# Patient Record
Sex: FEMALE | Race: BLACK OR AFRICAN AMERICAN | NOT HISPANIC OR LATINO | Employment: UNEMPLOYED | URBAN - METROPOLITAN AREA
[De-identification: names, ages, dates, MRNs, and addresses within clinical notes are randomized per-mention and may not be internally consistent; named-entity substitution may affect disease eponyms.]

---

## 2017-02-03 ENCOUNTER — HOSPITAL ENCOUNTER (EMERGENCY)
Facility: HOSPITAL | Age: 27
Discharge: HOME | End: 2017-02-03
Payer: MEDICAID

## 2017-02-03 VITALS
HEART RATE: 80 BPM | HEIGHT: 63 IN | SYSTOLIC BLOOD PRESSURE: 121 MMHG | WEIGHT: 160 LBS | RESPIRATION RATE: 18 BRPM | OXYGEN SATURATION: 100 % | BODY MASS INDEX: 28.35 KG/M2 | TEMPERATURE: 98 F | DIASTOLIC BLOOD PRESSURE: 63 MMHG

## 2017-02-03 DIAGNOSIS — R10.31 RIGHT LOWER QUADRANT PAIN: ICD-10-CM

## 2017-02-03 DIAGNOSIS — O26.891 ABDOMINAL PAIN IN PREGNANCY, FIRST TRIMESTER: Primary | ICD-10-CM

## 2017-02-03 DIAGNOSIS — R10.9 ABDOMINAL PAIN IN PREGNANCY, FIRST TRIMESTER: Primary | ICD-10-CM

## 2017-02-03 LAB
ABO + RH BLD: NORMAL
ALBUMIN SERPL BCP-MCNC: 4 G/DL
ALP SERPL-CCNC: 57 U/L
ALT SERPL W/O P-5'-P-CCNC: 7 U/L
ANION GAP SERPL CALC-SCNC: 7 MMOL/L
AST SERPL-CCNC: 15 U/L
B-HCG UR QL: POSITIVE
BACTERIA GENITAL QL WET PREP: ABNORMAL
BASOPHILS # BLD AUTO: 0.06 K/UL
BASOPHILS NFR BLD: 0.9 %
BILIRUB SERPL-MCNC: 0.8 MG/DL
BILIRUB UR QL STRIP: NEGATIVE
BUN SERPL-MCNC: 6 MG/DL
CALCIUM SERPL-MCNC: 8.8 MG/DL
CHLORIDE SERPL-SCNC: 106 MMOL/L
CLARITY UR: CLEAR
CLUE CELLS VAG QL WET PREP: ABNORMAL
CO2 SERPL-SCNC: 25 MMOL/L
COLOR UR: YELLOW
CREAT SERPL-MCNC: 0.7 MG/DL
CTP QC/QA: YES
DIFFERENTIAL METHOD: ABNORMAL
EOSINOPHIL # BLD AUTO: 0.2 K/UL
EOSINOPHIL NFR BLD: 3 %
ERYTHROCYTE [DISTWIDTH] IN BLOOD BY AUTOMATED COUNT: 13.3 %
EST. GFR  (AFRICAN AMERICAN): >60 ML/MIN/1.73 M^2
EST. GFR  (NON AFRICAN AMERICAN): >60 ML/MIN/1.73 M^2
FILAMENT FUNGI VAG WET PREP-#/AREA: ABNORMAL
GLUCOSE SERPL-MCNC: 96 MG/DL
GLUCOSE UR QL STRIP: NEGATIVE
HCG INTACT+B SERPL-ACNC: 1858 MIU/ML
HCT VFR BLD AUTO: 34.7 %
HGB BLD-MCNC: 11.6 G/DL
HGB UR QL STRIP: ABNORMAL
KETONES UR QL STRIP: NEGATIVE
LEUKOCYTE ESTERASE UR QL STRIP: ABNORMAL
LIPASE SERPL-CCNC: 15 U/L
LYMPHOCYTES # BLD AUTO: 2.1 K/UL
LYMPHOCYTES NFR BLD: 29.5 %
MCH RBC QN AUTO: 28.6 PG
MCHC RBC AUTO-ENTMCNC: 33.4 %
MCV RBC AUTO: 86 FL
MICROSCOPIC COMMENT: NORMAL
MONOCYTES # BLD AUTO: 0.4 K/UL
MONOCYTES NFR BLD: 5.8 %
NEUTROPHILS # BLD AUTO: 4.3 K/UL
NEUTROPHILS NFR BLD: 60.7 %
NITRITE UR QL STRIP: NEGATIVE
PH UR STRIP: 6 [PH] (ref 5–8)
PLATELET # BLD AUTO: 328 K/UL
PMV BLD AUTO: 9 FL
POTASSIUM SERPL-SCNC: 3.8 MMOL/L
PROT SERPL-MCNC: 7.3 G/DL
PROT UR QL STRIP: NEGATIVE
RBC # BLD AUTO: 4.05 M/UL
RBC #/AREA URNS HPF: 4 /HPF (ref 0–4)
SODIUM SERPL-SCNC: 138 MMOL/L
SP GR UR STRIP: 1.02 (ref 1–1.03)
SPECIMEN SOURCE: ABNORMAL
SQUAMOUS #/AREA URNS HPF: 8 /HPF
T VAGINALIS GENITAL QL WET PREP: ABNORMAL
URN SPEC COLLECT METH UR: ABNORMAL
UROBILINOGEN UR STRIP-ACNC: NEGATIVE EU/DL
WBC # BLD AUTO: 7.05 K/UL
WBC #/AREA URNS HPF: 4 /HPF (ref 0–5)
WBC #/AREA VAG WET PREP: ABNORMAL
YEAST GENITAL QL WET PREP: ABNORMAL

## 2017-02-03 PROCEDURE — 85025 COMPLETE CBC W/AUTO DIFF WBC: CPT

## 2017-02-03 PROCEDURE — 87086 URINE CULTURE/COLONY COUNT: CPT

## 2017-02-03 PROCEDURE — 86900 BLOOD TYPING SEROLOGIC ABO: CPT

## 2017-02-03 PROCEDURE — 96360 HYDRATION IV INFUSION INIT: CPT

## 2017-02-03 PROCEDURE — 80053 COMPREHEN METABOLIC PANEL: CPT

## 2017-02-03 PROCEDURE — 87210 SMEAR WET MOUNT SALINE/INK: CPT

## 2017-02-03 PROCEDURE — 25000003 PHARM REV CODE 250: Performed by: NURSE PRACTITIONER

## 2017-02-03 PROCEDURE — 86901 BLOOD TYPING SEROLOGIC RH(D): CPT

## 2017-02-03 PROCEDURE — 81025 URINE PREGNANCY TEST: CPT

## 2017-02-03 PROCEDURE — 84702 CHORIONIC GONADOTROPIN TEST: CPT

## 2017-02-03 PROCEDURE — 99284 EMERGENCY DEPT VISIT MOD MDM: CPT | Mod: 25

## 2017-02-03 PROCEDURE — 81000 URINALYSIS NONAUTO W/SCOPE: CPT

## 2017-02-03 PROCEDURE — 87591 N.GONORRHOEAE DNA AMP PROB: CPT

## 2017-02-03 PROCEDURE — 83690 ASSAY OF LIPASE: CPT

## 2017-02-03 RX ORDER — ACETAMINOPHEN 500 MG
500 TABLET ORAL
Status: COMPLETED | OUTPATIENT
Start: 2017-02-03 | End: 2017-02-03

## 2017-02-03 RX ADMIN — SODIUM CHLORIDE 1000 ML: 0.9 INJECTION, SOLUTION INTRAVENOUS at 09:02

## 2017-02-03 RX ADMIN — ACETAMINOPHEN 500 MG: 500 TABLET ORAL at 09:02

## 2017-02-03 NOTE — ED AVS SNAPSHOT
[facility]  [place] [address]               Christopher Austin   2/3/2017  8:56 AM   ED    Description:  Female : 1990   Department:  [facility]           Your Care was Coordinated By:     Provider Role From To    Bienvenido Holliday MD Attending Provider 17 --    PATRICIA Orantes Nurse Practitioner 17 --      Reason for Visit     Abdominal Pain           Diagnoses this Visit        Comments    Abdominal pain in pregnancy, first trimester    -  Primary     Right lower quadrant pain           ED Disposition     ED Disposition Condition Comment    Discharge             To Do List           Follow-up Information     Follow up with [facility] In 2 days.    Specialty:  Emergency Medicine    Why:  For Follow-Up or , If symptoms worsen    Contact information:    [place] [address]  [phone]      [facility] On Call     [facility] -  Assistance  Registered nurses in the [facility] provide clinical advisement, health education, appointment booking, and other advisory services.  Call for this free service at [phone].             Medications           Message regarding Medications     Verify the changes and/or additions to your medication regime listed below are the same as discussed with your clinician today.  If any of these changes or additions are incorrect, please notify your healthcare provider.        These medications were administered today        Dose Freq    sodium chloride 0.9% bolus 1,000 mL 1,000 mL ED 1 Time    Sig: Inject 1,000 mLs into the vein ED 1 Time.    Class: Normal    Route: Intravenous    acetaminophen tablet 500 mg 500 mg ED 1 Time    Sig: Take 1 tablet (500 mg total) by mouth ED 1 Time.    Class: Normal    Route: Oral           Verify that the below list of medications is an accurate representation of the  "medications you are currently taking.  If none reported, the list may be blank. If incorrect, please contact your healthcare provider. Carry this list with you in case of emergency.           Current Medications     fluconazole (DIFLUCAN) 150 MG Tab Take 1 tablet (150 mg total) by mouth as needed. Take one pill weekly as needed for yeast infection.    norgestimate-ethinyl estradiol (SPRINTEC, 28,) 0.25-35 mg-mcg per tablet Take 1 tablet by mouth once daily.           Clinical Reference Information           Your Vitals Were     BP Pulse Temp Resp Height Weight    109/55 (BP Location: Right arm, Patient Position: Sitting, BP Method: Automatic) 70 97.7 °F (36.5 °C) (Oral) 18 5' 3" (1.6 m) 72.6 kg (160 lb)    SpO2 BMI             100% 28.34 kg/m2         Allergies as of 2/3/2017     No Known Allergies      Immunizations Administered on Date of Encounter - 2/3/2017     None      ED Micro, Lab, POCT     Start Ordered       Status Ordering Provider    02/03/17 0926 02/03/17 0927  Vaginal Screen Vagina  STAT      Final result     02/03/17 0923 02/03/17 0927  C. trachomatis/N. gonorrhoeae by AMP DNA Cervix  STAT      In process     02/03/17 0922 02/03/17 0921  Urine culture **CANNOT BE ORDERED STAT**  Once      In process     02/03/17 0922 02/03/17 0921  hCG, quantitative, pregnancy  STAT      Final result     02/03/17 0814 02/03/17 0813  CBC auto differential  Once      Final result     02/03/17 0814 02/03/17 0813  Comprehensive metabolic panel  Once      Final result     02/03/17 0814 02/03/17 0813  Urinalysis  Once      Final result     02/03/17 0814 02/03/17 0813  Lipase  Once     Comments:  For upper or mid abdominal pain.    Final result     02/03/17 0814 02/03/17 0813  POCT urine pregnancy  Once     Comments:  For women of childbearing age w/o hysterectomy.    Final result     02/03/17 0813 02/03/17 0813  Urinalysis Microscopic  Once      Final result       ED Imaging Orders     Start Ordered       Status Ordering " Provider    02/03/17 1000 02/03/17 1000    1 time imaging,   Status:  Canceled      Canceled     02/03/17 0928 02/03/17 0927  US OB Less Than 14 Wks with Transvag(xpd  1 time imaging      Final result         Discharge Instructions       Please return to the Emergency Department for any new or worsening symptoms including: any vaginal bleeding, worsening or changes in the abdominal pain, unable to eat or drink, fever, chest pain, shortness of breath, loss of consciousness, dizziness, weakness, or any other concerns.     You can begin taking a prenatal vitamin.  And please do not take any other medications other than Tylenol unless otherwise instructed by your OBGYN    Pelvic rest.  Return to the Emergency department on Sunday for follow-up evaluation to ensure that you do not have an ectopic pregnancy.    Discharge References/Attachments     ABDOMINAL PAIN, EARLY PREGNANCY (ENGLISH)      Smoking Cessation     If you would like to quit smoking:   You may be eligible for free services if you are a [place] resident and started smoking cigarettes before September 1, 1988.  Call the Smoking Cessation [employer] ([facility]) toll free at ([phone] or [phone] [phone].   Call 7-445-QUIT-NOW if you do not meet the above criteria.             [facility] complies with applicable Federal civil rights laws and does not discriminate on the basis of race, color, national origin, age, disability, or sex.        Language Assistance Services     ATTENTION: Language assistance services are available, free of charge. Please call [phone].      ATENCIÓN: Si habla español, tiene a ramsey disposición servicios gratuitos de asistencia lingüística. Llame al [phone].     CHÚ Ý: N?u b?n nói Ti?ng Vi?t, có các d?ch v? h? tr? ngôn ng? mi?n phí dành cho b?n. G?i s? [phone].

## 2017-02-03 NOTE — DISCHARGE INSTRUCTIONS
Please return to the Emergency Department for any new or worsening symptoms including: any vaginal bleeding, worsening or changes in the abdominal pain, unable to eat or drink, fever, chest pain, shortness of breath, loss of consciousness, dizziness, weakness, or any other concerns.     You can begin taking a prenatal vitamin.  And please do not take any other medications other than Tylenol unless otherwise instructed by your OBGYN    Pelvic rest.  Return to the Emergency department on Sunday for follow-up evaluation to ensure that you do not have an ectopic pregnancy.

## 2017-02-03 NOTE — ED PROVIDER NOTES
Encounter Date: 2/3/2017       History     Chief Complaint   Patient presents with    Abdominal Pain     lower abd pain and lower back pain x1 week     Review of patient's allergies indicates:  No Known Allergies  HPI Comments: CC: Abdominal Pain and Back Pain  HPI: Christopher Avila, a 26 y.o. female that presents to the ED for a 1 week history of bilateral lower muscular back pain and right lower abdominal pain.  She reports her back pain pain is constant, nonradiating, and moderate to severe.  She reports her abdominal pain is mild to moderate cramping, aching. She also reports some increased urinary frequency.  She has not taken any medications for her symptoms prior to arrival.  She reports that she has been diagnosed with a cyst on her right ovary at which time she was placed on birth control by her OB/GYN but stopped approximately 2 months ago because it was not helping with the pain.     The history is provided by the patient. No  was used.     History reviewed. No pertinent past medical history.  No past medical history pertinent negatives.  History reviewed. No pertinent past surgical history.  Family History   Problem Relation Age of Onset    Diabetes Mother     Cancer Mother     Diabetes Maternal Grandmother      Social History   Substance Use Topics    Smoking status: Former Smoker    Smokeless tobacco: None    Alcohol use Yes      Comment: occasional      Review of Systems   Constitutional: Positive for chills. Negative for fever.   HENT: Negative for congestion and trouble swallowing.    Eyes: Negative for visual disturbance.   Respiratory: Negative for chest tightness and shortness of breath.    Gastrointestinal: Positive for abdominal pain. Negative for nausea and vomiting.   Genitourinary: Positive for frequency. Negative for dysuria, vaginal bleeding and vaginal discharge.   Musculoskeletal: Positive for back pain. Negative for neck pain and neck stiffness.   Skin: Negative  for rash and wound.   Neurological: Negative for dizziness, syncope, numbness and headaches.   Psychiatric/Behavioral: Negative for confusion.       Physical Exam   Initial Vitals   BP Pulse Resp Temp SpO2   02/03/17 0810 02/03/17 0810 02/03/17 0810 02/03/17 0810 02/03/17 0810   122/59 89 17 98.5 °F (36.9 °C) 100 %     Physical Exam    Nursing note and vitals reviewed.  Constitutional: She appears well-developed and well-nourished. She is not diaphoretic. She is cooperative.  Non-toxic appearance. No distress.   HENT:   Head: Normocephalic and atraumatic.   Right Ear: External ear normal.   Left Ear: External ear normal.   Nose: Nose normal.   Mouth/Throat: Oropharynx is clear and moist.   Eyes: Conjunctivae and EOM are normal.   Neck: Normal range of motion. No tracheal deviation present.   Cardiovascular: Normal rate, regular rhythm, normal heart sounds and intact distal pulses. Exam reveals no gallop and no friction rub.    No murmur heard.  Pulmonary/Chest: Effort normal and breath sounds normal. No stridor. No tachypnea and no bradypnea. No respiratory distress. She has no wheezes. She has no rhonchi. She has no rales. She exhibits no tenderness.   Abdominal: Soft. Bowel sounds are normal. She exhibits no distension and no mass. There is tenderness (mild) in the right lower quadrant. There is no rigidity, no rebound, no guarding and no CVA tenderness.   She reported some mild tenderness to the right lower abdomen on palpation however no rebound, guarding, or masses.  The remainder the abdomen is soft and nontender.   Genitourinary: Pelvic exam was performed with patient supine. There is no tenderness on the right labia. There is no tenderness on the left labia. Cervix exhibits no motion tenderness, no discharge and no friability. Right adnexum displays tenderness. Right adnexum displays no mass and no fullness. Left adnexum displays no mass, no tenderness and no fullness. There is bleeding in the vagina.    Genitourinary Comments: Exam chaperoned by TATYANA Gudino.  External exam: No erythema or tenderness.  Internal exam: Scant white vaginal discharge noted.  Cervical os closed.  No CMT or cervical friability.  No cervical discharge.  No tenderness or fullness in the left adnexa.  Mild tenderness in the right adnexa to palpation without fullness or mass noted on internal exam.   Musculoskeletal: Normal range of motion.        Thoracic back: She exhibits no tenderness, no bony tenderness and no pain.        Lumbar back: She exhibits tenderness and pain. She exhibits normal range of motion and no bony tenderness.        Back:    Tender to palpation of the bilateral lower muscular lumbar back without midline tenderness or focal tenderness palpation.   Lymphadenopathy:     She has no cervical adenopathy.   Neurological: She is alert and oriented to person, place, and time. She has normal strength. No sensory deficit. GCS eye subscore is 4. GCS verbal subscore is 5. GCS motor subscore is 6.   Ambulates with a steady and even gait.   Skin: Skin is warm, dry and intact. No rash noted. No cyanosis or erythema. Nails show no clubbing.   Psychiatric: She has a normal mood and affect. Her speech is normal and behavior is normal. Thought content normal.         ED Course   Procedures  Labs Reviewed   CBC W/ AUTO DIFFERENTIAL - Abnormal; Notable for the following:        Result Value    Hemoglobin 11.6 (*)     Hematocrit 34.7 (*)     MPV 9.0 (*)     All other components within normal limits    Narrative:     For upper or mid abdominal pain.   COMPREHENSIVE METABOLIC PANEL - Abnormal; Notable for the following:     ALT 7 (*)     Anion Gap 7 (*)     All other components within normal limits    Narrative:     For upper or mid abdominal pain.   URINALYSIS - Abnormal; Notable for the following:     Occult Blood UA 1+ (*)     Leukocytes, UA 1+ (*)     All other components within normal limits   VAGINAL SCREEN - Abnormal; Notable for the  following:     WBC - Vaginal Screen Rare (*)     All other components within normal limits   POCT URINE PREGNANCY - Abnormal; Notable for the following:     POC Preg Test, Ur Positive (*)     All other components within normal limits   CULTURE, URINE   C. TRACHOMATIS/N. GONORRHOEAE BY AMP DNA   LIPASE    Narrative:     For upper or mid abdominal pain.   URINALYSIS MICROSCOPIC   HCG, QUANTITATIVE, PREGNANCY   GROUP & RH             Medical Decision Making:   Clinical Tests:   Lab Tests: Ordered and Reviewed  Radiological Study: Ordered and Reviewed       APC / Resident Notes:   This is an evaluation of a 26-year-old female that presents emergency Department with 1 week history of bilateral lower back pain and a one-week history her right lower abdominal pain.  Of note, there was an incidental finding of a positive pregnancy test on her ED evaluation today.  She was unaware that she was pregnant.  This pregnancy will be . The patient is a non-toxic, afebrile, and well appearing female. On physical exam, abdomen soft with mild tenderness in the right lower quadrant, the remaining abdomen is soft and nontender, tenderness to palpation over the bilateral muscular lumbar back without midline tenderness.  Pelvic exam with white vaginal discharge noted.  No cervical discharge.  Cervical os closed.  No CMT.  Right adnexal tenderness noted on palpation. Vital Signs Are Reassuring.  RESULTS: UPT positive.  CBC without leukocytosis.  Mild anemia at 11.6 and 34.7 - this is stable from labs 3 months ago.  Platelet count.  CMP was normal renal function, electrolytes, low ALT at 7, low anion gap at 7.  Urine with 1+ leukocytes 1+ occult blood and nitrite negative.  Blood type is A+.  Beta hCG is 1858.  Wet Prep with rare WBCs.  Ultrasound with small possible gestational sac of 5 weeks 3 days.  No yolk sac or fetal pole identified.  Appendix not visualized.    Given the above findings, my overall impression is abdominal pain in  pregnancy and back pain - likely MSK vs Pregnancy. Given the above findings, I do not think the patient has very torsion, TOA, bowel obstruction, bowel perforation, .  I do not suspect appendicitis at this time.  Unable to completely rule out ectopic at this time so return precautions have been given and will have the patient return in 48 hours for repeat evaluation and beta hCG with ultrasound.    ED Treatments: IV fluids and Tylenol. Additional recommendations: Pelvic rest, prenatal vitamins, return to ED in 48 hours for reevaluation. The diagnosis, treatment plan, instructions for follow-up and reevaluation in the ED in 48 hours as well as ED return precautions have been discussed and she has verbalized an understanding of the information. All questions or concerns have been addressed. This case was discussed with Dr. Holliday who is in agreement with my assessment and plan. LIZETH Ruiz, PATRICIA-C          Attending Attestation:     Physician Attestation Statement for NP/PA:   I have conducted a face to face encounter with this patient in addition to the NP/PA, due to Medical Complexity    Other NP/PA Attestation Additions:    History of Present Illness:  5 para 4 AB 0   Physical Exam: Nontoxic-appearing                  ED Course     Clinical Impression:   The primary encounter diagnosis was Abdominal pain in pregnancy, first trimester. A diagnosis of Right lower quadrant pain was also pertinent to this visit.    Disposition:   Disposition: Discharged  Condition: Stable       PATRICIA Orantes  17 [phone]

## 2017-02-03 NOTE — ED TRIAGE NOTES
Pt arrives to ED via personal transportation with c/o abdominal pain, dysuria and back pain x 1 week and 1 episode of emesis today. Denies any recent injury, heavy lifting or any other symptoms at this time.

## 2017-02-05 ENCOUNTER — HOSPITAL ENCOUNTER (EMERGENCY)
Facility: HOSPITAL | Age: 27
Discharge: HOME | End: 2017-02-05
Attending: EMERGENCY MEDICINE
Payer: MEDICAID

## 2017-02-05 VITALS
WEIGHT: 160 LBS | SYSTOLIC BLOOD PRESSURE: 124 MMHG | OXYGEN SATURATION: 99 % | RESPIRATION RATE: 20 BRPM | TEMPERATURE: 98 F | HEIGHT: 63 IN | HEART RATE: 61 BPM | DIASTOLIC BLOOD PRESSURE: 74 MMHG | BODY MASS INDEX: 28.35 KG/M2

## 2017-02-05 DIAGNOSIS — M54.9 BACK PAIN AFFECTING PREGNANCY: Primary | ICD-10-CM

## 2017-02-05 DIAGNOSIS — O99.891 BACK PAIN AFFECTING PREGNANCY: Primary | ICD-10-CM

## 2017-02-05 LAB
BACTERIA UR CULT: NORMAL
HCG INTACT+B SERPL-ACNC: 3755 MIU/ML

## 2017-02-05 PROCEDURE — 84702 CHORIONIC GONADOTROPIN TEST: CPT

## 2017-02-05 PROCEDURE — 99284 EMERGENCY DEPT VISIT MOD MDM: CPT

## 2017-02-05 NOTE — ED PROVIDER NOTES
"Encounter Date: 2/5/2017    SCRIBE #1 NOTE: I, Emily Beckman, am scribing for, and in the presence of,  Morteza Sena MD. I have scribed the following portions of the note - Other sections scribed: HPI/ROS.       History     Chief Complaint   Patient presents with    Possible Pregnancy     Pt previously seen and instructed to follow up for Beta HCG and u/s     Review of patient's allergies indicates:  No Known Allergies  HPI Comments: CC: Possible Pregnancy    HPI: This 26 y.o. female with no pertinent PMHx presents to the ED for a possible pregnancy.  The patient reports she was seen here two days ago and was advised to return for a follow up beta HCG and ultra sound.  The patient c/o mild lower back pain that is described as "tight" for the past week.  She denies abdominal pain, emesis or any other associated symptoms.      The history is provided by the patient.     History reviewed. No pertinent past medical history.  No past medical history pertinent negatives.  History reviewed. No pertinent past surgical history.  Family History   Problem Relation Age of Onset    Diabetes Mother     Cancer Mother     Diabetes Maternal Grandmother      Social History   Substance Use Topics    Smoking status: Former Smoker    Smokeless tobacco: None    Alcohol use Yes      Comment: occasional      Review of Systems   Constitutional: Negative for fever.   Gastrointestinal: Negative for abdominal pain.   Musculoskeletal: Positive for back pain.       Physical Exam   Initial Vitals   BP Pulse Resp Temp SpO2   02/05/17 0846 02/05/17 0846 02/05/17 0846 02/05/17 0846 02/05/17 0846   99/54 81 18 98.7 °F (37.1 °C) 99 %     Physical Exam    Nursing note and vitals reviewed.  HENT:   Head: Atraumatic.   Eyes: Conjunctivae and EOM are normal.   Neck: Normal range of motion.   Cardiovascular: Exam reveals no gallop and no friction rub.    No murmur heard.  Pulmonary/Chest: Breath sounds normal. No respiratory distress.   Abdominal: " Soft. She exhibits no distension. There is no tenderness. There is no rebound.   Musculoskeletal: Normal range of motion. She exhibits no edema.   Neurological: She is alert and oriented to person, place, and time.   Psychiatric: She has a normal mood and affect.         ED Course   Procedures  Labs Reviewed   HCG, QUANTITATIVE, PREGNANCY             Medical Decision Making:   Initial Assessment:   26-year-old female presenting for follow-up of first trimester pain.  Was seen 2 days ago for back pain, diagnosed with pregnancy.  Ultrasound showed likely 5 week IUP however ectopic cannot be fully excluded.  HCG at that time was 1800.  Today she complains of mild back pain that is ongoing but no abdominal pain or vaginal bleeding since discharge.  HCG today is 3755.  Pelvic ultrasound shows single intrauterine gestation.  No evidence of ectopic pregnancy.  Plan for OB follow-up.  Start prenatal vitamins.  Return instructions given.  Patient verbalized understanding and agreement with the plan.            Scribe Attestation:   Scribe #1: I performed the above scribed service and the documentation accurately describes the services I performed. I attest to the accuracy of the note.    Attending Attestation:           Physician Attestation for Scribe:  Physician Attestation Statement for Scribe #1: I, Morteza Sena MD, reviewed documentation, as scribed by Emily Beckman in my presence, and it is both accurate and complete.                 ED Course     Clinical Impression:   The encounter diagnosis was Back pain affecting pregnancy.          Morteza Sena MD  02/05/17 6046

## 2017-02-05 NOTE — ED AVS SNAPSHOT
[facility]  [address] [place] [address]               Christopher Avila   2017  9:09 AM   ED    Description:  Female : 1990   Department:  [facility]           Your Care was Coordinated By:     Provider Role From To    Morteza Sena MD Attending Provider 17 0909 --      Reason for Visit     Possible Pregnancy           Diagnoses this Visit        Comments    Back pain affecting pregnancy    -  Primary       ED Disposition     ED Disposition Condition Comment    Discharge             To Do List           Follow-up Information     Follow up with Lisette Gilman MD. Schedule an appointment as soon as possible for a visit in 3 days.    Specialty:  Obstetrics and Gynecology    Contact information:    [facility]  [address]  [place] [address]  [phone]         These Medications        Disp Refills Start End    PNV cmb#21-iron-folic acid (PRENATAL COMPLETE)  mg-mcg Tab 30 tablet 0 2017    Take 1 tablet by mouth once daily. - Oral    Ph[facility] [place] [facility] [address] Ph #: [phone]         [facility]     [facility] -  Assistance  Registered nurses in the [facility] provide clinical advisement, health education, appointment booking, and other advisory services.  Call for this free service at [phone].             Medications           Message regarding Medications     Verify the changes and/or additions to your medication regime listed below are the same as discussed with your clinician today.  If any of these changes or additions are incorrect, please notify your healthcare provider.        START taking these NEW medications        Refills    PNV cmb#21-iron-folic acid (PRENATAL COMPLETE)  mg-mcg Tab 0    Sig: Take 1 tablet by mouth once daily.    Class: Print    Route: Oral      STOP taking these medications     fluconazole  "(DIFLUCAN) 150 MG Tab Take 1 tablet (150 mg total) by mouth as needed. Take one pill weekly as needed for yeast infection.    norgestimate-ethinyl estradiol (SPRINTEC, 28,) 0.25-35 mg-mcg per tablet Take 1 tablet by mouth once daily.           Verify that the below list of medications is an accurate representation of the medications you are currently taking.  If none reported, the list may be blank. If incorrect, please contact your healthcare provider. Carry this list with you in case of emergency.           Current Medications     PNV cmb#21-iron-folic acid (PRENATAL COMPLETE)  mg-mcg Tab Take 1 tablet by mouth once daily.           Clinical Reference Information           Your Vitals Were     BP Pulse Temp Resp Height Weight    99/54 (BP Location: Right arm) 81 98.7 °F (37.1 °C) (Oral) 18 5' 3" (1.6 m) 72.6 kg (160 lb)    Last Period SpO2 BMI          12/16/2016 99% 28.34 kg/m2        Allergies as of 2/5/2017     No Known Allergies      Immunizations Administered on Date of Encounter - 2/5/2017     None      ED Micro, Lab, POCT     Start Ordered       Status Ordering Provider    02/05/17 0918 02/05/17 0917  hCG, quantitative  STAT      Final result     02/05/17 0852 02/05/17 0851    Once,   Status:  Canceled      Canceled       ED Imaging Orders     Start Ordered       Status Ordering Provider    02/05/17 0924 02/05/17 0917  US OB Less Than 14 Wks with Transvaginal (xpd)  1 time imaging      Final result     02/05/17 0918 02/05/17 0917    1 time imaging,   Status:  Canceled      Canceled       Discharge References/Attachments     BACK PAIN DURING PREGNANCY: POSITIONING YOURSELF (ENGLISH)    PREGNANCY, BACK PAIN DURING (ENGLISH)      Smoking Cessation     If you would like to quit smoking:   You may be eligible for free services if you are a [place] resident and started smoking cigarettes before September 1, 1988.  Call the Smoking Cessation Trust ([employer]) toll free at (024) 478-2393 or (149) " 131-0194.   Call 1-800-QUIT-NOW if you do not meet the above criteria.             [facility] complies with applicable Federal civil rights laws and does not discriminate on the basis of race, color, national origin, age, disability, or sex.        Language Assistance Services     ATTENTION: Language assistance services are available, free of charge. Please call [phone].      ATENCIÓN: Si habla español, tiene a ramsey disposición servicios gratuitos de asistencia lingüística. Llame al [phone].     CHÚ Ý: N?u b?n nói Ti?ng Vi?t, có các d?ch v? h? tr? ngôn ng? mi?n phí dành cho b?n. G?i s? [phone].

## 2017-02-07 LAB
C TRACH DNA SPEC QL NAA+PROBE: NEGATIVE
N GONORRHOEA DNA SPEC QL NAA+PROBE: NEGATIVE

## 2017-02-12 ENCOUNTER — HOSPITAL ENCOUNTER (EMERGENCY)
Facility: HOSPITAL | Age: 27
Discharge: HOME | End: 2017-02-12
Attending: EMERGENCY MEDICINE
Payer: MEDICAID

## 2017-02-12 VITALS
HEIGHT: 63 IN | DIASTOLIC BLOOD PRESSURE: 60 MMHG | SYSTOLIC BLOOD PRESSURE: 110 MMHG | TEMPERATURE: 98 F | WEIGHT: 160 LBS | RESPIRATION RATE: 20 BRPM | BODY MASS INDEX: 28.35 KG/M2 | OXYGEN SATURATION: 99 % | HEART RATE: 88 BPM

## 2017-02-12 DIAGNOSIS — R10.30 PREGNANCY WITH ABDOMINAL CRAMPING OF LOWER QUADRANT, ANTEPARTUM: Primary | ICD-10-CM

## 2017-02-12 DIAGNOSIS — O26.899 PREGNANCY WITH ABDOMINAL CRAMPING OF LOWER QUADRANT, ANTEPARTUM: Primary | ICD-10-CM

## 2017-02-12 DIAGNOSIS — O21.9 NAUSEA AND VOMITING IN PREGNANCY PRIOR TO 22 WEEKS GESTATION: ICD-10-CM

## 2017-02-12 DIAGNOSIS — N39.0 ACUTE LOWER UTI: ICD-10-CM

## 2017-02-12 LAB
ALBUMIN SERPL BCP-MCNC: 3.8 G/DL
ALP SERPL-CCNC: 53 U/L
ALT SERPL W/O P-5'-P-CCNC: 7 U/L
ANION GAP SERPL CALC-SCNC: 9 MMOL/L
AST SERPL-CCNC: 13 U/L
B-HCG UR QL: POSITIVE
BACTERIA #/AREA URNS HPF: ABNORMAL /HPF
BASOPHILS # BLD AUTO: 0.05 K/UL
BASOPHILS NFR BLD: 0.9 %
BILIRUB SERPL-MCNC: 1.6 MG/DL
BILIRUB UR QL STRIP: NEGATIVE
BUN SERPL-MCNC: 6 MG/DL
CALCIUM SERPL-MCNC: 8.9 MG/DL
CHLORIDE SERPL-SCNC: 106 MMOL/L
CLARITY UR: ABNORMAL
CO2 SERPL-SCNC: 22 MMOL/L
COLOR UR: YELLOW
CREAT SERPL-MCNC: 0.8 MG/DL
CTP QC/QA: YES
DIFFERENTIAL METHOD: ABNORMAL
EOSINOPHIL # BLD AUTO: 0.2 K/UL
EOSINOPHIL NFR BLD: 2.7 %
ERYTHROCYTE [DISTWIDTH] IN BLOOD BY AUTOMATED COUNT: 13 %
EST. GFR  (AFRICAN AMERICAN): >60 ML/MIN/1.73 M^2
EST. GFR  (NON AFRICAN AMERICAN): >60 ML/MIN/1.73 M^2
GLUCOSE SERPL-MCNC: 100 MG/DL
GLUCOSE UR QL STRIP: NEGATIVE
HCT VFR BLD AUTO: 36.4 %
HGB BLD-MCNC: 12.1 G/DL
HGB UR QL STRIP: ABNORMAL
HYALINE CASTS #/AREA URNS LPF: 0 /LPF
KETONES UR QL STRIP: NEGATIVE
LEUKOCYTE ESTERASE UR QL STRIP: ABNORMAL
LIPASE SERPL-CCNC: 13 U/L
LYMPHOCYTES # BLD AUTO: 1.4 K/UL
LYMPHOCYTES NFR BLD: 25.5 %
MCH RBC QN AUTO: 28.3 PG
MCHC RBC AUTO-ENTMCNC: 33.2 %
MCV RBC AUTO: 85 FL
MICROSCOPIC COMMENT: ABNORMAL
MONOCYTES # BLD AUTO: 0.5 K/UL
MONOCYTES NFR BLD: 9 %
NEUTROPHILS # BLD AUTO: 3.5 K/UL
NEUTROPHILS NFR BLD: 61.7 %
NITRITE UR QL STRIP: NEGATIVE
PH UR STRIP: 5 [PH] (ref 5–8)
PLATELET # BLD AUTO: 312 K/UL
PMV BLD AUTO: 9.1 FL
POTASSIUM SERPL-SCNC: 3.7 MMOL/L
PROT SERPL-MCNC: 7.2 G/DL
PROT UR QL STRIP: ABNORMAL
RBC # BLD AUTO: 4.28 M/UL
RBC #/AREA URNS HPF: 12 /HPF (ref 0–4)
SODIUM SERPL-SCNC: 137 MMOL/L
SP GR UR STRIP: 1.02 (ref 1–1.03)
SQUAMOUS #/AREA URNS HPF: 40 /HPF
URN SPEC COLLECT METH UR: ABNORMAL
UROBILINOGEN UR STRIP-ACNC: NEGATIVE EU/DL
WBC # BLD AUTO: 5.64 K/UL
WBC #/AREA URNS HPF: 6 /HPF (ref 0–5)

## 2017-02-12 PROCEDURE — 96375 TX/PRO/DX INJ NEW DRUG ADDON: CPT

## 2017-02-12 PROCEDURE — 83690 ASSAY OF LIPASE: CPT

## 2017-02-12 PROCEDURE — 25000003 PHARM REV CODE 250: Performed by: EMERGENCY MEDICINE

## 2017-02-12 PROCEDURE — 87086 URINE CULTURE/COLONY COUNT: CPT

## 2017-02-12 PROCEDURE — 96365 THER/PROPH/DIAG IV INF INIT: CPT

## 2017-02-12 PROCEDURE — 81025 URINE PREGNANCY TEST: CPT | Performed by: EMERGENCY MEDICINE

## 2017-02-12 PROCEDURE — 81000 URINALYSIS NONAUTO W/SCOPE: CPT

## 2017-02-12 PROCEDURE — 99284 EMERGENCY DEPT VISIT MOD MDM: CPT | Mod: 25

## 2017-02-12 PROCEDURE — 85025 COMPLETE CBC W/AUTO DIFF WBC: CPT

## 2017-02-12 PROCEDURE — 80053 COMPREHEN METABOLIC PANEL: CPT

## 2017-02-12 PROCEDURE — 96361 HYDRATE IV INFUSION ADD-ON: CPT

## 2017-02-12 PROCEDURE — 63600175 PHARM REV CODE 636 W HCPCS: Performed by: EMERGENCY MEDICINE

## 2017-02-12 RX ORDER — CEPHALEXIN 500 MG/1
1000 CAPSULE ORAL EVERY 12 HOURS
Qty: 40 CAPSULE | Refills: 0 | Status: SHIPPED | OUTPATIENT
Start: 2017-02-12 | End: 2017-02-12

## 2017-02-12 RX ORDER — PROMETHAZINE HYDROCHLORIDE 12.5 MG/1
12.5 TABLET ORAL EVERY 6 HOURS PRN
Qty: 15 TABLET | Refills: 0 | Status: SHIPPED | OUTPATIENT
Start: 2017-02-12 | End: 2017-02-15

## 2017-02-12 RX ORDER — ONDANSETRON 2 MG/ML
8 INJECTION INTRAMUSCULAR; INTRAVENOUS
Status: COMPLETED | OUTPATIENT
Start: 2017-02-12 | End: 2017-02-12

## 2017-02-12 RX ORDER — CEPHALEXIN 500 MG/1
1000 CAPSULE ORAL EVERY 12 HOURS
Qty: 40 CAPSULE | Refills: 0 | Status: SHIPPED | OUTPATIENT
Start: 2017-02-12 | End: 2017-02-22

## 2017-02-12 RX ADMIN — ONDANSETRON 8 MG: 2 INJECTION INTRAMUSCULAR; INTRAVENOUS at 08:02

## 2017-02-12 RX ADMIN — SODIUM CHLORIDE 1000 ML: 0.9 INJECTION, SOLUTION INTRAVENOUS at 08:02

## 2017-02-12 RX ADMIN — CEFTRIAXONE 2 G: 2 INJECTION, SOLUTION INTRAVENOUS at 09:02

## 2017-02-12 NOTE — ED TRIAGE NOTES
Lower abdominal pain for 2 days radiates to back nausea and vomiting 6 weeks pregnant drinking juice on arrival

## 2017-02-12 NOTE — ED AVS SNAPSHOT
[facility]  [address] [place] [address]               Christopher Avila   2017  7:26 AM   ED    Description:  Female : 1990   Department:  [facility]           Your Care was Coordinated By:     Provider Role From To    Eugenio Richard MD Attending Provider 17 0730 --      Reason for Visit     Abdominal Pain           Diagnoses this Visit        Comments    Pregnancy with abdominal cramping of lower quadrant, antepartum    -  Primary     Nausea and vomiting in pregnancy prior to 22 weeks gestation         Acute lower UTI           ED Disposition     ED Disposition Condition Comment    Discharge             To Do List           Follow-up Information     Schedule an appointment as soon as possible for a visit with Neeraj Guerra MD.    Specialty:  Internal Medicine    Contact information:    [address]  [place] [id number] [phone]         These Medications        Disp Refills Start End    promethazine (PHENERGAN) 12.5 MG Tab 15 tablet 0 2017     Take 1 tablet (12.5 mg total) by mouth every 6 (six) hours as needed (Nausea and vomiting). - Oral    Pharmacy: [facility] [address] [place] [address] Ph #: [phone]       cephALEXin (KEFLEX) 500 MG capsule 40 capsule 0 2017    Take 2 capsules (1,000 mg total) by mouth every 12 (twelve) hours. - Oral    Pharmacy: [facility] [address] [place] [address] Ph #: [phone]         [facility]     [facility] -  Assistance  Registered nurses in the [facility] provide clinical advisement, health education, appointment booking, and other advisory services.  Call for this free service at [phone].             Medications           Message regarding Medications     Verify the changes and/or additions to your medication regime listed below are the same as discussed with your clinician  "today.  If any of these changes or additions are incorrect, please notify your healthcare provider.        START taking these NEW medications        Refills    promethazine (PHENERGAN) 12.5 MG Tab 0    Sig: Take 1 tablet (12.5 mg total) by mouth every 6 (six) hours as needed (Nausea and vomiting).    Class: Print    Route: Oral    cephALEXin (KEFLEX) 500 MG capsule 0    Sig: Take 2 capsules (1,000 mg total) by mouth every 12 (twelve) hours.    Class: Normal    Route: Oral      These medications were administered today        Dose Freq    sodium chloride 0.9% bolus 1,000 mL 1,000 mL ED 1 Time    Sig: Inject 1,000 mLs into the vein ED 1 Time.    Class: Normal    Route: Intravenous    ondansetron injection 8 mg 8 mg ED 1 Time    Sig: Inject 8 mg into the vein ED 1 Time.    Class: Normal    Route: Intravenous    cefTRIAXone (ROCEPHIN) 2 g in dextrose 5 % 50 mL IVPB 2 g ED 1 Time    Sig: Inject 50 mLs (2 g total) into the vein ED 1 Time.    Class: Normal    Route: Intravenous           Verify that the below list of medications is an accurate representation of the medications you are currently taking.  If none reported, the list may be blank. If incorrect, please contact your healthcare provider. Carry this list with you in case of emergency.           Current Medications     cefTRIAXone (ROCEPHIN) 2 g in dextrose 5 % 50 mL IVPB Inject 50 mLs (2 g total) into the vein ED 1 Time.    cephALEXin (KEFLEX) 500 MG capsule Take 2 capsules (1,000 mg total) by mouth every 12 (twelve) hours.    PNV cmb#21-iron-folic acid (PRENATAL COMPLETE)  mg-mcg Tab Take 1 tablet by mouth once daily.    promethazine (PHENERGAN) 12.5 MG Tab Take 1 tablet (12.5 mg total) by mouth every 6 (six) hours as needed (Nausea and vomiting).           Clinical Reference Information           Your Vitals Were     BP Pulse Temp Resp Height Weight    92/47 (BP Location: Right arm, Patient Position: Sitting) 89 98.7 °F (37.1 °C) (Oral) 17 5' 3" (1.6 m) " 72.6 kg (160 lb)    Last Period SpO2 BMI          12/16/2016 98% 28.34 kg/m2        Allergies as of 2/12/2017     No Known Allergies      Immunizations Administered on Date of Encounter - 2/12/2017     None      ED Micro, Lab, POCT     Start Ordered       Status Ordering Provider    02/12/17 0742 02/12/17 0741  Lipase  STAT      Final result     02/12/17 0741 02/12/17 0741  CBC auto differential  STAT      Final result     02/12/17 0741 02/12/17 0741  Comprehensive metabolic panel  STAT      Final result     02/12/17 0733 02/12/17 0732  Urinalysis  STAT      Final result     02/12/17 0733 02/12/17 0732  Urine culture  Once      In process     02/12/17 0732 02/12/17 0732  Urinalysis Microscopic  Once      Final result     02/12/17 0725 02/12/17 0724  POCT urine pregnancy  Once      Final result       ED Imaging Orders     None      Discharge References/Attachments     BLADDER INFECTION, FEMALE (ADULT) (ENGLISH)    HYPEREMESIS GRAVIDARUM (ENGLISH)      Your Scheduled Appointments     Feb 23, 2017 11:30 AM CST   New Patient with Jitendra Guerra MD   [facility] ([facility]    [address] [facility] [address]  [address]  [facility] [address] [phone]              Smoking Cessation     If you would like to quit smoking:   You may be eligible for free services if you are a [place] resident and started smoking cigarettes before September 1, 1988.  Call the Smoking Cessation Trust ([facility]) toll free at (119) 919-7386 or (188) 783-0004.   Call 0-800-QUIT-NOW if you do not meet the above criteria.             [facility] complies with applicable Federal civil rights laws and does not discriminate on the basis of race, color, national origin, age, disability, or sex.        Language Assistance Services     ATTENTION: Language assistance services are available, free of charge. Please call [phone].      ATENCIÓN: Si habla español, tiene a ramsey disposición servicios gratuitos de asistencia lingüística.  Maria Luisa ly [phone].     LUCAS Ý: N?u b?n nói Ti?ng Vi?t, có các d?ch v? h? tr? deisiôn ng? mi?n phí dành cho b?n. G?i s? [phone].

## 2017-02-12 NOTE — ED PROVIDER NOTES
Encounter Date: 2017    SCRIBE #1 NOTE: I, Bola Langford, am scribing for, and in the presence of,  Eugenio Richard MD. I have scribed the following portions of the note - Other sections scribed: HPI and ROS.       History     Chief Complaint   Patient presents with    Abdominal Pain     radiating to back with NV     Review of patient's allergies indicates:  No Known Allergies  HPI Comments: Chief Complaint: Abdominal Pain    HPI: This A0 6 weeks gravid 26 y.o.female with no known PMHx presents to the ED c/o suprapubic abdominal pain. Symptoms have been ongoing since gestation onset. Symptoms reoccurred yesterday. There's associated nausea and vomiting. Patient reports constant emesis yesterday but endorses 2 episodes this morning. Patient radiates to bilateral lower back, left worse than right. However, patient was diagnosed with an UTI last month and suspects symptoms have not fully resolved. There's no attempted treatment for current symptoms. Patient also reports mild vaginal discharge. She also reports suffering with hypotension secondary to pregnancy. Patient denies fevers, vaginal bleeding or urinary symptoms.     The history is provided by the patient. No  was used.     Past Medical History:   Diagnosis Date    Pregnant      No past medical history pertinent negatives.  History reviewed. No pertinent surgical history.  Family History   Problem Relation Age of Onset    Diabetes Mother     Cancer Mother     Diabetes Maternal Grandmother      Social History   Substance Use Topics    Smoking status: Former Smoker    Smokeless tobacco: None    Alcohol use Yes      Comment: occasional prior to pregnancy     Review of Systems   Constitutional: Negative for chills and fever.   HENT: Negative for ear pain and sore throat.    Eyes: Negative for visual disturbance.   Respiratory: Negative for cough and shortness of breath.    Cardiovascular: Negative for chest pain.    Gastrointestinal: Positive for abdominal pain, nausea and vomiting. Negative for constipation and diarrhea.   Genitourinary: Positive for vaginal discharge. Negative for difficulty urinating, dysuria and vaginal bleeding.   Musculoskeletal: Positive for back pain.   Skin: Negative for rash.   Neurological: Negative for dizziness, weakness, light-headedness and headaches.       Physical Exam   Initial Vitals   BP Pulse Resp Temp SpO2   02/12/17 0719 02/12/17 0719 02/12/17 0719 02/12/17 0719 02/12/17 0719   92/47 89 17 98.7 °F (37.1 °C) 98 %     Physical Exam  Nursing note and vitals reviewed.  Constitutional: Uncomfortable appearing young female in no obvious distress  HENT:    Head: NC/AT    Eyes: Conjunctivae normal.  (-) scleral icterus.              Mouth/Throat: Dry mucosal membranes.   Neck: Neck supple, normal rom.  Cardiovascular: RRR   Pulmonary/Chest: CTAB   Abdomen:  Soft, ND / moderate epigastric and nonspecific right-sided TTP - No guarding or rebound.   (-) CVA tenderness.  Musculoskeletal:  FROM of all major joints. No apparent edema or tenderness.  Neurological: A&O x4.  No focal motor deficits.  Normal gait.  Skin: Skin is intact, warm and dry.  No rash.  Psychiatric: normal mood and affect.      ED Course   Procedures  Labs Reviewed   URINALYSIS - Abnormal; Notable for the following:        Result Value    Appearance, UA Cloudy (*)     Protein, UA 1+ (*)     Occult Blood UA 2+ (*)     Leukocytes, UA 2+ (*)     All other components within normal limits   CBC W/ AUTO DIFFERENTIAL - Abnormal; Notable for the following:     Hematocrit 36.4 (*)     MPV 9.1 (*)     All other components within normal limits   COMPREHENSIVE METABOLIC PANEL - Abnormal; Notable for the following:     CO2 22 (*)     Total Bilirubin 1.6 (*)     Alkaline Phosphatase 53 (*)     ALT 7 (*)     All other components within normal limits   URINALYSIS MICROSCOPIC - Abnormal; Notable for the following:     RBC, UA 12 (*)     WBC, UA 6  (*)     Bacteria, UA Few (*)     All other components within normal limits   POCT URINE PREGNANCY - Abnormal; Notable for the following:     POC Preg Test, Ur Positive (*)     All other components within normal limits   CULTURE, URINE   LIPASE             Medical Decision Making:   History:   Old Medical Records: I decided to obtain old medical records.  Old Records Summarized: other records.  Clinical Tests:   Lab Tests: Ordered and Reviewed  Radiological Study: Reviewed    Additional Medical Decision Making:   Emergent evaluation of a 26-year-old pregnant female, , EGA 6 weeks 1 day, who presents the emergency department complaining of abdominal cramping, nausea/vomiting and increased urinary frequency for the past 48 hours.  On exam, she has epigastric as well as nonspecific right-sided TTP.  She has no CVA tenderness to suggest pyonephritis.  Upon obtaining and reviewing previous medical records, recent transvaginal ultrasound (17) reveals a single viable IUP without evidence of ectopic.  She denies any vaginal symptoms at present.  Although considered, I doubt threatened or active AB.  Initial VS significant for mild hypotension (92/47).  Basic labs pending.  In the meantime, IV fluids started and IV antiemetics given.    - Basic labs unremarkable.   Urinalysis consistent with possible UTI for which she has been given Rocephin prior to being discharged home on Keflex.  Culture pending.  She has been advised to follow up with OB within the week.  Return instructions discussed prior to discharge.          Scribe Attestation:   Scribe #1: I performed the above scribed service and the documentation accurately describes the services I performed. I attest to the accuracy of the note.    Attending Attestation:           Physician Attestation for Scribe:  Physician Attestation Statement for Scribe #1: I, Eugenio Richard MD, reviewed documentation, as scribed by Bola Langford in my presence, and it is both  accurate and complete.                 ED Course     Clinical Impression:   The primary encounter diagnosis was Pregnancy with abdominal cramping of lower quadrant, antepartum. Diagnoses of Nausea and vomiting in pregnancy prior to 22 weeks gestation and Acute lower UTI were also pertinent to this visit.    Disposition:   Disposition: Discharged       Eugenio Richard MD  03/03/17 0269

## 2017-02-14 ENCOUNTER — TELEPHONE (OUTPATIENT)
Dept: OBSTETRICS AND GYNECOLOGY | Facility: CLINIC | Age: 27
End: 2017-02-14

## 2017-02-14 LAB — BACTERIA UR CULT: NORMAL

## 2017-02-14 NOTE — TELEPHONE ENCOUNTER
----- Message from Emily Sanchez sent at 2/14/2017  8:32 AM CST -----  Contact: self  Pt calling to discuss not being able to take the uti medication. Pt states she can't keep them down and they make her nauseated. Please call [phone].

## 2017-02-14 NOTE — TELEPHONE ENCOUNTER
----- Message from Fina Ledesma sent at 2/14/2017  9:18 AM CST -----  Contact: self   Patient having constant vomiting after taking antibiotics . I relayed Lissette's message & told her to go to the E.R . She said she has gone multiple times . Her legs are weak & achy .    [phone]    LL

## 2017-02-15 ENCOUNTER — NURSE TRIAGE (OUTPATIENT)
Dept: ADMINISTRATIVE | Facility: CLINIC | Age: 27
End: 2017-02-15

## 2017-02-15 ENCOUNTER — HOSPITAL ENCOUNTER (EMERGENCY)
Facility: HOSPITAL | Age: 27
Discharge: HOME | End: 2017-02-15
Attending: EMERGENCY MEDICINE
Payer: MEDICAID

## 2017-02-15 VITALS
HEART RATE: 74 BPM | DIASTOLIC BLOOD PRESSURE: 51 MMHG | BODY MASS INDEX: 28.35 KG/M2 | OXYGEN SATURATION: 99 % | WEIGHT: 160 LBS | RESPIRATION RATE: 20 BRPM | HEIGHT: 63 IN | SYSTOLIC BLOOD PRESSURE: 99 MMHG | TEMPERATURE: 98 F

## 2017-02-15 DIAGNOSIS — R11.2 NON-INTRACTABLE VOMITING WITH NAUSEA, UNSPECIFIED VOMITING TYPE: ICD-10-CM

## 2017-02-15 DIAGNOSIS — Z34.91 INTRAUTERINE NORMAL PREGNANCY, FIRST TRIMESTER: Primary | ICD-10-CM

## 2017-02-15 DIAGNOSIS — R10.2 PELVIC PAIN IN FEMALE: ICD-10-CM

## 2017-02-15 LAB
ALBUMIN SERPL BCP-MCNC: 4.2 G/DL
ALP SERPL-CCNC: 61 U/L
ALT SERPL W/O P-5'-P-CCNC: 9 U/L
AMYLASE SERPL-CCNC: 81 U/L
ANION GAP SERPL CALC-SCNC: 9 MMOL/L
AST SERPL-CCNC: 15 U/L
B-HCG UR QL: POSITIVE
BACTERIA #/AREA URNS HPF: ABNORMAL /HPF
BASOPHILS # BLD AUTO: 0.04 K/UL
BASOPHILS NFR BLD: 0.6 %
BILIRUB SERPL-MCNC: 2.4 MG/DL
BILIRUB UR QL STRIP: NEGATIVE
BUN SERPL-MCNC: 6 MG/DL
CALCIUM SERPL-MCNC: 9.3 MG/DL
CHLORIDE SERPL-SCNC: 103 MMOL/L
CK SERPL-CCNC: 144 U/L
CLARITY UR: ABNORMAL
CO2 SERPL-SCNC: 24 MMOL/L
COLOR UR: ABNORMAL
CREAT SERPL-MCNC: 0.8 MG/DL
CTP QC/QA: YES
DIFFERENTIAL METHOD: ABNORMAL
EOSINOPHIL # BLD AUTO: 0.1 K/UL
EOSINOPHIL NFR BLD: 2 %
ERYTHROCYTE [DISTWIDTH] IN BLOOD BY AUTOMATED COUNT: 13.1 %
EST. GFR  (AFRICAN AMERICAN): >60 ML/MIN/1.73 M^2
EST. GFR  (NON AFRICAN AMERICAN): >60 ML/MIN/1.73 M^2
GLUCOSE SERPL-MCNC: 108 MG/DL
GLUCOSE UR QL STRIP: NEGATIVE
HCG INTACT+B SERPL-ACNC: NORMAL MIU/ML
HCT VFR BLD AUTO: 38.6 %
HGB BLD-MCNC: 13.3 G/DL
HGB UR QL STRIP: NEGATIVE
KETONES UR QL STRIP: NEGATIVE
LEUKOCYTE ESTERASE UR QL STRIP: ABNORMAL
LIPASE SERPL-CCNC: 5 U/L
LYMPHOCYTES # BLD AUTO: 2.1 K/UL
LYMPHOCYTES NFR BLD: 31.2 %
MAGNESIUM SERPL-MCNC: 2.1 MG/DL
MCH RBC QN AUTO: 29.5 PG
MCHC RBC AUTO-ENTMCNC: 34.5 %
MCV RBC AUTO: 86 FL
MICROSCOPIC COMMENT: ABNORMAL
MONOCYTES # BLD AUTO: 0.4 K/UL
MONOCYTES NFR BLD: 5.6 %
NEUTROPHILS # BLD AUTO: 4 K/UL
NEUTROPHILS NFR BLD: 60.6 %
NITRITE UR QL STRIP: NEGATIVE
PH UR STRIP: 5 [PH] (ref 5–8)
PLATELET # BLD AUTO: 305 K/UL
PMV BLD AUTO: 8.9 FL
POTASSIUM SERPL-SCNC: 3.7 MMOL/L
PROT SERPL-MCNC: 7.9 G/DL
PROT UR QL STRIP: NEGATIVE
RBC # BLD AUTO: 4.51 M/UL
RBC #/AREA URNS HPF: 2 /HPF (ref 0–4)
SODIUM SERPL-SCNC: 136 MMOL/L
SP GR UR STRIP: 1.03 (ref 1–1.03)
SQUAMOUS #/AREA URNS HPF: 25 /HPF
URN SPEC COLLECT METH UR: ABNORMAL
UROBILINOGEN UR STRIP-ACNC: ABNORMAL EU/DL
WBC # BLD AUTO: 6.63 K/UL
WBC #/AREA URNS HPF: 2 /HPF (ref 0–5)

## 2017-02-15 PROCEDURE — 82550 ASSAY OF CK (CPK): CPT

## 2017-02-15 PROCEDURE — 83690 ASSAY OF LIPASE: CPT

## 2017-02-15 PROCEDURE — 25000003 PHARM REV CODE 250: Performed by: EMERGENCY MEDICINE

## 2017-02-15 PROCEDURE — 82150 ASSAY OF AMYLASE: CPT

## 2017-02-15 PROCEDURE — 99284 EMERGENCY DEPT VISIT MOD MDM: CPT | Mod: 25

## 2017-02-15 PROCEDURE — 81025 URINE PREGNANCY TEST: CPT | Performed by: EMERGENCY MEDICINE

## 2017-02-15 PROCEDURE — 81000 URINALYSIS NONAUTO W/SCOPE: CPT

## 2017-02-15 PROCEDURE — 96375 TX/PRO/DX INJ NEW DRUG ADDON: CPT

## 2017-02-15 PROCEDURE — 84702 CHORIONIC GONADOTROPIN TEST: CPT

## 2017-02-15 PROCEDURE — 96365 THER/PROPH/DIAG IV INF INIT: CPT

## 2017-02-15 PROCEDURE — 96361 HYDRATE IV INFUSION ADD-ON: CPT

## 2017-02-15 PROCEDURE — 85025 COMPLETE CBC W/AUTO DIFF WBC: CPT

## 2017-02-15 PROCEDURE — 80053 COMPREHEN METABOLIC PANEL: CPT

## 2017-02-15 PROCEDURE — 83735 ASSAY OF MAGNESIUM: CPT

## 2017-02-15 PROCEDURE — 63600175 PHARM REV CODE 636 W HCPCS: Performed by: EMERGENCY MEDICINE

## 2017-02-15 RX ORDER — ONDANSETRON 4 MG/1
4 TABLET, FILM COATED ORAL EVERY 8 HOURS PRN
Qty: 12 TABLET | Refills: 0 | Status: SHIPPED | OUTPATIENT
Start: 2017-02-15 | End: 2017-02-23

## 2017-02-15 RX ORDER — PROMETHAZINE HYDROCHLORIDE 25 MG/1
25 SUPPOSITORY RECTAL EVERY 6 HOURS PRN
Qty: 12 SUPPOSITORY | Refills: 0 | Status: SHIPPED | OUTPATIENT
Start: 2017-02-15 | End: 2017-02-23

## 2017-02-15 RX ORDER — DIPHENHYDRAMINE HYDROCHLORIDE 50 MG/ML
25 INJECTION INTRAMUSCULAR; INTRAVENOUS
Status: COMPLETED | OUTPATIENT
Start: 2017-02-15 | End: 2017-02-15

## 2017-02-15 RX ORDER — DICYCLOMINE HYDROCHLORIDE 10 MG/ML
20 INJECTION INTRAMUSCULAR
Status: DISCONTINUED | OUTPATIENT
Start: 2017-02-15 | End: 2017-02-15 | Stop reason: HOSPADM

## 2017-02-15 RX ORDER — ONDANSETRON 2 MG/ML
8 INJECTION INTRAMUSCULAR; INTRAVENOUS
Status: COMPLETED | OUTPATIENT
Start: 2017-02-15 | End: 2017-02-15

## 2017-02-15 RX ADMIN — DIPHENHYDRAMINE HYDROCHLORIDE 25 MG: 50 INJECTION, SOLUTION INTRAMUSCULAR; INTRAVENOUS at 10:02

## 2017-02-15 RX ADMIN — SODIUM CHLORIDE 1000 ML: 0.9 INJECTION, SOLUTION INTRAVENOUS at 09:02

## 2017-02-15 RX ADMIN — PROMETHAZINE HYDROCHLORIDE 25 MG: 25 INJECTION INTRAMUSCULAR; INTRAVENOUS at 10:02

## 2017-02-15 RX ADMIN — ONDANSETRON 8 MG: 2 INJECTION INTRAMUSCULAR; INTRAVENOUS at 09:02

## 2017-02-15 RX ADMIN — DEXTROSE AND SODIUM CHLORIDE 1000 ML: 5; .9 INJECTION, SOLUTION INTRAVENOUS at 10:02

## 2017-02-15 NOTE — DISCHARGE INSTRUCTIONS
Tylenol for pain.  Lots of clear liquids which sugar.  Nausea and vomiting medicines as above.  Please return immediately if you get worse or if new problems develop.  Please make an appointment to see your primary care doctor this week.  Rest.

## 2017-02-15 NOTE — TELEPHONE ENCOUNTER
Reason for Disposition   [1] SEVERE vomiting (e.g., 6 or more times/day) AND [2] present > 8 hours    Protocols used: ST VOMITING-A-AH    Patient states she is continuously vomiting and unable to keep any liquids down and this has been going on for 3 days. She also states she is having severe pain in her legs, back 10/10 pain. She was seen in ED and discharged on antibiotic for uti but doesn't know what to do. Advised due to severity of symptoms to be seen in ED for further evaluation. She agrees to go. She woud like nurse from Dr. Guerra's office to give her a call today please. Please contact caller with any further care advice.

## 2017-02-15 NOTE — ED AVS SNAPSHOT
[facility]  [address] [place] [address]               Christopher Avila   2/15/2017  8:12 AM   ED    Description:  Female : 1990   Department:  [facility]           Your Care was Coordinated By:     Provider Role From To    Bienvenido Keith MD Attending Provider 02/15/17 0835 --      Reason for Visit     Multiple Complaints           Diagnoses this Visit        Comments    Intrauterine normal pregnancy, first trimester    -  Primary     Pelvic pain in female         Non-intractable vomiting with nausea, unspecified vomiting type           ED Disposition     None           To Do List           Follow-up Information     Follow up with Jitendra Guerra MD In 1 day.    Specialty:  Obstetrics and Gynecology    Why:  Please call the office and be evaluated by her OB/GYN doctor tomorrow.    Contact information:    [address] [place] [address] [address]  [place] [address]  [phone]         These Medications        Disp Refills Start End    ondansetron (ZOFRAN) 4 MG tablet 12 tablet 0 2/15/2017     Take 1 tablet (4 mg total) by mouth every 8 (eight) hours as needed (Nausea and vomiting). - Oral    Pharmacy: [facility] [address] [place] [address] Ph #: [phone]       promethazine (PHENERGAN) 25 MG suppository 12 suppository 0 2/15/2017     Place 1 suppository (25 mg total) rectally every 6 (six) hours as needed for Nausea. - Rectal    Pharmacy: [facility] [address] [place] [address] Ph #: [phone]         [facility]     [facility] Nurse Care Line -  Assistance  Registered nurses in the [facility] provide clinical advisement, health education, appointment booking, and other advisory services.  Call for this free service at [phone].             Medications           Message regarding Medications     Verify the changes and/or additions to your medication regime listed below are the same  as discussed with your clinician today.  If any of these changes or additions are incorrect, please notify your healthcare provider.        START taking these NEW medications        Refills    ondansetron (ZOFRAN) 4 MG tablet 0    Sig: Take 1 tablet (4 mg total) by mouth every 8 (eight) hours as needed (Nausea and vomiting).    Class: Print    Route: Oral    promethazine (PHENERGAN) 25 MG suppository 0    Sig: Place 1 suppository (25 mg total) rectally every 6 (six) hours as needed for Nausea.    Class: Print    Route: Rectal      These medications were administered today        Dose Freq    sodium chloride 0.9% bolus 1,000 mL 1,000 mL ED 1 Time    Sig: Inject 1,000 mLs into the vein ED 1 Time.    Class: Normal    Route: Intravenous    ondansetron injection 8 mg 8 mg ED 1 Time    Sig: Inject 8 mg into the vein ED 1 Time.    Class: Normal    Route: Intravenous    dicyclomine injection 20 mg 20 mg ED 1 Time    Sig: Inject 2 mLs (20 mg total) into the muscle ED 1 Time.    Class: Normal    Route: Intramuscular    promethazine (PHENERGAN) 25 mg in dextrose 5 % 50 mL IVPB 25 mg ED 1 Time    Sig: Inject 25 mg into the vein ED 1 Time.    Class: Normal    Route: Intravenous    diphenhydrAMINE injection 25 mg 25 mg ED 1 Time    Sig: Inject 0.5 mLs (25 mg total) into the vein ED 1 Time.    Class: Normal    Route: Intravenous    dextrose 5 % and 0.9% NaCl 5-0.9 % bolus 1,000 mL 1,000 mL ED 1 Time    Sig: Inject 1,000 mLs into the vein ED 1 Time.    Class: Normal    Route: Intravenous      STOP taking these medications     promethazine (PHENERGAN) 12.5 MG Tab Take 1 tablet (12.5 mg total) by mouth every 6 (six) hours as needed (Nausea and vomiting).           Verify that the below list of medications is an accurate representation of the medications you are currently taking.  If none reported, the list may be blank. If incorrect, please contact your healthcare provider. Carry this list with you in case of emergency.          "  Current Medications     cephALEXin (KEFLEX) 500 MG capsule Take 2 capsules (1,000 mg total) by mouth every 12 (twelve) hours.    dicyclomine injection 20 mg Inject 2 mLs (20 mg total) into the muscle ED 1 Time.    ondansetron (ZOFRAN) 4 MG tablet Take 1 tablet (4 mg total) by mouth every 8 (eight) hours as needed (Nausea and vomiting).    PNV cmb#21-iron-folic acid (PRENATAL COMPLETE)  mg-mcg Tab Take 1 tablet by mouth once daily.    promethazine (PHENERGAN) 25 MG suppository Place 1 suppository (25 mg total) rectally every 6 (six) hours as needed for Nausea.           Clinical Reference Information           Your Vitals Were     BP Pulse Temp Resp Height Weight    94/53 (BP Location: Right arm, Patient Position: Sitting) 78 98.5 °F (36.9 °C) (Oral) 18 5' 3" (1.6 m) 72.6 kg (160 lb)    Last Period SpO2 BMI          12/16/2016 99% 28.34 kg/m2        Allergies as of 2/15/2017     No Known Allergies      Immunizations Administered on Date of Encounter - 2/15/2017     None      ED Micro, Lab, POCT     Start Ordered       Status Ordering Provider    02/15/17 1011 02/15/17 1010  hCG, quantitative  STAT      Final result     02/15/17 0903 02/15/17 0903  POCT urine pregnancy  Once      Final result     02/15/17 0903 02/15/17 0903  Magnesium  STAT      Final result     02/15/17 0903 02/15/17 0903  Urinalysis  STAT      Final result     02/15/17 0903 02/15/17 0903  Amylase  STAT      Final result     02/15/17 0903 02/15/17 0903  Lipase  STAT      Final result     02/15/17 0903 02/15/17 0903  Comprehensive metabolic panel  STAT      Final result     02/15/17 0903 02/15/17 0903  CPK  STAT      Final result     02/15/17 0903 02/15/17 0903  CBC auto differential  STAT      Final result     02/15/17 0903 02/15/17 0903  Urinalysis Microscopic  Once      Final result       ED Imaging Orders     Start Ordered       Status Ordering Provider    02/15/17 1500 02/15/17 1339  US OB Less Than 14 Wks with Transvaginal (xpd)  1 time " imaging      Final result     02/15/17 1340 02/15/17 1339    1 time imaging,   Status:  Canceled      Canceled         Discharge Instructions       Tylenol for pain.  Lots of clear liquids which sugar.  Nausea and vomiting medicines as above.  Please return immediately if you get worse or if new problems develop.  Please make an appointment to see your primary care doctor this week.  Rest.    Your Scheduled Appointments     Feb 23, 2017 11:30 AM CST   New Patient with Jitendra Guerra MD   [facility] - [facility]/ [facility]YN ([facility])    [address] [facility] [address]  [address]  [facility] [id number] [phone]              Smoking Cessation     If you would like to quit smoking:   You may be eligible for free services if you are a [place] resident and started smoking cigarettes before September 1, 1988.  Call the Smoking Cessation Trust ([facility]) toll free at [phone] [phone] or (629) [phone].   Call 5-800-QUIT-NOW if you do not meet the above criteria.             [facility] complies with applicable Federal civil rights laws and does not discriminate on the basis of race, color, national origin, age, disability, or sex.        Language Assistance Services     ATTENTION: Language assistance services are available, free of charge. Please call [phone].      ATENCIÓN: Si habla español, tiene a ramsey disposición servicios gratuitos de asistencia lingüística. Llame al [phone].     CHÚ Ý: N?u b?n nói Ti?ng Vi?t, có các d?ch v? h? tr? ngôn ng? mi?n phí dành cho b?n. G?i s? [phone].

## 2017-02-15 NOTE — ED TRIAGE NOTES
Leg back and stomach cramp with headache seen here Sunday and given antibiotics but keeps vomiting

## 2017-02-15 NOTE — ED PROVIDER NOTES
"Encounter Date: 2/15/2017    SCRIBE #1 NOTE: I, Chloe Contreras, am scribing for, and in the presence of,  Bienvenido Keith MD. I have scribed the following portions of the note - Other sections scribed: HPI/ROS.       History     Chief Complaint   Patient presents with    Multiple Complaints     leg, back, abominal cramping, HA, and vomiting "they gave me some antibiotics on Sunday and I keep throwing them up"     Review of patient's allergies indicates:  No Known Allergies  HPI Comments: CC: Multiple Complaints    HPI: 26 y.o. Pregnant F presents to the ED with multiple complaints. Pt is actively crying. Pt is c/o bilateral leg pain, bilateral lower lumbar back pain (R>L), and lower abdominal pain. Pain is described as a severe cramping. Pt also reports of nausea, emesis (x9 within 24 hours), diarrhea w/ small stool (x6), dysuria, chills, and a HA. Pt reports she was evaluated at this ED 3 days ago for abdominal pain, dxed with a UTI, and started on Keflex. Pt states, "I get dizzy when I take them and keep throwing them up." Pt denies any recent trauma, cough, CP, SOB, and arm pain. Pt does not smoke or drink.     The history is provided by the patient.     Past Medical History   Diagnosis Date    Pregnant      No past medical history pertinent negatives.  History reviewed. No pertinent past surgical history.  Family History   Problem Relation Age of Onset    Diabetes Mother     Cancer Mother     Diabetes Maternal Grandmother      Social History   Substance Use Topics    Smoking status: Former Smoker    Smokeless tobacco: None    Alcohol use Yes      Comment: occasional      Review of Systems   Constitutional: Positive for chills. Negative for fever.   HENT: Negative for sore throat.    Eyes: Negative for pain.   Respiratory: Negative for cough and shortness of breath.    Cardiovascular: Negative for chest pain.   Gastrointestinal: Positive for abdominal pain, diarrhea, nausea and vomiting. Negative for blood " in stool.   Genitourinary: Positive for dysuria.   Musculoskeletal: Positive for back pain. Negative for neck pain.        (-) arm pain   Skin: Negative for rash and wound.   Neurological: Positive for headaches.       Physical Exam   Initial Vitals   BP Pulse Resp Temp SpO2   02/15/17 0809 02/15/17 0809 02/15/17 0809 02/15/17 0809 02/15/17 0809   128/69 89 16 98.4 °F (36.9 °C) 98 %     Physical Exam  The patient was examined specifically for the following:   General:No significant distress, Good color, Warm and dry. Head and neck:Scalp atraumatic, Neck supple. Neurological:Appropriate conversation, Gross motor deficits. Eyes:Conjugate gaze, Clear corneas. ENT: No epistaxis. Cardiac: Regular rate and rhythm, Grossly normal heart tones. Pulmonary: Wheezing, Rales. Gastrointestinal: Abdominal tenderness, Abdominal distention. Musculoskeletal: Extremity deformity, Apparent pain with range of motion of the joints. Skin: Rash.   The findings on examination were normal except for the following: The patient has tenderness of the lumbar back.  There is tenderness of the legs of the lower extremities.  Patient has mildly diffuse abdominal tenderness.  The tenderness is mostly suprapubic.  There is no guarding rebound mass or distention.  The patient is crying.  She is holding an emesis basin.  There is no abdominal distention.  The patient is not febrile.  The patient has symmetrical costovertebral angle tenderness is very mild.  The tenderness of the lumbar spine is more low, just above the gluteal region.  There are no abrasions or contusions.  ED Course   Procedures  Labs Reviewed   URINALYSIS - Abnormal; Notable for the following:        Result Value    Appearance, UA Hazy (*)     Urobilinogen, UA 4.0-6.0 (*)     Leukocytes, UA Trace (*)     All other components within normal limits   COMPREHENSIVE METABOLIC PANEL - Abnormal; Notable for the following:     Total Bilirubin 2.4 (*)     ALT 9 (*)     All other components  within normal limits   CBC W/ AUTO DIFFERENTIAL - Abnormal; Notable for the following:     MPV 8.9 (*)     All other components within normal limits   URINALYSIS MICROSCOPIC - Abnormal; Notable for the following:     Bacteria, UA Few (*)     All other components within normal limits   POCT URINE PREGNANCY - Abnormal; Notable for the following:     POC Preg Test, Ur Positive (*)     All other components within normal limits   MAGNESIUM   AMYLASE   LIPASE   CK   HCG, QUANTITATIVE, PREGNANCY          X-Rays:   Independently Interpreted Readings:   Other Readings:  Ultrasound reveals a viable intrauterine pregnancy.    Medical decision making: Given the above, this patient has a viable intrauterine pregnancy.  The patient has no evidence of urinary tract infection.  There is no evidence of peritonitis.  The patient nausea and vomiting was controlled emergency room.  The patient was treated with 2 L of crystalloid in the emergency room.  There is certainly no evidence of peritonitis.  The patient is essentially asymptomatic at 320.  I will discharge the patient on Zofran Phenergan and clear liquids and encouraged her to follow-up with her OB/GYN doctor tomorrow.             Scribe Attestation:   Scribe #1: I performed the above scribed service and the documentation accurately describes the services I performed. I attest to the accuracy of the note.    Attending Attestation:           Physician Attestation for Scribe:  Physician Attestation Statement for Scribe #1: I, Bienvenido Keith MD, reviewed documentation, as scribed by Chloe Contreras in my presence, and it is both accurate and complete.                 ED Course     Clinical Impression:   The primary encounter diagnosis was Intrauterine normal pregnancy, first trimester. Diagnoses of Pelvic pain in female and Non-intractable vomiting with nausea, unspecified vomiting type were also pertinent to this visit.          Bienvenido Keith MD  02/19/17 1548

## 2017-02-23 ENCOUNTER — INITIAL PRENATAL (OUTPATIENT)
Dept: OBSTETRICS AND GYNECOLOGY | Facility: CLINIC | Age: 27
End: 2017-02-23
Payer: MEDICAID

## 2017-02-23 DIAGNOSIS — Z34.91 PREGNANCY WITH ONE FETUS, FIRST TRIMESTER: Primary | ICD-10-CM

## 2017-02-23 PROCEDURE — 99999 PR PBB SHADOW E&M-EST. PATIENT-LVL II: CPT | Mod: PBBFAC,,, | Performed by: OBSTETRICS & GYNECOLOGY

## 2017-02-23 PROCEDURE — 99204 OFFICE O/P NEW MOD 45 MIN: CPT | Mod: TH,S$PBB,, | Performed by: OBSTETRICS & GYNECOLOGY

## 2017-02-23 PROCEDURE — 99212 OFFICE O/P EST SF 10 MIN: CPT | Mod: PBBFAC | Performed by: OBSTETRICS & GYNECOLOGY

## 2017-02-23 NOTE — MR AVS SNAPSHOT
[facility] - OB/ GYN  [address] [facility] [address]  [address]  [place] [address]  Phone: [phone]                  Christopher Avila   2017 11:30 AM   Initial Prenatal    Description:  Female : 1990   Provider:  Jitendra Guerra MD   Department:  [facility] - OB/ GYN           Reason for Visit     Initial Prenatal Visit                To Do List           Goals (5 Years of Data)     None      [facility]     [facility] On Call Nurse Care Line -  Assistance  Registered nurses in the [facility] provide clinical advisement, health education, appointment booking, and other advisory services.  Call for this free service at [phone].             Medications           Message regarding Medications     Verify the changes and/or additions to your medication regime listed below are the same as discussed with your clinician today.  If any of these changes or additions are incorrect, please notify your healthcare provider.        STOP taking these medications     ondansetron (ZOFRAN) 4 MG tablet Take 1 tablet (4 mg total) by mouth every 8 (eight) hours as needed (Nausea and vomiting).    promethazine (PHENERGAN) 25 MG suppository Place 1 suppository (25 mg total) rectally every 6 (six) hours as needed for Nausea.           Verify that the below list of medications is an accurate representation of the medications you are currently taking.  If none reported, the list may be blank. If incorrect, please contact your healthcare provider. Carry this list with you in case of emergency.           Current Medications     PNV cmb#21-iron-folic acid (PRENATAL COMPLETE)  mg-mcg Tab Take 1 tablet by mouth once daily.           Clinical Reference Information           Prenatal Vitals     Enc. Date GA Prenatal Vitals Prenatal Pulse Pain Level Urine Albumin/Glucose Edema Presentation Dilation/Effacement/Station    17 7w5d 112/62 / 68.9 kg (151 lb 12.6 oz)    Trace / Negative         Your Vitals Were      BP Weight Last Period BMI       112/62 68.9 kg (151 lb 12.6 oz) 12/16/2016 26.89 kg/m2       Allergies as of 2/23/2017     No Known Allergies      Immunizations Administered on Date of Encounter - 2/23/2017     None      Language Assistance Services     ATTENTION: Language assistance services are available, free of charge. Please call [phone].      ATENCIÓN: Si habla español, tiene a ramsey disposición servicios gratuitos de asistencia lingüística. Llame al [phone].     CHÚ Ý: N?u b?n nói Ti?ng Vi?t, có các d?ch v? h? tr? ngôn ng? mi?n phí dành cho b?n. G?i s? [phone].         [facility] - OB/ GYN complies with applicable Federal civil rights laws and does not discriminate on the basis of race, color, national origin, age, disability, or sex.

## 2017-02-23 NOTE — PROGRESS NOTES
[facility]  Ambulatory Clinic  Obstetrics & Gynecology    Visit Date:  2017     Chief Complaint:  Establish prenatal care    Dating Criteria:     LMP:  16  REHAN by LMP:  17  REHAN by 6w4d u/s on 2/15/17:  10/7/17     Working REHAN:  10/7/17 by ultrasound       History of Present Illness:      Christopher Avila is a 26 y.o.  at 7w5d by 6 wks ultrasound here to establish prenatal care.      Pt is well known to me.    Pt has no major complaints today and denies vaginal bleeding, leakage of fluid, contractions, or pain.      Pt was recently treated for UTI, completing keflex, denies UTI sxs.    Pt previous pregnancies were fairly uneventful and she delivered vaginal at term without complications.    Past Medical History:      None      Past Surgical History:     None     Medications:      Prenatal vitamins    Allergies:      NKDA      Obstetric History:          T3      TAB0   SAB0   E0   M0   L4       # Outcome Date GA Lbr Ryley/2nd Weight Sex Delivery Anes PTL Lv   5 Current            4  16 36w5d  3.192 kg (7 lb 0.6 oz) M Vag-Spont EPI N Y      Apgar1:  6                Apgar5: 8   3 Term 14 38w4d  3.386 kg (7 lb 7.4 oz) F Vag-Spont EPI N Y      Apgar1:  9                Apgar5: 9   2 Term 09 39w0d  2.863 kg (6 lb 5 oz) M Vag-Spont EPI  Y      Name: Rafat   1 Term 08 39w0d  3.345 kg (7 lb 6 oz) M Vag-Spont EPI  Y      Name: Lucas        Gynecologic History:      H/o chlamydia  H/o ASCUS HPV+      Social History:      Denies tobacco, alcohol or illicit drug use  Works as CNA  Current partner is father of baby  Denies domestic abuse     Family History:      Mom - DM, bone cancer   Denies congenital anomalies, inherited syndromes, fetal aneuploidy    Review of Systems:      Constitutional:  No fever, fatigue  HENT:  No congestion, hearing changes  Eyes:  No visual disturbance  Respiratory:  No cough, shortness of breath  Cardiovascular:   No chest pain, leg swelling  Breast:  No lump, pain, nipple discharge, redness, skin changes  Gastrointestinal:  No abdominal pain, constipation, blood in stool   Genitourinary:  No dysuria, frequency  Musculoskeletal:  No back pain, arthralgias  Skin:  No rash, jaundice  Neurological:  No dizziness, weakness, headaches  Psychiatric/Behavioral:  No dysphoric mood     Physical Exam:     /62  Wt 68.9 kg (151 lb 12.6 oz)  LMP 2016  BMI 26.89 kg/m2  Pulse 60, Resp rate 16    GENERAL:  NAD, well-nourished  HEENT:  NCAT, EOMI, moist mucus membranes, neck supple w/o masses  BREAST:  Symmetric, no obvious masses, adenopathy, skin changes or nipple discharge  LUNGS:  CTA-B  HEART:  RRR with no m/g/r  ABDOMEN:  Soft, non-tender, normoactive BS  EXTREMITIES:  Symmetric w/o cramping, claudication, or edema. +2 distal pulses. FROM.  SKIN:  No rashes or bruising.  NEUROLOGIC:  Grossly intact bilaterally. +2 DTR.  PSYCH:  Mood & affect appropriate.     GENITOURINARY:  NFEG no lesion. No vaginal or cervical lesion. No bleeding or discharge. No CMT. Uterus and ovaries small, NT. Wet prep negative. Declined rectal exam. No obvious external lesions.    Chaperone present for exam.    Assessment:     26 y.o.  at 7w5d by 6 wks ultrasound here to establish prenatal care    Plan:    Principles of prenatal care and precautions discussed in detail.      Literature on pregnancy and maternity care given to patient.    Order initial OB labs    Prenatal vitamins    Return in 4 weeks, or sooner as needed.    Pt voiced understanding.      Jitendra Guerra MD

## 2017-02-28 VITALS
DIASTOLIC BLOOD PRESSURE: 62 MMHG | WEIGHT: 151.81 LBS | SYSTOLIC BLOOD PRESSURE: 112 MMHG | BODY MASS INDEX: 26.89 KG/M2 | HEART RATE: 60 BPM

## 2017-03-02 ENCOUNTER — TELEPHONE (OUTPATIENT)
Dept: OBSTETRICS AND GYNECOLOGY | Facility: CLINIC | Age: 27
End: 2017-03-02

## 2017-03-02 ENCOUNTER — LAB VISIT (OUTPATIENT)
Dept: LAB | Facility: HOSPITAL | Age: 27
End: 2017-03-02
Attending: OBSTETRICS & GYNECOLOGY
Payer: MEDICAID

## 2017-03-02 DIAGNOSIS — Z34.91 PREGNANCY WITH ONE FETUS, FIRST TRIMESTER: ICD-10-CM

## 2017-03-02 LAB
ABO + RH BLD: NORMAL
BLD GP AB SCN CELLS X3 SERPL QL: NORMAL

## 2017-03-02 PROCEDURE — 86592 SYPHILIS TEST NON-TREP QUAL: CPT

## 2017-03-02 PROCEDURE — 86900 BLOOD TYPING SEROLOGIC ABO: CPT

## 2017-03-02 PROCEDURE — 86803 HEPATITIS C AB TEST: CPT

## 2017-03-02 PROCEDURE — 87340 HEPATITIS B SURFACE AG IA: CPT

## 2017-03-02 PROCEDURE — 86703 HIV-1/HIV-2 1 RESULT ANTBDY: CPT

## 2017-03-02 PROCEDURE — 83036 HEMOGLOBIN GLYCOSYLATED A1C: CPT

## 2017-03-02 PROCEDURE — 86762 RUBELLA ANTIBODY: CPT

## 2017-03-02 PROCEDURE — 86850 RBC ANTIBODY SCREEN: CPT

## 2017-03-02 NOTE — TELEPHONE ENCOUNTER
----- Message from Familia Baez sent at 3/2/2017 12:07 PM CST -----  Contact: Self  Called to discuss returning back to work.Pt can be reached @ [phone].    03/02/2017 1:19 PM  Pt stated that she only wants to speak to . Msg fwd to

## 2017-03-02 NOTE — TELEPHONE ENCOUNTER
----- Message from India Kearney MA sent at 3/2/2017  1:16 PM CST -----  Contact: Self      ----- Message -----     From: Familia Baez     Sent: 3/2/2017  12:07 PM       To: [place] [facility] Staff    Called to discuss returning back to work.Pt can be reached @ [phone].

## 2017-03-03 ENCOUNTER — TELEPHONE (OUTPATIENT)
Dept: OBSTETRICS AND GYNECOLOGY | Facility: CLINIC | Age: 27
End: 2017-03-03

## 2017-03-03 LAB
ESTIMATED AVG GLUCOSE: 108 MG/DL
HBA1C MFR BLD HPLC: 5.4 %
HBV SURFACE AG SERPL QL IA: NEGATIVE
HCV AB SERPL QL IA: NEGATIVE
HIV 1+2 AB+HIV1 P24 AG SERPL QL IA: NEGATIVE
RPR SER QL: NORMAL
RUBV IGG SER-ACNC: 11.8 IU/ML
RUBV IGG SER-IMP: REACTIVE

## 2017-03-03 NOTE — TELEPHONE ENCOUNTER
----- Message from Magnolia Mora sent at 3/3/2017 11:04 AM CST -----  Contact: self  Pt returning call to clinic regarding Dr's note content.  Please call pt at .    Thanks     03/03/2017 11:30 AM  Pt aware letter is ready for .

## 2017-03-27 ENCOUNTER — TELEPHONE (OUTPATIENT)
Dept: OBSTETRICS AND GYNECOLOGY | Facility: CLINIC | Age: 27
End: 2017-03-27

## 2017-03-28 ENCOUNTER — HOSPITAL ENCOUNTER (EMERGENCY)
Facility: HOSPITAL | Age: 27
Discharge: LEFT BEFORE DISCHARGE | End: 2017-03-28
Attending: EMERGENCY MEDICINE
Payer: MEDICAID

## 2017-03-28 VITALS
HEIGHT: 63 IN | TEMPERATURE: 98 F | BODY MASS INDEX: 29.23 KG/M2 | DIASTOLIC BLOOD PRESSURE: 60 MMHG | HEART RATE: 97 BPM | SYSTOLIC BLOOD PRESSURE: 104 MMHG | WEIGHT: 165 LBS | RESPIRATION RATE: 18 BRPM | OXYGEN SATURATION: 99 %

## 2017-03-28 DIAGNOSIS — N39.0 URINARY TRACT INFECTION WITHOUT HEMATURIA, SITE UNSPECIFIED: Primary | ICD-10-CM

## 2017-03-28 DIAGNOSIS — R51.9 NONINTRACTABLE HEADACHE, UNSPECIFIED CHRONICITY PATTERN, UNSPECIFIED HEADACHE TYPE: ICD-10-CM

## 2017-03-28 LAB
BILIRUB UR QL STRIP: NEGATIVE
CLARITY UR: ABNORMAL
COLOR UR: YELLOW
GLUCOSE UR QL STRIP: NEGATIVE
HGB UR QL STRIP: ABNORMAL
KETONES UR QL STRIP: NEGATIVE
LEUKOCYTE ESTERASE UR QL STRIP: ABNORMAL
MICROSCOPIC COMMENT: NORMAL
NITRITE UR QL STRIP: NEGATIVE
PH UR STRIP: 7 [PH] (ref 5–8)
PROT UR QL STRIP: NEGATIVE
RBC #/AREA URNS HPF: 2 /HPF (ref 0–4)
SP GR UR STRIP: 1.01 (ref 1–1.03)
SQUAMOUS #/AREA URNS HPF: 4 /HPF
URN SPEC COLLECT METH UR: ABNORMAL
UROBILINOGEN UR STRIP-ACNC: NEGATIVE EU/DL
WBC #/AREA URNS HPF: 2 /HPF (ref 0–5)

## 2017-03-28 PROCEDURE — 96361 HYDRATE IV INFUSION ADD-ON: CPT

## 2017-03-28 PROCEDURE — 25000003 PHARM REV CODE 250: Performed by: NURSE PRACTITIONER

## 2017-03-28 PROCEDURE — 96375 TX/PRO/DX INJ NEW DRUG ADDON: CPT

## 2017-03-28 PROCEDURE — 96374 THER/PROPH/DIAG INJ IV PUSH: CPT

## 2017-03-28 PROCEDURE — 99284 EMERGENCY DEPT VISIT MOD MDM: CPT | Mod: 25

## 2017-03-28 PROCEDURE — 63600175 PHARM REV CODE 636 W HCPCS: Performed by: NURSE PRACTITIONER

## 2017-03-28 PROCEDURE — 81000 URINALYSIS NONAUTO W/SCOPE: CPT

## 2017-03-28 RX ORDER — ONDANSETRON 2 MG/ML
4 INJECTION INTRAMUSCULAR; INTRAVENOUS
Status: DISCONTINUED | OUTPATIENT
Start: 2017-03-28 | End: 2017-03-28

## 2017-03-28 RX ORDER — SODIUM CHLORIDE 9 MG/ML
1000 INJECTION, SOLUTION INTRAVENOUS
Status: COMPLETED | OUTPATIENT
Start: 2017-03-28 | End: 2017-03-28

## 2017-03-28 RX ORDER — ONDANSETRON HYDROCHLORIDE 4 MG/5ML
4 SOLUTION ORAL ONCE
Status: DISCONTINUED | OUTPATIENT
Start: 2017-03-28 | End: 2017-03-28

## 2017-03-28 RX ORDER — ONDANSETRON 2 MG/ML
4 INJECTION INTRAMUSCULAR; INTRAVENOUS
Status: COMPLETED | OUTPATIENT
Start: 2017-03-28 | End: 2017-03-28

## 2017-03-28 RX ORDER — METOCLOPRAMIDE HYDROCHLORIDE 5 MG/ML
10 INJECTION INTRAMUSCULAR; INTRAVENOUS
Status: COMPLETED | OUTPATIENT
Start: 2017-03-28 | End: 2017-03-28

## 2017-03-28 RX ORDER — DIPHENHYDRAMINE HYDROCHLORIDE 50 MG/ML
25 INJECTION INTRAMUSCULAR; INTRAVENOUS
Status: COMPLETED | OUTPATIENT
Start: 2017-03-28 | End: 2017-03-28

## 2017-03-28 RX ORDER — CEPHALEXIN 500 MG/1
500 CAPSULE ORAL EVERY 8 HOURS
Qty: 21 CAPSULE | Refills: 0 | Status: SHIPPED | OUTPATIENT
Start: 2017-03-28 | End: 2017-04-02

## 2017-03-28 RX ADMIN — SODIUM CHLORIDE 1000 ML: 0.9 INJECTION, SOLUTION INTRAVENOUS at 04:03

## 2017-03-28 RX ADMIN — METOCLOPRAMIDE 10 MG: 5 INJECTION, SOLUTION INTRAMUSCULAR; INTRAVENOUS at 04:03

## 2017-03-28 RX ADMIN — ONDANSETRON 4 MG: 2 INJECTION INTRAMUSCULAR; INTRAVENOUS at 04:03

## 2017-03-28 RX ADMIN — DIPHENHYDRAMINE HYDROCHLORIDE 25 MG: 50 INJECTION, SOLUTION INTRAMUSCULAR; INTRAVENOUS at 04:03

## 2017-03-28 RX ADMIN — SODIUM CHLORIDE 1000 ML: 0.9 INJECTION, SOLUTION INTRAVENOUS at 05:03

## 2017-03-28 NOTE — ED PROVIDER NOTES
Encounter Date: 3/28/2017    SCRIBE #1 NOTE: I, Charles Colby , am scribing for, and in the presence of,  Ni Ortiz NP . I have scribed the following portions of the note - Other sections scribed: HPI/ROS .       History     Chief Complaint   Patient presents with    Nausea     and headache x 2 days states also saw blood when she threw up . Pt reports being 3 months pregnant.      Review of patient's allergies indicates:  No Known Allergies  HPI Comments: CC: Nausea     HPI: This 8 weeks gravid (A0, LMP:16, RHEAN: 10/7/17) 26 y.o. female with hx of migraine ALBRECHT  presents to the ED c/o a 2-day hx of acute-onset N/V that have not resolved since onset. She also reports a R fontal HA with associated photophobia and phonophobia. Pt states her current HA is not typical of previous migraine HA's because she normally does not have N/V with her HA's. Pt became concerned when she visualized bright red blood clots in her vomit, prompting her arrival to the ED. She states she has not experienced nausea or vomiting associated with her pregnancy until 2 days ago. Pt is followed by her OB/GYN, Dr. Jitendra Guerra. Pt otherwise denies fever, vaginal bleeding, cough, abdominal pain.       The history is provided by the patient. No  was used.     Past Medical History:   Diagnosis Date    Migraine headache     Pregnant      History reviewed. No pertinent surgical history.  Family History   Problem Relation Age of Onset    Diabetes Mother     Cancer Mother     Diabetes Maternal Grandmother      Social History   Substance Use Topics    Smoking status: Former Smoker    Smokeless tobacco: None    Alcohol use Yes      Comment: occasional prior to pregnancy     Review of Systems   Constitutional: Negative for chills and fever.   HENT:        (+) phonophobia   Eyes: Positive for photophobia.   Respiratory: Negative for cough and shortness of breath.    Cardiovascular: Negative for chest pain.    Gastrointestinal: Positive for nausea and vomiting. Negative for abdominal pain and diarrhea.   Genitourinary: Negative for dysuria and vaginal bleeding.   Musculoskeletal: Negative for back pain and neck pain.   Neurological: Positive for headaches (R frontal).       Physical Exam   Initial Vitals   BP Pulse Resp Temp SpO2   03/28/17 1306 03/28/17 1306 03/28/17 1306 03/28/17 1306 03/28/17 1306   105/62 94 16 98.4 °F (36.9 °C) 98 %     Physical Exam    Nursing note and vitals reviewed.  Constitutional: She appears well-developed and well-nourished.   HENT:   Head: Normocephalic.   Mouth/Throat: Oropharynx is clear and moist.   Eyes: Conjunctivae and EOM are normal. Pupils are equal, round, and reactive to light.   Neck: Normal range of motion. Neck supple.   Cardiovascular: Normal rate, regular rhythm and normal heart sounds.   Pulmonary/Chest: Breath sounds normal.   Musculoskeletal: Normal range of motion.   Neurological: She is alert and oriented to person, place, and time. She has normal strength and normal reflexes. She displays normal reflexes. No cranial nerve deficit or sensory deficit.   Skin: Skin is warm and dry.         ED Course   Procedures  Labs Reviewed   URINALYSIS - Abnormal; Notable for the following:        Result Value    Appearance, UA Cloudy (*)     Occult Blood UA 2+ (*)     Leukocytes, UA Trace (*)     All other components within normal limits   URINALYSIS MICROSCOPIC             Medical Decision Making:   Initial Assessment:   26-year-old female presents to the emergency department with nausea, vomiting and headache.  Differential Diagnosis:   Migraine  Tension headache   cluster headache  ED Management:  Pts exam was unremarkable; pt does not appear ill or toxic, pt is afebrile with normal VS, no focal neurological deficits, heart and lung sounds normal, abdomen is soft and benign with no tenderness to palpation.    Based on exam today, I believe patient is having a version of her  "chronic migraines.  Migraines often change quality during pregnancy.  I find no red flags to have to be concerned about intracranial hemorrhage or infection.  I will send a CBC and chemistries secondary to patient's complaints of throwing up "some bright red blood clots"    While patient waiting for lab work to be return, patient states she is to leave because her ride is leaving.  Attempted to discuss risks benefits of leaving with patient.  Patient states she feels better the "headache is gone" and she "just wants to go home".  I will treat patient for urinary tract infection being her urine did have some bacteria in it and she is pregnant.  Patient signed AMA paperwork and will return if anything worsens.    Case discussed with attending who agrees with assessment and plan.               Scribe Attestation:   Scribe #1: I performed the above scribed service and the documentation accurately describes the services I performed. I attest to the accuracy of the note.    Attending Attestation:           Physician Attestation for Scribe:  Physician Attestation Statement for Scribe #1: I, Ni Ortiz NP , reviewed documentation, as scribed by Charles Colby  in my presence, and it is both accurate and complete.                 ED Course     Clinical Impression:   The primary encounter diagnosis was Urinary tract infection without hematuria, site unspecified. A diagnosis of Nonintractable headache, unspecified chronicity pattern, unspecified headache type was also pertinent to this visit.    Disposition:   Disposition: AMA  Condition: Stable       Ni Ortiz NP  03/29/17 1028    "

## 2017-03-28 NOTE — TELEPHONE ENCOUNTER
PUT PT THROUGH ON PHONE, STATED SHE HAS JUST LEFT ER BECAUSE ASSISTED WAS AGITATING HER ABOUT THROWING UP BLOOD , INFORMED PT THAT DR WATERS IS NOT HERE THIS AFTERNOON AND SHE NEEDS TO GO BACK TO THE ER BECAUSE HER CONDITION CAN CAUSE PREGNANCY COMPLICATIONS AND DO NOT WORRY ABOUT WHAT OTHER PEOPLE SAY , LISTEN TO THE DOCTOR AND HIS ADVISE , BECAUSE THROWING UP BLOOD IS DANGEROUS, PT STATED SHE IS GOING BACK TO ER NOW, INFORMED HER TO MAKE APPT WITH DR WATERS ONCE SHE IS RELEASED FROM THE ER. PT STATED THAT SHE WOULD

## 2017-03-28 NOTE — DISCHARGE INSTRUCTIONS
"    Self-Care for Headaches  Most headaches aren't serious and can be relieved with self-care. But some headaches may be a sign of another health problem like eye trouble or high blood pressure. To find the best treatment, learn what kind of headaches you get. For tension headaches, self-care will usually help. To treat migraines, ask your healthcare provider for advice. It is also possible to get both tension and migraine headaches. Self-care involves relieving the pain and avoiding headache triggers if you can.    Ways to reduce pain and tension  Try these steps:  · Apply a cold compress or ice pack to the pain site.  · Drink fluids. If nausea makes it hard to drink, try sucking on ice.  · Rest. Protect yourself from bright light and loud noises.  · Calm your emotions by imagining a peaceful scene.  · Massage tight neck, shoulder, and head muscles.  · To relax muscles, soak in a hot bath or use a hot shower.  Use medicines  Aspirin or aspirin substitutes, such as ibuprofen and acetaminophen, can relieve headache. Remember: Never give aspirin to anyone 18 years old or younger because of the risk of developing Reye syndrome. Use pain medicines only when necessary.  Track your headaches  Keeping a headache diary can help you and your healthcare provider identify what's causing your headaches:  · Note when each headache happens.  · Identify your activities and the foods you've eaten 6 to 8 hours before the headache began.  · Look for any trends or "triggers."  Signs of tension headache  Any of the following can be signs:  · Dull pain or feeling of pressure in a tight band around your head  · Pain in your neck or shoulders  · Headache without a definite beginning or end  · Headache after an activity such as driving or working on a computer  Signs of migraine  Any of the following can be signs:  · Throbbing pain on one or both sides of your head  · Nausea or vomiting  · Extreme sensitivity to light, sound, and " "smells  · Bright spots, flashes, or other visual changes  · Pain or nausea so severe that you can't continue your daily activities  Call your healthcare provider   If you have any of the following symptoms, contact your healthcare provider:  · A headache that lingers after a recent injury or bump to the head.  · A fever with a stiff neck or pain when you bend your head toward your chest.  · A headache along with slurred speech, changes in your vision, or numbness or weakness in your arms or legs.  · A headache for longer than 3 days.  · Frequent headaches, especially in the morning.  · Headaches with seizures   · Seek immediate medical attention if you have a headache that you would call "the worst headache you have ever had."   Date Last Reviewed: 10/4/2015  © 9437-0643 [employer]. [address] [place] [address]. All rights reserved. This information is not intended as a substitute for professional medical care. Always follow your healthcare professional's instructions.        Understanding Urinary Tract Infections (UTIs)  Most UTIs are caused by bacteria, although they may also be caused by viruses or fungi. Bacteria from the bowel are the most common source of infection. The infection may begin because of any of the following:  · Sexual activity. During sex, germs can travel from the penis, vagina, or rectum into the urethra.   · Germs on the skin outside the rectum may travel into the urethra. This is more common in women since the rectum and urethra are closer to each other than in men. Wiping from front to back after using the toilet and keeping the area clean can help prevent germs from getting to the urethra.  · Blockage of urine flow through the urinary tract. If urine sits too long, germs may begin to grow out of control.      Parts of the urinary tract  The infection can occur in any part of the urinary tract.  · The kidneys collect and store urine.  · The ureters carry urine " from the kidneys to the bladder.  · The bladder holds urine until you are ready to let it out.  · The urethra carries urine from the bladder out of the body. It is shorter in women, so bacteria can move through it more easily. The urethra is longer in men, so a UTI is less likely to reach the bladder or kidneys in men.  Date Last Reviewed: 9/8/2014  © 6723-7259 [employer]. [address] [place] [address], [place] [address]. All rights reserved. This information is not intended as a substitute for professional medical care. Always follow your healthcare professional's instructions.

## 2017-03-28 NOTE — ED AVS SNAPSHOT
[facility]  [place] [address]               Christopher Avila   3/28/2017  3:11 PM   ED    Description:  Female : 1990   Department:  [facility]           Your Care was Coordinated By:     Provider Role From To    Keely Arteaga MD Attending Provider 17 2025 --    Ni Ortiz NP Nurse Practitioner 17 2913 --      Reason for Visit     Nausea           Diagnoses this Visit        Comments    Urinary tract infection without hematuria, site unspecified    -  Primary     Nonintractable headache, unspecified chronicity pattern, unspecified headache type           ED Disposition     None           To Do List           Follow-up Information     Schedule an appointment as soon as possible for a visit with Neeraj Guerra MD.    Specialty:  Internal Medicine    Contact information:    [address]  [place] [id number] [phone]          Follow up with [facility].    Specialty:  Emergency Medicine    Why:  If symptoms worsen or any other concerns    Contact information:    [place] [address] [phone]       These Medications        Disp Refills Start End    cephALEXin (KEFLEX) 500 MG capsule 21 capsule 0 3/28/2017 2017    Take 1 capsule (500 mg total) by mouth every 8 (eight) hours. - Oral    Pharmacy: [facility] [address] [place] [address] DESTINEE UMESHNIKOS Ph #: [phone]         [facility]     [facility] -  Assistance  Registered nurses in the [facility] provide clinical advisement, health education, appointment booking, and other advisory services.  Call for this free service at [phone].             Medications           Message regarding Medications     Verify the changes and/or additions to your medication regime listed below are the same as discussed with your clinician today.  If any of these changes or  "additions are incorrect, please notify your healthcare provider.        START taking these NEW medications        Refills    cephALEXin (KEFLEX) 500 MG capsule 0    Sig: Take 1 capsule (500 mg total) by mouth every 8 (eight) hours.    Class: Print    Route: Oral      These medications were administered today        Dose Freq    sodium chloride 0.9% bolus 1,000 mL 1,000 mL ED 1 Time    Sig: Inject 1,000 mLs into the vein ED 1 Time.    Class: Normal    Route: Intravenous    diphenhydrAMINE injection 25 mg 25 mg ED 1 Time    Sig: Inject 0.5 mLs (25 mg total) into the vein ED 1 Time.    Class: Normal    Route: Intravenous    metoclopramide HCl injection 10 mg 10 mg ED 1 Time    Sig: Inject 2 mLs (10 mg total) into the vein ED 1 Time.    Class: Normal    Route: Intravenous    ondansetron injection 4 mg 4 mg ED 1 Time    Sig: Inject 4 mg into the vein ED 1 Time.    Class: Normal    Route: Intravenous    0.9%  NaCl infusion 1,000 mL ED 1 Time    Sig: Inject 1,000 mLs into the vein ED 1 Time.    Class: Normal    Route: Intravenous           Verify that the below list of medications is an accurate representation of the medications you are currently taking.  If none reported, the list may be blank. If incorrect, please contact your healthcare provider. Carry this list with you in case of emergency.           Current Medications     prenatal multivit-Ca-min-Fe-FA (PRENATAL VITAMIN) Tab Take 1 tablet by mouth once daily.    cephALEXin (KEFLEX) 500 MG capsule Take 1 capsule (500 mg total) by mouth every 8 (eight) hours.           Clinical Reference Information           Your Vitals Were     BP Pulse Temp Resp Height Weight    104/60 (BP Location: Right arm, Patient Position: Sitting, BP Method: Automatic) 97 98 °F (36.7 °C) (Oral) 18 5' 3" (1.6 m) 74.8 kg (165 lb)    Last Period SpO2 BMI          12/16/2016 99% 29.23 kg/m2        Allergies as of 3/28/2017     No Known Allergies      Immunizations Administered on Date of " Encounter - 3/28/2017     None      ED Micro, Lab, POCT     Start Ordered       Status Ordering Provider    03/28/17 1531 03/28/17 1530  CBC auto differential  STAT      In process     03/28/17 1531 03/28/17 1530  Comprehensive metabolic panel  STAT      In process     03/28/17 1531 03/28/17 1530  Urinalysis  STAT      Final result     03/28/17 1530 03/28/17 1530  Urinalysis Microscopic  Once      Final result       ED Imaging Orders     None        Discharge Instructions           Self-Care for Headaches  Most headaches aren't serious and can be relieved with self-care. But some headaches may be a sign of another health problem like eye trouble or high blood pressure. To find the best treatment, learn what kind of headaches you get. For tension headaches, self-care will usually help. To treat migraines, ask your healthcare provider for advice. It is also possible to get both tension and migraine headaches. Self-care involves relieving the pain and avoiding headache triggers if you can.    Ways to reduce pain and tension  Try these steps:  · Apply a cold compress or ice pack to the pain site.  · Drink fluids. If nausea makes it hard to drink, try sucking on ice.  · Rest. Protect yourself from bright light and loud noises.  · Calm your emotions by imagining a peaceful scene.  · Massage tight neck, shoulder, and head muscles.  · To relax muscles, soak in a hot bath or use a hot shower.  Use medicines  Aspirin or aspirin substitutes, such as ibuprofen and acetaminophen, can relieve headache. Remember: Never give aspirin to anyone 18 years old or younger because of the risk of developing Reye syndrome. Use pain medicines only when necessary.  Track your headaches  Keeping a headache diary can help you and your healthcare provider identify what's causing your headaches:  · Note when each headache happens.  · Identify your activities and the foods you've eaten 6 to 8 hours before the headache began.  · Look for any  "trends or "triggers."  Signs of tension headache  Any of the following can be signs:  · Dull pain or feeling of pressure in a tight band around your head  · Pain in your neck or shoulders  · Headache without a definite beginning or end  · Headache after an activity such as driving or working on a computer  Signs of migraine  Any of the following can be signs:  · Throbbing pain on one or both sides of your head  · Nausea or vomiting  · Extreme sensitivity to light, sound, and smells  · Bright spots, flashes, or other visual changes  · Pain or nausea so severe that you can't continue your daily activities  Call your healthcare provider   If you have any of the following symptoms, contact your healthcare provider:  · A headache that lingers after a recent injury or bump to the head.  · A fever with a stiff neck or pain when you bend your head toward your chest.  · A headache along with slurred speech, changes in your vision, or numbness or weakness in your arms or legs.  · A headache for longer than 3 days.  · Frequent headaches, especially in the morning.  · Headaches with seizures   · Seek immediate medical attention if you have a headache that you would call "the worst headache you have ever had."   Date Last Reviewed: 10/4/2015  © 7552-5667 [employer]. [address] [place] [address], [place] [address]. All rights reserved. This information is not intended as a substitute for professional medical care. Always follow your healthcare professional's instructions.        Understanding Urinary Tract Infections (UTIs)  Most UTIs are caused by bacteria, although they may also be caused by viruses or fungi. Bacteria from the bowel are the most common source of infection. The infection may begin because of any of the following:  · Sexual activity. During sex, germs can travel from the penis, vagina, or rectum into the urethra.   · Germs on the skin outside the rectum may travel into the urethra. This is more common in " women since the rectum and urethra are closer to each other than in men. Wiping from front to back after using the toilet and keeping the area clean can help prevent germs from getting to the urethra.  · Blockage of urine flow through the urinary tract. If urine sits too long, germs may begin to grow out of control.      Parts of the urinary tract  The infection can occur in any part of the urinary tract.  · The kidneys collect and store urine.  · The ureters carry urine from the kidneys to the bladder.  · The bladder holds urine until you are ready to let it out.  · The urethra carries urine from the bladder out of the body. It is shorter in women, so bacteria can move through it more easily. The urethra is longer in men, so a UTI is less likely to reach the bladder or kidneys in men.  Date Last Reviewed: 9/8/2014  © 9577-1474 [employer]. [address] [place] [address]. All rights reserved. This information is not intended as a substitute for professional medical care. Always follow your healthcare professional's instructions.          Smoking Cessation     If you would like to quit smoking:   You may be eligible for free services if you are a [place] resident and started smoking cigarettes before September 1, 1988.  Call the Smoking Cessation Trust ([facility]) toll free at (262) 442-5418 or (163) 043-7873.   Call -231-QUIT-NOW if you do not meet the above criteria.             [facility] complies with applicable Federal civil rights laws and does not discriminate on the basis of race, color, national origin, age, disability, or sex.        Language Assistance Services     ATTENTION: Language assistance services are available, free of charge. Please call [phone].      ATENCIÓN: Si habla español, tiene a ramsey disposición servicios gratuitos de asistencia lingüística. Llame al [phone].     CHÚ Ý: N?u b?n nói Ti?ng Vi?t, có các d?ch v? h? tr? ngôn ng? mi?n phí  dành cho b?ino. G?i s? [phone].

## 2017-03-28 NOTE — ED TRIAGE NOTES
Headache for 2 days, yesterday spitting up blood reportedly. Reports this am vomiting blood, bright red and brown. No diarrhea, denies blood in stools. Reports she is 3 months pregnant, due 10/7/17 reportedly. Denies vaginal bleeding

## 2017-04-19 ENCOUNTER — ROUTINE PRENATAL (OUTPATIENT)
Dept: OBSTETRICS AND GYNECOLOGY | Facility: CLINIC | Age: 27
End: 2017-04-19
Payer: MEDICAID

## 2017-04-19 VITALS — BODY MASS INDEX: 28.88 KG/M2 | DIASTOLIC BLOOD PRESSURE: 60 MMHG | WEIGHT: 163 LBS | SYSTOLIC BLOOD PRESSURE: 122 MMHG

## 2017-04-19 DIAGNOSIS — Z34.92 PREGNANCY WITH ONE FETUS IN SECOND TRIMESTER: Primary | ICD-10-CM

## 2017-04-19 PROCEDURE — 99213 OFFICE O/P EST LOW 20 MIN: CPT | Mod: TH,S$PBB,, | Performed by: OBSTETRICS & GYNECOLOGY

## 2017-04-19 PROCEDURE — 99999 PR PBB SHADOW E&M-EST. PATIENT-LVL II: CPT | Mod: PBBFAC,,, | Performed by: OBSTETRICS & GYNECOLOGY

## 2017-04-19 PROCEDURE — 99212 OFFICE O/P EST SF 10 MIN: CPT | Mod: PBBFAC | Performed by: OBSTETRICS & GYNECOLOGY

## 2017-04-19 NOTE — PROGRESS NOTES
"15w4d here for OB visit.  Last visit at 7 wks "been busy".  Pt has no major complaint today.  Initial prenatal lab results d/w pt.  Order quad screen.  Anatomy ultrasound next visit.  Importance of prenatal visits discussed.  Principles of prenatal care and precautions reviewed.  Return 4 wks.  Voiced understanding.  "

## 2017-04-19 NOTE — MR AVS SNAPSHOT
[facility] - [facility]/ [facility]  [address]  [place] [address]  Phone: [phone]                  Christopher Avila   2017 9:20 AM   Routine Prenatal    Description:  Female : 1990   Provider:  Jitendra Guerra MD   Department:  [facility]           Reason for Visit     Routine Prenatal Visit                To Do List           Future Appointments        Provider Department Dept Phone    2017 11:20 AM Jitendra Guerra MD [facility] - OB/ -[phone]      Goals (5 Years of Data)     None      [facility]     [facility] Care Line -  Assistance  Unless otherwise directed by your provider, please contact [facility], our nurse care line that is available for  assistance.     Registered nurses in the [facility] provide: appointment scheduling, clinical advisement, health education, and other advisory services.  Call: [phone] (toll free)               Medications           Message regarding Medications     Verify the changes and/or additions to your medication regime listed below are the same as discussed with your clinician today.  If any of these changes or additions are incorrect, please notify your healthcare provider.             Verify that the below list of medications is an accurate representation of the medications you are currently taking.  If none reported, the list may be blank. If incorrect, please contact your healthcare provider. Carry this list with you in case of emergency.           Current Medications     prenatal multivit-Ca-min-Fe-FA (PRENATAL VITAMIN) Tab Take 1 tablet by mouth once daily.           Clinical Reference Information           Prenatal Vitals     Enc. Date GA Prenatal Vitals Prenatal Pulse Pain Level Urine Albumin/Glucose Edema Presentation Dilation/Effacement/Station    17 15w4d 122/60 / 73.9 kg (163 lb 0.5 oz)    Trace / Trace       17 7w5d 112/62 / 68.9 kg (151 lb 12.6 oz)  / 164 60 0 Trace /  Negative None / None  0 / 0      Your Vitals Were     BP Weight Last Period BMI       122/60 73.9 kg (163 lb 0.5 oz) 12/16/2016 28.88 kg/m2       Allergies as of 4/19/2017     No Known Allergies      Immunizations Administered on Date of Encounter - 4/19/2017     None      Language Assistance Services     ATTENTION: Language assistance services are available, free of charge. Please call [phone].      ATENCIÓN: Si habla español, tiene a ramsey disposición servicios gratuitos de asistencia lingüística. Llame al [phone].     [facility] Ý: N?u b?n nói Ti?ng Vi?t, có các d?ch v? h? tr? ngôn ng? mi?n phí dành cho b?n. G?i s? [phone].         [facility] [place] complies with applicable Federal civil rights laws and does not discriminate on the basis of race, color, national origin, age, disability, or sex.

## 2017-04-27 ENCOUNTER — LAB VISIT (OUTPATIENT)
Dept: LAB | Facility: HOSPITAL | Age: 27
End: 2017-04-27
Attending: OBSTETRICS & GYNECOLOGY
Payer: MEDICAID

## 2017-04-27 DIAGNOSIS — Z34.92 PREGNANCY WITH ONE FETUS IN SECOND TRIMESTER: ICD-10-CM

## 2017-04-27 PROCEDURE — 36415 COLL VENOUS BLD VENIPUNCTURE: CPT

## 2017-04-27 PROCEDURE — 81511 FTL CGEN ABNOR FOUR ANAL: CPT

## 2017-05-01 LAB
ALPHA FETOPROTEIN MATERNAL: 33.4 NG/ML
DOWN RISK (<1:270): NORMAL
ETHNIC ORIGIN: NORMAL
GA METHOD: NORMAL
GESTATIONAL AGE (DAYS): 5
GESTATIONAL AGE (WEEKS): 16
HUMAN CHORIONIC GONADOTROPIN: 32.4 IU/ML
INHIBIN A: 136.8 PG/ML
INSULIN DEPEND. DIABETES: NORMAL
M.O.M. ALPHA FETOPROTEIN: 0.85
M.O.M. HCG: 1.01
M.O.M. INHIBIN A: 0.89
M.O.M. UNCONJ. ESTRIOL: 1.22
MATERNAL AGE AT EDD (YRS): 25
MATERNAL AGE FOR DOWN: NORMAL
MATERNAL WEIGHT (LBS): 163
MULTIPLE GESTATIONS: NORMAL
QUAD SCREEN INTERPRETATION: NORMAL
QUAD SCREEN: NEGATIVE
TRISOMY 18 (<1:100): NORMAL
UNCONJUGATED ESTRIOL: 1.04 NG/ML

## 2017-05-22 ENCOUNTER — ROUTINE PRENATAL (OUTPATIENT)
Dept: OBSTETRICS AND GYNECOLOGY | Facility: CLINIC | Age: 27
End: 2017-05-22
Payer: MEDICAID

## 2017-05-22 DIAGNOSIS — Z34.92 PREGNANCY WITH ONE FETUS IN SECOND TRIMESTER: Primary | ICD-10-CM

## 2017-05-22 DIAGNOSIS — Z36.3 ANTENATAL SCREENING FOR MALFORMATION USING ULTRASONICS: ICD-10-CM

## 2017-05-22 PROCEDURE — 99213 OFFICE O/P EST LOW 20 MIN: CPT | Mod: TH,25,S$PBB, | Performed by: OBSTETRICS & GYNECOLOGY

## 2017-05-22 PROCEDURE — 99999 PR PBB SHADOW E&M-EST. PATIENT-LVL II: CPT | Mod: PBBFAC,,, | Performed by: OBSTETRICS & GYNECOLOGY

## 2017-05-22 PROCEDURE — 76805 OB US >/= 14 WKS SNGL FETUS: CPT | Mod: PBBFAC | Performed by: OBSTETRICS & GYNECOLOGY

## 2017-05-22 PROCEDURE — 99212 OFFICE O/P EST SF 10 MIN: CPT | Mod: PBBFAC | Performed by: OBSTETRICS & GYNECOLOGY

## 2017-05-22 PROCEDURE — 76805 OB US >/= 14 WKS SNGL FETUS: CPT | Mod: 26,S$PBB,, | Performed by: OBSTETRICS & GYNECOLOGY

## 2017-05-23 VITALS
DIASTOLIC BLOOD PRESSURE: 62 MMHG | SYSTOLIC BLOOD PRESSURE: 130 MMHG | WEIGHT: 164.25 LBS | BODY MASS INDEX: 29.09 KG/M2

## 2017-05-23 NOTE — PROGRESS NOTES
20w2d here for OB visit and anatomy ultrasound.  Pt has no major complaint today.  Reports active fetus.  Denies vaginal bleeding, leakage of fluid, or contractions.  Anatomy u/s shows normal appearing fetus with dating c/w established EDC.  Limitation of today's u/s exam discussed.  Quad screen negative.   Precautions reviewed.  Return 4 wks.  Voiced understanding.

## 2017-06-19 ENCOUNTER — ROUTINE PRENATAL (OUTPATIENT)
Dept: OBSTETRICS AND GYNECOLOGY | Facility: CLINIC | Age: 27
End: 2017-06-19
Payer: MEDICAID

## 2017-06-19 VITALS
DIASTOLIC BLOOD PRESSURE: 64 MMHG | BODY MASS INDEX: 31.75 KG/M2 | SYSTOLIC BLOOD PRESSURE: 120 MMHG | WEIGHT: 179.25 LBS

## 2017-06-19 DIAGNOSIS — Z34.92 PREGNANCY WITH ONE FETUS IN SECOND TRIMESTER: Primary | ICD-10-CM

## 2017-06-19 PROCEDURE — 99212 OFFICE O/P EST SF 10 MIN: CPT | Mod: PBBFAC | Performed by: OBSTETRICS & GYNECOLOGY

## 2017-06-19 PROCEDURE — 99213 OFFICE O/P EST LOW 20 MIN: CPT | Mod: TH,S$PBB,, | Performed by: OBSTETRICS & GYNECOLOGY

## 2017-06-19 PROCEDURE — 99999 PR PBB SHADOW E&M-EST. PATIENT-LVL II: CPT | Mod: PBBFAC,,, | Performed by: OBSTETRICS & GYNECOLOGY

## 2017-06-19 NOTE — PROGRESS NOTES
24w2d here for OB visit.  Reports active fetus.  Denies vaginal bleeding, leakage of fluid, or contractions.  Pt c/o crampy lower back pain.  Abd soft/nontender.  No CVA tenderness.  We discussed supportive care measures for her lower back/round ligament pain discussed, tylenol prn, maternity belt.  Order 1 hr glucola, CBC.  Labor/preE precautions.  Kick counts.  Return 4 wks.  Voiced understanding.

## 2017-06-20 ENCOUNTER — TELEPHONE (OUTPATIENT)
Dept: OBSTETRICS AND GYNECOLOGY | Facility: CLINIC | Age: 27
End: 2017-06-20

## 2017-06-20 NOTE — TELEPHONE ENCOUNTER
----- Message from Linda Monae sent at 6/20/2017 11:12 AM CDT -----  Patient states she was told to go take glucose test. She needed to ask information about doing this. Please call at [phone] Thank you!

## 2017-06-20 NOTE — TELEPHONE ENCOUNTER
Spoke with pt. Pt informed nothing to eat after midnight. She needs to go to lab first thing in the morning prior to eating.

## 2017-07-03 ENCOUNTER — HOSPITAL ENCOUNTER (OUTPATIENT)
Facility: HOSPITAL | Age: 27
Discharge: HOME | End: 2017-07-03
Attending: OBSTETRICS & GYNECOLOGY | Admitting: OBSTETRICS & GYNECOLOGY
Payer: MEDICAID

## 2017-07-03 ENCOUNTER — TELEPHONE (OUTPATIENT)
Dept: OBSTETRICS AND GYNECOLOGY | Facility: CLINIC | Age: 27
End: 2017-07-03

## 2017-07-03 DIAGNOSIS — E87.6 HYPOKALEMIA: Primary | ICD-10-CM

## 2017-07-03 DIAGNOSIS — Z34.90 PREGNANCY WITH ONE FETUS: ICD-10-CM

## 2017-07-03 DIAGNOSIS — O99.012 ANEMIA IN PREGNANCY, SECOND TRIMESTER: ICD-10-CM

## 2017-07-03 DIAGNOSIS — Z34.92 PREGNANCY WITH ONE FETUS, SECOND TRIMESTER: Primary | ICD-10-CM

## 2017-07-03 LAB
ALBUMIN SERPL BCP-MCNC: 2.6 G/DL
ALP SERPL-CCNC: 55 U/L
ALT SERPL W/O P-5'-P-CCNC: <5 U/L
AMPHET+METHAMPHET UR QL: NEGATIVE
ANION GAP SERPL CALC-SCNC: 8 MMOL/L
AST SERPL-CCNC: 13 U/L
BARBITURATES UR QL SCN>200 NG/ML: NEGATIVE
BASOPHILS # BLD AUTO: 0.02 K/UL
BASOPHILS NFR BLD: 0.2 %
BENZODIAZ UR QL SCN>200 NG/ML: NEGATIVE
BILIRUB SERPL-MCNC: 0.6 MG/DL
BILIRUB UR QL STRIP: NEGATIVE
BUN SERPL-MCNC: 3 MG/DL
BZE UR QL SCN: NEGATIVE
CALCIUM SERPL-MCNC: 8 MG/DL
CANNABINOIDS UR QL SCN: NORMAL
CHLORIDE SERPL-SCNC: 104 MMOL/L
CLARITY UR: ABNORMAL
CO2 SERPL-SCNC: 24 MMOL/L
COLOR UR: YELLOW
CREAT SERPL-MCNC: 0.5 MG/DL
CREAT UR-MCNC: 108.7 MG/DL
DIFFERENTIAL METHOD: ABNORMAL
EOSINOPHIL # BLD AUTO: 0.1 K/UL
EOSINOPHIL NFR BLD: 1.1 %
ERYTHROCYTE [DISTWIDTH] IN BLOOD BY AUTOMATED COUNT: 14.3 %
EST. GFR  (AFRICAN AMERICAN): >60 ML/MIN/1.73 M^2
EST. GFR  (NON AFRICAN AMERICAN): >60 ML/MIN/1.73 M^2
GLUCOSE SERPL-MCNC: 102 MG/DL
GLUCOSE UR QL STRIP: NEGATIVE
HCT VFR BLD AUTO: 25.6 %
HGB BLD-MCNC: 8.1 G/DL
HGB UR QL STRIP: NEGATIVE
KETONES UR QL STRIP: NEGATIVE
LEUKOCYTE ESTERASE UR QL STRIP: NEGATIVE
LYMPHOCYTES # BLD AUTO: 1.2 K/UL
LYMPHOCYTES NFR BLD: 10.9 %
MCH RBC QN AUTO: 24.9 PG
MCHC RBC AUTO-ENTMCNC: 31.6 %
MCV RBC AUTO: 79 FL
METHADONE UR QL SCN>300 NG/ML: NEGATIVE
MICROSCOPIC COMMENT: NORMAL
MONOCYTES # BLD AUTO: 0.6 K/UL
MONOCYTES NFR BLD: 5.2 %
NEUTROPHILS # BLD AUTO: 9 K/UL
NEUTROPHILS NFR BLD: 82.3 %
NITRITE UR QL STRIP: NEGATIVE
OPIATES UR QL SCN: NEGATIVE
PCP UR QL SCN>25 NG/ML: NEGATIVE
PH UR STRIP: 7 [PH] (ref 5–8)
PLATELET # BLD AUTO: 276 K/UL
PMV BLD AUTO: 9.3 FL
POTASSIUM SERPL-SCNC: 2.9 MMOL/L
PROT SERPL-MCNC: 6 G/DL
PROT UR QL STRIP: NEGATIVE
RBC # BLD AUTO: 3.25 M/UL
SODIUM SERPL-SCNC: 136 MMOL/L
SP GR UR STRIP: 1.01 (ref 1–1.03)
SQUAMOUS #/AREA URNS HPF: 8 /HPF
TOXICOLOGY INFORMATION: NORMAL
URN SPEC COLLECT METH UR: ABNORMAL
UROBILINOGEN UR STRIP-ACNC: NEGATIVE EU/DL
WBC # BLD AUTO: 10.93 K/UL
WBC #/AREA URNS HPF: 3 /HPF (ref 0–5)

## 2017-07-03 PROCEDURE — 59025 FETAL NON-STRESS TEST: CPT | Mod: 26,,, | Performed by: OBSTETRICS & GYNECOLOGY

## 2017-07-03 PROCEDURE — 80307 DRUG TEST PRSMV CHEM ANLYZR: CPT

## 2017-07-03 PROCEDURE — 36415 COLL VENOUS BLD VENIPUNCTURE: CPT

## 2017-07-03 PROCEDURE — 85025 COMPLETE CBC W/AUTO DIFF WBC: CPT

## 2017-07-03 PROCEDURE — 99211 OFF/OP EST MAY X REQ PHY/QHP: CPT

## 2017-07-03 PROCEDURE — 25000003 PHARM REV CODE 250: Performed by: OBSTETRICS & GYNECOLOGY

## 2017-07-03 PROCEDURE — 99213 OFFICE O/P EST LOW 20 MIN: CPT | Mod: 25,TH,, | Performed by: OBSTETRICS & GYNECOLOGY

## 2017-07-03 PROCEDURE — 81000 URINALYSIS NONAUTO W/SCOPE: CPT

## 2017-07-03 PROCEDURE — 80053 COMPREHEN METABOLIC PANEL: CPT

## 2017-07-03 RX ORDER — POTASSIUM CHLORIDE 20 MEQ/1
40 TABLET, EXTENDED RELEASE ORAL ONCE
Status: COMPLETED | OUTPATIENT
Start: 2017-07-03 | End: 2017-07-03

## 2017-07-03 RX ORDER — DOCUSATE SODIUM 100 MG/1
100 CAPSULE, LIQUID FILLED ORAL 2 TIMES DAILY PRN
Qty: 60 CAPSULE | Refills: 1 | Status: SHIPPED | OUTPATIENT
Start: 2017-07-03 | End: 2017-07-20 | Stop reason: SDUPTHER

## 2017-07-03 RX ORDER — ONDANSETRON 8 MG/1
8 TABLET, ORALLY DISINTEGRATING ORAL EVERY 8 HOURS PRN
Status: DISCONTINUED | OUTPATIENT
Start: 2017-07-03 | End: 2017-07-03 | Stop reason: HOSPADM

## 2017-07-03 RX ORDER — SODIUM CHLORIDE, SODIUM LACTATE, POTASSIUM CHLORIDE, CALCIUM CHLORIDE 600; 310; 30; 20 MG/100ML; MG/100ML; MG/100ML; MG/100ML
INJECTION, SOLUTION INTRAVENOUS CONTINUOUS
Status: DISCONTINUED | OUTPATIENT
Start: 2017-07-03 | End: 2017-07-03 | Stop reason: HOSPADM

## 2017-07-03 RX ORDER — OXYCODONE AND ACETAMINOPHEN 10; 325 MG/1; MG/1
1 TABLET ORAL EVERY 4 HOURS PRN
Status: DISCONTINUED | OUTPATIENT
Start: 2017-07-03 | End: 2017-07-03

## 2017-07-03 RX ORDER — POTASSIUM CHLORIDE 20 MEQ/1
40 TABLET, EXTENDED RELEASE ORAL ONCE
Qty: 5 TABLET | Refills: 0 | Status: SHIPPED | OUTPATIENT
Start: 2017-07-03 | End: 2017-07-03

## 2017-07-03 RX ORDER — ACETAMINOPHEN 500 MG
500 TABLET ORAL EVERY 6 HOURS PRN
Status: DISCONTINUED | OUTPATIENT
Start: 2017-07-03 | End: 2017-07-03 | Stop reason: HOSPADM

## 2017-07-03 RX ORDER — OXYCODONE AND ACETAMINOPHEN 5; 325 MG/1; MG/1
1 TABLET ORAL ONCE
Status: DISCONTINUED | OUTPATIENT
Start: 2017-07-03 | End: 2017-07-03 | Stop reason: HOSPADM

## 2017-07-03 RX ORDER — OXYCODONE AND ACETAMINOPHEN 5; 325 MG/1; MG/1
1 TABLET ORAL ONCE
Status: DISCONTINUED | OUTPATIENT
Start: 2017-07-03 | End: 2017-07-03

## 2017-07-03 RX ADMIN — POTASSIUM CHLORIDE 40 MEQ: 1500 TABLET, EXTENDED RELEASE ORAL at 05:07

## 2017-07-03 NOTE — PROGRESS NOTES
[facility]  Progress Note  Obstetrics & Gynecology    Date:  7/3/2017    Chief complaint: Pelvic cramping     Subjective:    Christopher Avila is a 26 y.o.  at 26w2d gestation (REHAN: 10/7/2017, by Ultrasound) who presents to L&D c/o pelvic and lower back pain cramping.  Pain is dull and crampy, episodic in nature, no particular aggravating factors, and relieved with rest.  Pt denies any contractions, vaginal bleeding, leakage of fluid, and notes active fetal movement.  Pt denies any headaches, vision changes, epigastric pain, acute swelling in her extremities, SOB, or CP.     Review of Systems:      Constitutional:  No fever, fatigue  HENT:  No headaches or visual disturbance  Respiratory:  No cough, shortness of breath  Cardiovascular:  No chest pain, leg swelling  Gastrointestinal:  No nausea, vomiting, constipation, blood in stool   Genitourinary:  No dysuria, frequency  Skin:  No rash, jaundice  Neurological:  No dizziness, weakness, headaches  Psychiatric/Behavioral:  No sleep disturbance, dysphoric mood    Objective:    VITALS:  See charting.   GENERAL:  NAD, AOx3  HEENT:  NCAT, anicteric, moist mucus membranes.  Neck supple without masses.  LUNGS:  Clear to auscultation bilaterally.  HEART:  Regular rate & rhythm with physiologic heart sounds.  ABDOMEN:  Soft, non tender without any guarding, rigidity or rebound. Normoactive bowel sounds.  Size = dates.  Mild diffuse tenderness in lower back.  PELVIC:  Normal female external genitalia without gross lesions, rashes or excoriations.  Cervix closed, thick, high, posterior.  EXTREMITIES:  Symmetric without cramping, claudication or edema LE. +2 distal pulses.  FROM.  SKIN:  No rashes, good turgor & capillary refill.  NEUROLOGIC:  CN II - XII grossly intact bilaterally. +2 DTR, symmetric.  PSYCH:  Mood & affect appropriate.       Laboratory:     Lab Results   Component Value Date    WBC 10.93 2017    HGB 8.1 (L) 2017    HCT  25.6 (L) 07/03/2017    MCV 79 (L) 07/03/2017     07/03/2017     BMP  Lab Results   Component Value Date     07/03/2017    K 2.9 (L) 07/03/2017     07/03/2017    CO2 24 07/03/2017    BUN 3 (L) 07/03/2017    CREATININE 0.5 07/03/2017    CALCIUM 8.0 (L) 07/03/2017    ANIONGAP 8 07/03/2017    ESTGFRAFRICA >60 07/03/2017    EGFRNONAA >60 07/03/2017     Lab Results   Component Value Date    ALT <5 (L) 07/03/2017    AST 13 07/03/2017    ALKPHOS 55 07/03/2017    BILITOT 0.6 07/03/2017     ABO:  A POS    Component      Latest Ref Rng & Units 7/3/2017             Specimen UA       Urine, Clean Catch   Color, UA      Yellow, Straw, Sheela Yellow   Appearance, UA      Clear Hazy (A)   pH, UA      5.0 - 8.0 7.0   Specific Gravity, UA      1.005 - 1.030 1.015   Protein, UA      Negative Negative   Glucose, UA      Negative Negative   Ketones, UA      Negative Negative   Bilirubin (UA)      Negative Negative   Occult Blood UA      Negative Negative   Nitrite, UA      Negative Negative   Urobilinogen, UA      <2.0 EU/dL Negative   Leukocytes, UA      Negative Negative     Component      Latest Ref Rng & Units 7/3/2017   Benzodiazepines       Negative   Methadone metabolites       Negative   Cocaine (Metab.)       Negative   Opiate Scrn, Ur       Negative   Barbiturate Screen, Ur       Negative   Amphetamine Screen, Ur       Negative   Marijuana (THC) Metabolite       Presumptive Positive   Phencyclidine       Negative   Creatinine, Random Ur      15.0 - 325.0 mg/dL 108.7   Toxicology Information       SEE COMMENT     NST:    FHT:  Baseline 150, moderate variability, positive accelerations, no decelerations.  Aspen:  No contractions.    Hospital Course:      Pt was observed on L&D, received supportive care, and had no acute events.  Labor was ruled out.  Pt cramping improved prior to discharge.  Maternal and fetal status was overall reassuring.  Pt ate a meal prior to discharge.  Pt was observed talking on the phone  and watching TV during her time is triage and appeared to be in no acute distress and requested to go home. Pt states will return if she here sxs worsen.  Pt was discharged in stable condition.     Assessment:    1. 26 y.o.  at 26w2d  2. Lower back/round ligament pain  3. THC use    Plan:    Discharge home.     Labor precautions and kick counts instructions.    Supportive care for round ligament pain, tylenol prn.     F/u in clinic as previously scheduled, or sooner as needed.     Advised on THC use, harmful effects discussed.    Return to L&D if symptoms worsen, vaginal bleeding, leakage of fluid, contractions, decreased fetal movement, or any concerns.       All of her questions were answered to her satisfaction.  Pt voiced understanding and acceptance of hospital discharge.       Jitendra Guerra MD

## 2017-07-03 NOTE — PROGRESS NOTES
Patient given discharge instructions, will  iron scrip and attempt to improve diet..... Verb understanding, discharged from unit via w/c accompanied by ort, cab called for patient, states she cannot find ride.... No s/s distress noted.

## 2017-07-03 NOTE — DISCHARGE INSTRUCTIONS
Home Undelivered Discharge Instructions    After Discharge Orders:    Future Appointments  Date Time Provider Department Center   7/17/2017 1:00 PM Jitendra Guerra MD [facility]       Call physician for any problems    Current Discharge Medication List      CONTINUE these medications which have NOT CHANGED    Details   prenatal multivit-Ca-min-Fe-FA (PRENATAL VITAMIN) Tab Take 1 tablet by mouth once daily.  Qty: 30 each, Refills: 11    Comments: Please substitute for a comparable prenatal vitamins covered by patient's insurance plan affordable to patient. Also, dispense a 90 days supply when possible if requested by patient. Thanks.  Associated Diagnoses: Pregnancy with one fetus, first trimester                     · Diet:  normal diet as tolerated    · Rest: normal activity as tolerated    Other instructions: Do kick counts once a day on your baby. Choose the time of day your baby is most active. Get in a comfortable lying or sitting position and time how long it takes to feel 10 kicks, twists, turns, swishes, or rolls. Call your physician or midwife if there have not been 10 kicks in 1 hours    Call physician or midwife, return to Labor and Delivery, call 911, or go to the nearest Emergency Room if: increased leakage or fluid, contractions more than  5 per  1 hour, decreased fetal movement, persistent low back pain or cramping, bleeding from vaginal area, difficulty urinating, pain with urination, difficulty breathing or new calf pain

## 2017-07-03 NOTE — TELEPHONE ENCOUNTER
----- Message from Familia Baez sent at 7/3/2017 11:13 AM CDT -----  Contact: Self  Pt called crying, stating that she is experiencing really bad abdominal cramps. Pt would like to be advised how to handle. Pt can be reached @ [phone].      07/03/2017 11:19 AM  Pt advised to go to the E.R

## 2017-07-03 NOTE — TELEPHONE ENCOUNTER
----- Message from Linda Monae sent at 7/3/2017 11:10 AM CDT -----  Patient is pregnant and having bad cramping. She was crying on the phone. please call at [phone] Thank you!

## 2017-07-03 NOTE — TELEPHONE ENCOUNTER
Pt stated she was having abdominal cramps and was crying. Pt stated that it started this morning when she got up and they pain was at a 10. Pt advised to go to the E.R and follow up with  .

## 2017-07-20 ENCOUNTER — CLINICAL SUPPORT (OUTPATIENT)
Dept: OBSTETRICS AND GYNECOLOGY | Facility: CLINIC | Age: 27
End: 2017-07-20
Payer: MEDICAID

## 2017-07-20 ENCOUNTER — ROUTINE PRENATAL (OUTPATIENT)
Dept: OBSTETRICS AND GYNECOLOGY | Facility: CLINIC | Age: 27
End: 2017-07-20
Payer: MEDICAID

## 2017-07-20 VITALS
SYSTOLIC BLOOD PRESSURE: 114 MMHG | BODY MASS INDEX: 31.75 KG/M2 | WEIGHT: 179.25 LBS | DIASTOLIC BLOOD PRESSURE: 72 MMHG

## 2017-07-20 VITALS
SYSTOLIC BLOOD PRESSURE: 114 MMHG | DIASTOLIC BLOOD PRESSURE: 72 MMHG | WEIGHT: 179.25 LBS | BODY MASS INDEX: 31.75 KG/M2

## 2017-07-20 DIAGNOSIS — O99.013 ANEMIA IN PREGNANCY, THIRD TRIMESTER: ICD-10-CM

## 2017-07-20 DIAGNOSIS — Z23 NEED FOR DIPHTHERIA-TETANUS-PERTUSSIS (TDAP) VACCINE, ADULT/ADOLESCENT: Primary | ICD-10-CM

## 2017-07-20 DIAGNOSIS — Z34.93 PREGNANCY WITH ONE FETUS, THIRD TRIMESTER: Primary | ICD-10-CM

## 2017-07-20 PROCEDURE — 90471 IMMUNIZATION ADMIN: CPT | Mod: PBBFAC,VFC

## 2017-07-20 PROCEDURE — 99999 PR PBB SHADOW E&M-EST. PATIENT-LVL II: CPT | Mod: PBBFAC,,,

## 2017-07-20 PROCEDURE — 90715 TDAP VACCINE 7 YRS/> IM: CPT | Mod: PBBFAC,SL

## 2017-07-20 PROCEDURE — 99999 PR PBB SHADOW E&M-EST. PATIENT-LVL II: CPT | Mod: PBBFAC,,, | Performed by: OBSTETRICS & GYNECOLOGY

## 2017-07-20 PROCEDURE — 99212 OFFICE O/P EST SF 10 MIN: CPT | Mod: PBBFAC

## 2017-07-20 PROCEDURE — 99213 OFFICE O/P EST LOW 20 MIN: CPT | Mod: TH,S$PBB,, | Performed by: OBSTETRICS & GYNECOLOGY

## 2017-07-20 RX ORDER — DOCUSATE SODIUM 100 MG/1
100 CAPSULE, LIQUID FILLED ORAL 2 TIMES DAILY PRN
Qty: 60 CAPSULE | Refills: 1 | Status: SHIPPED | OUTPATIENT
Start: 2017-07-20 | End: 2017-09-15 | Stop reason: SDUPTHER

## 2017-07-20 NOTE — PROGRESS NOTES
28w5d here for OB visit.  Pt has no major complaints today.  Reports active fetus.  Denies vaginal bleeding, leakage of fluid, or contractions.  Pt has not completed 1 hr glucola, testing strongly advised.  Continue Fe/colace for anemia.  Risks and benefits of the Tdap vaccine discussed and encouraged. Questions answered. Patient is not allergic to eggs or latex. Pt has not been with a fever for the past several days.   The vaccine was then given by our nurse, Ms. Friend without any difficulty or problems.  Labor/preE/anemia precautions.  Kick counts.  Return 2 wks.  Voiced understanding.

## 2017-07-20 NOTE — PROGRESS NOTES
Pt here for T- DAP injection, explained what the medication is for, TDAP handout given to pt,  reviewed documentation of medication with pt, injection given via R Deltoid w/o difficulty. Pt stated her understanding of all instructions.

## 2017-08-14 ENCOUNTER — HOSPITAL ENCOUNTER (OUTPATIENT)
Facility: HOSPITAL | Age: 27
Discharge: HOME | End: 2017-08-14
Attending: OBSTETRICS & GYNECOLOGY | Admitting: OBSTETRICS & GYNECOLOGY
Payer: MEDICAID

## 2017-08-14 VITALS
WEIGHT: 179 LBS | BODY MASS INDEX: 31.71 KG/M2 | DIASTOLIC BLOOD PRESSURE: 62 MMHG | HEART RATE: 86 BPM | OXYGEN SATURATION: 99 % | TEMPERATURE: 98 F | HEIGHT: 63 IN | RESPIRATION RATE: 18 BRPM | SYSTOLIC BLOOD PRESSURE: 114 MMHG

## 2017-08-14 DIAGNOSIS — Z34.93 PREGNANCY WITH ONE FETUS, THIRD TRIMESTER: Primary | ICD-10-CM

## 2017-08-14 DIAGNOSIS — R82.71 ASYMPTOMATIC BACTERIURIA: Primary | ICD-10-CM

## 2017-08-14 LAB
AMPHET+METHAMPHET UR QL: NEGATIVE
BACTERIA #/AREA URNS HPF: NORMAL /HPF
BARBITURATES UR QL SCN>200 NG/ML: NEGATIVE
BENZODIAZ UR QL SCN>200 NG/ML: NEGATIVE
BILIRUB UR QL STRIP: NEGATIVE
BZE UR QL SCN: NEGATIVE
CANNABINOIDS UR QL SCN: NORMAL
CLARITY UR: ABNORMAL
COLOR UR: YELLOW
CREAT UR-MCNC: 89 MG/DL
GLUCOSE UR QL STRIP: NEGATIVE
HGB UR QL STRIP: ABNORMAL
KETONES UR QL STRIP: NEGATIVE
LEUKOCYTE ESTERASE UR QL STRIP: ABNORMAL
METHADONE UR QL SCN>300 NG/ML: NEGATIVE
MICROSCOPIC COMMENT: NORMAL
NITRITE UR QL STRIP: NEGATIVE
OPIATES UR QL SCN: NEGATIVE
PCP UR QL SCN>25 NG/ML: NEGATIVE
PH UR STRIP: 7 [PH] (ref 5–8)
PROT UR QL STRIP: NEGATIVE
RBC #/AREA URNS HPF: 1 /HPF (ref 0–4)
SP GR UR STRIP: 1.01 (ref 1–1.03)
SQUAMOUS #/AREA URNS HPF: 15 /HPF
TOXICOLOGY INFORMATION: NORMAL
URN SPEC COLLECT METH UR: ABNORMAL
UROBILINOGEN UR STRIP-ACNC: NEGATIVE EU/DL
WBC #/AREA URNS HPF: 1 /HPF (ref 0–5)

## 2017-08-14 PROCEDURE — 59025 FETAL NON-STRESS TEST: CPT

## 2017-08-14 PROCEDURE — 81000 URINALYSIS NONAUTO W/SCOPE: CPT

## 2017-08-14 PROCEDURE — 25000003 PHARM REV CODE 250: Performed by: OBSTETRICS & GYNECOLOGY

## 2017-08-14 PROCEDURE — 99211 OFF/OP EST MAY X REQ PHY/QHP: CPT

## 2017-08-14 PROCEDURE — 80307 DRUG TEST PRSMV CHEM ANLYZR: CPT

## 2017-08-14 RX ORDER — ACETAMINOPHEN 500 MG
500 TABLET ORAL EVERY 6 HOURS PRN
Status: DISCONTINUED | OUTPATIENT
Start: 2017-08-14 | End: 2017-08-14 | Stop reason: HOSPADM

## 2017-08-14 RX ORDER — NITROFURANTOIN 25; 75 MG/1; MG/1
100 CAPSULE ORAL 2 TIMES DAILY
Qty: 14 CAPSULE | Refills: 0 | Status: SHIPPED | OUTPATIENT
Start: 2017-08-14 | End: 2017-08-21

## 2017-08-14 RX ORDER — ONDANSETRON 8 MG/1
8 TABLET, ORALLY DISINTEGRATING ORAL EVERY 8 HOURS PRN
Status: DISCONTINUED | OUTPATIENT
Start: 2017-08-14 | End: 2017-08-14 | Stop reason: HOSPADM

## 2017-08-14 RX ORDER — OXYCODONE AND ACETAMINOPHEN 5; 325 MG/1; MG/1
1 TABLET ORAL EVERY 4 HOURS PRN
Status: DISCONTINUED | OUTPATIENT
Start: 2017-08-14 | End: 2017-08-14

## 2017-08-14 RX ORDER — OXYCODONE AND ACETAMINOPHEN 5; 325 MG/1; MG/1
1 TABLET ORAL ONCE
Status: COMPLETED | OUTPATIENT
Start: 2017-08-14 | End: 2017-08-14

## 2017-08-14 RX ADMIN — OXYCODONE HYDROCHLORIDE AND ACETAMINOPHEN 1 TABLET: 5; 325 TABLET ORAL at 05:08

## 2017-08-14 NOTE — PROGRESS NOTES
"Called to bedside. Patient states her "butt is hurting more". Discussed position changes and pillow given to place under buttocks. Encouraged patient to call as needed, verbalized understanding.  "

## 2017-08-14 NOTE — PROGRESS NOTES
Notified patient of need for urine specimen, percocet prn after reactive nst and po hydration. Agreed with plan of care. Call light placed in reach.

## 2017-08-14 NOTE — PROGRESS NOTES
Notified Dr. Guerra of patient arrival and assessment. Patient requesting pain meds. Orders received. Will update patient on plan of care.

## 2017-08-14 NOTE — PROGRESS NOTES
To bedside. EFMx2 applied by GELY Luis RN. Patient crying upon my arrival. She states she has been hurting since this morning and it is not getting better. Points to bilat lower segment of abdomen and rates 9/10 on face scale. Assisted patient with breathing techniques until patient calm. Patient denies bleeding, sex, vag exams and ROM. Abdomen palpated soft and non tender. Mild ctx palpated while present in room. FFN done. SVE - 1cm externally, closed internally / thick /-4.  Patient reports feeling down and depressed due to her poor living conditions - currently living at her Aunt's house with her 4 children and there are multiple other people in the home. Patient states she feels this is part of the reason she is so upset. Coping and support systems addressed, verbalized understanding. Call light placed in reach.

## 2017-08-15 ENCOUNTER — TELEPHONE (OUTPATIENT)
Dept: OBSTETRICS AND GYNECOLOGY | Facility: CLINIC | Age: 27
End: 2017-08-15

## 2017-08-15 NOTE — TREATMENT PLAN
House supervisor called;pt already used cab ride home during this pregnancy;pt instructed to find ride home

## 2017-08-15 NOTE — TREATMENT PLAN
"poc discussed with pt regarding dc home and meds being called in to her pharmacy by ;pt asking for "medicaid cab" for ride home;sve done;no cervical change noted  "

## 2017-08-15 NOTE — DISCHARGE INSTRUCTIONS
Home Undelivered Discharge Instructions    After Discharge Orders:    Future Appointments  Date Time Provider Department Center   8/16/2017 11:00 AM Jitendra Guerra MD [facility]         Current Discharge Medication List      CONTINUE these medications which have NOT CHANGED    Details   docusate sodium (COLACE) 100 MG capsule Take 1 capsule (100 mg total) by mouth 2 (two) times daily as needed for Constipation.  Qty: 60 capsule, Refills: 1    Associated Diagnoses: Anemia in pregnancy, third trimester      ferrous gluconate (FERGON) 325 MG Tab Take 1 tablet (325 mg total) by mouth 2 (two) times daily with meals.  Qty: 60 tablet, Refills: 1    Associated Diagnoses: Anemia in pregnancy, third trimester      prenatal multivit-Ca-min-Fe-FA (PRENATAL VITAMIN) Tab Take 1 tablet by mouth once daily.  Qty: 30 each, Refills: 11    Comments: Please substitute for a comparable prenatal vitamins covered by patient's insurance plan affordable to patient. Also, dispense a 90 days supply when possible if requested by patient. Thanks.  Associated Diagnoses: Pregnancy with one fetus, first trimester                     · Diet:  normal diet as tolerated    · Rest: normal activity as tolerated    Other instructions: Do kick counts once a day on your baby. Choose the time of day your baby is most active. Get in a comfortable lying or sitting position and time how long it takes to feel 10 kicks, twists, turns, swishes, or rolls. Call your physician or midwife if there have not been 10 kicks in 1 hours    Call physician or midwife, return to Labor and Delivery, call 911, or go to the nearest Emergency Room if: increased leakage or fluid, decreased fetal movement, persistent low back pain or cramping, bleeding from vaginal area, persistent headache or vision change

## 2017-08-16 ENCOUNTER — TELEPHONE (OUTPATIENT)
Dept: OBSTETRICS AND GYNECOLOGY | Facility: CLINIC | Age: 27
End: 2017-08-16

## 2017-08-16 NOTE — TELEPHONE ENCOUNTER
Attempted to contact pt phone went straight to YUE SHIPMAN asking her to call the office to reschedule apt. kt

## 2017-08-23 ENCOUNTER — TELEPHONE (OUTPATIENT)
Dept: OBSTETRICS AND GYNECOLOGY | Facility: CLINIC | Age: 27
End: 2017-08-23

## 2017-08-23 NOTE — TELEPHONE ENCOUNTER
----- Message from Jitendra Guerra MD sent at 8/16/2017 12:51 PM CDT -----  Contact: Self   Please schedule pt for f/u OB visit ASAP.  Thanks.    ----- Message -----  From: Aysha Franco MA  Sent: 8/16/2017  11:33 AM  To: Jitendra Guerra MD        ----- Message -----  From: Ade Monae  Sent: 8/16/2017   9:40 AM  To: Mari CORNELL Staff    Patient says she need to speak with the doctor regarding something personal. Please call at [phone]     unable to contact patient to schedule OB appt. sal

## 2017-08-23 NOTE — TELEPHONE ENCOUNTER
Spoke to pt and she informed me she is having difficulties with transportation and it's getting harder for her to walk to swelling in her feet with SOB. Pt advised to keep feet elevated when she can and watch the type of foods she eat that can trigger swelling. I informed the pt I will call the transportation company to see what happened and what we can do to fix it and get her in.    Contacted [employer] and spoke to the  and he stated Ms. Austin edwards didn't sound like a familiar pt he picks up and that she may have been using another company. He explained the process to me once the pt call the number on their medicaid card. He even call the company on the phone to try and verify what company to pt was told she can use but no one answered the phone. Took the number down he called and thanked him for his help.    Contacted [employer] to get a contact number on the transportation company for the pt. ([employer])  number was provided.    Contacted pt and provided the number asked her to call them and setup transportation today. Apt for Monday 8/28 was provided for the pt to give to the company if they asked. Asked pt to give me a call to update me. Pt voiced her understanding and thanked me. deyvi

## 2017-08-23 NOTE — TELEPHONE ENCOUNTER
----- Message from Phyllis Garzon sent at 8/23/2017  1:08 PM CDT -----  Contact: Self  Please disregard previous message. Pt states she's having trouble walking and wants to know if a nurse can come out to her home for OB visits. Pt would also like to know how many weeks she is. Pt can be reached @ [phone].

## 2017-08-24 ENCOUNTER — TELEPHONE (OUTPATIENT)
Dept: OBSTETRICS AND GYNECOLOGY | Facility: CLINIC | Age: 27
End: 2017-08-24

## 2017-08-24 NOTE — TELEPHONE ENCOUNTER
----- Message from Jitendra Guerra MD sent at 8/16/2017 12:51 PM CDT -----  Contact: Self   Please schedule pt for f/u OB visit ASAP.  Thanks.    ----- Message -----  From: Aysha Franco MA  Sent: 8/16/2017  11:33 AM  To: Jitendra Guerra MD        ----- Message -----  From: Ade Monae  Sent: 8/16/2017   9:40 AM  To: Mari CORNELL Staff    Patient says she need to speak with the doctor regarding something personal. Please call at [phone]      Attempted to contact patient in regards to  OB appt, no answer, no voicemail. sal

## 2017-08-28 ENCOUNTER — LAB VISIT (OUTPATIENT)
Dept: LAB | Facility: HOSPITAL | Age: 27
End: 2017-08-28
Payer: MEDICAID

## 2017-08-28 ENCOUNTER — ROUTINE PRENATAL (OUTPATIENT)
Dept: OBSTETRICS AND GYNECOLOGY | Facility: CLINIC | Age: 27
End: 2017-08-28
Payer: MEDICAID

## 2017-08-28 VITALS
WEIGHT: 187.38 LBS | BODY MASS INDEX: 33.19 KG/M2 | SYSTOLIC BLOOD PRESSURE: 115 MMHG | DIASTOLIC BLOOD PRESSURE: 72 MMHG | HEART RATE: 62 BPM

## 2017-08-28 DIAGNOSIS — Z34.93 PREGNANCY WITH ONE FETUS, THIRD TRIMESTER: Primary | ICD-10-CM

## 2017-08-28 DIAGNOSIS — Z34.93 PREGNANCY WITH ONE FETUS, THIRD TRIMESTER: ICD-10-CM

## 2017-08-28 LAB
AMPHET+METHAMPHET UR QL: NEGATIVE
BARBITURATES UR QL SCN>200 NG/ML: NEGATIVE
BASOPHILS # BLD AUTO: 0.02 K/UL
BASOPHILS NFR BLD: 0.3 %
BENZODIAZ UR QL SCN>200 NG/ML: NEGATIVE
BZE UR QL SCN: NEGATIVE
CANNABINOIDS UR QL SCN: NORMAL
CREAT UR-MCNC: 82.1 MG/DL
DIFFERENTIAL METHOD: ABNORMAL
EOSINOPHIL # BLD AUTO: 0.1 K/UL
EOSINOPHIL NFR BLD: 1.7 %
ERYTHROCYTE [DISTWIDTH] IN BLOOD BY AUTOMATED COUNT: 16.7 %
ESTIMATED AVG GLUCOSE: 114 MG/DL
GLUCOSE SERPL-MCNC: 159 MG/DL
HBA1C MFR BLD HPLC: 5.6 %
HCT VFR BLD AUTO: 26.8 %
HGB BLD-MCNC: 8.4 G/DL
LYMPHOCYTES # BLD AUTO: 1.5 K/UL
LYMPHOCYTES NFR BLD: 18.6 %
MCH RBC QN AUTO: 21.9 PG
MCHC RBC AUTO-ENTMCNC: 31.3 G/DL
MCV RBC AUTO: 70 FL
METHADONE UR QL SCN>300 NG/ML: NEGATIVE
MONOCYTES # BLD AUTO: 0.5 K/UL
MONOCYTES NFR BLD: 5.8 %
NEUTROPHILS # BLD AUTO: 5.7 K/UL
NEUTROPHILS NFR BLD: 73.1 %
OPIATES UR QL SCN: NEGATIVE
PCP UR QL SCN>25 NG/ML: NEGATIVE
PLATELET # BLD AUTO: 234 K/UL
PMV BLD AUTO: 9.7 FL
RBC # BLD AUTO: 3.83 M/UL
TOXICOLOGY INFORMATION: NORMAL
WBC # BLD AUTO: 7.79 K/UL

## 2017-08-28 PROCEDURE — 3008F BODY MASS INDEX DOCD: CPT | Mod: ,,, | Performed by: OBSTETRICS & GYNECOLOGY

## 2017-08-28 PROCEDURE — 83036 HEMOGLOBIN GLYCOSYLATED A1C: CPT

## 2017-08-28 PROCEDURE — 85025 COMPLETE CBC W/AUTO DIFF WBC: CPT

## 2017-08-28 PROCEDURE — 86703 HIV-1/HIV-2 1 RESULT ANTBDY: CPT

## 2017-08-28 PROCEDURE — 82950 GLUCOSE TEST: CPT

## 2017-08-28 PROCEDURE — 99999 PR PBB SHADOW E&M-EST. PATIENT-LVL II: CPT | Mod: PBBFAC,,, | Performed by: OBSTETRICS & GYNECOLOGY

## 2017-08-28 PROCEDURE — 99213 OFFICE O/P EST LOW 20 MIN: CPT | Mod: TH,S$PBB,, | Performed by: OBSTETRICS & GYNECOLOGY

## 2017-08-28 PROCEDURE — 36415 COLL VENOUS BLD VENIPUNCTURE: CPT | Mod: PO

## 2017-08-29 LAB — HIV 1+2 AB+HIV1 P24 AG SERPL QL IA: NEGATIVE

## 2017-09-02 ENCOUNTER — HOSPITAL ENCOUNTER (OUTPATIENT)
Facility: HOSPITAL | Age: 27
Discharge: HOME | End: 2017-09-02
Attending: OBSTETRICS & GYNECOLOGY | Admitting: OBSTETRICS & GYNECOLOGY
Payer: MEDICAID

## 2017-09-02 VITALS
HEART RATE: 75 BPM | BODY MASS INDEX: 33.2 KG/M2 | TEMPERATURE: 97 F | SYSTOLIC BLOOD PRESSURE: 125 MMHG | DIASTOLIC BLOOD PRESSURE: 77 MMHG | OXYGEN SATURATION: 100 % | WEIGHT: 187.38 LBS | HEIGHT: 63 IN | RESPIRATION RATE: 18 BRPM

## 2017-09-02 DIAGNOSIS — Z34.93 PREGNANCY WITH ONE FETUS, THIRD TRIMESTER: ICD-10-CM

## 2017-09-02 LAB
AMPHET+METHAMPHET UR QL: NEGATIVE
BARBITURATES UR QL SCN>200 NG/ML: NEGATIVE
BENZODIAZ UR QL SCN>200 NG/ML: NEGATIVE
BILIRUB UR QL STRIP: NEGATIVE
BZE UR QL SCN: NEGATIVE
CANNABINOIDS UR QL SCN: NORMAL
CLARITY UR: ABNORMAL
COLOR UR: YELLOW
CREAT UR-MCNC: 108.7 MG/DL
GLUCOSE UR QL STRIP: NEGATIVE
HGB UR QL STRIP: NEGATIVE
KETONES UR QL STRIP: NEGATIVE
LEUKOCYTE ESTERASE UR QL STRIP: NEGATIVE
METHADONE UR QL SCN>300 NG/ML: NEGATIVE
NITRITE UR QL STRIP: NEGATIVE
OPIATES UR QL SCN: NEGATIVE
PCP UR QL SCN>25 NG/ML: NEGATIVE
PH UR STRIP: 6 [PH] (ref 5–8)
POCT GLUCOSE: 92 MG/DL (ref 70–110)
PROT UR QL STRIP: NEGATIVE
SP GR UR STRIP: 1.01 (ref 1–1.03)
TOXICOLOGY INFORMATION: NORMAL
URN SPEC COLLECT METH UR: ABNORMAL
UROBILINOGEN UR STRIP-ACNC: ABNORMAL EU/DL

## 2017-09-02 PROCEDURE — 96361 HYDRATE IV INFUSION ADD-ON: CPT

## 2017-09-02 PROCEDURE — 99211 OFF/OP EST MAY X REQ PHY/QHP: CPT | Mod: 25

## 2017-09-02 PROCEDURE — 96375 TX/PRO/DX INJ NEW DRUG ADDON: CPT

## 2017-09-02 PROCEDURE — 96374 THER/PROPH/DIAG INJ IV PUSH: CPT

## 2017-09-02 PROCEDURE — 80307 DRUG TEST PRSMV CHEM ANLYZR: CPT

## 2017-09-02 PROCEDURE — 63600175 PHARM REV CODE 636 W HCPCS: Performed by: OBSTETRICS & GYNECOLOGY

## 2017-09-02 PROCEDURE — 96365 THER/PROPH/DIAG IV INF INIT: CPT

## 2017-09-02 PROCEDURE — 96360 HYDRATION IV INFUSION INIT: CPT

## 2017-09-02 PROCEDURE — 81003 URINALYSIS AUTO W/O SCOPE: CPT

## 2017-09-02 PROCEDURE — 25000003 PHARM REV CODE 250: Performed by: OBSTETRICS & GYNECOLOGY

## 2017-09-02 RX ORDER — SODIUM CHLORIDE, SODIUM LACTATE, POTASSIUM CHLORIDE, CALCIUM CHLORIDE 600; 310; 30; 20 MG/100ML; MG/100ML; MG/100ML; MG/100ML
INJECTION, SOLUTION INTRAVENOUS CONTINUOUS
Status: DISCONTINUED | OUTPATIENT
Start: 2017-09-02 | End: 2017-09-02 | Stop reason: HOSPADM

## 2017-09-02 RX ORDER — BUTORPHANOL TARTRATE 2 MG/ML
1 INJECTION INTRAMUSCULAR; INTRAVENOUS ONCE
Status: COMPLETED | OUTPATIENT
Start: 2017-09-02 | End: 2017-09-02

## 2017-09-02 RX ORDER — ACETAMINOPHEN 500 MG
500 TABLET ORAL EVERY 6 HOURS PRN
Status: DISCONTINUED | OUTPATIENT
Start: 2017-09-02 | End: 2017-09-02 | Stop reason: HOSPADM

## 2017-09-02 RX ADMIN — PROMETHAZINE HYDROCHLORIDE 12.5 MG: 25 INJECTION INTRAMUSCULAR; INTRAVENOUS at 12:09

## 2017-09-02 RX ADMIN — BUTORPHANOL TARTRATE 1 MG: 2 INJECTION, SOLUTION INTRAMUSCULAR; INTRAVENOUS at 12:09

## 2017-09-02 RX ADMIN — SODIUM CHLORIDE, SODIUM LACTATE, POTASSIUM CHLORIDE, AND CALCIUM CHLORIDE: .6; .31; .03; .02 INJECTION, SOLUTION INTRAVENOUS at 12:09

## 2017-09-02 NOTE — TREATMENT PLAN
To room 224 per EMS on stretcher with complaint of lower abd/back pain 8/10.  Pt  Crying at intervals.  Dr. Guerra on unit.  Pt states ate at 0600, noodles but threw up.  States had some leakage of whitish vag discharge.  Nitrazine neg.  vag exam done per Dr. Guerra.  1 cm, thick and high with no bleeding noted on glove.  IV to left anti cubital with  cc tba in progress.  Plan of care discussed with pt..

## 2017-09-02 NOTE — DISCHARGE INSTRUCTIONS
Home Undelivered Discharge Instructions    After Discharge Orders:    Future Appointments  Date Time Provider Department Center   9/5/2017 9:20 AM Jitendra Guerra MD [facility]       Call physician or midwife's office on 9/74/17  for instructions.    Current Discharge Medication List      CONTINUE these medications which have NOT CHANGED    Details   docusate sodium (COLACE) 100 MG capsule Take 1 capsule (100 mg total) by mouth 2 (two) times daily as needed for Constipation.  Qty: 60 capsule, Refills: 1    Associated Diagnoses: Anemia in pregnancy, third trimester      ferrous gluconate (FERGON) 325 MG Tab Take 1 tablet (325 mg total) by mouth 2 (two) times daily with meals.  Qty: 60 tablet, Refills: 1    Associated Diagnoses: Anemia in pregnancy, third trimester      prenatal multivit-Ca-min-Fe-FA (PRENATAL VITAMIN) Tab Take 1 tablet by mouth once daily.  Qty: 30 each, Refills: 11    Comments: Please substitute for a comparable prenatal vitamins covered by patient's insurance plan affordable to patient. Also, dispense a 90 days supply when possible if requested by patient. Thanks.  Associated Diagnoses: Pregnancy with one fetus, first trimester                     · Diet:  normal diet as tolerated  Drink 8 glasses of water daily.  · Rest: bed rest, up only to bathroom and no sex    Other instructions: Do kick counts once a day on your baby. Choose the time of day your baby is most active. Get in a comfortable lying or sitting position and time how long it takes to feel 10 kicks, twists, turns, swishes, or rolls. Call your physician or midwife if there have not been 10 kicks in 1.5 hours    Call physician or midwife, return to Labor and Delivery, call 911, or go to the nearest Emergency Room if: increased leakage or fluid, contractions more than  6 per  1 hour, decreased fetal movement, persistent low back pain or cramping, bleeding from vaginal area, difficulty urinating, pain with urination, difficulty  breathing or new calf pain

## 2017-09-03 ENCOUNTER — HOSPITAL ENCOUNTER (OUTPATIENT)
Facility: HOSPITAL | Age: 27
Discharge: HOME | End: 2017-09-03
Attending: OBSTETRICS & GYNECOLOGY | Admitting: OBSTETRICS & GYNECOLOGY
Payer: MEDICAID

## 2017-09-03 VITALS
HEIGHT: 63 IN | DIASTOLIC BLOOD PRESSURE: 67 MMHG | HEART RATE: 86 BPM | SYSTOLIC BLOOD PRESSURE: 122 MMHG | BODY MASS INDEX: 33.31 KG/M2 | WEIGHT: 188 LBS | RESPIRATION RATE: 18 BRPM | TEMPERATURE: 98 F

## 2017-09-03 DIAGNOSIS — Z34.90 PREGNANCY WITH ONE FETUS: ICD-10-CM

## 2017-09-03 LAB — POCT GLUCOSE: 97 MG/DL (ref 70–110)

## 2017-09-03 PROCEDURE — 25000003 PHARM REV CODE 250: Performed by: OBSTETRICS & GYNECOLOGY

## 2017-09-03 PROCEDURE — 99211 OFF/OP EST MAY X REQ PHY/QHP: CPT

## 2017-09-03 RX ORDER — OXYCODONE AND ACETAMINOPHEN 7.5; 325 MG/1; MG/1
1 TABLET ORAL EVERY 4 HOURS PRN
Status: DISCONTINUED | OUTPATIENT
Start: 2017-09-03 | End: 2017-09-03 | Stop reason: HOSPADM

## 2017-09-03 RX ORDER — BUTORPHANOL TARTRATE 2 MG/ML
1 INJECTION INTRAMUSCULAR; INTRAVENOUS ONCE
Status: DISCONTINUED | OUTPATIENT
Start: 2017-09-03 | End: 2017-09-03

## 2017-09-03 RX ADMIN — OXYCODONE HYDROCHLORIDE AND ACETAMINOPHEN 1 TABLET: 7.5; 325 TABLET ORAL at 10:09

## 2017-09-03 RX ADMIN — OXYCODONE HYDROCHLORIDE AND ACETAMINOPHEN 1 TABLET: 7.5; 325 TABLET ORAL at 06:09

## 2017-09-03 NOTE — TREATMENT PLAN
Reactive fetal monitor strip noted.  Pain 3/10 with contractions.  Monitor d/c'd.  Discharge instructions given.  Labor precautions given.  Verified understanding.  Discharged per amb

## 2017-09-03 NOTE — TREATMENT PLAN
Pt presents to unit with c/o lower back/abd pain, rates 9/10, crying and requesting pain medication. Pt states she was here yesterday with the same c/o. SVE done, see chart. EFM placed x2, +fhts noted. Will monitor.

## 2017-09-03 NOTE — TREATMENT PLAN
Dr. Guerra on unit.  efm strip reviewed.  Vag exam done.  No change noted from yesterday.  Pt states wants to deliver.  States pain 5/10 with contractions and wants to eat food.  Order received for percocet x 1, regular diet then reactive monitor strip, would discharge pt home.  Verified understanding.  Apple juice given.

## 2017-09-03 NOTE — TREATMENT PLAN
Called Dr. Guerra, notified of pt c/o lower back/abd pain and sve. MD gives orders for percocet, monitor for 1 hour and discharge home if cervix remains unchanged.

## 2017-09-03 NOTE — DISCHARGE INSTRUCTIONS
Home Undelivered Discharge Instructions    After Discharge Orders:    Future Appointments  Date Time Provider Department Center   9/5/2017 9:20 AM Jitendra Guerra MD [facility]BG[facility]       Call physician or midwife's office on 9/4/17  for instructions.    Current Discharge Medication List      CONTINUE these medications which have NOT CHANGED    Details   docusate sodium (COLACE) 100 MG capsule Take 1 capsule (100 mg total) by mouth 2 (two) times daily as needed for Constipation.  Qty: 60 capsule, Refills: 1    Associated Diagnoses: Anemia in pregnancy, third trimester      ferrous gluconate (FERGON) 325 MG Tab Take 1 tablet (325 mg total) by mouth 2 (two) times daily with meals.  Qty: 60 tablet, Refills: 1    Associated Diagnoses: Anemia in pregnancy, third trimester      prenatal multivit-Ca-min-Fe-FA (PRENATAL VITAMIN) Tab Take 1 tablet by mouth once daily.  Qty: 30 each, Refills: 11    Comments: Please substitute for a comparable prenatal vitamins covered by patient's insurance plan affordable to patient. Also, dispense a 90 days supply when possible if requested by patient. Thanks.  Associated Diagnoses: Pregnancy with one fetus, first trimester                     · Diet:  normal diet as tolerated  Drink 8 glasses of water daily.  · Rest: bed rest, up only to bathroom and no sex    Other instructions: Do kick counts once a day on your baby. Choose the time of day your baby is most active. Get in a comfortable lying or sitting position and time how long it takes to feel 10 kicks, twists, turns, swishes, or rolls. Call your physician or midwife if there have not been 10 kicks in 1.5 hours   go to lab for 3 hr glucose test as ordered and discussed per Dr. Guerra.  Call physician or midwife, return to Labor and Delivery, call 911, or go to the nearest Emergency Room if: increased leakage or fluid, contractions more than  6 per  45 minutes, decreased fetal movement, persistent low back pain or cramping,  bleeding from vaginal area, difficulty urinating, pain with urination, difficulty breathing or new calf pain

## 2017-09-07 PROBLEM — Z34.90 NORMAL PREGNANCY: Status: ACTIVE | Noted: 2017-09-07

## 2017-09-08 ENCOUNTER — TELEPHONE (OUTPATIENT)
Dept: OBSTETRICS AND GYNECOLOGY | Facility: CLINIC | Age: 27
End: 2017-09-08

## 2017-09-08 NOTE — TELEPHONE ENCOUNTER
----- Message from Carla Mclean sent at 9/8/2017  1:05 PM CDT -----  Contact: self  Patient calling back stating that she is in a lot of pain when baby moves. Please contact her [phone].    Thanks!

## 2017-09-08 NOTE — TELEPHONE ENCOUNTER
Spoke with pt. Pt stated that is in a lot of pain and feels like she has a vaginal boil and does not want to go to ER because she feels like she would be turned away. Pt stated nothing helps with the pain she said she has already tried tylenol and benadryl but nothing helps. Pt advised to try to rest. If pt does not get any relief she needs to report to ER so that someone can check her.

## 2017-09-13 ENCOUNTER — HOSPITAL ENCOUNTER (OUTPATIENT)
Facility: HOSPITAL | Age: 27
Discharge: HOME | End: 2017-09-14
Attending: OBSTETRICS & GYNECOLOGY | Admitting: OBSTETRICS & GYNECOLOGY
Payer: MEDICAID

## 2017-09-13 DIAGNOSIS — Z34.90 PREGNANCY WITH ONE FETUS: ICD-10-CM

## 2017-09-13 PROCEDURE — 99211 OFF/OP EST MAY X REQ PHY/QHP: CPT

## 2017-09-13 PROCEDURE — 63600175 PHARM REV CODE 636 W HCPCS: Performed by: OBSTETRICS & GYNECOLOGY

## 2017-09-13 RX ORDER — PROMETHAZINE HYDROCHLORIDE 25 MG/ML
12.5 INJECTION, SOLUTION INTRAMUSCULAR; INTRAVENOUS ONCE
Status: COMPLETED | OUTPATIENT
Start: 2017-09-13 | End: 2017-09-13

## 2017-09-13 RX ORDER — BUTORPHANOL TARTRATE 2 MG/ML
2 INJECTION INTRAMUSCULAR; INTRAVENOUS ONCE
Status: COMPLETED | OUTPATIENT
Start: 2017-09-13 | End: 2017-09-13

## 2017-09-13 RX ADMIN — PROMETHAZINE HYDROCHLORIDE 12.5 MG: 25 INJECTION INTRAMUSCULAR; INTRAVENOUS at 09:09

## 2017-09-13 RX ADMIN — BUTORPHANOL TARTRATE 2 MG: 2 INJECTION, SOLUTION INTRAMUSCULAR; INTRAVENOUS at 09:09

## 2017-09-14 VITALS
DIASTOLIC BLOOD PRESSURE: 59 MMHG | HEIGHT: 63 IN | RESPIRATION RATE: 18 BRPM | BODY MASS INDEX: 33.31 KG/M2 | WEIGHT: 188 LBS | SYSTOLIC BLOOD PRESSURE: 118 MMHG | HEART RATE: 81 BPM | TEMPERATURE: 98 F

## 2017-09-14 NOTE — TREATMENT PLAN
"Pt presents to unit with c/o ctx's "all day." Pain rated 10/10 and pt is tearful, appears anxious. SVE cindi Guerrero RN, see chart. Pt reports drinking 2 glasses of water throughout the day. EFM placed x2, +fhts noted. Will monitor.  "

## 2017-09-14 NOTE — DISCHARGE INSTRUCTIONS
Home Undelivered Discharge Instructions    After Discharge Orders:    Future Appointments  Date Time Provider Department Center   9/15/2017 9:20 AM Jitendra Guerra MD [facility]           Current Discharge Medication List      CONTINUE these medications which have NOT CHANGED    Details   docusate sodium (COLACE) 100 MG capsule Take 1 capsule (100 mg total) by mouth 2 (two) times daily as needed for Constipation.  Qty: 60 capsule, Refills: 1    Associated Diagnoses: Anemia in pregnancy, third trimester      ferrous gluconate (FERGON) 325 MG Tab Take 1 tablet (325 mg total) by mouth 2 (two) times daily with meals.  Qty: 60 tablet, Refills: 1    Associated Diagnoses: Anemia in pregnancy, third trimester      prenatal multivit-Ca-min-Fe-FA (PRENATAL VITAMIN) Tab Take 1 tablet by mouth once daily.  Qty: 30 each, Refills: 11    Comments: Please substitute for a comparable prenatal vitamins covered by patient's insurance plan affordable to patient. Also, dispense a 90 days supply when possible if requested by patient. Thanks.  Associated Diagnoses: Pregnancy with one fetus, first trimester                     · Diet:  normal diet as tolerated    · Rest: normal activity as tolerated    Other instructions: Do kick counts once a day on your baby. Choose the time of day your baby is most active. Get in a comfortable lying or sitting position and time how long it takes to feel 10 kicks, twists, turns, swishes, or rolls. Call your physician or midwife if there have not been 10 kicks in 1 hours    Call physician or midwife, return to Labor and Delivery, call 911, or go to the nearest Emergency Room if: increased leakage or fluid, contractions more than  3-5 per hour, decreased fetal movement, persistent low back pain or cramping, bleeding from vaginal area, difficulty urinating, pain with urination, difficulty breathing, new calf pain, persistent headache or vision change

## 2017-09-14 NOTE — TREATMENT PLAN
Dr. Sandhu notified of pt arrival with c/o ctx, ctx pattern, no cervical change x1 hour. Pt requesting something for pain. MD gives orders for stadol and phenergan, see chart. MD states if no cervical change, pt can be d/c home.

## 2017-09-15 ENCOUNTER — LAB VISIT (OUTPATIENT)
Dept: LAB | Facility: HOSPITAL | Age: 27
End: 2017-09-15
Attending: OBSTETRICS & GYNECOLOGY
Payer: MEDICAID

## 2017-09-15 ENCOUNTER — ROUTINE PRENATAL (OUTPATIENT)
Dept: OBSTETRICS AND GYNECOLOGY | Facility: CLINIC | Age: 27
End: 2017-09-15
Payer: MEDICAID

## 2017-09-15 VITALS
BODY MASS INDEX: 34.37 KG/M2 | DIASTOLIC BLOOD PRESSURE: 82 MMHG | SYSTOLIC BLOOD PRESSURE: 142 MMHG | WEIGHT: 194 LBS | HEART RATE: 60 BPM

## 2017-09-15 DIAGNOSIS — O99.013 ANEMIA IN PREGNANCY, THIRD TRIMESTER: ICD-10-CM

## 2017-09-15 DIAGNOSIS — Z34.93 PREGNANCY WITH ONE FETUS, THIRD TRIMESTER: Primary | ICD-10-CM

## 2017-09-15 DIAGNOSIS — Z34.93 PREGNANCY WITH ONE FETUS, THIRD TRIMESTER: ICD-10-CM

## 2017-09-15 LAB
ALBUMIN SERPL BCP-MCNC: 2.4 G/DL
ALP SERPL-CCNC: 172 U/L
ALT SERPL W/O P-5'-P-CCNC: 5 U/L
AMPHET+METHAMPHET UR QL: NEGATIVE
ANION GAP SERPL CALC-SCNC: 7 MMOL/L
ANISOCYTOSIS BLD QL SMEAR: SLIGHT
AST SERPL-CCNC: 17 U/L
BARBITURATES UR QL SCN>200 NG/ML: NEGATIVE
BASOPHILS # BLD AUTO: 0.03 K/UL
BASOPHILS NFR BLD: 0.4 %
BENZODIAZ UR QL SCN>200 NG/ML: NEGATIVE
BILIRUB SERPL-MCNC: 1.8 MG/DL
BUN SERPL-MCNC: 2 MG/DL
BZE UR QL SCN: NEGATIVE
CALCIUM SERPL-MCNC: 8.2 MG/DL
CANNABINOIDS UR QL SCN: NORMAL
CHLORIDE SERPL-SCNC: 105 MMOL/L
CO2 SERPL-SCNC: 25 MMOL/L
CREAT SERPL-MCNC: 0.6 MG/DL
CREAT UR-MCNC: 198.5 MG/DL
CREAT UR-MCNC: 198.5 MG/DL
DACRYOCYTES BLD QL SMEAR: ABNORMAL
DIFFERENTIAL METHOD: ABNORMAL
EOSINOPHIL # BLD AUTO: 0.1 K/UL
EOSINOPHIL NFR BLD: 1.3 %
ERYTHROCYTE [DISTWIDTH] IN BLOOD BY AUTOMATED COUNT: 17.1 %
EST. GFR  (AFRICAN AMERICAN): >60 ML/MIN/1.73 M^2
EST. GFR  (NON AFRICAN AMERICAN): >60 ML/MIN/1.73 M^2
GLUCOSE SERPL-MCNC: 85 MG/DL
HCT VFR BLD AUTO: 25.4 %
HGB BLD-MCNC: 7.7 G/DL
LDH SERPL L TO P-CCNC: 267 U/L
LYMPHOCYTES # BLD AUTO: 1.7 K/UL
LYMPHOCYTES NFR BLD: 22 %
MCH RBC QN AUTO: 20.9 PG
MCHC RBC AUTO-ENTMCNC: 30.3 G/DL
MCV RBC AUTO: 69 FL
METHADONE UR QL SCN>300 NG/ML: NEGATIVE
MONOCYTES # BLD AUTO: 0.6 K/UL
MONOCYTES NFR BLD: 7.9 %
NEUTROPHILS # BLD AUTO: 5.2 K/UL
NEUTROPHILS NFR BLD: 69.2 %
OPIATES UR QL SCN: NEGATIVE
PCP UR QL SCN>25 NG/ML: NEGATIVE
PLATELET # BLD AUTO: 196 K/UL
PLATELET BLD QL SMEAR: ABNORMAL
PMV BLD AUTO: 9.1 FL
POIKILOCYTOSIS BLD QL SMEAR: SLIGHT
POLYCHROMASIA BLD QL SMEAR: ABNORMAL
POTASSIUM SERPL-SCNC: 3.2 MMOL/L
PROT SERPL-MCNC: 5.9 G/DL
PROT UR-MCNC: 54 MG/DL
PROT/CREAT RATIO, UR: 0.27
RBC # BLD AUTO: 3.69 M/UL
SODIUM SERPL-SCNC: 137 MMOL/L
TOXICOLOGY INFORMATION: NORMAL
URATE SERPL-MCNC: 4.4 MG/DL
WBC # BLD AUTO: 7.59 K/UL

## 2017-09-15 PROCEDURE — 87591 N.GONORRHOEAE DNA AMP PROB: CPT

## 2017-09-15 PROCEDURE — 84550 ASSAY OF BLOOD/URIC ACID: CPT

## 2017-09-15 PROCEDURE — 84156 ASSAY OF PROTEIN URINE: CPT

## 2017-09-15 PROCEDURE — 86703 HIV-1/HIV-2 1 RESULT ANTBDY: CPT

## 2017-09-15 PROCEDURE — 99213 OFFICE O/P EST LOW 20 MIN: CPT | Mod: TH,S$PBB,, | Performed by: OBSTETRICS & GYNECOLOGY

## 2017-09-15 PROCEDURE — 99999 PR PBB SHADOW E&M-EST. PATIENT-LVL II: CPT | Mod: PBBFAC,,, | Performed by: OBSTETRICS & GYNECOLOGY

## 2017-09-15 PROCEDURE — 83615 LACTATE (LD) (LDH) ENZYME: CPT

## 2017-09-15 PROCEDURE — 87081 CULTURE SCREEN ONLY: CPT

## 2017-09-15 PROCEDURE — 88141 CYTOPATH C/V INTERPRET: CPT | Mod: ,,, | Performed by: PATHOLOGY

## 2017-09-15 PROCEDURE — 36415 COLL VENOUS BLD VENIPUNCTURE: CPT

## 2017-09-15 PROCEDURE — 99212 OFFICE O/P EST SF 10 MIN: CPT | Mod: PBBFAC | Performed by: OBSTETRICS & GYNECOLOGY

## 2017-09-15 PROCEDURE — 3008F BODY MASS INDEX DOCD: CPT | Mod: ,,, | Performed by: OBSTETRICS & GYNECOLOGY

## 2017-09-15 PROCEDURE — 80053 COMPREHEN METABOLIC PANEL: CPT

## 2017-09-15 PROCEDURE — 85025 COMPLETE CBC W/AUTO DIFF WBC: CPT

## 2017-09-15 PROCEDURE — 88175 CYTOPATH C/V AUTO FLUID REDO: CPT | Performed by: PATHOLOGY

## 2017-09-15 PROCEDURE — 80307 DRUG TEST PRSMV CHEM ANLYZR: CPT

## 2017-09-15 RX ORDER — DOCUSATE SODIUM 100 MG/1
100 CAPSULE, LIQUID FILLED ORAL 2 TIMES DAILY PRN
Qty: 60 CAPSULE | Refills: 1 | Status: ON HOLD | OUTPATIENT
Start: 2017-09-15 | End: 2017-09-21

## 2017-09-15 NOTE — PROGRESS NOTES
"36w6d here for OB visit.    Pt c/o crampy lower back pain.  Reports very active fetus.  Denies vaginal bleeding, leakage of fluid, or contractions.    BP elevated today.  Pt states due to back pain and "rushing to clinic".  Repeat BP at 20 mins rest 124/72, P 68, R18.  Denies headaches, vision changes, epigastric pain, or acute swelling.  Order preE workup.  Pt declined NST in clinic today, "I can't stay due to transportation issues".  Pt was advised to go to L&D ASAP for fetal monitoring.  Implications of uncontrolled HTN on her pregnancy discussed including fetal demise.  Pt was advised to monitor her BP at home and was encourage to buy BP cuff.  Parameters to call reviewed, go to L&D if BP > 150/95 or sxs of preE.  Kick counts TID.  Pt voiced clear understanding of instructions.    Exam showed no acute findings.  HEENT WNL  CTA-B  RRR no m/g/r  Abd soft/NT, palpable fetal mov't  Lower back mild diffuse tenderness  Ext no c/c. Trace edema in feet. +2 pulses, +2 DTR  Cervix 2/30/-3, no bleeding or LOF    Again, pt has not completed 1 hr glucola, testing strongly advised.  Refill Fe/colace for anemia.    Order GBS, HIV, G/C.  Delivery consents signed.    Labor/preE/anemia precautions.  Kick counts tid.  Go to L&D for NST ASAP "I will go today".  Return 1 wk or sooner as needed.  Pt voiced clear understanding of instructions.  "

## 2017-09-17 LAB
BACTERIA SPEC AEROBE CULT: NORMAL
C TRACH DNA SPEC QL NAA+PROBE: NORMAL
N GONORRHOEA DNA SPEC QL NAA+PROBE: NORMAL

## 2017-09-18 ENCOUNTER — ROUTINE PRENATAL (OUTPATIENT)
Dept: OBSTETRICS AND GYNECOLOGY | Facility: CLINIC | Age: 27
End: 2017-09-18
Payer: MEDICAID

## 2017-09-18 VITALS
HEART RATE: 70 BPM | DIASTOLIC BLOOD PRESSURE: 84 MMHG | WEIGHT: 196.19 LBS | BODY MASS INDEX: 34.76 KG/M2 | SYSTOLIC BLOOD PRESSURE: 142 MMHG

## 2017-09-18 DIAGNOSIS — Z34.93 PREGNANCY WITH ONE FETUS, THIRD TRIMESTER: Primary | ICD-10-CM

## 2017-09-18 DIAGNOSIS — O13.3 GESTATIONAL HYPERTENSION, THIRD TRIMESTER: ICD-10-CM

## 2017-09-18 LAB — HIV 1+2 AB+HIV1 P24 AG SERPL QL IA: NEGATIVE

## 2017-09-18 PROCEDURE — 3008F BODY MASS INDEX DOCD: CPT | Mod: ,,, | Performed by: OBSTETRICS & GYNECOLOGY

## 2017-09-18 PROCEDURE — 99213 OFFICE O/P EST LOW 20 MIN: CPT | Mod: TH,S$PBB,, | Performed by: OBSTETRICS & GYNECOLOGY

## 2017-09-18 PROCEDURE — 99999 PR PBB SHADOW E&M-EST. PATIENT-LVL II: CPT | Mod: PBBFAC,,, | Performed by: OBSTETRICS & GYNECOLOGY

## 2017-09-18 PROCEDURE — 99212 OFFICE O/P EST SF 10 MIN: CPT | Mod: PBBFAC | Performed by: OBSTETRICS & GYNECOLOGY

## 2017-09-18 RX ORDER — OXYTOCIN/RINGER'S LACTATE 20/1000 ML
2 PLASTIC BAG, INJECTION (ML) INTRAVENOUS CONTINUOUS
Status: CANCELLED | OUTPATIENT
Start: 2017-09-19

## 2017-09-18 RX ORDER — MISOPROSTOL 100 UG/1
600 TABLET ORAL
Status: CANCELLED | OUTPATIENT
Start: 2017-09-18

## 2017-09-18 RX ORDER — KETOROLAC TROMETHAMINE 30 MG/ML
30 INJECTION, SOLUTION INTRAMUSCULAR; INTRAVENOUS EVERY 6 HOURS
Status: CANCELLED | OUTPATIENT
Start: 2017-09-18 | End: 2017-09-19

## 2017-09-18 RX ORDER — IBUPROFEN 200 MG
800 TABLET ORAL EVERY 8 HOURS
Status: CANCELLED | OUTPATIENT
Start: 2017-09-19

## 2017-09-18 RX ORDER — SODIUM CHLORIDE, SODIUM LACTATE, POTASSIUM CHLORIDE, CALCIUM CHLORIDE 600; 310; 30; 20 MG/100ML; MG/100ML; MG/100ML; MG/100ML
INJECTION, SOLUTION INTRAVENOUS CONTINUOUS
Status: CANCELLED | OUTPATIENT
Start: 2017-09-18

## 2017-09-18 RX ORDER — BUTORPHANOL TARTRATE 1 MG/ML
2 INJECTION INTRAMUSCULAR; INTRAVENOUS
Status: CANCELLED | OUTPATIENT
Start: 2017-09-18

## 2017-09-18 NOTE — PROGRESS NOTES
"37w2d with gestational HTN here for OB visit.     Pt has no major complaints today.  Reports very active fetus.  Denies vaginal bleeding, leakage of fluid, or contractions.    BP elevated today.  Initial /84, Repeat 144/88.  Denies headaches, vision changes, epigastric pain, or acute swelling.  Bedside ultrasound shows very active fetus.  MVP > 4 cm.  Pt declined NST, "can't stay, my ride is waiting".  Plan for induction of labor tonight secondary to gestational hypertension.  Risks, benefits, and alternatives to IOL reviewed.  Pt resolute in her decision to proceed with IOL.    Abd soft/nontender.  Cervix favorable.    Labor/preE/anemia precautions.  Kick counts tid.  Go to L&D for any concerns.  Pt voiced understanding.  "

## 2017-09-19 ENCOUNTER — ANESTHESIA (OUTPATIENT)
Dept: OBSTETRICS AND GYNECOLOGY | Facility: HOSPITAL | Age: 27
End: 2017-09-19
Payer: MEDICAID

## 2017-09-19 ENCOUNTER — HOSPITAL ENCOUNTER (INPATIENT)
Facility: HOSPITAL | Age: 27
LOS: 2 days | Discharge: HOME | End: 2017-09-21
Attending: OBSTETRICS & GYNECOLOGY | Admitting: OBSTETRICS & GYNECOLOGY
Payer: MEDICAID

## 2017-09-19 ENCOUNTER — ANESTHESIA EVENT (OUTPATIENT)
Dept: OBSTETRICS AND GYNECOLOGY | Facility: HOSPITAL | Age: 27
End: 2017-09-19
Payer: MEDICAID

## 2017-09-19 DIAGNOSIS — Z34.90 PREGNANCY WITH ONE FETUS: ICD-10-CM

## 2017-09-19 DIAGNOSIS — O99.013 ANEMIA IN PREGNANCY, THIRD TRIMESTER: ICD-10-CM

## 2017-09-19 DIAGNOSIS — O13.3 GESTATIONAL HYPERTENSION, THIRD TRIMESTER: ICD-10-CM

## 2017-09-19 DIAGNOSIS — Z34.93 PREGNANCY WITH ONE FETUS, THIRD TRIMESTER: ICD-10-CM

## 2017-09-19 LAB
ABO + RH BLD: NORMAL
ANISOCYTOSIS BLD QL SMEAR: SLIGHT
BASOPHILS # BLD AUTO: 0.02 K/UL
BASOPHILS NFR BLD: 0.2 %
BLD GP AB SCN CELLS X3 SERPL QL: NORMAL
DIFFERENTIAL METHOD: ABNORMAL
EOSINOPHIL # BLD AUTO: 0.1 K/UL
EOSINOPHIL NFR BLD: 0.9 %
ERYTHROCYTE [DISTWIDTH] IN BLOOD BY AUTOMATED COUNT: 17.2 %
HCT VFR BLD AUTO: 24.4 %
HGB BLD-MCNC: 7.4 G/DL
HYPOCHROMIA BLD QL SMEAR: ABNORMAL
LYMPHOCYTES # BLD AUTO: 1.7 K/UL
LYMPHOCYTES NFR BLD: 18.5 %
MCH RBC QN AUTO: 20.5 PG
MCHC RBC AUTO-ENTMCNC: 30.3 G/DL
MCV RBC AUTO: 68 FL
MONOCYTES # BLD AUTO: 0.6 K/UL
MONOCYTES NFR BLD: 6.9 %
NEUTROPHILS # BLD AUTO: 6.6 K/UL
NEUTROPHILS NFR BLD: 74.4 %
PLATELET # BLD AUTO: 211 K/UL
PLATELET BLD QL SMEAR: ABNORMAL
PMV BLD AUTO: 9.5 FL
POIKILOCYTOSIS BLD QL SMEAR: SLIGHT
POLYCHROMASIA BLD QL SMEAR: ABNORMAL
RBC # BLD AUTO: 3.61 M/UL
RPR SER QL: NORMAL
WBC # BLD AUTO: 8.99 K/UL

## 2017-09-19 PROCEDURE — 62326 NJX INTERLAMINAR LMBR/SAC: CPT | Performed by: ANESTHESIOLOGY

## 2017-09-19 PROCEDURE — 86900 BLOOD TYPING SEROLOGIC ABO: CPT

## 2017-09-19 PROCEDURE — 72100003 HC LABOR CARE, EA. ADDL. 8 HRS

## 2017-09-19 PROCEDURE — 25000003 PHARM REV CODE 250: Performed by: OBSTETRICS & GYNECOLOGY

## 2017-09-19 PROCEDURE — 59409 OBSTETRICAL CARE: CPT | Mod: AA,,, | Performed by: ANESTHESIOLOGY

## 2017-09-19 PROCEDURE — 72100002 HC LABOR CARE, 1ST 8 HOURS

## 2017-09-19 PROCEDURE — 59409 OBSTETRICAL CARE: CPT | Mod: AT,,, | Performed by: OBSTETRICS & GYNECOLOGY

## 2017-09-19 PROCEDURE — 3E033VJ INTRODUCTION OF OTHER HORMONE INTO PERIPHERAL VEIN, PERCUTANEOUS APPROACH: ICD-10-PCS | Performed by: OBSTETRICS & GYNECOLOGY

## 2017-09-19 PROCEDURE — 10907ZC DRAINAGE OF AMNIOTIC FLUID, THERAPEUTIC FROM PRODUCTS OF CONCEPTION, VIA NATURAL OR ARTIFICIAL OPENING: ICD-10-PCS | Performed by: OBSTETRICS & GYNECOLOGY

## 2017-09-19 PROCEDURE — 86592 SYPHILIS TEST NON-TREP QUAL: CPT

## 2017-09-19 PROCEDURE — 85025 COMPLETE CBC W/AUTO DIFF WBC: CPT

## 2017-09-19 PROCEDURE — 25000003 PHARM REV CODE 250: Performed by: REGISTERED NURSE

## 2017-09-19 PROCEDURE — 63600175 PHARM REV CODE 636 W HCPCS: Performed by: REGISTERED NURSE

## 2017-09-19 PROCEDURE — 63600175 PHARM REV CODE 636 W HCPCS: Performed by: OBSTETRICS & GYNECOLOGY

## 2017-09-19 PROCEDURE — 27200710 HC EPIDURAL INFUSION PUMP SET: Performed by: ANESTHESIOLOGY

## 2017-09-19 PROCEDURE — 11000001 HC ACUTE MED/SURG PRIVATE ROOM

## 2017-09-19 PROCEDURE — 72200005 HC VAGINAL DELIVERY LEVEL II

## 2017-09-19 PROCEDURE — 25000003 PHARM REV CODE 250: Performed by: NURSE ANESTHETIST, CERTIFIED REGISTERED

## 2017-09-19 PROCEDURE — 27800517 HC TRAY,EPIDURAL-CONTINUOUS: Performed by: ANESTHESIOLOGY

## 2017-09-19 PROCEDURE — 86901 BLOOD TYPING SEROLOGIC RH(D): CPT

## 2017-09-19 PROCEDURE — 51702 INSERT TEMP BLADDER CATH: CPT

## 2017-09-19 PROCEDURE — 25000003 PHARM REV CODE 250: Performed by: ANESTHESIOLOGY

## 2017-09-19 PROCEDURE — 36415 COLL VENOUS BLD VENIPUNCTURE: CPT

## 2017-09-19 PROCEDURE — 51701 INSERT BLADDER CATHETER: CPT

## 2017-09-19 RX ORDER — IBUPROFEN 400 MG/1
800 TABLET ORAL EVERY 8 HOURS
Status: DISCONTINUED | OUTPATIENT
Start: 2017-09-20 | End: 2017-09-19

## 2017-09-19 RX ORDER — HYDROCORTISONE 25 MG/G
CREAM TOPICAL 3 TIMES DAILY PRN
Status: DISCONTINUED | OUTPATIENT
Start: 2017-09-19 | End: 2017-09-21 | Stop reason: HOSPADM

## 2017-09-19 RX ORDER — PRENATAL WITH FERROUS FUM AND FOLIC ACID 3080; 920; 120; 400; 22; 1.84; 3; 20; 10; 1; 12; 200; 27; 25; 2 [IU]/1; [IU]/1; MG/1; [IU]/1; MG/1; MG/1; MG/1; MG/1; MG/1; MG/1; UG/1; MG/1; MG/1; MG/1; MG/1
1 TABLET ORAL DAILY
Status: DISCONTINUED | OUTPATIENT
Start: 2017-09-19 | End: 2017-09-21 | Stop reason: HOSPADM

## 2017-09-19 RX ORDER — DOCUSATE SODIUM 100 MG/1
200 CAPSULE, LIQUID FILLED ORAL 2 TIMES DAILY PRN
Status: DISCONTINUED | OUTPATIENT
Start: 2017-09-19 | End: 2017-09-21 | Stop reason: HOSPADM

## 2017-09-19 RX ORDER — FENTANYL CITRATE 50 UG/ML
INJECTION, SOLUTION INTRAMUSCULAR; INTRAVENOUS
Status: DISCONTINUED | OUTPATIENT
Start: 2017-09-19 | End: 2017-11-06

## 2017-09-19 RX ORDER — BUTORPHANOL TARTRATE 2 MG/ML
2 INJECTION INTRAMUSCULAR; INTRAVENOUS
Status: DISCONTINUED | OUTPATIENT
Start: 2017-09-19 | End: 2017-09-21 | Stop reason: HOSPADM

## 2017-09-19 RX ORDER — FENTANYL/BUPIVACAINE/NS/PF 2MCG/ML-.1
PLASTIC BAG, INJECTION (ML) INJECTION
Status: DISCONTINUED | OUTPATIENT
Start: 2017-09-19 | End: 2017-11-06

## 2017-09-19 RX ORDER — KETOROLAC TROMETHAMINE 30 MG/ML
30 INJECTION, SOLUTION INTRAMUSCULAR; INTRAVENOUS EVERY 6 HOURS
Status: DISCONTINUED | OUTPATIENT
Start: 2017-09-19 | End: 2017-09-19

## 2017-09-19 RX ORDER — HYDROCODONE BITARTRATE AND ACETAMINOPHEN 5; 325 MG/1; MG/1
1 TABLET ORAL EVERY 4 HOURS PRN
Status: DISCONTINUED | OUTPATIENT
Start: 2017-09-19 | End: 2017-09-21 | Stop reason: HOSPADM

## 2017-09-19 RX ORDER — OXYTOCIN/RINGER'S LACTATE 20/1000 ML
2 PLASTIC BAG, INJECTION (ML) INTRAVENOUS CONTINUOUS
Status: DISCONTINUED | OUTPATIENT
Start: 2017-09-19 | End: 2017-09-19

## 2017-09-19 RX ORDER — ONDANSETRON 8 MG/1
8 TABLET, ORALLY DISINTEGRATING ORAL EVERY 8 HOURS PRN
Status: DISCONTINUED | OUTPATIENT
Start: 2017-09-19 | End: 2017-09-21 | Stop reason: HOSPADM

## 2017-09-19 RX ORDER — SODIUM CHLORIDE, SODIUM LACTATE, POTASSIUM CHLORIDE, CALCIUM CHLORIDE 600; 310; 30; 20 MG/100ML; MG/100ML; MG/100ML; MG/100ML
INJECTION, SOLUTION INTRAVENOUS CONTINUOUS
Status: DISCONTINUED | OUTPATIENT
Start: 2017-09-19 | End: 2017-09-19

## 2017-09-19 RX ORDER — SIMETHICONE 80 MG
1 TABLET,CHEWABLE ORAL EVERY 6 HOURS PRN
Status: DISCONTINUED | OUTPATIENT
Start: 2017-09-19 | End: 2017-09-21 | Stop reason: HOSPADM

## 2017-09-19 RX ORDER — IBUPROFEN 600 MG/1
600 TABLET ORAL EVERY 6 HOURS PRN
Status: DISCONTINUED | OUTPATIENT
Start: 2017-09-19 | End: 2017-09-21 | Stop reason: HOSPADM

## 2017-09-19 RX ORDER — MISOPROSTOL 200 UG/1
600 TABLET ORAL
Status: DISCONTINUED | OUTPATIENT
Start: 2017-09-19 | End: 2017-09-21 | Stop reason: HOSPADM

## 2017-09-19 RX ORDER — BUPIVACAINE HYDROCHLORIDE 2.5 MG/ML
INJECTION, SOLUTION EPIDURAL; INFILTRATION; INTRACAUDAL
Status: DISCONTINUED | OUTPATIENT
Start: 2017-09-19 | End: 2017-11-06

## 2017-09-19 RX ORDER — OXYTOCIN/RINGER'S LACTATE 20/1000 ML
41.65 PLASTIC BAG, INJECTION (ML) INTRAVENOUS CONTINUOUS
Status: ACTIVE | OUTPATIENT
Start: 2017-09-19 | End: 2017-09-19

## 2017-09-19 RX ORDER — DIPHENHYDRAMINE HCL 25 MG
25 CAPSULE ORAL EVERY 4 HOURS PRN
Status: DISCONTINUED | OUTPATIENT
Start: 2017-09-19 | End: 2017-09-21 | Stop reason: HOSPADM

## 2017-09-19 RX ORDER — LIDOCAINE HCL/EPINEPHRINE/PF 2%-1:200K
VIAL (ML) INJECTION
Status: DISCONTINUED | OUTPATIENT
Start: 2017-09-19 | End: 2017-11-06

## 2017-09-19 RX ORDER — OXYCODONE AND ACETAMINOPHEN 10; 325 MG/1; MG/1
1 TABLET ORAL EVERY 4 HOURS PRN
Status: DISCONTINUED | OUTPATIENT
Start: 2017-09-19 | End: 2017-09-21 | Stop reason: HOSPADM

## 2017-09-19 RX ADMIN — LIDOCAINE HYDROCHLORIDE,EPINEPHRINE BITARTRATE 3 ML: 20; .005 INJECTION, SOLUTION EPIDURAL; INFILTRATION; INTRACAUDAL; PERINEURAL at 01:09

## 2017-09-19 RX ADMIN — Medication 2 MILLI-UNITS/MIN: at 03:09

## 2017-09-19 RX ADMIN — FENTANYL CITRATE 100 MCG: 50 INJECTION INTRAMUSCULAR; INTRAVENOUS at 10:09

## 2017-09-19 RX ADMIN — Medication 41.65 MILLI-UNITS/MIN: at 03:09

## 2017-09-19 RX ADMIN — PROMETHAZINE HYDROCHLORIDE 12.5 MG: 25 INJECTION INTRAMUSCULAR; INTRAVENOUS at 10:09

## 2017-09-19 RX ADMIN — SODIUM CHLORIDE, SODIUM LACTATE, POTASSIUM CHLORIDE, AND CALCIUM CHLORIDE 1000 ML: .6; .31; .03; .02 INJECTION, SOLUTION INTRAVENOUS at 02:09

## 2017-09-19 RX ADMIN — BUPIVACAINE HYDROCHLORIDE 5 MG: 2.5 INJECTION, SOLUTION EPIDURAL; INFILTRATION; INTRACAUDAL; PERINEURAL at 10:09

## 2017-09-19 RX ADMIN — Medication 2 ML: at 03:09

## 2017-09-19 RX ADMIN — LIDOCAINE HYDROCHLORIDE,EPINEPHRINE BITARTRATE 5 ML: 20; .005 INJECTION, SOLUTION EPIDURAL; INFILTRATION; INTRACAUDAL; PERINEURAL at 01:09

## 2017-09-19 RX ADMIN — OXYCODONE HYDROCHLORIDE AND ACETAMINOPHEN 1 TABLET: 10; 325 TABLET ORAL at 10:09

## 2017-09-19 RX ADMIN — OXYCODONE HYDROCHLORIDE AND ACETAMINOPHEN 1 TABLET: 10; 325 TABLET ORAL at 03:09

## 2017-09-19 NOTE — H&P
[facility]    Obstetrics & Gynecology  History & Physical      Patient Name:  Christopher Avila  MRN:  [id number]  Attending Physician:  Jitendra Guerra MD  Primary Care Provider:  Neeraj Guerra MD    Date:  2017    Principal Problem:  Pregnancy with one fetus    Hospital Course:  IUP at 37w2d for induction of labor secondary to gestational hypertension    Chief Complaint:  Induction of labor    History of Present Illness:      Christopher Avila is a 26 y.o.  at 37w2d scheduled for induction of labor 17 secondary to gestational hypertension with favorable cervix.  Pt has no major complaints.  Pt reports active fetal movements and occasional mild contractions, and denies any vaginal bleeding or leakage of fluid.   Pregnancy complicated by gestational hypertension, anemia, abnormal 1 hr glucola (pt did not complete 3 hr OGTT), and non-compliance with prenatal care.    Past Medical History:      Anemia     Past Surgical History:      None      Medications:      Prenatal vitamins     Allergies:      NKDA       Obstetric History:           T3      TAB0   SAB0   E0   M0   L4        # Outcome Date GA Lbr Ryley/2nd Weight Sex Delivery Anes PTL Lv   5 Current                     4  / 36w5d   3.192 kg (7 lb 0.6 oz) M Vag-Spont EPI N Y      Apgar1:  6                Apgar5: 8   3 Term 14 38w4d   3.386 kg (7 lb 7.4 oz) F Vag-Spont EPI N Y      Apgar1:  9                Apgar5: 9   2 Term 09 39w0d   2.863 kg (6 lb 5 oz) M Vag-Spont EPI   Y      Name: Rafat   1 Term 08 39w0d   3.345 kg (7 lb 6 oz) M Vag-Spont EPI   Y      Name: Lucas      Gynecologic History:      H/o chlamydia  H/o ASCUS HPV+      Social History:       Denies tobacco, alcohol or illicit drug use  Works as CNA  Current partner is father of baby  Denies domestic abuse     Family History:       Mom - DM, bone cancer   Denies congenital anomalies, inherited syndromes, fetal aneuploidy    Review  of Systems:      At least 10 systems reviewed and were negative except as stated in the HPI     Physical Exam:     BP (!) 142/84   Pulse 70   Wt 89 kg (196 lb 3.4 oz)   LMP 2016   BMI 34.76 kg/m²      GENERAL:  No acute distress.  Alert and oriented to person, place and time.  HEENT:  Normocephalic, atraumatic, anicteric, EOMI, moist mucus membranes.  Neck supple without masses.  LUNGS:  Clear to auscultation bilaterally.  HEART:  Regular rate & rhythm with physiologic heart sounds.  ABDOMEN:  Soft, non tender without any guarding, rigidity or rebound. Normoactive bowel sounds.  PELVIC:  Normal female external genitalia without gross lesions, rashes or excoriations. No gross vaginal or cervical lesions.  Adequate pelvis.  Cervix:  3/50/-3.   EXTREMITIES:  Symmetric without cramping, claudication. Trace edema LE. +2 distal pulses.  Full range of motion.  SKIN:  No rashes, good turgor & capillary refill.  NEUROLOGIC:  Grossly intact bilaterally. +2 DTR, symmetric.  PSYCH:  Mood & affect appropriate.       Assessment:     1. 26 y.o.  at 37w2d for induction of labor secondary to gestational hypertension with favorable cervix  2. Anemia  3. Abnormal 1 hr glucola (pt did not complete 3 hr OGTT)    Plan:    Admit to L&D for induction of labor    The patient was informed of the risks, benefits, alternatives and possible complications to induction of labor (with cervidil, cytotec, pitocin, and/or bowen bulb), vaginal delivery, operative vaginal delivery,  section, blood transfusion, and the possibility of failed labor induction resulting in a  section due to maternal or fetal indications.  All questions were answered to her satisfaction.  She voiced understanding and acceptance of these risks.  Informed consents were obtained for delivery, labor induction and blood transfusions.    Routine admit labs     Consult anesthesia, epidural prn    Monitor BP, treat with labetalol prn     Will  consider mag sulfate for seizure prophylaxis if worsening HTN, or signs/sxs of preeclampsia     Pt at risk for postpartum hemorrhage, fe supplementation      Jitendra Guerra MD

## 2017-09-19 NOTE — PLAN OF CARE
Problem: Labor (Cervical Ripen, Induct, Augment) (Adult,Obstetrics,Pediatric)  Goal: Signs and Symptoms of Listed Potential Problems Will be Absent, Minimized or Managed (Labor)  Signs and symptoms of listed potential problems will be absent, minimized or managed by discharge/transition of care (reference Labor (Cervical Ripen, Induct, Augment) (Adult,Obstetrics,Pediatric) CPG).   Outcome: Ongoing (interventions implemented as appropriate)  Adequate cervical change, contractions 2-3 min. Afebrile.

## 2017-09-19 NOTE — TREATMENT PLAN
"Pt presents to unit for scheduled induction with c/o "a lot" of ctx, rates pain 8/10. EFM placed x2, +fhts noted. SVE done, see chart. 18g started to L hand, fluid bolus in progress. Pt states she plans to receive the epidural for her pain control. Will monitor.  "

## 2017-09-19 NOTE — NURSING
I&O cath inserted. Clear yellow urine.     1710  I&o cath removed.     1715  Transferred to  via wheelchair with TATYANA Mcclure at [facility]. No c/o dizziness when standing. Ambulated well.

## 2017-09-19 NOTE — ANESTHESIA PREPROCEDURE EVALUATION
09/19/2017  Christopher Avila is a 26 y.o., female.    Pre-op Assessment    I have reviewed the Patient Summary Reports.     I have reviewed the Nursing Notes.   I have reviewed the Medications.     Review of Systems  Anesthesia Hx:  No problems with previous Anesthesia  Neg history of prior surgery. Denies Family Hx of Anesthesia complications.   Denies Personal Hx of Anesthesia complications.   Social:  Smoker Quit   Hematology/Oncology:     Oncology Normal    -- Anemia:   EENT/Dental:EENT/Dental Normal   Cardiovascular:  Cardiovascular Normal Exercise tolerance: good     Pulmonary:  Pulmonary Normal    Renal/:  Renal/ Normal     Hepatic/GI:   GERD    Musculoskeletal:  Musculoskeletal Normal    OB/GYN/PEDS:  Gravid   Neurological:  Neurology Normal    Endocrine:  Endocrine Normal    Dermatological:  Skin Normal    Psych:  Psychiatric Normal           Physical Exam  General:  Well nourished Gravid    Airway/Jaw/Neck:  Airway Findings: Mouth Opening: Small, but > 3cm Tongue: Normal  General Airway Assessment: Adult, Average  Mallampati: III  Improves to II with phonation.  TM Distance: 4 - 6 cm        Eyes/Ears/Nose:  EYES/EARS/NOSE FINDINGS: Normal   Dental:  DENTAL FINDINGS: Normal   Chest/Lungs:  Chest/Lungs Findings: Clear to auscultation, Normal Respiratory Rate     Heart/Vascular:  Heart Findings: Rate: Normal  Rhythm: Regular Rhythm  Sounds: Normal  Heart murmur: negative Vascular Findings: Normal    Abdomen:  Abdomen Findings:  Gravid     Musculoskeletal:  Musculoskeletal Findings: Normal   Skin:  Skin Findings: Normal    Mental Status:  Mental Status Findings: Normal        Anesthesia Plan  Type of Anesthesia, risks & benefits discussed:  Anesthesia Type:  epidural, CSE  Patient's Preference:   Intra-op Monitoring Plan:   Intra-op Monitoring Plan Comments:   Post Op Pain Control Plan:   Post Op Pain  Control Plan Comments:   Induction:    Beta Blocker:  Patient is not currently on a Beta-Blocker (No further documentation required).       Informed Consent: Patient understands risks and agrees with Anesthesia plan.  Questions answered. Anesthesia consent signed with patient.  ASA Score: 2     Day of Surgery Review of History & Physical:            Ready For Surgery From Anesthesia Perspective.

## 2017-09-19 NOTE — ANESTHESIA PROCEDURE NOTES
CSE    Patient location during procedure: OB  Start time: 9/19/2017 3:42 AM  Timeout: 9/19/2017 3:41 AM  End time: 9/19/2017 3:47 AM  Staffing  Anesthesiologist: JULIANA THOMPSON  Performed: anesthesiologist   Preanesthetic Checklist  Completed: patient identified, pre-op evaluation, timeout performed, IV checked, risks and benefits discussed and monitors and equipment checked  CSE  Patient position: sitting  Prep: ChloraPrep  Patient monitoring: heart rate, continuous pulse ox and frequent blood pressure checks  Approach: midline  Spinal Needle  Needle type: Andrea   Needle gauge: 25 G  Needle length: 5 in  Epidural Needle  Injection technique: JORJE saline  Needle type: Tuohy   Needle gauge: 17 G  Needle length: 3.5 in  Needle insertion depth: 7 cm  Location: L4-5  Catheter  Catheter type: end hole  Catheter size: 19 G  Catheter at skin depth: 11 cm  Test dose: lidocaine 1.5% with Epi 1-to-200,000 and negative  Additional Documentation: incremental injection, negative aspiration for CSF, negative aspiration for heme, no paresthesia on injection and negative test dose  Assessment  Sensory level: T10   Dermatomal levels determined by pinch or prick

## 2017-09-19 NOTE — H&P
[facility]  Obstetrics & Gynecology  Progress Note    2017    26 y.o.  at 37w3d admitted for induction of labor secondary to gestational hypertension    Pt c/o pressure  No vaginal bleeding or leakage of fluid  Active fetus  Epidural adequate  AROM 2017, ~8:30AM tolerated well with moderate clear fluid, no odor  No vaginal bleeding, active fetus   Denies HA, vision changes, epigastric pain    Temp:  [99.1 °F (37.3 °C)] 99.1 °F (37.3 °C)  Pulse:  [] 81  Resp:  [18] 18  SpO2:  [88 %-100 %] 99 %  BP: (121-142)/(66-88) 123/66    Physical Exam:   Constitutional: She appears well-developed and well-nourished.                             Abdomen:  Soft/NT   Cervix:  4/70%/-2   Extremeties:  No cramping, claudication, trace edema, +2 distal pulses, symmetric    NST:  Cat 2  Blende:  q2-4min on 10 monica-units pitocin    Continue active mgt of labor  Titrate pitocin   Epidural prn   Continue to monitor bp, treat if persistently elevated   Plan of care discussed, all questions answered, voiced understanding      Jitendra Guerra MD

## 2017-09-19 NOTE — L&D DELIVERY NOTE
[facility]   Delivery Summary  Obstetrics & Gynecology       Date of Delivery:  2017        Preoperative Diagnosis:    Fleming gestation at 37w3d induction of labor secondary to gestational hypertension    Postoperative Diagnosis:    Fleming gestation at 37w3d s/p spontaneous vaginal delivery  Live infant  Gestational hypertension  Procedure Performed:    Vaginal delivery    Indication (HPI):     Please see History & Physical for complete details.  In brief, this is a 26 y.o.  at 37w3d gestation who presented to L&D for induction of labor secondary to gestational hypertension.  The induction was initiated with pitocin.  Pt progressed well through the active phase of labor and delivered a viable infant.  Maternal and fetal status was overall reassuring throughout the labor course.  Pt was informed of the risks, benefits, alternatives and possible complications to a vaginal delivery.  Informed consent was obtained for delivery and blood transfusion.      Anesthesia:  Epidural     Clinical EBL:  300 mL with no replacements    Specimens:  Cord blood      Findings, Infant & Apgars:      Live female infant, APGAR 1 min: 9, 5 min: 9, transfer to well baby  Weight pending at time of note   AROM 2017 ~8:30AM with moderate, clear fluid, no odor, no intrapartum fever   No laceration    Complications:  None    Delivery Summary:      Vaginal delivery of viable infant.  Infant delivered after 3 minutes of pushing.  No nuchal cord.  No shoulder dystocia.  No laceration repaired.  The infant was vigorous with a strong cry.  Cord blood was collected.   The placenta delivered spontaneously intact with three vessel cord.    Uterine massage and 20 units of Pitocin IV were given until the fundus was firm.    Hemostasis was noted.    No sponges were left in the vagina.   Sponge, lap, needle, and instrument counts were correct time two.   Mother and baby were bonding well at the end of the  delivery both in stable condition.   Findings and expectations were discussed with the patient.   All of pt questions were answered to her satisfaction, pt voiced understanding.      Jitendra Guerra MD

## 2017-09-20 ENCOUNTER — TELEPHONE (OUTPATIENT)
Dept: OBSTETRICS AND GYNECOLOGY | Facility: CLINIC | Age: 27
End: 2017-09-20

## 2017-09-20 LAB
ANISOCYTOSIS BLD QL SMEAR: SLIGHT
BASOPHILS # BLD AUTO: 0.04 K/UL
BASOPHILS NFR BLD: 0.4 %
DIFFERENTIAL METHOD: ABNORMAL
EOSINOPHIL # BLD AUTO: 0.1 K/UL
EOSINOPHIL NFR BLD: 0.9 %
ERYTHROCYTE [DISTWIDTH] IN BLOOD BY AUTOMATED COUNT: 17.5 %
HCT VFR BLD AUTO: 23.5 %
HGB BLD-MCNC: 7 G/DL
HYPOCHROMIA BLD QL SMEAR: ABNORMAL
LYMPHOCYTES # BLD AUTO: 2.4 K/UL
LYMPHOCYTES NFR BLD: 23 %
MCH RBC QN AUTO: 20.4 PG
MCHC RBC AUTO-ENTMCNC: 29.8 G/DL
MCV RBC AUTO: 69 FL
MONOCYTES # BLD AUTO: 1 K/UL
MONOCYTES NFR BLD: 9.1 %
NEUTROPHILS # BLD AUTO: 7.1 K/UL
NEUTROPHILS NFR BLD: 67.4 %
PLATELET # BLD AUTO: 193 K/UL
PLATELET BLD QL SMEAR: ABNORMAL
PMV BLD AUTO: 9.9 FL
POIKILOCYTOSIS BLD QL SMEAR: SLIGHT
POLYCHROMASIA BLD QL SMEAR: ABNORMAL
RBC # BLD AUTO: 3.43 M/UL
WBC # BLD AUTO: 10.63 K/UL

## 2017-09-20 PROCEDURE — 11000001 HC ACUTE MED/SURG PRIVATE ROOM

## 2017-09-20 PROCEDURE — 85025 COMPLETE CBC W/AUTO DIFF WBC: CPT

## 2017-09-20 PROCEDURE — 99231 SBSQ HOSP IP/OBS SF/LOW 25: CPT | Mod: ,,, | Performed by: OBSTETRICS & GYNECOLOGY

## 2017-09-20 PROCEDURE — 25000003 PHARM REV CODE 250: Performed by: OBSTETRICS & GYNECOLOGY

## 2017-09-20 PROCEDURE — 36415 COLL VENOUS BLD VENIPUNCTURE: CPT

## 2017-09-20 RX ADMIN — OXYCODONE HYDROCHLORIDE AND ACETAMINOPHEN 1 TABLET: 10; 325 TABLET ORAL at 06:09

## 2017-09-20 RX ADMIN — VITAMIN A, VITAMIN C, VITAMIN D-3, VITAMIN E, VITAMIN B-1, VITAMIN B-2, NIACIN, VITAMIN B-6, CALCIUM, IRON, ZINC, COPPER 1 EACH: 4000; 120; 400; 22; 1.84; 3; 20; 10; 1; 12; 200; 27; 25; 2 TABLET ORAL at 09:09

## 2017-09-20 RX ADMIN — OXYCODONE HYDROCHLORIDE AND ACETAMINOPHEN 1 TABLET: 10; 325 TABLET ORAL at 09:09

## 2017-09-20 NOTE — PROGRESS NOTES
"[facility]    Obstetrics & Gynecology  Progress Note      Patient Name:  Christopher Avila  MRN:  [id number]  Admission Date:  9/19/2017  Hospital Length of Stay:  1  Attending Physician:  Jitendra Guerra MD  Primary Care Provider:  Neeraj Guerra MD    Subjective:     Date:  09/20/2017    Principal Problem:  Pregnancy with one fetus    Hospital Course:  Post Partum Day # 1 - s/p vaginal delivery at 37w3d, gestational hypertension    Patient without major complaints  No acute events overnight  Denies headaches, vision changes, epigastric pain, SOB, CP  Denies dizziness, HOWARD  Pain well controlled  Lochia less than menses  Bottle/breast feeding   Breast non-tender, non-engorged  Ambulating, tolerating regular diet, and voiding without diffculty  Bonding well with baby    Objective:     Temp:  [96.7 °F (35.9 °C)-99.2 °F (37.3 °C)] 96.7 °F (35.9 °C)  Pulse:  [] 90  Resp:  [16-20] 18  BP: (117-140)/(57-79) 129/79    /79   Pulse 90   Temp 96.7 °F (35.9 °C)   Resp 18   Ht 5' 3" (1.6 m)   Wt 89 kg (196 lb 3.4 oz)   LMP 12/16/2016   SpO2 97%   Breastfeeding? Yes   BMI 34.76 kg/m²     Intake/Output Summary (Last 24 hours) at 09/20/17 1410  Last data filed at 09/19/17 1710   Gross per 24 hour   Intake                0 ml   Output             1650 ml   Net            -1650 ml   Clear, iesha urine    Physical Exam:   Constitutional: She appears well-developed and well-nourished.                             No acute distress. Alert and oriented x 3.   HEENT:  No scleral icterus. Neck supple. Moist mucus membranes.   LUNGS:  Clear to auscultation bilaterally.   HEART:  Regular rate and rhythm with physiologic heart sounds.   ABDOMEN:  Soft, non-tender, non-distended, no guarding, rigidity, or rebound with normoactive bowel sounds.   FUNDUS:  Firm, nontender below the umbilicus.   EXTREMITIES:  No cramping, claudication.  Trace edema LE. +2 distal pulses. Symmetric, full range of motion, negative " Dano.  NEUROLOGIC:  Grossly intact bilaterally. +2 DTR's.   PSYCH:  Mood and affect appropriate.   PERINEUM:  Intact with light lochia.    Labs:      Recent Results (from the past 336 hour(s))   CBC auto differential    Collection Time: 09/20/17  6:44 AM   Result Value Ref Range    WBC 10.63 3.90 - 12.70 K/uL    Hemoglobin 7.0 (L) 12.0 - 16.0 g/dL    Hematocrit 23.5 (L) 37.0 - 48.5 %    Platelets 193 150 - 350 K/uL   CBC with Auto Differential    Collection Time: 09/19/17  2:52 AM   Result Value Ref Range    WBC 8.99 3.90 - 12.70 K/uL    Hemoglobin 7.4 (L) 12.0 - 16.0 g/dL    Hematocrit 24.4 (L) 37.0 - 48.5 %    Platelets 211 150 - 350 K/uL   CBC auto differential    Collection Time: 09/15/17 10:40 AM   Result Value Ref Range    WBC 7.59 3.90 - 12.70 K/uL    Hemoglobin 7.7 (L) 12.0 - 16.0 g/dL    Hematocrit 25.4 (L) 37.0 - 48.5 %    Platelets 196 150 - 350 K/uL     ABO:  A POS    Assessment:     1. Post-partum day # 1 - IUP at 37w3d s/p spontaneous vaginal delivery - progressing well.  2. Gestational hypertension, BP at goal  3. Anemia, asymptomatic    Plan:     Continue post-partum care  Review post-partum care instructions and precautions  Encourage ambulation  Encourage breast-feeding  Iron supplementation, anemia precautions, pt declined blood transfusion  BP at goal, will continue to monitor  Delivery findings, labs/studies, and expectations were discussed with pt.  All questions answered and pt voiced understanding.     Disposition:  As patient meets milestones, will plan to discharge tomorrow.      Jitendra Guerra MD

## 2017-09-20 NOTE — LACTATION NOTE
Mother visited at bedside -giving formula via bottle now Reviewed the risks of supplementation.  Discussed the adequacy of colostrum.  Instructed on normal  feeding and sleeping patterns.  Encouraged mother to feed the infant on cue, a minimum of 8 times in 24 hours prior to supplementation to promote appropriate breast stimulation for adequate milk supply.  Discussed preferred alternative feeding methods, such as supplementing the infant via breast with SNS, syringe feeding, cup feeding, spoon feeding and finger feeding.  Discussed risks and encouraged to avoid artificial bottles and nipples.  Chooses to supplement via bottle.  Safely taught how to feed infant via chosen method.  Demonstrated by nurse and pt return demonstrates proper and safe usage.  States understand and provided appropriate recall of all information.Encouraged to call for any assistance with breastfeeding

## 2017-09-20 NOTE — PLAN OF CARE
Problem: Patient Care Overview  Goal: Plan of Care Review  Outcome: Ongoing (interventions implemented as appropriate)  VSS> NAD. Ambulating and voiding. Pain well controlled with prn pain meds. Discussed POC, pain management, and hydration. Pt verbalizes understanding.

## 2017-09-20 NOTE — PLAN OF CARE
Problem: Patient Care Overview  Goal: Plan of Care Review  Outcome: Ongoing (interventions implemented as appropriate)  Patient alert and oriented with even & unlabored respirations. Pain managed with scheduled and/or PRN pain medicine. POC discussed with the patient, and the patient verbalized understanding.

## 2017-09-21 ENCOUNTER — TELEPHONE (OUTPATIENT)
Dept: OBSTETRICS AND GYNECOLOGY | Facility: CLINIC | Age: 27
End: 2017-09-21

## 2017-09-21 VITALS
TEMPERATURE: 98 F | HEIGHT: 63 IN | DIASTOLIC BLOOD PRESSURE: 78 MMHG | SYSTOLIC BLOOD PRESSURE: 129 MMHG | OXYGEN SATURATION: 97 % | BODY MASS INDEX: 34.76 KG/M2 | WEIGHT: 196.19 LBS | RESPIRATION RATE: 20 BRPM | HEART RATE: 83 BPM

## 2017-09-21 PROCEDURE — 25000003 PHARM REV CODE 250: Performed by: OBSTETRICS & GYNECOLOGY

## 2017-09-21 PROCEDURE — 63600175 PHARM REV CODE 636 W HCPCS: Performed by: OBSTETRICS & GYNECOLOGY

## 2017-09-21 PROCEDURE — 99238 HOSP IP/OBS DSCHRG MGMT 30/<: CPT | Mod: ,,, | Performed by: OBSTETRICS & GYNECOLOGY

## 2017-09-21 RX ORDER — IBUPROFEN 600 MG/1
600 TABLET ORAL EVERY 6 HOURS PRN
Qty: 60 TABLET | Refills: 0 | Status: SHIPPED | OUTPATIENT
Start: 2017-09-21 | End: 2018-11-30 | Stop reason: ALTCHOICE

## 2017-09-21 RX ORDER — OXYCODONE AND ACETAMINOPHEN 5; 325 MG/1; MG/1
1 TABLET ORAL EVERY 4 HOURS PRN
Qty: 30 TABLET | Refills: 0 | Status: SHIPPED | OUTPATIENT
Start: 2017-09-21 | End: 2018-11-30 | Stop reason: CLARIF

## 2017-09-21 RX ORDER — DOCUSATE SODIUM 100 MG/1
100 CAPSULE, LIQUID FILLED ORAL 2 TIMES DAILY PRN
Qty: 60 CAPSULE | Refills: 2 | Status: SHIPPED | OUTPATIENT
Start: 2017-09-21 | End: 2018-11-30 | Stop reason: CLARIF

## 2017-09-21 RX ORDER — MEDROXYPROGESTERONE ACETATE 150 MG/ML
150 INJECTION, SUSPENSION INTRAMUSCULAR ONCE
Status: COMPLETED | OUTPATIENT
Start: 2017-09-21 | End: 2017-09-21

## 2017-09-21 RX ADMIN — IBUPROFEN 600 MG: 600 TABLET, FILM COATED ORAL at 07:09

## 2017-09-21 RX ADMIN — VITAMIN A, VITAMIN C, VITAMIN D-3, VITAMIN E, VITAMIN B-1, VITAMIN B-2, NIACIN, VITAMIN B-6, CALCIUM, IRON, ZINC, COPPER 1 EACH: 4000; 120; 400; 22; 1.84; 3; 20; 10; 1; 12; 200; 27; 25; 2 TABLET ORAL at 07:09

## 2017-09-21 RX ADMIN — OXYCODONE HYDROCHLORIDE AND ACETAMINOPHEN 1 TABLET: 10; 325 TABLET ORAL at 07:09

## 2017-09-21 RX ADMIN — MEDROXYPROGESTERONE ACETATE 150 MG: 150 INJECTION, SUSPENSION INTRAMUSCULAR at 09:09

## 2017-09-21 NOTE — TELEPHONE ENCOUNTER
----- Message from Rosalba Rhoades sent at 9/21/2017 11:15 AM CDT -----  Contact: self  Asking if Melly faxed over paperwork to pt's school.  Please call at [phone].

## 2017-09-21 NOTE — DISCHARGE SUMMARY
[facility]    Discharge Summary  Obstetrics & Gynecology      Patient Name:  Christopher Avila  MRN:  [id number]  Admission Date:  2017  Discharge Date:   2017  Hospital Length of Stay:  2  Attending Physician:  Jitendra Guerra MD  Discharging Physician:  Jitendra Guerra MD  Primary Care Provider:  Neeraj Guerra MD  Date of Delivery:  2017    Admitting Diagnosis:       Fleming gestation at 37w3d  Induction of labor secondary to gestational hypertension  Anemia, asymptomatic     Discharge Diagnosis:    Fleming gestation at term s/p spontaneous vaginal delivery   Gestational hypertension, BP at goal not on medication  Anemia, asymptomatic       Procedure performed:      Vaginal delivery    Brief Hospital Course:      Please see History & Physical for complete details.  In brief, this is a 26 y.o.  at 37w3d gestation who presented to L&D for induction of labor secondary to gestational hypertension.  The induction was initiated with pitocin.  Pt progressed well through the active phase of labor and delivered a viable infant.  Maternal and fetal status was overall reassuring throughout the labor course.  See delivery note for further details.  Following delivery, pt was transferred to mother baby unit for routine post-partum care.  Pt had a fairly uncomplicated postpartum course.  Prior to discharge, she was without major complaints.  Pt pain was well controlled.  Pt was ambulating, tolerating a regular diet, voiding without difficulty, and bonding well with baby.  Lochia was light.  Vital signs where physiologic and stable.  Physical exam showed no acute findings.  Pt had satisfied routine postpartum discharge criteria and expressed the desire to be discharge from the hospital.     Pertinent Labs or Studies:      Recent Results (from the past 336 hour(s))   CBC auto differential    Collection Time: 17  6:44 AM   Result Value Ref Range    WBC 10.63 3.90 - 12.70 K/uL     "Hemoglobin 7.0 (L) 12.0 - 16.0 g/dL    Hematocrit 23.5 (L) 37.0 - 48.5 %    Platelets 193 150 - 350 K/uL   CBC with Auto Differential    Collection Time: 09/19/17  2:52 AM   Result Value Ref Range    WBC 8.99 3.90 - 12.70 K/uL    Hemoglobin 7.4 (L) 12.0 - 16.0 g/dL    Hematocrit 24.4 (L) 37.0 - 48.5 %    Platelets 211 150 - 350 K/uL   CBC auto differential    Collection Time: 09/15/17 10:40 AM   Result Value Ref Range    WBC 7.59 3.90 - 12.70 K/uL    Hemoglobin 7.7 (L) 12.0 - 16.0 g/dL    Hematocrit 25.4 (L) 37.0 - 48.5 %    Platelets 196 150 - 350 K/uL     ABO:  A POS    Discharge Exam:      Temp:  [96.7 °F (35.9 °C)-98.1 °F (36.7 °C)] 97.7 °F (36.5 °C)  Pulse:  [78-90] 83  Resp:  [16-20] 20  BP: (116-135)/(64-81) 129/78    /78   Pulse 83   Temp 97.7 °F (36.5 °C) (Oral)   Resp 20   Ht 5' 3" (1.6 m)   Wt 89 kg (196 lb 3.4 oz)   LMP 12/16/2016   SpO2 97%   Breastfeeding? Yes   BMI 34.76 kg/m²     GENERAL:  No acute distress. Alert and oriented x 3.   HEENT:  Normocephalic. EOMI, PERRLA. No scleral icterus. Neck supple. Moist mucus membranes.   LUNGS:  Clear to auscultation bilaterally.   HEART:  Regular rate and rhythm with physiologic heart sounds.   ABDOMEN:  Soft, non-tender, non-distended, no guarding, rigidity, or rebound.   FUNDUS:  Firm, nontender below the umbilicus.   EXTREMITIES:  No cramping, claudication.  Trace edema. Good skin turgor. +2 distal pulses, symmetric. Full range of motion.   NEUROLOGIC:  Grossly intact bilaterally.   PSYCH:  Mood and affect appropriate.   PERINEUM:  Intact with light lochia.    Discharge Condition:  Stable      Discharge Disposition:  Home       Discharge Medications:     Resume prenatal vitamins 1 tab po qday  Fergon 325 mg 1 tab po bid, disp #60, refill 1  Colace 100 mg 1 tab po bid prn constipation, disp #60, refill 1  Motrin 600 mg 1 tab po q6h prn pain, disp #60, refill 0  Percocet 5/325 mg 1 tab po q4-6h prn breakthrough pain, disp #30, refill " 0    Discharge Activites:      Resume activities as tolerated with adequate rest  Pelvic rest for 6 weeks   No heavy lifting greater 10 lbs or strenuous activities   Showers only  Wound care instructions and precautions given   Avoid driving and operating machinery while taking narcotics or other sedative medications.  Patient voiced understanding.     Discharge Diet:  Regular diet    Discharge Instructions:      Pt was instructed to contact the clinic or emergency department for any concerns including but not limited to an increase in her lochia, abdominal pain, foul vaginal discharge, weakness, decrease activity level, decrease appetite, feeling sad or hopeless, inability to care for her baby, breast pain or infection, difficulty with voiding or having bowel movements, perineal pain or breakdown, wound breakdown, fever >100.4 or abnormal vital signs, shortness of breath, chest pain, dizziness or feeling faint, pain or swelling in her extremities, headaches not relieved with rest and Tylenol, vision changes, seizure activities, abnormal bleeding/bruising, or any other health concerns.  Post-partum care instructions and precautions, labs/studies, clinical/delivery findings, and hospital course reviewed with the patient prior to discharge.  All of her questions were answered to her satisfaction.  Pt voiced understanding.  Keep blood pressure log at home and call office if sbp >140 or dbp >95.  Hypertensive, pre-eclampsia given.      Follow-up Visit:  6 weeks for postpartum visit, or sooner if any problems.       Jitendra Guerra MD

## 2017-09-21 NOTE — PLAN OF CARE
Problem: Patient Care Overview  Goal: Plan of Care Review  Outcome: Ongoing (interventions implemented as appropriate)  Formula feeding only, no longer wants to breastfeed.

## 2017-09-21 NOTE — LACTATION NOTE
"Reports that she no longer wants to breastfeed, only formula feeding.  Non-nursing engorgement precautions given.  States "understand" and verbalized appropriate recall.  "

## 2017-09-21 NOTE — DISCHARGE INSTRUCTIONS
After a Vaginal Birth    General Discharge Instructions  · May follow a regular diet, unless otherwise discussed with physician.  · Take showers, not baths unless otherwise discussed with physician.  · Activity as tolerated.  · No lifting or heavy exercise for 6 weeks, no driving for 2 weeks, no sexual intercourse, douching or tampons for 6 weeks  · May return to work/school as discussed with physician  · Discuss birth control with physician  · Take Rx as directed  · Breast care support bra worn at all times  · Lactation consultant referral number ( [phone] or [phone])    Call Your Healthcare Provider Right Away If You Have:  · A fever of 101°F or higher.  · Heavy vaginal bleeding, clots, or vaginal discharge with foul odor.  · Redness, discharge, or pain worse than you had in the hospital.  · Burning, pain, red streaks, or lumpy areas in your breasts.  · Cracks, blisters, or blood on your nipples.  · Burning or pain when you urinate.  · Persistent nausea or vomiting.  · Severe headaches, blurred vision, dizziness or fainting.  · Feelings of extreme sadness or anxiety, or a feeling that you dont want to be with your baby.  · No bowel movement for 5 days.  · Redness, warmth, swelling, or pain in the lower leg.    If You Had Stitches  You may have received stitches in the skin near your vagina. The stitches might have closed an episiotomy (an incision that enlarges the opening of the vagina). Or you may have needed stitches to repair torn skin. Either way, your stitches should dissolve within weeks. Until then, you can help reduce discomfort, aid healing, and reduce your risk of infection by keeping the stitches clean. These tips can help:  · Gently wipe from front to back after you urinate or have a bowel movement.  · After wiping, spray warm water on the area. Or you can have a sitz bath. This means sitting in a tub with a few inches of water in it. Then pat the area dry or use a hairdryer on a cool  setting.  · You can take a shower instead of a bath  · Change sanitary pads at least every 2-4 hours.  · Place cold or heat packs on the area as directed by your doctors or nurses. Keep a thin towel between the pack and your skin.  · Sit on firm seats so the stitches pull less.     Follow-Up  Schedule a  follow-up exam with your healthcare provider for about 6 weeks after delivery. During this exam, your uterus and vaginal area will be checked. Contact your healthcare provider if you think you are having any problems.

## 2017-09-21 NOTE — PROGRESS NOTES
"[facility]    Obstetrics & Gynecology  Progress Note      Patient Name:  Christopher Avila  MRN:  [id number]  Admission Date:  9/19/2017  Hospital Length of Stay:  2  Attending Physician:  Jitendra Guerra MD  Primary Care Provider:  Neeraj Guerra MD    Subjective:     Date:  09/21/2017    Principal Problem:  Pregnancy with one fetus    Hospital Course:  Post Partum Day # 2 - s/p vaginal delivery at 37w3d, gestational hypertension    No major complaints  No acute events overnight  Denies headaches, vision changes, epigastric pain, SOB, CP  Denies dizziness, HOWARD  Requesting to go home  Pain well controlled  Lochia less than menses  Bottle/breast feeding   Breast non-tender, non-engorged  Ambulating, tolerating regular diet, and voiding without diffculty  Bonding well with baby  Pt is requesting depo provera prior to discharge    Objective:     Temp:  [96.7 °F (35.9 °C)-98.1 °F (36.7 °C)] 97.7 °F (36.5 °C)  Pulse:  [78-90] 83  Resp:  [16-20] 20  BP: (116-135)/(64-81) 129/78    /78   Pulse 83   Temp 97.7 °F (36.5 °C) (Oral)   Resp 20   Ht 5' 3" (1.6 m)   Wt 89 kg (196 lb 3.4 oz)   LMP 12/16/2016   SpO2 97%   Breastfeeding? Yes   BMI 34.76 kg/m²   No intake or output data in the 24 hours ending 09/21/17 0718Clear, iesha urine    Physical Exam:   Constitutional: She appears well-developed and well-nourished.                             No acute distress. Alert and oriented x 3.   HEENT:  No scleral icterus. Neck supple. Moist mucus membranes.   LUNGS:  Clear to auscultation bilaterally.   HEART:  Regular rate and rhythm with physiologic heart sounds.   ABDOMEN:  Soft, non-tender, non-distended, no guarding, rigidity, or rebound with normoactive bowel sounds.   FUNDUS:  Firm, nontender below the umbilicus.   EXTREMITIES:  No cramping, claudication.  Trace edema LE. +2 distal pulses. Symmetric, full range of motion, negative Matson.  NEUROLOGIC:  Grossly intact bilaterally. +2 DTR's.   PSYCH:  " Mood and affect appropriate.   PERINEUM:  Intact with light lochia.    Assessment:     1. Post-partum day # 2 - IUP at 37w3d s/p spontaneous vaginal delivery - progressing well.  2. Gestational hypertension, BP at goal  3. Anemia, asymptomatic    Plan:     Plan for discharge today.     Iron supplementation, anemia precautions, pt declined transfusion at this time    Reviewed discharge medications.  Pt was instructed to avoid driving or operate heavy machinery while taking narcotics or other medications with sedative properties.    Keep blood pressure log at home and call office if sbp >140 or dbp >95.  Hypertensive, pre-eclampsia precautions reivewed.    Post-partum care instructions and precautions, clinical/delivery findings, and hospital course reviewed with pt prior to discharge.  All of her questions were answered to her satisfaction.  Pt voiced understanding and agrees with discharge.    Discussed contraceptive options.  Depo provera injection given.  Risks, benefits, and alternatives to depo provera reviewed.    Return to clinic in 6 weeks for post-partum visit or sooner if any concerns.  Go to ER for any emergencies.    Disposition:  As patient meets milestones, will plan to discharge.      Jitendra Guerra MD

## 2017-09-21 NOTE — PLAN OF CARE
Problem: Patient Care Overview  Goal: Plan of Care Review  Outcome: Ongoing (interventions implemented as appropriate)  VSS. NAD. Ambulating and voiding. Pain well controlled. Pt has decided not to breastfeed at this time. Discussed POC, pain management, and hydration. Pt verbalizes understanding.

## 2017-09-25 ENCOUNTER — TELEPHONE (OUTPATIENT)
Dept: OBSTETRICS AND GYNECOLOGY | Facility: CLINIC | Age: 27
End: 2017-09-25

## 2017-09-25 NOTE — TELEPHONE ENCOUNTER
----- Message from Jitendra Guerra MD sent at 9/22/2017  7:23 PM CDT -----  Contact: Self  Refill request denied.  Pt need to make an apt for further evaluation if her pain is that bad.  Thanks.    ----- Message -----  From: Clara Monae MA  Sent: 9/22/2017   9:29 AM  To: Jitendra Guerra MD        ----- Message -----  From: Familia Baez  Sent: 9/22/2017   8:52 AM  To: Mari CORNELL Staff    Pt states oxycodone-acetaminophen (PERCOCET) 5-325 mg per tablet is not working. Pt states the 10MG she received in the hospital worked better. Patient can be reached @ [phone].

## 2017-10-02 ENCOUNTER — TELEPHONE (OUTPATIENT)
Dept: OBSTETRICS AND GYNECOLOGY | Facility: CLINIC | Age: 27
End: 2017-10-02

## 2017-10-02 NOTE — TELEPHONE ENCOUNTER
----- Message from Jitendra Guerra MD sent at 9/27/2017  5:13 PM CDT -----  Please notify pt her pap smear is ABNORMAL.  Schedule pt for a colposcopy in 2 months.  F/u strongly advised.  Thanks

## 2017-10-02 NOTE — TELEPHONE ENCOUNTER
Notes Recorded by Clara Monae MA on 10/2/2017 at 11:40 AM CDT  Patient received results already via patient portal. I scheduled patient appointment for 6wk ppv

## 2017-11-06 NOTE — ANESTHESIA POSTPROCEDURE EVALUATION
"Anesthesia Post Evaluation    Patient: Christopher Avila    Procedure(s) Performed: * No procedures listed *    Final Anesthesia Type: CSE  Patient location during evaluation: labor & delivery  Patient participation: Yes- Able to Participate  Level of consciousness: awake and alert, oriented and awake  Post-procedure vital signs: reviewed and stable  Airway patency: patent  PONV status at discharge: No PONV  Anesthetic complications: no      Cardiovascular status: blood pressure returned to baseline  Respiratory status: unassisted, spontaneous ventilation and room air  Hydration status: euvolemic  Follow-up not needed.        Visit Vitals  /78   Pulse 83   Temp 36.5 °C (97.7 °F) (Oral)   Resp 20   Ht 5' 3" (1.6 m)   Wt 89 kg (196 lb 3.4 oz)   LMP 12/16/2016   SpO2 97%   Breastfeeding? Yes   BMI 34.76 kg/m²       Pain/Albert Score: No Data Recorded      "

## 2017-12-25 PROBLEM — O13.3 PREGNANCY-INDUCED HYPERTENSION IN THIRD TRIMESTER: Status: RESOLVED | Noted: 2017-09-19 | Resolved: 2017-12-25

## 2018-04-03 ENCOUNTER — HOSPITAL ENCOUNTER (EMERGENCY)
Facility: HOSPITAL | Age: 28
Discharge: HOME | End: 2018-04-03
Attending: EMERGENCY MEDICINE

## 2018-04-03 VITALS
RESPIRATION RATE: 18 BRPM | HEIGHT: 63 IN | HEART RATE: 92 BPM | SYSTOLIC BLOOD PRESSURE: 125 MMHG | WEIGHT: 183 LBS | OXYGEN SATURATION: 100 % | BODY MASS INDEX: 32.43 KG/M2 | TEMPERATURE: 98 F | DIASTOLIC BLOOD PRESSURE: 59 MMHG

## 2018-04-03 DIAGNOSIS — R10.30 LOWER ABDOMINAL PAIN: Primary | ICD-10-CM

## 2018-04-03 DIAGNOSIS — N88.8 CERVICAL DISCHARGE: ICD-10-CM

## 2018-04-03 LAB
ALBUMIN SERPL BCP-MCNC: 3.8 G/DL
ALP SERPL-CCNC: 62 U/L
ALT SERPL W/O P-5'-P-CCNC: 11 U/L
ANION GAP SERPL CALC-SCNC: 8 MMOL/L
AST SERPL-CCNC: 18 U/L
B-HCG UR QL: NEGATIVE
BACTERIA #/AREA URNS AUTO: NORMAL /HPF
BACTERIA GENITAL QL WET PREP: ABNORMAL
BASOPHILS # BLD AUTO: 0.07 K/UL
BASOPHILS NFR BLD: 1.2 %
BILIRUB SERPL-MCNC: 0.7 MG/DL
BILIRUB UR QL STRIP: NEGATIVE
BUN SERPL-MCNC: 10 MG/DL
CALCIUM SERPL-MCNC: 8.9 MG/DL
CHLORIDE SERPL-SCNC: 108 MMOL/L
CLARITY UR REFRACT.AUTO: CLEAR
CLUE CELLS VAG QL WET PREP: ABNORMAL
CO2 SERPL-SCNC: 22 MMOL/L
COLOR UR AUTO: YELLOW
CREAT SERPL-MCNC: 0.8 MG/DL
CTP QC/QA: YES
DIFFERENTIAL METHOD: ABNORMAL
EOSINOPHIL # BLD AUTO: 0.2 K/UL
EOSINOPHIL NFR BLD: 3.6 %
ERYTHROCYTE [DISTWIDTH] IN BLOOD BY AUTOMATED COUNT: 15.2 %
EST. GFR  (AFRICAN AMERICAN): >60 ML/MIN/1.73 M^2
EST. GFR  (NON AFRICAN AMERICAN): >60 ML/MIN/1.73 M^2
FILAMENT FUNGI VAG WET PREP-#/AREA: ABNORMAL
GLUCOSE SERPL-MCNC: 113 MG/DL
GLUCOSE UR QL STRIP: NEGATIVE
HCT VFR BLD AUTO: 36.6 %
HGB BLD-MCNC: 11.7 G/DL
HGB UR QL STRIP: ABNORMAL
IMM GRANULOCYTES # BLD AUTO: 0.01 K/UL
IMM GRANULOCYTES NFR BLD AUTO: 0.2 %
KETONES UR QL STRIP: NEGATIVE
LEUKOCYTE ESTERASE UR QL STRIP: ABNORMAL
LIPASE SERPL-CCNC: 34 U/L
LYMPHOCYTES # BLD AUTO: 1.6 K/UL
LYMPHOCYTES NFR BLD: 26.8 %
MCH RBC QN AUTO: 27.9 PG
MCHC RBC AUTO-ENTMCNC: 32 G/DL
MCV RBC AUTO: 87 FL
MICROSCOPIC COMMENT: NORMAL
MONOCYTES # BLD AUTO: 0.3 K/UL
MONOCYTES NFR BLD: 5.3 %
NEUTROPHILS # BLD AUTO: 3.7 K/UL
NEUTROPHILS NFR BLD: 62.9 %
NITRITE UR QL STRIP: NEGATIVE
NRBC BLD-RTO: 0 /100 WBC
PH UR STRIP: 6 [PH] (ref 5–8)
PLATELET # BLD AUTO: 287 K/UL
PMV BLD AUTO: 8.9 FL
POTASSIUM SERPL-SCNC: 4.4 MMOL/L
PROT SERPL-MCNC: 7.5 G/DL
PROT UR QL STRIP: NEGATIVE
RBC # BLD AUTO: 4.19 M/UL
RBC #/AREA URNS AUTO: 1 /HPF (ref 0–4)
SODIUM SERPL-SCNC: 138 MMOL/L
SP GR UR STRIP: 1.01 (ref 1–1.03)
SPECIMEN SOURCE: ABNORMAL
SQUAMOUS #/AREA URNS AUTO: 5 /HPF
T VAGINALIS GENITAL QL WET PREP: ABNORMAL
URN SPEC COLLECT METH UR: ABNORMAL
UROBILINOGEN UR STRIP-ACNC: NEGATIVE EU/DL
WBC # BLD AUTO: 5.82 K/UL
WBC #/AREA URNS AUTO: 3 /HPF (ref 0–5)
WBC #/AREA VAG WET PREP: ABNORMAL
YEAST GENITAL QL WET PREP: ABNORMAL

## 2018-04-03 PROCEDURE — 25000003 PHARM REV CODE 250: Performed by: EMERGENCY MEDICINE

## 2018-04-03 PROCEDURE — 87491 CHLMYD TRACH DNA AMP PROBE: CPT

## 2018-04-03 PROCEDURE — 63600175 PHARM REV CODE 636 W HCPCS: Performed by: EMERGENCY MEDICINE

## 2018-04-03 PROCEDURE — 96372 THER/PROPH/DIAG INJ SC/IM: CPT

## 2018-04-03 PROCEDURE — 81025 URINE PREGNANCY TEST: CPT | Performed by: EMERGENCY MEDICINE

## 2018-04-03 PROCEDURE — 80053 COMPREHEN METABOLIC PANEL: CPT

## 2018-04-03 PROCEDURE — 99283 EMERGENCY DEPT VISIT LOW MDM: CPT | Mod: ,,, | Performed by: EMERGENCY MEDICINE

## 2018-04-03 PROCEDURE — 99283 EMERGENCY DEPT VISIT LOW MDM: CPT | Mod: 25

## 2018-04-03 PROCEDURE — 81001 URINALYSIS AUTO W/SCOPE: CPT

## 2018-04-03 PROCEDURE — 87210 SMEAR WET MOUNT SALINE/INK: CPT

## 2018-04-03 PROCEDURE — 96374 THER/PROPH/DIAG INJ IV PUSH: CPT

## 2018-04-03 PROCEDURE — 85025 COMPLETE CBC W/AUTO DIFF WBC: CPT

## 2018-04-03 PROCEDURE — 83690 ASSAY OF LIPASE: CPT

## 2018-04-03 RX ORDER — AZITHROMYCIN 250 MG/1
1000 TABLET, FILM COATED ORAL
Status: COMPLETED | OUTPATIENT
Start: 2018-04-03 | End: 2018-04-03

## 2018-04-03 RX ORDER — ACETAMINOPHEN 500 MG
1000 TABLET ORAL
Status: COMPLETED | OUTPATIENT
Start: 2018-04-03 | End: 2018-04-03

## 2018-04-03 RX ORDER — KETOROLAC TROMETHAMINE 30 MG/ML
10 INJECTION, SOLUTION INTRAMUSCULAR; INTRAVENOUS
Status: COMPLETED | OUTPATIENT
Start: 2018-04-03 | End: 2018-04-03

## 2018-04-03 RX ORDER — CEFTRIAXONE 500 MG/1
250 INJECTION, POWDER, FOR SOLUTION INTRAMUSCULAR; INTRAVENOUS
Status: COMPLETED | OUTPATIENT
Start: 2018-04-03 | End: 2018-04-03

## 2018-04-03 RX ADMIN — ACETAMINOPHEN 1000 MG: 500 TABLET ORAL at 09:04

## 2018-04-03 RX ADMIN — AZITHROMYCIN 1000 MG: 250 TABLET, FILM COATED ORAL at 09:04

## 2018-04-03 RX ADMIN — KETOROLAC TROMETHAMINE 30 MG: 30 INJECTION, SOLUTION INTRAMUSCULAR; INTRAVENOUS at 09:04

## 2018-04-03 RX ADMIN — CEFTRIAXONE SODIUM 250 MG: 500 INJECTION, POWDER, FOR SOLUTION INTRAMUSCULAR; INTRAVENOUS at 09:04

## 2018-04-03 NOTE — ED NOTES
LOC: The patient is awake, alert and aware of environment with an appropriate affect, the patient is oriented x 3 and speaking appropriately.  APPEARANCE: Patient resting comfortably and in no acute distress, patient is clean and well groomed, patient's clothing is properly fastened.  SKIN: The skin is warm and dry, intact, patient has normal skin turgor and moist mucus membranes.   RESPIRATORY: Airway is open and patent, respirations are spontaneous, patient has a normal effort and rate.  CARDIAC: Patient has a normal rate and rhythm, no periphreal edema noted, capillary refill < 3 seconds. Bilateral radial pulses +2  ABDOMEN: Soft and non tender to palpation, no distention noted. Bowel sounds present  NEUROLOGIC: PERRL, facial expression is symmetrical, patient moving all extremities spontaneously, normal sensation in all extremities when touched with a finger. Follows all commands appropriately  MUSCULOSKELETAL: No obvious deformities. Full ROM in all 4 extremities.

## 2018-04-03 NOTE — ED TRIAGE NOTES
Presents to ER with lower abdominal pain at her ovary sites for 1 month.  States that her last menses lasted 45 day.  Reports the pain is radiating to her back.  Pt identifiers checked and correct  LOC: The patient is awake, alert, aware of environment with an appropriate affect. Oriented x3, speaking appropriately  APPEARANCE: Pt resting comfortably, in no acute distress, pt is clean and well groomed, clothing properly fastened  SKIN: Skin warm, dry and intact, normal skin turgor, moist mucus membranes  RESPIRATORY: Airway is open and patent, respirations are spontaneous, even and unlabored, normal effort and rate  MUSCULOSKELETAL: No obvious deformities.

## 2018-04-03 NOTE — ED PROVIDER NOTES
Encounter Date: 4/3/2018       History     Chief Complaint   Patient presents with    Abdominal Pain     lower abd pain     HPI   26 yo  with hx of gestational HTN and anemia who presents with lower abdominal pain over the last month that has acutely worsened this morning. Unable to characterize pain. LMP lasted 45 days and ended 3/28. Denies vaginal discharge, bleeding, or dysuria/hematuria. Patient has not followed up with obgyn since her  in 2017. States pain is throughout lower abdomen but worse on the R side. Has hx of chlamydia in the past that was treated and ASCUS HPV +. No prior abdominal surgeries.  Review of patient's allergies indicates:  No Known Allergies  Past Medical History:   Diagnosis Date    Migraine headache     Pregnant      History reviewed. No pertinent surgical history.  Family History   Problem Relation Age of Onset    Diabetes Mother     Cancer Mother     Diabetes Maternal Grandmother      Social History   Substance Use Topics    Smoking status: Former Smoker    Smokeless tobacco: Never Used    Alcohol use Yes      Comment: occasional prior to pregnancy     Review of Systems   Constitutional: Positive for fatigue. Negative for fever.   HENT: Negative for congestion.    Eyes: Negative for visual disturbance.   Respiratory: Positive for shortness of breath (dyspnea over last several months).    Cardiovascular: Negative for palpitations.   Gastrointestinal: Negative for nausea and vomiting.   Genitourinary: Negative for dysuria and vaginal discharge.   Musculoskeletal: Negative for myalgias.   Skin: Negative for rash.   Neurological: Positive for weakness (generalized). Negative for syncope.   Psychiatric/Behavioral: Negative for decreased concentration.       Physical Exam     Initial Vitals [18 0729]   BP Pulse Resp Temp SpO2   (!) 125/59 92 18 98.3 °F (36.8 °C) 100 %      MAP       81         Physical Exam    Nursing note and vitals reviewed.  Constitutional:  She appears well-developed and well-nourished. She is not diaphoretic. No distress.   HENT:   Head: Normocephalic and atraumatic.   Eyes: EOM are normal.   Neck: Normal range of motion. Neck supple.   Cardiovascular: Normal rate, regular rhythm, normal heart sounds and intact distal pulses. Exam reveals no gallop and no friction rub.    No murmur heard.  Pulmonary/Chest: Breath sounds normal. No respiratory distress. She has no wheezes. She has no rhonchi. She has no rales.   Abdominal: Soft. Bowel sounds are normal. She exhibits no distension. There is tenderness (mildly TTP throughout on deep palpation). There is no rebound and no guarding.   Non tender throughout with light plapation   Genitourinary: There is no tenderness or lesion on the right labia. There is no tenderness or lesion on the left labia. Cervix exhibits discharge (thick yellow-white discharge). Cervix exhibits no motion tenderness and no friability (cervical lesions noted). Right adnexum displays no mass, no tenderness and no fullness. Left adnexum displays no mass, no tenderness and no fullness. No erythema, tenderness or bleeding in the vagina. No vaginal discharge found.   Musculoskeletal: Normal range of motion. She exhibits no edema.   Neurological: She is alert and oriented to person, place, and time.         ED Course   Procedures  Labs Reviewed   CBC W/ AUTO DIFFERENTIAL   COMPREHENSIVE METABOLIC PANEL   LIPASE   URINALYSIS, REFLEX TO URINE CULTURE   POCT URINE PREGNANCY                   APC / Resident Notes:   28 yo  with hx of gestational HTN and anemia who presents with lower abdominal pain over the last month that has acutely worsened this morning. AFVSS. Abd soft but complains of TTP throughout on deep palpation. Thick yellow-white discharge from cervical os. UPT negative. DDx includes but not limited to STI, UTI, ovarian cyst, appendicitis, cholecystitis, pancreatitis, diverticulitis, colitis, constipation. Will obtain basic  labs and UA. Will defer CT scan for now and perform serial abdominal exams, as abdomen is soft without rebound or guarding. Will discuss empiric STI treatment with patient.    Aniket Fonseca, PGY-2  9:02 AM     Labs unremarkable. Hgb above baseline, CMP wnl, UA with no signs of infection. Toradol and tylenol given for pain. Pain is now resolved on repeat exam. Patient opted for empiric gonorrhea/chlamydia tx. As patient has had increased vaginal bleeding over the last two months, I have recommended that she follow up with her obgyn as an outpatient. Return precautions provided.    Aniket Fonseca, PGY-2  10:20 AM                    Clinical Impression:   There were no encounter diagnoses.                           Aniket Fonseca MD  Resident  04/03/18 1027

## 2018-04-04 LAB
C TRACH DNA SPEC QL NAA+PROBE: NOT DETECTED
N GONORRHOEA DNA SPEC QL NAA+PROBE: NOT DETECTED

## 2018-10-31 ENCOUNTER — DOCUMENTATION ONLY (OUTPATIENT)
Dept: OBSTETRICS AND GYNECOLOGY | Facility: CLINIC | Age: 28
End: 2018-10-31

## 2018-10-31 NOTE — PROGRESS NOTES
Patient never had birthcontrol implant placed. The implant was  and was disposed of properly. See media for expiration date.

## 2018-11-29 ENCOUNTER — HOSPITAL ENCOUNTER (EMERGENCY)
Facility: HOSPITAL | Age: 28
Discharge: HOME | End: 2018-11-30
Attending: EMERGENCY MEDICINE
Payer: MEDICAID

## 2018-11-29 DIAGNOSIS — H66.92 LEFT OTITIS MEDIA, UNSPECIFIED OTITIS MEDIA TYPE: ICD-10-CM

## 2018-11-29 DIAGNOSIS — N73.0 PID (ACUTE PELVIC INFLAMMATORY DISEASE): Primary | ICD-10-CM

## 2018-11-29 LAB
B-HCG UR QL: NEGATIVE
CTP QC/QA: YES

## 2018-11-29 PROCEDURE — 99284 EMERGENCY DEPT VISIT MOD MDM: CPT | Mod: 25

## 2018-11-29 PROCEDURE — 99284 EMERGENCY DEPT VISIT MOD MDM: CPT | Mod: ,,, | Performed by: PHYSICIAN ASSISTANT

## 2018-11-29 PROCEDURE — 81025 URINE PREGNANCY TEST: CPT | Performed by: EMERGENCY MEDICINE

## 2018-11-29 PROCEDURE — 81001 URINALYSIS AUTO W/SCOPE: CPT

## 2018-11-30 VITALS
TEMPERATURE: 98 F | HEIGHT: 62 IN | DIASTOLIC BLOOD PRESSURE: 54 MMHG | RESPIRATION RATE: 18 BRPM | SYSTOLIC BLOOD PRESSURE: 112 MMHG | WEIGHT: 164 LBS | OXYGEN SATURATION: 100 % | HEART RATE: 91 BPM | BODY MASS INDEX: 30.18 KG/M2

## 2018-11-30 LAB
BACTERIA GENITAL QL WET PREP: ABNORMAL
BILIRUB UR QL STRIP: NEGATIVE
C TRACH DNA SPEC QL NAA+PROBE: NOT DETECTED
CLARITY UR REFRACT.AUTO: CLEAR
CLUE CELLS VAG QL WET PREP: ABNORMAL
COLOR UR AUTO: YELLOW
FILAMENT FUNGI VAG WET PREP-#/AREA: ABNORMAL
GLUCOSE UR QL STRIP: NEGATIVE
HGB UR QL STRIP: NEGATIVE
KETONES UR QL STRIP: NEGATIVE
LEUKOCYTE ESTERASE UR QL STRIP: ABNORMAL
MICROSCOPIC COMMENT: NORMAL
N GONORRHOEA DNA SPEC QL NAA+PROBE: NOT DETECTED
NITRITE UR QL STRIP: NEGATIVE
PH UR STRIP: 6 [PH] (ref 5–8)
PROT UR QL STRIP: NEGATIVE
RBC #/AREA URNS AUTO: 1 /HPF (ref 0–4)
SP GR UR STRIP: 1.02 (ref 1–1.03)
SPECIMEN SOURCE: ABNORMAL
SQUAMOUS #/AREA URNS AUTO: 6 /HPF
T VAGINALIS GENITAL QL WET PREP: ABNORMAL
URN SPEC COLLECT METH UR: ABNORMAL
WBC #/AREA URNS AUTO: 1 /HPF (ref 0–5)
WBC #/AREA VAG WET PREP: ABNORMAL
YEAST GENITAL QL WET PREP: ABNORMAL

## 2018-11-30 PROCEDURE — 96372 THER/PROPH/DIAG INJ SC/IM: CPT

## 2018-11-30 PROCEDURE — 25000003 PHARM REV CODE 250: Performed by: PHYSICIAN ASSISTANT

## 2018-11-30 PROCEDURE — 87210 SMEAR WET MOUNT SALINE/INK: CPT

## 2018-11-30 PROCEDURE — 87491 CHLMYD TRACH DNA AMP PROBE: CPT

## 2018-11-30 PROCEDURE — 63600175 PHARM REV CODE 636 W HCPCS: Performed by: PHYSICIAN ASSISTANT

## 2018-11-30 RX ORDER — KETOROLAC TROMETHAMINE 10 MG/1
10 TABLET, FILM COATED ORAL
Status: COMPLETED | OUTPATIENT
Start: 2018-11-30 | End: 2018-11-30

## 2018-11-30 RX ORDER — METRONIDAZOLE 500 MG/1
500 TABLET ORAL EVERY 12 HOURS
Qty: 20 TABLET | Refills: 0 | Status: SHIPPED | OUTPATIENT
Start: 2018-11-30 | End: 2018-12-10

## 2018-11-30 RX ORDER — AZITHROMYCIN 500 MG/1
1000 TABLET, FILM COATED ORAL ONCE
Qty: 2 TABLET | Refills: 0 | Status: SHIPPED | OUTPATIENT
Start: 2018-12-07 | End: 2018-12-07

## 2018-11-30 RX ORDER — DICLOFENAC SODIUM 50 MG/1
50 TABLET, DELAYED RELEASE ORAL 3 TIMES DAILY PRN
Qty: 15 TABLET | Refills: 0 | Status: SHIPPED | OUTPATIENT
Start: 2018-11-30 | End: 2019-01-19

## 2018-11-30 RX ORDER — CEFTRIAXONE 500 MG/1
250 INJECTION, POWDER, FOR SOLUTION INTRAMUSCULAR; INTRAVENOUS
Status: COMPLETED | OUTPATIENT
Start: 2018-11-30 | End: 2018-11-30

## 2018-11-30 RX ORDER — AZITHROMYCIN 250 MG/1
1000 TABLET, FILM COATED ORAL
Status: COMPLETED | OUTPATIENT
Start: 2018-11-30 | End: 2018-11-30

## 2018-11-30 RX ADMIN — AZITHROMYCIN MONOHYDRATE 1000 MG: 250 TABLET ORAL at 12:11

## 2018-11-30 RX ADMIN — CEFTRIAXONE SODIUM 250 MG: 500 INJECTION, POWDER, FOR SOLUTION INTRAMUSCULAR; INTRAVENOUS at 12:11

## 2018-11-30 RX ADMIN — KETOROLAC TROMETHAMINE 10 MG: 10 TABLET, FILM COATED ORAL at 12:11

## 2018-11-30 NOTE — ED TRIAGE NOTES
Zainino Austin, a 27 y.o. female presents to the ED w/ complaint of abd pain, back pain, otalgia in L ear x1 week. Pt reports abd pain and back pain 8/10, constant burning/stabbing/aching. Pt denies drainage from ears, fever/chills, CP, SOB.    Triage note:  Chief Complaint   Patient presents with    Abdominal Pain     x 1 week    Back Pain     x 1 week    Otalgia     X 1 week; denies drainage     Review of patient's allergies indicates:  No Known Allergies  Past Medical History:   Diagnosis Date    Migraine headache     Pregnant

## 2018-11-30 NOTE — ED PROVIDER NOTES
Encounter Date: 11/29/2018       History     Chief Complaint   Patient presents with    Abdominal Pain     x 1 week    Back Pain     x 1 week    Otalgia     X 1 week; denies drainage     The patient presents to the ER for an emergent evaluation due to pelvic pain that radiates to her back for the past week. She states that the degree is moderate. She states that the course is waxing and waning. She reports having an associated vaginal discharge. She is sexually active. She has tried taking Ibuprofen for the pain, but denies adequate relief. She denies current pregnancy or breast feeding.     She also reports having left sided otalgia for the past few days. She describes the pain as sharp and aching. She denies any trauma to the ear, otorrhea, or hearing loss. She denies any pre-arrival treatment.           Review of patient's allergies indicates:  No Known Allergies  Past Medical History:   Diagnosis Date    Abnormal menstrual periods     Anemia     Dental caries     Migraine headache     Ovarian cyst      History reviewed. No pertinent surgical history.  Family History   Problem Relation Age of Onset    Diabetes Mother     Cancer Mother     Diabetes Maternal Grandmother      Social History     Tobacco Use    Smoking status: Current Every Day Smoker    Smokeless tobacco: Never Used   Substance Use Topics    Alcohol use: Yes     Comment: occasional prior to pregnancy    Drug use: No     Review of Systems   Constitutional: Negative for activity change, appetite change, chills, diaphoresis and fever.   HENT: Positive for ear pain. Negative for congestion, dental problem, drooling, ear discharge, facial swelling, postnasal drip, sinus pain, sore throat, tinnitus and trouble swallowing.    Eyes: Negative for pain and visual disturbance.   Respiratory: Negative for cough and chest tightness.    Cardiovascular: Negative for chest pain and palpitations.   Gastrointestinal: Positive for abdominal pain.  Negative for abdominal distention, constipation, diarrhea, nausea, rectal pain and vomiting.   Genitourinary: Positive for pelvic pain and vaginal discharge. Negative for decreased urine volume, difficulty urinating, dysuria, flank pain, frequency, genital sores, menstrual problem, urgency, vaginal bleeding and vaginal pain.   Musculoskeletal: Positive for back pain. Negative for arthralgias, gait problem, myalgias and neck pain.   Skin: Negative for rash.   Neurological: Negative for dizziness, syncope, weakness, light-headedness, numbness and headaches.   Psychiatric/Behavioral: Negative for confusion.       Physical Exam     Initial Vitals [11/29/18 2311]   BP Pulse Resp Temp SpO2   (!) 114/59 93 20 98.2 °F (36.8 °C) 99 %      MAP       --         Physical Exam    Nursing note and vitals reviewed.  Constitutional: She appears well-developed and well-nourished. She is not diaphoretic.   HENT:   Head: Normocephalic.   Mouth/Throat: Oropharynx is clear and moist.   Cotton ball removed from right external auditory canal for otic exam: there is erythema and bulging of the TM present, no perforation FB, or otorrhea; No tragal tenderness; no mastoid tenderness; no swelling or erythema of external ear canal.    Eyes: Conjunctivae are normal. Right eye exhibits no discharge. Left eye exhibits no discharge.   Neck: Neck supple.   Cardiovascular: Normal rate.   Pulmonary/Chest: Breath sounds normal. No respiratory distress. She has no wheezes. She has no rhonchi. She has no rales.   Abdominal: Soft. She exhibits no distension. There is no tenderness. There is no rebound and no guarding.   Benign abdomen. No peritoneal signs. Negative McBurney's point. Negative Doll's sign.    Genitourinary:   Genitourinary Comments: Female ER tech present throughout entire exam as a chaperone. Normal external genitalia. Malodorous. She is tender to insertion of speculum. Cervical motion tenderness present. The cervix is erythematous and  friable. Cervical OS closed. No bleeding. There is moderate amount of light colored discharge present with copious cervical mucous. No adnexal mass or exquisite tenderness on bi-manual exam.    Musculoskeletal: Normal range of motion.   Spine non-tender. No CVA tenderness.    Lymphadenopathy:     She has no cervical adenopathy.   Neurological: She is alert and oriented to person, place, and time. She has normal strength. No sensory deficit.   Skin: Skin is warm and dry. No rash noted.   Psychiatric: She has a normal mood and affect.         ED Course   Procedures  Labs Reviewed   URINALYSIS, REFLEX TO URINE CULTURE - Abnormal; Notable for the following components:       Result Value    Leukocytes, UA Trace (*)     All other components within normal limits    Narrative:     Preferred Collection Type->Urine, Clean Catch  Received a cup of urine.   C. TRACHOMATIS/N. GONORRHOEAE BY AMP DNA   URINALYSIS MICROSCOPIC    Narrative:     Preferred Collection Type->Urine, Clean Catch  Received a cup of urine.   VAGINAL SCREEN   POCT URINE PREGNANCY     Results for orders placed or performed during the hospital encounter of 11/29/18   Urinalysis, Reflex to Urine Culture Urine, Clean Catch   Result Value Ref Range    Specimen UA Urine, Clean Catch     Color, UA Yellow Yellow, Straw, Sheela    Appearance, UA Clear Clear    pH, UA 6.0 5.0 - 8.0    Specific Gravity, UA 1.025 1.005 - 1.030    Protein, UA Negative Negative    Glucose, UA Negative Negative    Ketones, UA Negative Negative    Bilirubin (UA) Negative Negative    Occult Blood UA Negative Negative    Nitrite, UA Negative Negative    Leukocytes, UA Trace (A) Negative   Urinalysis Microscopic   Result Value Ref Range    RBC, UA 1 0 - 4 /hpf    WBC, UA 1 0 - 5 /hpf    Squam Epithel, UA 6 /hpf    Microscopic Comment SEE COMMENT    POCT urine pregnancy   Result Value Ref Range    POC Preg Test, Ur Negative Negative     Acceptable Yes             Imaging Results     None          Medical Decision Making:   History:   Old Medical Records: I decided to obtain old medical records.  Initial Assessment:   Lower abdominal/pelvic pain radiating to low back x 1 week with discharge. Left otalgia x 1 week.   Differential Diagnosis:   UTI, appendicitis, PID, cervicitis, vaginitis, Ovarian cyst, Endometriosis, Uterine fibroid, cancer, diverticulitis, pregnancy, BV, Trichomoniasis, Candidiasis, Kidney stone, Spine injury, etc   Clinical Tests:   Lab Tests: Ordered and Reviewed  Radiological Study: Reviewed  ED Management:  Wetprep and cultures obtained.   UPT negative   Treated with antibiotic in the ER   Advised close follow up with her PCP and OB/GYN physician   Advised prompt return to the ER if worse or if unimproved.   Other:   I have discussed this case with another health care provider.       <> Summary of the Discussion: I discussed the case in detail with the ER attending physician.                       Clinical Impression:   The primary encounter diagnosis was PID (acute pelvic inflammatory disease). A diagnosis of Left otitis media, unspecified otitis media type was also pertinent to this visit.      Disposition:   Disposition: Discharged  Condition: Stable                        Sanjeev Cerda PA-C  11/30/18 0050

## 2019-01-18 ENCOUNTER — HOSPITAL ENCOUNTER (EMERGENCY)
Facility: HOSPITAL | Age: 29
Discharge: HOME | End: 2019-01-19
Attending: EMERGENCY MEDICINE
Payer: MEDICAID

## 2019-01-18 DIAGNOSIS — Z3A.01 6 WEEKS GESTATION OF PREGNANCY: Primary | ICD-10-CM

## 2019-01-18 DIAGNOSIS — R10.2 PELVIC PAIN IN PREGNANCY: ICD-10-CM

## 2019-01-18 DIAGNOSIS — O26.899 PELVIC PAIN IN PREGNANCY: ICD-10-CM

## 2019-01-18 DIAGNOSIS — N30.00 ACUTE CYSTITIS WITHOUT HEMATURIA: ICD-10-CM

## 2019-01-18 DIAGNOSIS — R07.9 CHEST PAIN: ICD-10-CM

## 2019-01-18 LAB
ALBUMIN SERPL BCP-MCNC: 4.1 G/DL
ALP SERPL-CCNC: 68 U/L
ALT SERPL W/O P-5'-P-CCNC: 9 U/L
ANION GAP SERPL CALC-SCNC: 10 MMOL/L
AST SERPL-CCNC: 14 U/L
B-HCG UR QL: POSITIVE
BACTERIA #/AREA URNS HPF: ABNORMAL /HPF
BASOPHILS # BLD AUTO: 0.04 K/UL
BASOPHILS NFR BLD: 0.4 %
BILIRUB SERPL-MCNC: 1.3 MG/DL
BILIRUB UR QL STRIP: NEGATIVE
BUN SERPL-MCNC: 5 MG/DL
CALCIUM SERPL-MCNC: 9.3 MG/DL
CHLORIDE SERPL-SCNC: 103 MMOL/L
CLARITY UR: ABNORMAL
CO2 SERPL-SCNC: 23 MMOL/L
COLOR UR: YELLOW
CREAT SERPL-MCNC: 0.7 MG/DL
CTP QC/QA: YES
DIFFERENTIAL METHOD: ABNORMAL
EOSINOPHIL # BLD AUTO: 0.2 K/UL
EOSINOPHIL NFR BLD: 2.5 %
ERYTHROCYTE [DISTWIDTH] IN BLOOD BY AUTOMATED COUNT: 13.5 %
EST. GFR  (AFRICAN AMERICAN): >60 ML/MIN/1.73 M^2
EST. GFR  (NON AFRICAN AMERICAN): >60 ML/MIN/1.73 M^2
GLUCOSE SERPL-MCNC: 98 MG/DL
GLUCOSE UR QL STRIP: NEGATIVE
HCT VFR BLD AUTO: 38.8 %
HGB BLD-MCNC: 13 G/DL
HGB UR QL STRIP: NEGATIVE
KETONES UR QL STRIP: NEGATIVE
LEUKOCYTE ESTERASE UR QL STRIP: ABNORMAL
LIPASE SERPL-CCNC: 13 U/L
LYMPHOCYTES # BLD AUTO: 2.5 K/UL
LYMPHOCYTES NFR BLD: 25.9 %
MCH RBC QN AUTO: 29 PG
MCHC RBC AUTO-ENTMCNC: 33.5 G/DL
MCV RBC AUTO: 87 FL
MICROSCOPIC COMMENT: ABNORMAL
MONOCYTES # BLD AUTO: 0.6 K/UL
MONOCYTES NFR BLD: 6.6 %
NEUTROPHILS # BLD AUTO: 6.1 K/UL
NEUTROPHILS NFR BLD: 64.6 %
NITRITE UR QL STRIP: NEGATIVE
NON-SQ EPI CELLS #/AREA URNS HPF: 1 /HPF
PH UR STRIP: 7 [PH] (ref 5–8)
PLATELET # BLD AUTO: 337 K/UL
PMV BLD AUTO: 8.9 FL
POTASSIUM SERPL-SCNC: 3.6 MMOL/L
PROT SERPL-MCNC: 7.3 G/DL
PROT UR QL STRIP: NEGATIVE
RBC # BLD AUTO: 4.48 M/UL
RBC #/AREA URNS HPF: 2 /HPF (ref 0–4)
SODIUM SERPL-SCNC: 136 MMOL/L
SP GR UR STRIP: 1.01 (ref 1–1.03)
SQUAMOUS #/AREA URNS HPF: 25 /HPF
URN SPEC COLLECT METH UR: ABNORMAL
UROBILINOGEN UR STRIP-ACNC: ABNORMAL EU/DL
WBC # BLD AUTO: 9.48 K/UL
WBC #/AREA URNS HPF: 20 /HPF (ref 0–5)

## 2019-01-18 PROCEDURE — 87210 SMEAR WET MOUNT SALINE/INK: CPT

## 2019-01-18 PROCEDURE — 87491 CHLMYD TRACH DNA AMP PROBE: CPT

## 2019-01-18 PROCEDURE — 63600175 PHARM REV CODE 636 W HCPCS: Performed by: NURSE PRACTITIONER

## 2019-01-18 PROCEDURE — 81000 URINALYSIS NONAUTO W/SCOPE: CPT

## 2019-01-18 PROCEDURE — 96365 THER/PROPH/DIAG IV INF INIT: CPT

## 2019-01-18 PROCEDURE — 93005 ELECTROCARDIOGRAM TRACING: CPT

## 2019-01-18 PROCEDURE — 83690 ASSAY OF LIPASE: CPT

## 2019-01-18 PROCEDURE — 84702 CHORIONIC GONADOTROPIN TEST: CPT

## 2019-01-18 PROCEDURE — 81025 URINE PREGNANCY TEST: CPT | Performed by: NURSE PRACTITIONER

## 2019-01-18 PROCEDURE — 99284 EMERGENCY DEPT VISIT MOD MDM: CPT

## 2019-01-18 PROCEDURE — 80053 COMPREHEN METABOLIC PANEL: CPT

## 2019-01-18 PROCEDURE — 93010 ELECTROCARDIOGRAM REPORT: CPT | Mod: ,,, | Performed by: INTERNAL MEDICINE

## 2019-01-18 PROCEDURE — 87086 URINE CULTURE/COLONY COUNT: CPT

## 2019-01-18 PROCEDURE — 93010 EKG 12-LEAD: ICD-10-PCS | Mod: ,,, | Performed by: INTERNAL MEDICINE

## 2019-01-18 PROCEDURE — 85025 COMPLETE CBC W/AUTO DIFF WBC: CPT

## 2019-01-18 PROCEDURE — 96375 TX/PRO/DX INJ NEW DRUG ADDON: CPT

## 2019-01-18 PROCEDURE — 25000003 PHARM REV CODE 250: Performed by: NURSE PRACTITIONER

## 2019-01-18 RX ORDER — ONDANSETRON 2 MG/ML
4 INJECTION INTRAMUSCULAR; INTRAVENOUS
Status: COMPLETED | OUTPATIENT
Start: 2019-01-18 | End: 2019-01-18

## 2019-01-18 RX ORDER — KETOROLAC TROMETHAMINE 30 MG/ML
15 INJECTION, SOLUTION INTRAMUSCULAR; INTRAVENOUS
Status: DISCONTINUED | OUTPATIENT
Start: 2019-01-18 | End: 2019-01-18

## 2019-01-18 RX ORDER — ACETAMINOPHEN 325 MG/1
650 TABLET ORAL
Status: COMPLETED | OUTPATIENT
Start: 2019-01-18 | End: 2019-01-18

## 2019-01-18 RX ORDER — DICYCLOMINE HYDROCHLORIDE 10 MG/1
10 CAPSULE ORAL
Status: DISCONTINUED | OUTPATIENT
Start: 2019-01-18 | End: 2019-01-18

## 2019-01-18 RX ADMIN — SODIUM CHLORIDE 1000 ML: 0.9 INJECTION, SOLUTION INTRAVENOUS at 11:01

## 2019-01-18 RX ADMIN — CEFTRIAXONE 1 G: 1 INJECTION, SOLUTION INTRAVENOUS at 11:01

## 2019-01-18 RX ADMIN — ACETAMINOPHEN 650 MG: 325 TABLET ORAL at 11:01

## 2019-01-18 RX ADMIN — ONDANSETRON 4 MG: 2 INJECTION INTRAMUSCULAR; INTRAVENOUS at 11:01

## 2019-01-19 VITALS
WEIGHT: 190 LBS | BODY MASS INDEX: 33.66 KG/M2 | DIASTOLIC BLOOD PRESSURE: 54 MMHG | SYSTOLIC BLOOD PRESSURE: 111 MMHG | HEART RATE: 84 BPM | RESPIRATION RATE: 18 BRPM | OXYGEN SATURATION: 98 % | TEMPERATURE: 99 F | HEIGHT: 63 IN

## 2019-01-19 LAB
BACTERIA GENITAL QL WET PREP: ABNORMAL
CLUE CELLS VAG QL WET PREP: ABNORMAL
FILAMENT FUNGI VAG WET PREP-#/AREA: ABNORMAL
HCG INTACT+B SERPL-ACNC: NORMAL MIU/ML
SPECIMEN SOURCE: ABNORMAL
T VAGINALIS GENITAL QL WET PREP: ABNORMAL
WBC #/AREA VAG WET PREP: ABNORMAL
YEAST GENITAL QL WET PREP: ABNORMAL

## 2019-01-19 RX ORDER — CEPHALEXIN 500 MG/1
500 CAPSULE ORAL 2 TIMES DAILY
Qty: 14 CAPSULE | Refills: 0 | Status: SHIPPED | OUTPATIENT
Start: 2019-01-19 | End: 2019-01-26

## 2019-01-19 NOTE — ED TRIAGE NOTES
Patient came in the ED with the complaint of abdominal pain, nausea, vomiting, chest pain and headaches that start three days ago.

## 2019-01-19 NOTE — DISCHARGE INSTRUCTIONS
Your ultrasound shows you are 6 weeks pregnant.  You may take Tylenol for pain.  Drink plenty of fluids to stay hydrated.  Do not take any other medications unless approved by your OBGYN.  Please follow up as soon as possible.    Please return to the Emergency Department for any new or worsening symptoms including: abdominal pain, fever, chest pain, shortness of breath, loss of consciousness, dizziness, weakness, or any other concerns.     Please follow up with your Primary Care Provider within in the week. If you do not have one, you may contact the one listed on your discharge paperwork or you may also call the [facility] Appointment Desk at [phone] to schedule an appointment with one.     Please take all medication as prescribed.

## 2019-01-19 NOTE — ED PROVIDER NOTES
Encounter Date: 1/18/2019    SCRIBE #1 NOTE: I, Andrea Vazquez, am scribing for, and in the presence of,  Rosalba Maldonado NP. I have scribed the following portions of the note - Other sections scribed: HPI/ROS.       History     Chief Complaint   Patient presents with    Abdominal Pain     Pt reports pain in lower stomach and back since last night. Pt states vomiting for 3 days. Pt denies dysuria.    Back Pain     CC: Abdominal Pain     HPI: This 28 y.o. female with a medical hx of ovarian cysts presents to the ED for an emergent evaluation of intermittent, severe (10/10) lower abdominal and back pain x 3 days. There is associated n/v (3x/day). Pt reports sharp, intermittent chest pain underneath the L breast x 3 days. No chest pain currently. Pt reports a fever (Tmax: 102F) this morning, so she took Tylenol. She was at [facility] in which there was a 4 hour wait. LMP was 11/12/18. She has irregular menstrual periods now, as she is getting depo shots. She smokes about 2 cigarettes daily. No recent drugs or alcohol use. Otherwise, pt denies fever, chills, SOB, cough, urinary sympoms, vaginal bleeding or discharge, and any other associated symptoms.      The history is provided by the patient. No  was used.     Review of patient's allergies indicates:  No Known Allergies  Past Medical History:   Diagnosis Date    Abnormal menstrual periods     Anemia     Dental caries     Migraine headache     Ovarian cyst      No past surgical history on file.  Family History   Problem Relation Age of Onset    Diabetes Mother     Cancer Mother     Diabetes Maternal Grandmother      Social History     Tobacco Use    Smoking status: Former Smoker    Smokeless tobacco: Never Used   Substance Use Topics    Alcohol use: Yes     Comment: occasional prior to pregnancy    Drug use: No     Review of Systems   Constitutional: Positive for fever (Tmax: 102F). Negative for chills.   HENT: Negative  for congestion, ear pain, rhinorrhea and sore throat.    Eyes: Negative for pain and visual disturbance.   Respiratory: Negative for cough and shortness of breath.    Cardiovascular: Positive for chest pain (underneath L breast).   Gastrointestinal: Positive for abdominal pain, nausea and vomiting. Negative for diarrhea.   Genitourinary: Negative for difficulty urinating, dysuria, frequency, hematuria, urgency, vaginal bleeding, vaginal discharge and vaginal pain.   Musculoskeletal: Positive for back pain. Negative for neck pain.   Skin: Negative for rash.   Neurological: Negative for headaches.   All other systems reviewed and are negative.      Physical Exam     Initial Vitals [01/18/19 2215]   BP Pulse Resp Temp SpO2   138/67 87 18 98.6 °F (37 °C) 99 %      MAP       --         Physical Exam    Constitutional: She appears well-developed and well-nourished. She is not diaphoretic. No distress.   HENT:   Head: Normocephalic and atraumatic.   Neck: Normal range of motion.   Cardiovascular: Normal rate, regular rhythm, normal heart sounds and intact distal pulses.   Pulmonary/Chest: Breath sounds normal. No respiratory distress.   Abdominal: Soft. Bowel sounds are normal. There is no hepatosplenomegaly. There is no tenderness. There is no rigidity, no rebound, no guarding, no CVA tenderness, no tenderness at McBurney's point and negative Doll's sign.   Genitourinary: Vagina normal and uterus normal. There is no rash, tenderness, lesion or injury on the right labia. There is no rash, tenderness, lesion or injury on the left labia. Cervix exhibits no motion tenderness, no discharge and no friability. Right adnexum displays no mass, no tenderness and no fullness. Left adnexum displays no mass, no tenderness and no fullness.   Genitourinary Comments: Cervical os closed.  No bleeding.  No significant discharge.   Musculoskeletal: Normal range of motion.   Neurological: She is alert and oriented to person, place, and  time.   Skin: Skin is warm and dry.   Psychiatric: She has a normal mood and affect. Her behavior is normal.         ED Course   Procedures  Labs Reviewed   CBC W/ AUTO DIFFERENTIAL - Abnormal; Notable for the following components:       Result Value    MPV 8.9 (*)     All other components within normal limits   COMPREHENSIVE METABOLIC PANEL - Abnormal; Notable for the following components:    BUN, Bld 5 (*)     Total Bilirubin 1.3 (*)     ALT 9 (*)     All other components within normal limits   URINALYSIS, REFLEX TO URINE CULTURE - Abnormal; Notable for the following components:    Appearance, UA Hazy (*)     Urobilinogen, UA 2.0-3.0 (*)     Leukocytes, UA 2+ (*)     All other components within normal limits    Narrative:     Preferred Collection Type->Urine, Clean Catch   VAGINAL SCREEN - Abnormal; Notable for the following components:    Bacteria - Vaginal Screen Occasional (*)     All other components within normal limits   URINALYSIS MICROSCOPIC - Abnormal; Notable for the following components:    WBC, UA 20 (*)     Bacteria, UA Moderate (*)     Non-Squam Epith 1 (*)     All other components within normal limits    Narrative:     Preferred Collection Type->Urine, Clean Catch   POCT URINE PREGNANCY - Abnormal; Notable for the following components:    POC Preg Test, Ur Positive (*)     All other components within normal limits   C. TRACHOMATIS/N. GONORRHOEAE BY AMP DNA   CULTURE, URINE   LIPASE   HCG, QUANTITATIVE, PREGNANCY     EKG Readings: (Independently Interpreted)   Initial Reading: No STEMI. Rhythm: Sinus Arrhythmia. Heart Rate: 74. Ectopy: No Ectopy. Conduction: Normal.       Imaging Results          US OB Less Than 14 Wks with Transvaginal (xpd) (Final result)  Result time 01/19/19 00:59:16    Final result by Shama Mcelroy MD (01/19/19 00:59:16)                 Impression:      Single intrauterine pregnancy which corresponds to a 6 weeks 0 days gestation by crown rump length. Fetal heart rate is 96  BPM.  REHAN by ultrasound 09/14/2019.      Electronically signed by: Shama Mcelroy MD  Date:    01/19/2019  Time:    00:59             Narrative:    EXAMINATION:  US OB LESS THAN 14 WKS WITH TRANSVAGINAL (XPD)    CLINICAL HISTORY:  pelvic pain;    TECHNIQUE:  Transabdominal sonography of the pelvis was performed, followed by transvaginal sonography to better evaluate the uterus and ovaries.    COMPARISON:  None.    FINDINGS:  The uterus measures 9.5 x 6.6 x 6.3 cm. Uterine parenchyma is heterogenous in echotexture. In the uterine cavity there is a gestational sac with a mean diameter of 1.1 cm. The fetal pole measures 0.3 cm by crown rump length and corresponds to a 6 weeks 0 days gestation. Fetal heart rate is 96 BPM. The yolk sac measures 0.3 cm.    The right ovary measures 4.2 x 2.9 x 1.7 cm. The left ovary measures 2.7 x 3.2 x 1.4 cm. Arterial and venous flow are preserved bilaterally. A suspected corpus luteum cyst measuring 2.2 x 1.6 x 1.9 cm is seen in the right ovary. No significant free fluid is seen.                                 Medical Decision Making:   ED Management:  This is an evaluation of a 28 y.o. Female that presents to the Emergency Department for Abdominal Pain, low back pain, nausea, vomiting. She also reports two-day history of intermittent left breast/chest pain. No pain in the emergency department today.  She denies shortness of breath. Urine pregnancy test in ED is positive. She has not received prenatal care. The patient is a non-toxic, afebrile, and well appearing female. On examination the abdomen is flat without distention.  There is no surface trauma, scars, incisions.  Bowel sounds are present in all 4 quadrants.  No tenderness, guarding, rigidity to palpation.  No tenderness, masses, pulsation in epigastric area.  No organomegaly.  Negative Doll sign.  No periumbilical tenderness.  No rebound in the lower quadrants.  Nontender over McBurney's point.  No suprapubic tenderness or  distention.  Good femoral pulses bilaterally.  No CVA tenderness.    Vital Signs are reassuring.  EKG: appears normal, NSR  Lab\Radiology\Other Procedure RESULTS: Beta HC, correlates with transvaginal US findings of a 6 weeks.    Given the above findings, my overall impression is pelvic pain in pregnancy, UTI. Given the above findings, I do not think the patient has ectopic pregnancy, bowel obstruction, appendicitis, cholecystitis, gastroenteritis, diverticulitis, colitis, pyelonephritis.    During her stay in the ED, the patient has been given IV Rocephin, fluids, Tylenol with good relief of her symptoms. The patient will be discharged home with Keflex and prenatal vitamins. The diagnosis, treatment plan, instructions for follow-up and reevaluation with her discharging her with follow up to her OB/GYN for further evaluation of her symptoms in 2 days. She can take tylenol for pain. ED return precautions have been discussed with the patient and she has verbalized an understanding of the information. All questions or concerns from the patient have been addressed.     This case was discussed with Dr. Stauffer who is in agreement with my assessment and plan.            Scribe Attestation:   Scribe #1: I performed the above scribed service and the documentation accurately describes the services I performed. I attest to the accuracy of the note.    Attending Attestation:           Physician Attestation for Scribe:  Physician Attestation Statement for Scribe #1: I, Rosalba Maldonado NP, reviewed documentation, as scribed by Andrea Vazquez in my presence, and it is both accurate and complete.                    Clinical Impression:   The primary encounter diagnosis was 6 weeks gestation of pregnancy. Diagnoses of Chest pain, Acute cystitis without hematuria, and Pelvic pain in pregnancy were also pertinent to this visit.      Disposition:   Disposition: Discharged  Condition: Stable                        Rosalba FIORE  BEN Maldonado  01/19/19 0519

## 2019-01-20 LAB — BACTERIA UR CULT: NORMAL

## 2019-01-21 LAB
C TRACH DNA SPEC QL NAA+PROBE: NOT DETECTED
N GONORRHOEA DNA SPEC QL NAA+PROBE: NOT DETECTED

## 2019-06-16 ENCOUNTER — HOSPITAL ENCOUNTER (EMERGENCY)
Facility: HOSPITAL | Age: 29
Discharge: HOME | End: 2019-06-16
Attending: EMERGENCY MEDICINE
Payer: MEDICAID

## 2019-06-16 VITALS
BODY MASS INDEX: 33.66 KG/M2 | TEMPERATURE: 99 F | WEIGHT: 190 LBS | DIASTOLIC BLOOD PRESSURE: 90 MMHG | SYSTOLIC BLOOD PRESSURE: 124 MMHG | OXYGEN SATURATION: 100 % | RESPIRATION RATE: 18 BRPM | HEIGHT: 63 IN | HEART RATE: 90 BPM

## 2019-06-16 DIAGNOSIS — R10.9 ABDOMINAL PAIN, UNSPECIFIED ABDOMINAL LOCATION: Primary | ICD-10-CM

## 2019-06-16 LAB
ALBUMIN SERPL BCP-MCNC: 3.4 G/DL (ref 3.5–5.2)
ALP SERPL-CCNC: 58 U/L (ref 55–135)
ALT SERPL W/O P-5'-P-CCNC: 12 U/L (ref 10–44)
ANION GAP SERPL CALC-SCNC: 6 MMOL/L (ref 8–16)
AST SERPL-CCNC: 16 U/L (ref 10–40)
B-HCG UR QL: NEGATIVE
BACTERIA #/AREA URNS AUTO: NORMAL /HPF
BACTERIA GENITAL QL WET PREP: ABNORMAL
BASOPHILS # BLD AUTO: 0.06 K/UL (ref 0–0.2)
BASOPHILS NFR BLD: 0.9 % (ref 0–1.9)
BILIRUB SERPL-MCNC: 0.6 MG/DL (ref 0.1–1)
BILIRUB UR QL STRIP: NEGATIVE
BUN SERPL-MCNC: 12 MG/DL (ref 6–20)
CALCIUM SERPL-MCNC: 8.9 MG/DL (ref 8.7–10.5)
CHLORIDE SERPL-SCNC: 108 MMOL/L (ref 95–110)
CLARITY UR REFRACT.AUTO: ABNORMAL
CLUE CELLS VAG QL WET PREP: ABNORMAL
CO2 SERPL-SCNC: 24 MMOL/L (ref 23–29)
COLOR UR AUTO: YELLOW
CREAT SERPL-MCNC: 0.7 MG/DL (ref 0.5–1.4)
CTP QC/QA: YES
DIFFERENTIAL METHOD: ABNORMAL
EOSINOPHIL # BLD AUTO: 0.3 K/UL (ref 0–0.5)
EOSINOPHIL NFR BLD: 4.1 % (ref 0–8)
ERYTHROCYTE [DISTWIDTH] IN BLOOD BY AUTOMATED COUNT: 12.7 % (ref 11.5–14.5)
EST. GFR  (AFRICAN AMERICAN): >60 ML/MIN/1.73 M^2
EST. GFR  (NON AFRICAN AMERICAN): >60 ML/MIN/1.73 M^2
FILAMENT FUNGI VAG WET PREP-#/AREA: ABNORMAL
GLUCOSE SERPL-MCNC: 101 MG/DL (ref 70–110)
GLUCOSE UR QL STRIP: NEGATIVE
HCT VFR BLD AUTO: 37.9 % (ref 37–48.5)
HGB BLD-MCNC: 11.8 G/DL (ref 12–16)
HGB UR QL STRIP: ABNORMAL
HYALINE CASTS UR QL AUTO: 0 /LPF
IMM GRANULOCYTES # BLD AUTO: 0.01 K/UL (ref 0–0.04)
IMM GRANULOCYTES NFR BLD AUTO: 0.2 % (ref 0–0.5)
KETONES UR QL STRIP: NEGATIVE
LEUKOCYTE ESTERASE UR QL STRIP: ABNORMAL
LYMPHOCYTES # BLD AUTO: 2.5 K/UL (ref 1–4.8)
LYMPHOCYTES NFR BLD: 38 % (ref 18–48)
MCH RBC QN AUTO: 28.4 PG (ref 27–31)
MCHC RBC AUTO-ENTMCNC: 31.1 G/DL (ref 32–36)
MCV RBC AUTO: 91 FL (ref 82–98)
MICROSCOPIC COMMENT: NORMAL
MONOCYTES # BLD AUTO: 0.4 K/UL (ref 0.3–1)
MONOCYTES NFR BLD: 6.4 % (ref 4–15)
NEUTROPHILS # BLD AUTO: 3.3 K/UL (ref 1.8–7.7)
NEUTROPHILS NFR BLD: 50.4 % (ref 38–73)
NITRITE UR QL STRIP: NEGATIVE
NRBC BLD-RTO: 0 /100 WBC
PH UR STRIP: 6 [PH] (ref 5–8)
PLATELET # BLD AUTO: 259 K/UL (ref 150–350)
PMV BLD AUTO: 9.1 FL (ref 9.2–12.9)
POTASSIUM SERPL-SCNC: 3.7 MMOL/L (ref 3.5–5.1)
PROT SERPL-MCNC: 6.7 G/DL (ref 6–8.4)
PROT UR QL STRIP: ABNORMAL
RBC # BLD AUTO: 4.16 M/UL (ref 4–5.4)
RBC #/AREA URNS AUTO: 4 /HPF (ref 0–4)
SODIUM SERPL-SCNC: 138 MMOL/L (ref 136–145)
SP GR UR STRIP: 1.01 (ref 1–1.03)
SPECIMEN SOURCE: ABNORMAL
SQUAMOUS #/AREA URNS AUTO: 12 /HPF
T VAGINALIS GENITAL QL WET PREP: ABNORMAL
URN SPEC COLLECT METH UR: ABNORMAL
WBC # BLD AUTO: 6.6 K/UL (ref 3.9–12.7)
WBC #/AREA URNS AUTO: 5 /HPF (ref 0–5)
WBC #/AREA VAG WET PREP: ABNORMAL
YEAST GENITAL QL WET PREP: ABNORMAL

## 2019-06-16 PROCEDURE — 25000003 PHARM REV CODE 250: Performed by: EMERGENCY MEDICINE

## 2019-06-16 PROCEDURE — 99284 PR EMERGENCY DEPT VISIT,LEVEL IV: ICD-10-PCS | Mod: ,,, | Performed by: EMERGENCY MEDICINE

## 2019-06-16 PROCEDURE — 85025 COMPLETE CBC W/AUTO DIFF WBC: CPT

## 2019-06-16 PROCEDURE — 96372 THER/PROPH/DIAG INJ SC/IM: CPT

## 2019-06-16 PROCEDURE — 99284 EMERGENCY DEPT VISIT MOD MDM: CPT | Mod: ,,, | Performed by: EMERGENCY MEDICINE

## 2019-06-16 PROCEDURE — 63600175 PHARM REV CODE 636 W HCPCS: Performed by: EMERGENCY MEDICINE

## 2019-06-16 PROCEDURE — 99284 EMERGENCY DEPT VISIT MOD MDM: CPT | Mod: 25

## 2019-06-16 PROCEDURE — 80053 COMPREHEN METABOLIC PANEL: CPT

## 2019-06-16 PROCEDURE — 81001 URINALYSIS AUTO W/SCOPE: CPT

## 2019-06-16 PROCEDURE — 87210 SMEAR WET MOUNT SALINE/INK: CPT

## 2019-06-16 PROCEDURE — 87491 CHLMYD TRACH DNA AMP PROBE: CPT

## 2019-06-16 PROCEDURE — 81025 URINE PREGNANCY TEST: CPT | Performed by: STUDENT IN AN ORGANIZED HEALTH CARE EDUCATION/TRAINING PROGRAM

## 2019-06-16 RX ORDER — CEFTRIAXONE 500 MG/1
250 INJECTION, POWDER, FOR SOLUTION INTRAMUSCULAR; INTRAVENOUS
Status: COMPLETED | OUTPATIENT
Start: 2019-06-16 | End: 2019-06-16

## 2019-06-16 RX ORDER — NAPROXEN 375 MG/1
375 TABLET ORAL 2 TIMES DAILY PRN
Qty: 30 TABLET | Refills: 0 | Status: SHIPPED | OUTPATIENT
Start: 2019-06-16 | End: 2020-06-08

## 2019-06-16 RX ORDER — NAPROXEN 500 MG/1
500 TABLET ORAL
Status: COMPLETED | OUTPATIENT
Start: 2019-06-16 | End: 2019-06-16

## 2019-06-16 RX ORDER — AZITHROMYCIN 250 MG/1
1000 TABLET, FILM COATED ORAL
Status: COMPLETED | OUTPATIENT
Start: 2019-06-16 | End: 2019-06-16

## 2019-06-16 RX ADMIN — NAPROXEN 500 MG: 500 TABLET ORAL at 09:06

## 2019-06-16 RX ADMIN — AZITHROMYCIN 1000 MG: 250 TABLET, FILM COATED ORAL at 09:06

## 2019-06-16 RX ADMIN — CEFTRIAXONE SODIUM 250 MG: 500 INJECTION, POWDER, FOR SOLUTION INTRAMUSCULAR; INTRAVENOUS at 09:06

## 2019-06-16 NOTE — ED PROVIDER NOTES
"Encounter Date: 6/16/2019       History     Chief Complaint   Patient presents with    Abdominal Cramping     Pt c/o lower abdominal pain. Pt LMP in January, has taken multiple pregnancy tests which all were negative, menstrual cycle is usually normal. Pt also c/o vaginal burning, burning with urination.      Christopher Avila is a 27 yo with history of ovarian cysts presents with abdominal pain for one week. She describes it as a "throbbing, burning" intermittent pain and that she sometimes feels it in her back. She rates the intensity as a 7-8/10. She reports that sitting up straight and movement makes the pain worse. She takes ibuprofen and tylenol which reduces the pain to 2/10. She denies vaginal discharge but does note an odor. She reports frequency and dysuria. She also reports diarrhea and nausea but denies vomiting. She states that she feels similar to last time she presented to the ED in January 2019.           Review of patient's allergies indicates:  No Known Allergies  Past Medical History:   Diagnosis Date    Abnormal menstrual periods     Anemia     Dental caries     Migraine headache     Ovarian cyst      No past surgical history on file.  Family History   Problem Relation Age of Onset    Diabetes Mother     Cancer Mother     Diabetes Maternal Grandmother      Social History     Tobacco Use    Smoking status: Former Smoker    Smokeless tobacco: Never Used   Substance Use Topics    Alcohol use: Yes     Comment: occasional prior to pregnancy    Drug use: No     Review of Systems   Constitutional: Negative for fever.   HENT: Negative for sore throat.    Eyes: Negative for visual disturbance.   Respiratory: Negative for shortness of breath.    Cardiovascular: Negative for chest pain.   Gastrointestinal: Positive for abdominal pain, diarrhea and nausea. Negative for vomiting.   Genitourinary: Negative for dysuria.   Musculoskeletal: Negative for back pain.   Skin: Negative for rash. "   Neurological: Negative for weakness.   Hematological: Does not bruise/bleed easily.       Physical Exam     Initial Vitals [06/16/19 0629]   BP Pulse Resp Temp SpO2   110/64 84 16 98.5 °F (36.9 °C) 99 %      MAP       --         Physical Exam    Nursing note and vitals reviewed.  Constitutional: She appears well-developed and well-nourished. She is not diaphoretic. No distress.   HENT:   Head: Normocephalic and atraumatic.   Right Ear: External ear normal.   Left Ear: External ear normal.   Nose: Nose normal.   Eyes: Conjunctivae and EOM are normal. Pupils are equal, round, and reactive to light. Right eye exhibits no discharge. Left eye exhibits no discharge.   Cardiovascular: Normal rate, regular rhythm and normal heart sounds. Exam reveals no gallop and no friction rub.    No murmur heard.  Pulmonary/Chest: Breath sounds normal. No respiratory distress. She has no wheezes. She has no rhonchi. She has no rales.   Abdominal: Soft. Bowel sounds are normal. She exhibits no distension. There is tenderness. There is no rebound and no guarding.   Tenderness of the suprapubic area and in left lower quadrant   Genitourinary:   Genitourinary Comments: Mild bilateral adnexal tenderness to bimanual exam, scant white vaginal discharge, no increased erythema of cervix   Musculoskeletal: She exhibits no edema.   Neurological: She is alert and oriented to person, place, and time.   Skin: Skin is warm and dry.         ED Course   Procedures  Labs Reviewed   URINALYSIS, REFLEX TO URINE CULTURE - Abnormal; Notable for the following components:       Result Value    Appearance, UA Hazy (*)     Protein, UA 1+ (*)     Occult Blood UA 2+ (*)     Leukocytes, UA Trace (*)     All other components within normal limits    Narrative:     Preferred Collection Type->Urine, Clean Catch   COMPREHENSIVE METABOLIC PANEL - Abnormal; Notable for the following components:    Albumin 3.4 (*)     Anion Gap 6 (*)     All other components within  normal limits   CBC W/ AUTO DIFFERENTIAL - Abnormal; Notable for the following components:    Hemoglobin 11.8 (*)     Mean Corpuscular Hemoglobin Conc 31.1 (*)     MPV 9.1 (*)     All other components within normal limits   VAGINAL SCREEN - Abnormal; Notable for the following components:    WBC - Vaginal Screen Rare (*)     Bacteria - Vaginal Screen Rare (*)     All other components within normal limits   C. TRACHOMATIS/N. GONORRHOEAE BY AMP DNA   URINALYSIS MICROSCOPIC    Narrative:     Preferred Collection Type->Urine, Clean Catch   POCT URINE PREGNANCY          Imaging Results          US Pelvis Comp with Transvag NON-OB (xpd) (Final result)  Result time 06/16/19 09:05:57   Procedure changed from US Pelvis Complete Non OB     Final result by Beto Weaver MD (06/16/19 09:05:57)                 Impression:      No acute abnormality.  Mildly prominent adnexal vasculature, more pronounced on the right than on the left, raises the question of pelvic congestion syndrome.    Electronically signed by resident: Terry Fernandez  Date:    06/16/2019  Time:    08:58    Electronically signed by: Beto Weaver MD  Date:    06/16/2019  Time:    09:05             Narrative:    EXAMINATION:  US PELVIS COMP WITH TRANSVAG NON-OB (XPD)    CLINICAL HISTORY:  pelvic pain    TECHNIQUE:  Transabdominal sonography of the pelvis was performed, followed by transvaginal sonography to better evaluate the uterus and ovaries.    COMPARISON:  Ultrasound pelvis 10/24/2016    FINDINGS:  Uterus:    *Size: 8.0 x 4.7 x 5.2 cm  *Masses: None  *Endometrium: Normal in this pre menopausal patient, measuring 2 mm.  .    Right ovary:    *Size: 4.5 x 1.8 x 3.6 cm  *Appearance: Normal, with normal appearing follicles.  There is mildly prominent adnexal vasculature.  *Vascular flow: Normal.  .    Left ovary:    *Size: 3.4 x 2.3 x 3.6 cm  *Appearance: Normal, with normal appearing follicles.  There is mildly prominent adnexal vasculature.  *Vascular Flow:  Normal.  Free Fluid: None.                                 Medical Decision Making:   Initial Assessment:   27yo female presents with abdominal pain. DDX include: ectopic pregnancy, UTI, gastroenteritis  ED Management:  Ordered UA, CBC, CMP, Pelvic US with transvaginal        APC / Resident Notes:   Non-toxic appearing on exam, mild adnexal tenderness noted on bimanual. No leukocytosis on CBC. CMP unremarkable. UA only showing trace leukocytes. Pelvic US with Transvaginal negative for abscess. Vaginal screen negative. Chlamydia and gonorrhea RNA pending.    Although PID is low, given that patient does display mild adnexal tenderness, will treat with ceftriaxone and azithromycin. Naproxen ordered for symptomatic relief and patient reports improvement. Discussed with patient need to follow up with OBGYN. She voiced understanding. Will prescribe naproxen with close OBGYN follow-up.                 Clinical Impression:       ICD-10-CM ICD-9-CM   1. Abdominal pain, unspecified abdominal location R10.9 789.00         Disposition:   Disposition: Discharged  Condition: Stable                        Sarah Arzola MD  Resident  06/16/19 3143

## 2019-06-17 LAB
C TRACH DNA SPEC QL NAA+PROBE: NOT DETECTED
N GONORRHOEA DNA SPEC QL NAA+PROBE: NOT DETECTED

## 2019-10-24 ENCOUNTER — TELEPHONE (OUTPATIENT)
Dept: OBSTETRICS AND GYNECOLOGY | Facility: CLINIC | Age: 29
End: 2019-10-24

## 2020-04-23 ENCOUNTER — TELEPHONE (OUTPATIENT)
Dept: OBSTETRICS AND GYNECOLOGY | Facility: CLINIC | Age: 30
End: 2020-04-23

## 2020-04-29 ENCOUNTER — OFFICE VISIT (OUTPATIENT)
Dept: OBSTETRICS AND GYNECOLOGY | Facility: CLINIC | Age: 30
End: 2020-04-29
Payer: MEDICAID

## 2020-04-29 ENCOUNTER — LAB VISIT (OUTPATIENT)
Dept: LAB | Facility: HOSPITAL | Age: 30
End: 2020-04-29
Attending: OBSTETRICS & GYNECOLOGY
Payer: MEDICAID

## 2020-04-29 VITALS
WEIGHT: 200 LBS | HEIGHT: 63 IN | BODY MASS INDEX: 35.44 KG/M2 | DIASTOLIC BLOOD PRESSURE: 70 MMHG | SYSTOLIC BLOOD PRESSURE: 124 MMHG

## 2020-04-29 DIAGNOSIS — N92.6 LATE PERIOD: ICD-10-CM

## 2020-04-29 DIAGNOSIS — Z12.4 CERVICAL CANCER SCREENING: ICD-10-CM

## 2020-04-29 DIAGNOSIS — Z01.419 WELL WOMAN EXAM WITH ROUTINE GYNECOLOGICAL EXAM: Primary | ICD-10-CM

## 2020-04-29 DIAGNOSIS — Z87.42 H/O ABNORMAL CERVICAL PAPANICOLAOU SMEAR: ICD-10-CM

## 2020-04-29 DIAGNOSIS — Z32.02 NEGATIVE PREGNANCY TEST: ICD-10-CM

## 2020-04-29 LAB
B-HCG UR QL: NEGATIVE
CTP QC/QA: YES
HCG INTACT+B SERPL-ACNC: <1.2 MIU/ML

## 2020-04-29 PROCEDURE — 99213 OFFICE O/P EST LOW 20 MIN: CPT | Mod: PBBFAC | Performed by: OBSTETRICS & GYNECOLOGY

## 2020-04-29 PROCEDURE — 99395 PREV VISIT EST AGE 18-39: CPT | Mod: S$PBB,,, | Performed by: OBSTETRICS & GYNECOLOGY

## 2020-04-29 PROCEDURE — 88175 CYTOPATH C/V AUTO FLUID REDO: CPT

## 2020-04-29 PROCEDURE — 99999 PR PBB SHADOW E&M-EST. PATIENT-LVL III: CPT | Mod: PBBFAC,,, | Performed by: OBSTETRICS & GYNECOLOGY

## 2020-04-29 PROCEDURE — 84702 CHORIONIC GONADOTROPIN TEST: CPT

## 2020-04-29 PROCEDURE — 99999 PR PBB SHADOW E&M-EST. PATIENT-LVL III: ICD-10-PCS | Mod: PBBFAC,,, | Performed by: OBSTETRICS & GYNECOLOGY

## 2020-04-29 PROCEDURE — 99395 PR PREVENTIVE VISIT,EST,18-39: ICD-10-PCS | Mod: S$PBB,,, | Performed by: OBSTETRICS & GYNECOLOGY

## 2020-04-29 PROCEDURE — 81025 URINE PREGNANCY TEST: CPT | Mod: PBBFAC | Performed by: OBSTETRICS & GYNECOLOGY

## 2020-04-29 PROCEDURE — 87624 HPV HI-RISK TYP POOLED RSLT: CPT

## 2020-04-29 PROCEDURE — 36415 COLL VENOUS BLD VENIPUNCTURE: CPT

## 2020-04-29 NOTE — PROGRESS NOTES
[facility]  Ambulatory Clinic  Obstetrics & Gynecology    Visit Date:  2020    Chief Complaint:  Annual GYN exam, late period, h/o abnormal pap    History of Present Illness:      Christopher Avila is a 29 y.o.  here for a gynecologic exam with c/o late period and h/o abnormal pap.    Patient's last menstrual period was 2020.  Pt reports positive home UPT.  UPT negative today in clinic.  Pt is requesting serum HCG.      Otherwise, pt has no major complaints today.      Menses are regular, not heavy or painful.    Pt current method of family planning is natural family planning, and reports no problems with this method.      Pt denies family h/o adverse reaction to hormonal contraception.    Last pap ~2017 was LGSIL, pt failed to follow up.    Pt denies active sexually transmitted infections.    Pt performs monthly self breast examination, former smoker, uses seat belts, and denies abuse.     Pt denies abnormal vaginal bleeding, vaginal discharge, dysmenorrhea, dyspareunia, pelvic pain, bloating, early satiety, unintentional weight loss, breast mass/skin changes, incontinence, GI or urinary complaints.      Otherwise, the pt is in her usual state of health.    Past History:  Gynecologic history as noted above.    Review of Systems:      GENERAL:  No fever, fatigue, excessive weight gain or loss  HEENT:  No headaches, hearing changes, visual disturbance  RESPIRATORY:  No cough, shortness of breath  CARDIOVASCULAR:  No chest pain, heart palpitations, leg swelling  BREAST:  No lump, pain, nipple discharge, skin changes  GASTROINTESTINAL:  No nausea, vomiting, constipation, diarrhea, abd pain, rectal bleeding   GENITOURINARY:  See HPI  ENDOCRINE:  No heat or cold intolerance  HEMATOLOGIC:  No easy bruisability or bleeding   LYMPHATICS:  No enlarged nodes  MUSCULOSKELETAL:  No acute joint pain or swelling  SKIN:  No rash, lesions, jaundice  NEUROLOGIC:  No dizziness, weakness,  "syncope  PSYCHIATRIC:  No significant mood changes, homicidal/suicidal ideations    Physical Exam:     /70   Ht 5' 3" (1.6 m)   Wt 90.7 kg (200 lb)   LMP 2020   BMI 35.43 kg/m²   Pulse 70, Resp rate 16     GENERAL:  No acute distress, well-nourished  HEENT:  Atraumatic, anicteric, moist mucus membranes. Neck supple w/o masses.  BREAST:  Symmetric, nontender, no obvious masses, adenopathy, skin changes or nipple discharge.  LUNGS:  Clear to auscultation  HEART:  Regular rate and rhythm, no murmurs, gallops, or rubs  ABDOMEN:  Soft, non-tender, non-distended, normoactive bowel sounds, no obvious organomegaly  EXT:  Symmetric w/o cramping, claudication, or edema. +2 distal pulses.  SKIN:  No rashes or bruising  PSYCH:  Mood and affect appropriate  NEURO:  Grossly intact bilaterally, FROM.     GENITOURINARY:    VULVAR:  Female external genitalia w/o obvious lesions. Female hair distribution. Normal urethral meatus. No gross lymphadenopathy.    VAGINA:  Pink, moist, well-rugated. Good support. No obvious lesion. No discharge.  CERVIX:  No cervical motion tenderness, discharge, or obvious lesions.   UTERUS:  Small, non-tender, normal contour  ADNEXA:  No masses, non-tender    RECTAL:  Declined. No obvious external lesions    Chaperone present for exam.    Assessment:     29 y.o. :    1. Well woman gynecologic exam  2. Late period, Patient's last menstrual period was 2020., UPT negative 2020   3. H/o LGSIL pap     Plan:    A gynecologic health assessment was performed with age appropriate counseling.    Cervical cancer screening - pap obtained.  Pt may need a colposcopy depending on the results of this pap test.  We discussed her abnormal pap.  Compliance with f/u advised to prevent progression of cervical dyplasia leading to cancer.  Safe sex.    STI screening - pt declined.  Safe sex discussed.      Discussed late period.  UPT negative today.  Repeat home UPT weekly until next menses.  " Pregnancy precautions.  Return to clinic if no menses in 4-6 wks or positive UPT.  Prenatal vitamins.    Encourage healthy lifestyle modifications, monthly self breast exams, encourage HPV vaccination, Ca/Vit D.    F/u with PCP for health maintenance.    Return 1 year for gynecologic exam, or sooner as needed.  All questions answered, pt voiced understanding.        Jitendra Guerra MD

## 2020-05-01 LAB
FINAL PATHOLOGIC DIAGNOSIS: NORMAL
Lab: NORMAL

## 2020-05-07 LAB
HPV HR 12 DNA SPEC QL NAA+PROBE: POSITIVE
HPV16 AG SPEC QL: NEGATIVE
HPV18 DNA SPEC QL NAA+PROBE: NEGATIVE

## 2020-06-08 ENCOUNTER — HOSPITAL ENCOUNTER (EMERGENCY)
Facility: HOSPITAL | Age: 30
Discharge: HOME | End: 2020-06-09
Attending: EMERGENCY MEDICINE
Payer: MEDICAID

## 2020-06-08 DIAGNOSIS — Z34.90 PREGNANCY, UNSPECIFIED GESTATIONAL AGE: Primary | ICD-10-CM

## 2020-06-08 DIAGNOSIS — R10.9 ABDOMINAL PAIN: ICD-10-CM

## 2020-06-08 LAB
ABO + RH BLD: NORMAL
ALBUMIN SERPL BCP-MCNC: 4.3 G/DL (ref 3.5–5.2)
ALP SERPL-CCNC: 65 U/L (ref 55–135)
ALT SERPL W/O P-5'-P-CCNC: 10 U/L (ref 10–44)
ANION GAP SERPL CALC-SCNC: 10 MMOL/L (ref 8–16)
AST SERPL-CCNC: 14 U/L (ref 10–40)
B-HCG UR QL: POSITIVE
BACTERIA #/AREA URNS AUTO: ABNORMAL /HPF
BASOPHILS # BLD AUTO: 0.06 K/UL (ref 0–0.2)
BASOPHILS NFR BLD: 0.7 % (ref 0–1.9)
BILIRUB SERPL-MCNC: 1.1 MG/DL (ref 0.1–1)
BILIRUB UR QL STRIP: NEGATIVE
BUN SERPL-MCNC: 5 MG/DL (ref 6–20)
CALCIUM SERPL-MCNC: 9.5 MG/DL (ref 8.7–10.5)
CHLORIDE SERPL-SCNC: 104 MMOL/L (ref 95–110)
CLARITY UR REFRACT.AUTO: ABNORMAL
CO2 SERPL-SCNC: 24 MMOL/L (ref 23–29)
COLOR UR AUTO: YELLOW
CREAT SERPL-MCNC: 0.7 MG/DL (ref 0.5–1.4)
CTP QC/QA: YES
DIFFERENTIAL METHOD: ABNORMAL
EOSINOPHIL # BLD AUTO: 0.3 K/UL (ref 0–0.5)
EOSINOPHIL NFR BLD: 3 % (ref 0–8)
ERYTHROCYTE [DISTWIDTH] IN BLOOD BY AUTOMATED COUNT: 13.1 % (ref 11.5–14.5)
EST. GFR  (AFRICAN AMERICAN): >60 ML/MIN/1.73 M^2
EST. GFR  (NON AFRICAN AMERICAN): >60 ML/MIN/1.73 M^2
GLUCOSE SERPL-MCNC: 103 MG/DL (ref 70–110)
GLUCOSE UR QL STRIP: NEGATIVE
HCT VFR BLD AUTO: 39.8 % (ref 37–48.5)
HGB BLD-MCNC: 13.1 G/DL (ref 12–16)
HGB UR QL STRIP: ABNORMAL
HYALINE CASTS UR QL AUTO: 2 /LPF
IMM GRANULOCYTES # BLD AUTO: 0.02 K/UL (ref 0–0.04)
IMM GRANULOCYTES NFR BLD AUTO: 0.2 % (ref 0–0.5)
KETONES UR QL STRIP: NEGATIVE
LEUKOCYTE ESTERASE UR QL STRIP: ABNORMAL
LIPASE SERPL-CCNC: 9 U/L (ref 4–60)
LYMPHOCYTES # BLD AUTO: 2.4 K/UL (ref 1–4.8)
LYMPHOCYTES NFR BLD: 27.8 % (ref 18–48)
MCH RBC QN AUTO: 29.2 PG (ref 27–31)
MCHC RBC AUTO-ENTMCNC: 32.9 G/DL (ref 32–36)
MCV RBC AUTO: 89 FL (ref 82–98)
MICROSCOPIC COMMENT: ABNORMAL
MONOCYTES # BLD AUTO: 0.6 K/UL (ref 0.3–1)
MONOCYTES NFR BLD: 6.6 % (ref 4–15)
NEUTROPHILS # BLD AUTO: 5.3 K/UL (ref 1.8–7.7)
NEUTROPHILS NFR BLD: 61.7 % (ref 38–73)
NITRITE UR QL STRIP: NEGATIVE
NRBC BLD-RTO: 0 /100 WBC
PH UR STRIP: 7 [PH] (ref 5–8)
PLATELET # BLD AUTO: 303 K/UL (ref 150–350)
PMV BLD AUTO: 9 FL (ref 9.2–12.9)
POTASSIUM SERPL-SCNC: 3.7 MMOL/L (ref 3.5–5.1)
PROT SERPL-MCNC: 7.9 G/DL (ref 6–8.4)
PROT UR QL STRIP: ABNORMAL
RBC # BLD AUTO: 4.48 M/UL (ref 4–5.4)
RBC #/AREA URNS AUTO: 18 /HPF (ref 0–4)
SODIUM SERPL-SCNC: 138 MMOL/L (ref 136–145)
SP GR UR STRIP: 1.02 (ref 1–1.03)
SQUAMOUS #/AREA URNS AUTO: 10 /HPF
URN SPEC COLLECT METH UR: ABNORMAL
WBC # BLD AUTO: 8.64 K/UL (ref 3.9–12.7)
WBC #/AREA URNS AUTO: 6 /HPF (ref 0–5)

## 2020-06-08 PROCEDURE — 86901 BLOOD TYPING SEROLOGIC RH(D): CPT

## 2020-06-08 PROCEDURE — 99284 EMERGENCY DEPT VISIT MOD MDM: CPT | Mod: ,,, | Performed by: PHYSICIAN ASSISTANT

## 2020-06-08 PROCEDURE — 25000003 PHARM REV CODE 250: Performed by: PHYSICIAN ASSISTANT

## 2020-06-08 PROCEDURE — 84702 CHORIONIC GONADOTROPIN TEST: CPT

## 2020-06-08 PROCEDURE — 63600175 PHARM REV CODE 636 W HCPCS: Performed by: PHYSICIAN ASSISTANT

## 2020-06-08 PROCEDURE — 99284 EMERGENCY DEPT VISIT MOD MDM: CPT | Mod: 25

## 2020-06-08 PROCEDURE — 96361 HYDRATE IV INFUSION ADD-ON: CPT

## 2020-06-08 PROCEDURE — 85025 COMPLETE CBC W/AUTO DIFF WBC: CPT

## 2020-06-08 PROCEDURE — 80053 COMPREHEN METABOLIC PANEL: CPT

## 2020-06-08 PROCEDURE — 83690 ASSAY OF LIPASE: CPT

## 2020-06-08 PROCEDURE — 96374 THER/PROPH/DIAG INJ IV PUSH: CPT

## 2020-06-08 PROCEDURE — 81001 URINALYSIS AUTO W/SCOPE: CPT

## 2020-06-08 PROCEDURE — 81025 URINE PREGNANCY TEST: CPT | Performed by: PHYSICIAN ASSISTANT

## 2020-06-08 PROCEDURE — 99284 PR EMERGENCY DEPT VISIT,LEVEL IV: ICD-10-PCS | Mod: ,,, | Performed by: PHYSICIAN ASSISTANT

## 2020-06-08 RX ORDER — ONDANSETRON 2 MG/ML
4 INJECTION INTRAMUSCULAR; INTRAVENOUS
Status: COMPLETED | OUTPATIENT
Start: 2020-06-08 | End: 2020-06-08

## 2020-06-08 RX ADMIN — ONDANSETRON HYDROCHLORIDE 4 MG: 2 SOLUTION INTRAMUSCULAR; INTRAVENOUS at 10:06

## 2020-06-08 RX ADMIN — SODIUM CHLORIDE 1000 ML: 0.9 INJECTION, SOLUTION INTRAVENOUS at 10:06

## 2020-06-09 ENCOUNTER — TELEPHONE (OUTPATIENT)
Dept: OBSTETRICS AND GYNECOLOGY | Facility: CLINIC | Age: 30
End: 2020-06-09

## 2020-06-09 ENCOUNTER — NURSE TRIAGE (OUTPATIENT)
Dept: ADMINISTRATIVE | Facility: CLINIC | Age: 30
End: 2020-06-09

## 2020-06-09 VITALS
HEART RATE: 76 BPM | BODY MASS INDEX: 30 KG/M2 | WEIGHT: 163 LBS | OXYGEN SATURATION: 99 % | DIASTOLIC BLOOD PRESSURE: 58 MMHG | TEMPERATURE: 98 F | HEIGHT: 62 IN | SYSTOLIC BLOOD PRESSURE: 124 MMHG | RESPIRATION RATE: 14 BRPM

## 2020-06-09 PROCEDURE — 87210 SMEAR WET MOUNT SALINE/INK: CPT

## 2020-06-09 PROCEDURE — 87491 CHLMYD TRACH DNA AMP PROBE: CPT

## 2020-06-09 NOTE — ED PROVIDER NOTES
"Encounter Date: 6/8/2020       History     Chief Complaint   Patient presents with    Abdominal Pain     left lower with N/V/D that began yesterday, describes pain as sharp. also reporting hot flashes      29-year-old female presenting for abdominal pain for 2 days.  She endorses sharp "sticking" low abdominal pain which has been intermittent over the past few days.  She denies any palliating factors, she has not been taking any medications.  Denies any obvious provoking factors.  She reports associated nausea, several episodes of emesis, several episodes of diarrhea, belching, breast heaviness and frequent urination.  Her symptoms started after eating crawfish.  She denies bloody or dark stool, fevers/chills, vaginal discharge, back pain, dysuria, hematuria or flank pain.  LMP sometime in April, her periods are irregular and she is unsure of the date.        Review of patient's allergies indicates:  No Known Allergies  Past Medical History:   Diagnosis Date    Abnormal menstrual periods     Anemia     Dental caries     Migraine headache     Ovarian cyst      History reviewed. No pertinent surgical history.  Family History   Problem Relation Age of Onset    Diabetes Mother     Cancer Mother     Diabetes Maternal Grandmother      Social History     Tobacco Use    Smoking status: Former Smoker    Smokeless tobacco: Never Used   Substance Use Topics    Alcohol use: Yes     Comment: occasional prior to pregnancy    Drug use: No     Review of Systems   Constitutional: Negative for fatigue and fever.   HENT: Negative for sore throat.    Respiratory: Negative for shortness of breath.    Cardiovascular: Negative for chest pain.   Gastrointestinal: Positive for abdominal pain, diarrhea, nausea and vomiting. Negative for abdominal distention, anal bleeding, blood in stool, constipation and rectal pain.   Genitourinary: Positive for menstrual problem and pelvic pain. Negative for dysuria, frequency, hematuria, " urgency, vaginal bleeding and vaginal discharge.   Musculoskeletal: Negative for back pain.   Skin: Negative for rash.   Neurological: Negative for weakness.   Hematological: Does not bruise/bleed easily.       Physical Exam     Initial Vitals [06/08/20 2120]   BP Pulse Resp Temp SpO2   (!) 141/60 81 16 99.4 °F (37.4 °C) 98 %      MAP       --         Physical Exam    Nursing note and vitals reviewed.  Constitutional: She appears well-developed and well-nourished. She is not diaphoretic. No distress.   HENT:   Head: Normocephalic and atraumatic.   Eyes: EOM are normal. Pupils are equal, round, and reactive to light.   Neck: Normal range of motion.   Cardiovascular: Normal rate, regular rhythm, normal heart sounds and intact distal pulses. Exam reveals no gallop and no friction rub.    No murmur heard.  Pulmonary/Chest: Breath sounds normal. No respiratory distress. She has no wheezes. She has no rhonchi. She has no rales. She exhibits no tenderness.   Abdominal: Soft. Bowel sounds are normal. She exhibits no distension and no mass. There is tenderness in the suprapubic area and left lower quadrant. There is no rigidity, no rebound, no guarding, no CVA tenderness, no tenderness at McBurney's point and negative Doll's sign.   Genitourinary:   Genitourinary Comments: RN present as chaperone for pelvic exam.  Thin whitish discharge in the vaginal vault.  No CMT, uterine or adnexal tenderness   Musculoskeletal: Normal range of motion.   Neurological: She is alert and oriented to person, place, and time.   Skin: Skin is warm and dry.   Psychiatric: She has a normal mood and affect.         ED Course   Procedures  Labs Reviewed   CBC W/ AUTO DIFFERENTIAL - Abnormal; Notable for the following components:       Result Value    MPV 9.0 (*)     All other components within normal limits   COMPREHENSIVE METABOLIC PANEL - Abnormal; Notable for the following components:    BUN, Bld 5 (*)     Total Bilirubin 1.1 (*)     All other  components within normal limits   URINALYSIS, REFLEX TO URINE CULTURE - Abnormal; Notable for the following components:    Appearance, UA Hazy (*)     Protein, UA 1+ (*)     Occult Blood UA 2+ (*)     Leukocytes, UA Trace (*)     All other components within normal limits    Narrative:     Preferred Collection Type->Urine, Clean Catch   URINALYSIS MICROSCOPIC - Abnormal; Notable for the following components:    RBC, UA 18 (*)     WBC, UA 6 (*)     Hyaline Casts, UA 2 (*)     All other components within normal limits    Narrative:     Preferred Collection Type->Urine, Clean Catch   POCT URINE PREGNANCY - Abnormal; Notable for the following components:    POC Preg Test, Ur Positive (*)     All other components within normal limits   C. TRACHOMATIS/N. GONORRHOEAE BY AMP DNA   HCG, QUANTITATIVE, PREGNANCY   LIPASE   VAGINAL SCREEN   GROUP & RH          Imaging Results          US OB <14 Wks TransAbd & TransVag, Single Gestation (XPD) (In process)                  Medical Decision Making:   History:   Old Medical Records: I decided to obtain old medical records.  Old Records Summarized: records from clinic visits.       <> Summary of Records:   Patient seen Ob/gyn clinic on 04/29/2020 with a normal beta hCG  Initial Assessment:   29-year-old female presenting for abdominal pain, nausea/ vomiting /diarrhea. She is mildly hypertensive 141/60 with otherwise normal vitals.  She has some low abdominal tenderness to palpation. Pelvic exam notable for thin whitish discharge, no adnexal or uterine tenderness  Differential Diagnosis:   Pregnancy   Gastroenteritis   Ectopic pregnancy  UTI  Dehydration    Clinical Tests:   Lab Tests: Ordered and Reviewed  Radiological Study: Ordered and Reviewed  ED Management:  UPT is positive.  I discussed this with the patient, this was not a planned pregnancy and is not a wanted pregnancy.  By her LMP, the patient would be approximately 9-10 weeks pregnant, however she is unsure of her dates and  states that her periods are irregular.  Will check labs, give Zofran, fluids, do ultrasound and reassess.    Lab workup notable for positive Rh factor, beta HCG over 17,000. Wet prep and GC / chlamydia are pending.  Dispo pending results of transvaginal ultrasound.  This patient with my supervising physician and I am signing out to Chao Jerry PA-C.    12:59 AM  Naida Steinberg PA-C                     ED Course as of Jun 09 0011 Mon Jun 08, 2020   2245 US OB <14 Wks TransAbd & TransVag, Single Gestation (XPD) [CC]   2307 No significant anemia   CBC W/ AUTO DIFFERENTIAL(!) [CC]      ED Course User Index  [CC] Naida Steinberg PA-C                Clinical Impression:       ICD-10-CM ICD-9-CM   1. Pregnancy, unspecified gestational age Z34.90 V22.2   2. Abdominal pain R10.9 789.00                                Naida Steinberg PA-C  06/09/20 0059

## 2020-06-09 NOTE — DISCHARGE INSTRUCTIONS
Diagnosis: Abdominal pain, pregnancy    Your pregnancy test was positive.  This is likely the cause of your symptoms.  Please schedule a follow-up appointment with your Ob/gyn doctor.  If you start to have severe, worsening abdominal pain with high fever, persistent vomiting or other concerning symptoms please return to the emergency department.    If you wish to terminate your pregnancy, there is one clinic in [place] that does this procedure.  Information is listed below.  You need financial assistance, please go to the [place]  fund web site also listed below.    [address] [address]  [phone]  [email or web address]    [email or web address]

## 2020-06-09 NOTE — ED NOTES
LOC: The patient is awake, alert, and oriented to place, time, situation. Affect is appropriate.  Speech is appropriate and clear.     APPEARANCE: Patient resting on stretcher; appears uncomfortable but in no acute distress.  Patient is clean and well groomed.    SKIN: The skin is warm and dry; color consistent with ethnicity.  Patient has normal skin turgor and moist mucus membranes.  Skin intact; no breakdown or bruising noted.     MUSCULOSKELETAL: Patient moving upper and lower extremities without difficulty.  Denies weakness.     RESPIRATORY: Airway is open and patent. Respirations spontaneous, even, easy, and non-labored.  Patient has a normal effort and rate.  No accessory muscle use noted. Denies cough.     CARDIAC:  Normal rate noted.  No peripheral edema noted. No complaints of chest pain.      ABDOMEN: Soft and non tender to palpation though pt complains of pain to the left lower abdomen as well as mid epigastric pain.  No distention noted. Pt complains of nausea and vomiting.    NEUROLOGIC: Eyes open spontaneously.  Behavior appropriate to situation.  Follows commands; facial expression symmetrical.  Purposeful motor response noted; normal sensation in all extremities.

## 2020-06-09 NOTE — TELEPHONE ENCOUNTER
Pt went to ED and they could not find a heart beat made pt a appt fir 6/12/20 at 1030 this was first opening

## 2020-06-09 NOTE — TELEPHONE ENCOUNTER
Spoke with patient she states that she went to Er yesterday and she found out she was pregnant.  Patient states that she was told the baby has no heartbeat. Patient states that she has been vomiting.  Reports that she feels dizzy and when she stands she states it feels as if her legs are going to give out on her.  Patient states that she feels confused and disoriented.  Advised patient to call 911 to seek immediate medical attention.  Patient verbalized understanding.     Reason for Disposition   Difficult to awaken or acting confused (e.g., disoriented, slurred speech)    Additional Information   Negative: Severe difficulty breathing (e.g., struggling for each breath, speaks in single words)   Negative: [1] Difficulty breathing or swallowing AND [2] started suddenly after medicine, an allergic food or bee sting   Negative: Shock suspected (e.g., cold/pale/clammy skin, too weak to stand, low BP, rapid pulse)    Protocols used: DIZZINESS - [id number]-L-ES

## 2020-06-09 NOTE — ED TRIAGE NOTES
Pt with the ER with complaints of mid epigastric abdominal pain with as well as pain in the left lower abdomen with nausea and vomiting since yesterday.

## 2020-06-09 NOTE — PROVIDER PROGRESS NOTES - EMERGENCY DEPT.
Encounter Date: 6/8/2020    ED Physician Progress Notes           I assumed care this patient by the outgoing physician assistant.  Ultrasound revealed an intrauterine pregnancy approximate 6 week.  No heart rate detected.   Patient made aware results.  Patient will follow-up with her OBGYN.  Strict return precautions and follow-up information given.

## 2020-06-09 NOTE — TELEPHONE ENCOUNTER
Attempted to contact pt for appointment, no answer. Lvm for pt to contact the office.    ----- Message from Rosalba Verona sent at 6/9/2020 11:17 AM CDT -----  Type:  Sooner Appointment Request    Patient is requesting a sooner appointment.  Patient declined first available appointment listed as well as another facility and provider .  Patient will not accept being placed on the waitlist and is requesting a message be sent to doctor.    Name of Caller:  Pt     When is the first available appointment? 06/25    Symptoms: ER f/u / could not find fetal heartbeat    Would the patient rather a call back or a response via [facility]? Call back     Best Call Back Number: [phone]    Additional Information:

## 2020-06-09 NOTE — TELEPHONE ENCOUNTER
Attempted to return pt.'s call no answer left number for call back   ----- Message from Alice Monae sent at 6/9/2020 12:30 PM CDT -----  Contact: Patient  Type: Patient Call Back    Who called: Pt    What is the request in detail: Patient is returning call.    Can the clinic reply by [facility]? No    Would the patient rather a call back or a response via [facility]? Call    Best call back number: [phone] (home)     Additional Information:n/a

## 2020-06-09 NOTE — TELEPHONE ENCOUNTER
----- Message from Mishel Verma sent at 6/9/2020  1:10 PM CDT -----  Contact: pt  Type: Patient Call Back    Who called:pt    What is the request in detail:pt returned the nurse's phone call. Call     Can the clinic reply by [facility]?    Would the patient rather a call back or a response via [facility]? call    Best call back number:[phone] (home)       Additional Information:

## 2020-06-10 ENCOUNTER — HOSPITAL ENCOUNTER (EMERGENCY)
Facility: HOSPITAL | Age: 30
Discharge: HOME | End: 2020-06-10
Attending: EMERGENCY MEDICINE
Payer: MEDICAID

## 2020-06-10 VITALS
OXYGEN SATURATION: 100 % | BODY MASS INDEX: 30 KG/M2 | HEART RATE: 90 BPM | DIASTOLIC BLOOD PRESSURE: 62 MMHG | WEIGHT: 163 LBS | RESPIRATION RATE: 18 BRPM | TEMPERATURE: 98 F | HEIGHT: 62 IN | SYSTOLIC BLOOD PRESSURE: 120 MMHG

## 2020-06-10 DIAGNOSIS — R53.1 GENERALIZED WEAKNESS: ICD-10-CM

## 2020-06-10 DIAGNOSIS — R05.9 COUGH: ICD-10-CM

## 2020-06-10 DIAGNOSIS — O21.9 NAUSEA AND VOMITING IN PREGNANCY: ICD-10-CM

## 2020-06-10 DIAGNOSIS — R42 DIZZINESS: ICD-10-CM

## 2020-06-10 DIAGNOSIS — N39.0 URINARY TRACT INFECTION WITHOUT HEMATURIA, SITE UNSPECIFIED: Primary | ICD-10-CM

## 2020-06-10 LAB
ALBUMIN SERPL BCP-MCNC: 4.2 G/DL (ref 3.5–5.2)
ALP SERPL-CCNC: 67 U/L (ref 55–135)
ALT SERPL W/O P-5'-P-CCNC: 10 U/L (ref 10–44)
ANION GAP SERPL CALC-SCNC: 11 MMOL/L (ref 8–16)
AST SERPL-CCNC: 16 U/L (ref 10–40)
BACTERIA #/AREA URNS HPF: ABNORMAL /HPF
BACTERIA GENITAL QL WET PREP: ABNORMAL
BASOPHILS # BLD AUTO: 0.05 K/UL (ref 0–0.2)
BASOPHILS NFR BLD: 0.6 % (ref 0–1.9)
BILIRUB SERPL-MCNC: 1.3 MG/DL (ref 0.1–1)
BILIRUB UR QL STRIP: NEGATIVE
BUN SERPL-MCNC: 6 MG/DL (ref 6–20)
C TRACH DNA SPEC QL NAA+PROBE: NOT DETECTED
CALCIUM SERPL-MCNC: 9.3 MG/DL (ref 8.7–10.5)
CHLORIDE SERPL-SCNC: 104 MMOL/L (ref 95–110)
CLARITY UR: ABNORMAL
CLUE CELLS VAG QL WET PREP: ABNORMAL
CO2 SERPL-SCNC: 23 MMOL/L (ref 23–29)
COLOR UR: ABNORMAL
CREAT SERPL-MCNC: 0.8 MG/DL (ref 0.5–1.4)
DIFFERENTIAL METHOD: ABNORMAL
EOSINOPHIL # BLD AUTO: 0.1 K/UL (ref 0–0.5)
EOSINOPHIL NFR BLD: 1.6 % (ref 0–8)
ERYTHROCYTE [DISTWIDTH] IN BLOOD BY AUTOMATED COUNT: 13 % (ref 11.5–14.5)
EST. GFR  (AFRICAN AMERICAN): >60 ML/MIN/1.73 M^2
EST. GFR  (NON AFRICAN AMERICAN): >60 ML/MIN/1.73 M^2
FILAMENT FUNGI VAG WET PREP-#/AREA: ABNORMAL
GLUCOSE SERPL-MCNC: 119 MG/DL (ref 70–110)
GLUCOSE UR QL STRIP: NEGATIVE
HCG INTACT+B SERPL-ACNC: NORMAL MIU/ML
HCT VFR BLD AUTO: 40.7 % (ref 37–48.5)
HGB BLD-MCNC: 13 G/DL (ref 12–16)
HGB UR QL STRIP: ABNORMAL
HYALINE CASTS #/AREA URNS LPF: 0 /LPF
IMM GRANULOCYTES # BLD AUTO: 0.02 K/UL (ref 0–0.04)
IMM GRANULOCYTES NFR BLD AUTO: 0.3 % (ref 0–0.5)
KETONES UR QL STRIP: ABNORMAL
LEUKOCYTE ESTERASE UR QL STRIP: ABNORMAL
LYMPHOCYTES # BLD AUTO: 2.1 K/UL (ref 1–4.8)
LYMPHOCYTES NFR BLD: 26.6 % (ref 18–48)
MCH RBC QN AUTO: 28.3 PG (ref 27–31)
MCHC RBC AUTO-ENTMCNC: 31.9 G/DL (ref 32–36)
MCV RBC AUTO: 89 FL (ref 82–98)
MICROSCOPIC COMMENT: ABNORMAL
MONOCYTES # BLD AUTO: 0.5 K/UL (ref 0.3–1)
MONOCYTES NFR BLD: 6.3 % (ref 4–15)
N GONORRHOEA DNA SPEC QL NAA+PROBE: NOT DETECTED
NEUTROPHILS # BLD AUTO: 5 K/UL (ref 1.8–7.7)
NEUTROPHILS NFR BLD: 64.6 % (ref 38–73)
NITRITE UR QL STRIP: NEGATIVE
NRBC BLD-RTO: 0 /100 WBC
PH UR STRIP: 6 [PH] (ref 5–8)
PLATELET # BLD AUTO: 270 K/UL (ref 150–350)
PMV BLD AUTO: 8.8 FL (ref 9.2–12.9)
POTASSIUM SERPL-SCNC: 3.5 MMOL/L (ref 3.5–5.1)
PROT SERPL-MCNC: 7.9 G/DL (ref 6–8.4)
PROT UR QL STRIP: ABNORMAL
RBC # BLD AUTO: 4.59 M/UL (ref 4–5.4)
RBC #/AREA URNS HPF: 6 /HPF (ref 0–4)
SARS-COV-2 RDRP RESP QL NAA+PROBE: NEGATIVE
SODIUM SERPL-SCNC: 138 MMOL/L (ref 136–145)
SP GR UR STRIP: 1.03 (ref 1–1.03)
SPECIMEN SOURCE: ABNORMAL
SQUAMOUS #/AREA URNS HPF: ABNORMAL /HPF
T VAGINALIS GENITAL QL WET PREP: ABNORMAL
TROPONIN I SERPL DL<=0.01 NG/ML-MCNC: <0.006 NG/ML (ref 0–0.03)
TSH SERPL DL<=0.005 MIU/L-ACNC: 0.99 UIU/ML (ref 0.4–4)
URN SPEC COLLECT METH UR: ABNORMAL
UROBILINOGEN UR STRIP-ACNC: ABNORMAL EU/DL
WBC # BLD AUTO: 7.74 K/UL (ref 3.9–12.7)
WBC #/AREA URNS HPF: 25 /HPF (ref 0–5)
WBC #/AREA VAG WET PREP: ABNORMAL
YEAST GENITAL QL WET PREP: ABNORMAL

## 2020-06-10 PROCEDURE — 63600175 PHARM REV CODE 636 W HCPCS: Performed by: EMERGENCY MEDICINE

## 2020-06-10 PROCEDURE — 93010 EKG 12-LEAD: ICD-10-PCS | Mod: ,,, | Performed by: INTERNAL MEDICINE

## 2020-06-10 PROCEDURE — 93010 ELECTROCARDIOGRAM REPORT: CPT | Mod: ,,, | Performed by: INTERNAL MEDICINE

## 2020-06-10 PROCEDURE — 81000 URINALYSIS NONAUTO W/SCOPE: CPT

## 2020-06-10 PROCEDURE — 80053 COMPREHEN METABOLIC PANEL: CPT

## 2020-06-10 PROCEDURE — 25000003 PHARM REV CODE 250: Performed by: EMERGENCY MEDICINE

## 2020-06-10 PROCEDURE — 85025 COMPLETE CBC W/AUTO DIFF WBC: CPT

## 2020-06-10 PROCEDURE — 84484 ASSAY OF TROPONIN QUANT: CPT

## 2020-06-10 PROCEDURE — 99285 EMERGENCY DEPT VISIT HI MDM: CPT | Mod: 25

## 2020-06-10 PROCEDURE — 87210 SMEAR WET MOUNT SALINE/INK: CPT

## 2020-06-10 PROCEDURE — 84702 CHORIONIC GONADOTROPIN TEST: CPT

## 2020-06-10 PROCEDURE — 93005 ELECTROCARDIOGRAM TRACING: CPT

## 2020-06-10 PROCEDURE — U0002 COVID-19 LAB TEST NON-CDC: HCPCS

## 2020-06-10 PROCEDURE — 84443 ASSAY THYROID STIM HORMONE: CPT

## 2020-06-10 PROCEDURE — 96361 HYDRATE IV INFUSION ADD-ON: CPT

## 2020-06-10 PROCEDURE — 96374 THER/PROPH/DIAG INJ IV PUSH: CPT

## 2020-06-10 PROCEDURE — 87086 URINE CULTURE/COLONY COUNT: CPT

## 2020-06-10 RX ORDER — ONDANSETRON 4 MG/1
4 TABLET, ORALLY DISINTEGRATING ORAL
Status: DISCONTINUED | OUTPATIENT
Start: 2020-06-10 | End: 2020-06-10

## 2020-06-10 RX ORDER — NITROFURANTOIN 25; 75 MG/1; MG/1
100 CAPSULE ORAL 2 TIMES DAILY
Qty: 10 CAPSULE | Refills: 0 | Status: ON HOLD | OUTPATIENT
Start: 2020-06-10 | End: 2020-06-16 | Stop reason: HOSPADM

## 2020-06-10 RX ORDER — ACETAMINOPHEN 500 MG
1000 TABLET ORAL
Status: COMPLETED | OUTPATIENT
Start: 2020-06-10 | End: 2020-06-10

## 2020-06-10 RX ORDER — METRONIDAZOLE 7.5 MG/G
1 GEL VAGINAL 2 TIMES DAILY
Qty: 70 G | Refills: 0 | Status: ON HOLD | OUTPATIENT
Start: 2020-06-10 | End: 2020-06-16 | Stop reason: HOSPADM

## 2020-06-10 RX ORDER — PYRIDOXINE HCL (VITAMIN B6) 25 MG
25 TABLET ORAL EVERY 8 HOURS PRN
Qty: 20 TABLET | Refills: 0 | Status: ON HOLD | OUTPATIENT
Start: 2020-06-10 | End: 2020-06-16 | Stop reason: HOSPADM

## 2020-06-10 RX ORDER — ONDANSETRON 4 MG/1
4 TABLET, ORALLY DISINTEGRATING ORAL
Status: DISCONTINUED | OUTPATIENT
Start: 2020-06-10 | End: 2020-06-10 | Stop reason: HOSPADM

## 2020-06-10 RX ORDER — ONDANSETRON 4 MG/1
4 TABLET, ORALLY DISINTEGRATING ORAL EVERY 12 HOURS PRN
Qty: 10 TABLET | Refills: 0 | Status: SHIPPED | OUTPATIENT
Start: 2020-06-10 | End: 2020-07-22

## 2020-06-10 RX ORDER — ONDANSETRON 2 MG/ML
4 INJECTION INTRAMUSCULAR; INTRAVENOUS
Status: COMPLETED | OUTPATIENT
Start: 2020-06-10 | End: 2020-06-10

## 2020-06-10 RX ADMIN — ONDANSETRON 4 MG: 2 INJECTION INTRAMUSCULAR; INTRAVENOUS at 05:06

## 2020-06-10 RX ADMIN — ACETAMINOPHEN 1000 MG: 500 TABLET ORAL at 07:06

## 2020-06-10 RX ADMIN — SODIUM CHLORIDE, SODIUM LACTATE, POTASSIUM CHLORIDE, AND CALCIUM CHLORIDE 1000 ML: .6; .31; .03; .02 INJECTION, SOLUTION INTRAVENOUS at 07:06

## 2020-06-10 NOTE — ED PROVIDER NOTES
"Encounter Date: 6/10/2020    SCRIBE #1 NOTE: I, Arleth Gwen, am scribing for, and in the presence of,  Swetha Cho MD. I have scribed the following portions of the note - Other sections scribed: HPI, ROS, PE.       History     Chief Complaint   Patient presents with    Fatigue     Patient reports weakness and fever X 2 days. Patient reports seeing her obgyn on   and reports no fetal heart tone.      CC: Abdominal Pain    HPI: This  6 weeks pregnant 29 y.o female, with a medical history of anemia, migraine headache, and ovarian cyst, presents to the ED via EMS transportation c/o acute, constant lower abdominal pain. Pt states that the pain feels "like a stick poking me". She reports that she has also been experiencing emesis, diarrhea (none today), decreased appetite, hot flashes, fever (102.6 F), rhinorrhea, chest pain ("feels like a blockage" when vomiting), intermittent shortness of breath, cough (post-tussive and with sputum), and numbness as well as weakness to the bilateral legs. Additionally, pt reports experiencing dizziness (feels like going to pass out; began 1 hour ago), which she notes is exacerbated by light. She adds that she experienced a witnessed syncopal episode at 2:00 am this morning while attempting to go to the bathroom to vomit. Pt reports that she was seen in the ED a few days ago for similar symptoms and was informed that no fetal heart tone could be heard. She states that the present symptoms are worse than symptoms experienced in her previous pregnancy, noting that it "feels like I'm pregnant with maybe the flu." No complications with previous pregnancies. LMP 2020. No recent sick contacts. Pt denies dysuria, hematuria, vaginal discharge, nausea, tinnitus, hearing loss, or any other associated symptoms. No alleviating factors. Pt notes marijuana use. No alcohol or tobacco use.    The history is provided by the patient.     Review of patient's allergies indicates:  No " Known Allergies  Past Medical History:   Diagnosis Date    Abnormal menstrual periods     Anemia     Dental caries     Migraine headache     Ovarian cyst      History reviewed. No pertinent surgical history.  Family History   Problem Relation Age of Onset    Diabetes Mother     Cancer Mother     Diabetes Maternal Grandmother      Social History     Tobacco Use    Smoking status: Current Every Day Smoker     Packs/day: 1.00    Smokeless tobacco: Never Used   Substance Use Topics    Alcohol use: Not Currently     Comment: occasional prior to pregnancy    Drug use: Yes     Types: Marijuana     Review of Systems   Constitutional: Positive for appetite change (decreased) and fever (102.6 F). Negative for chills and diaphoresis.        (+) hot flashes   HENT: Positive for rhinorrhea. Negative for congestion, hearing loss, sore throat and tinnitus.    Eyes: Negative for photophobia and visual disturbance.   Respiratory: Positive for cough and shortness of breath.    Cardiovascular: Positive for chest pain. Negative for leg swelling.   Gastrointestinal: Positive for abdominal pain (lower), diarrhea (none today) and vomiting. Negative for blood in stool, constipation and nausea.   Genitourinary: Negative for dysuria, flank pain, frequency, hematuria, urgency and vaginal discharge.   Musculoskeletal: Negative for back pain, neck pain and neck stiffness.   Skin: Negative for rash and wound.   Neurological: Positive for dizziness, weakness (to the bilateral legs) and numbness (to the bilateral legs). Negative for light-headedness and headaches.   Psychiatric/Behavioral: Negative for confusion and suicidal ideas.   All other systems reviewed and are negative.      Physical Exam     Initial Vitals [06/10/20 0436]   BP Pulse Resp Temp SpO2   110/60 100 20 98.1 °F (36.7 °C) 99 %      MAP       --         Physical Exam    Nursing note and vitals reviewed.  Constitutional: She appears well-developed and well-nourished.  She is not diaphoretic. No distress.   Patient is tearful.   HENT:   Head: Normocephalic and atraumatic.   Mouth/Throat: Oropharynx is clear and moist. No oropharyngeal exudate.   TM's clear bilaterally.  Selma-Hallpike negative.   Eyes: Conjunctivae and EOM are normal. Pupils are equal, round, and reactive to light. Right eye exhibits no discharge. Left eye exhibits no discharge. No scleral icterus.   No nystagmus   Neck: Normal range of motion. Neck supple. No JVD present.   Cardiovascular: Normal rate, regular rhythm, normal heart sounds and intact distal pulses. Exam reveals no gallop and no friction rub.    No murmur heard.  Pulmonary/Chest: Breath sounds normal. No stridor. No respiratory distress. She has no wheezes. She has no rhonchi. She has no rales.   Abdominal: Soft. Bowel sounds are normal. She exhibits no distension. There is tenderness. There is CVA tenderness (right). There is no rebound and no guarding.   There is suprapubic tenderness as well as left lower quadrant tenderness present on exam.    Genitourinary:   Genitourinary Comments: Pelvic exam: normal external genitalia, closed cervical os, normal vaginal walls without evidence of tears or trauma. No blood in vault. Physiologic discharge present. No CMT, no adnexal masses or tenderness. Female chaperone present for entire exam.     Musculoskeletal: Normal range of motion. She exhibits no edema or tenderness.   Lymphadenopathy:     She has no cervical adenopathy.   Neurological: She is alert and oriented to person, place, and time. She has normal strength. No cranial nerve deficit or sensory deficit. GCS score is 15. GCS eye subscore is 4. GCS verbal subscore is 5. GCS motor subscore is 6.   Normal heel to shin. Normal finger to nose test. Moves all extremities and carries on conversation. CN- II: PERRL; III/IV/VI: EOMI w/out evidence of nystagmus; V: no deficits appreciated to light touch bilateral face; VII: no facial weakness, no facial  asymmetry. Eyebrow raise symmetric. Smile symmetric; IX/X: palate midline, and raises symmetrically; XI: shoulder shrug 5/5 bilaterally; XII: tongue is midline w/out asymmetry. Strength 5/5 to bilateral upper and lower extremities, sensation intact to light touch,   Skin: Skin is warm and dry. Capillary refill takes less than 2 seconds.   Psychiatric: She has a normal mood and affect. Thought content normal.         ED Course   Procedures  Labs Reviewed   COMPREHENSIVE METABOLIC PANEL - Abnormal; Notable for the following components:       Result Value    Glucose 119 (*)     Total Bilirubin 1.3 (*)     All other components within normal limits   CBC W/ AUTO DIFFERENTIAL - Abnormal; Notable for the following components:    Mean Corpuscular Hemoglobin Conc 31.9 (*)     MPV 8.8 (*)     All other components within normal limits   URINALYSIS, REFLEX TO URINE CULTURE - Abnormal; Notable for the following components:    Appearance, UA Hazy (*)     Protein, UA 2+ (*)     Ketones, UA 1+ (*)     Occult Blood UA 2+ (*)     Urobilinogen, UA 4.0-6.0 (*)     Leukocytes, UA 2+ (*)     All other components within normal limits    Narrative:     Preferred Collection Type->Urine, Clean Catch   URINALYSIS MICROSCOPIC - Abnormal; Notable for the following components:    RBC, UA 6 (*)     WBC, UA 25 (*)     All other components within normal limits    Narrative:     Preferred Collection Type->Urine, Clean Catch   VAGINAL SCREEN - Abnormal; Notable for the following components:    Clue Cells Few (*)     WBC - Vaginal Screen Few (*)     Bacteria - Vaginal Screen Few (*)     All other components within normal limits   CULTURE, URINE   SARS-COV-2 RNA AMPLIFICATION, QUAL   HCG, QUANTITATIVE, PREGNANCY   TSH   TROPONIN I     EKG Readings: (Independently Interpreted)   Normal sinus rhythm at 76 with no ST elevation or depression.  Normal axis.  Normal intervals.  QTC is 459.     ECG Results          EKG 12-lead (Final result)  Result time  06/10/20 14:07:29    Final result by Interface, [facility] (06/10/20 14:07:29)                 Narrative:    Test Reason : R42,    Vent. Rate : 076 BPM     Atrial Rate : 076 BPM     P-R Int : 160 ms          QRS Dur : 088 ms      QT Int : 408 ms       P-R-T Axes : 067 056 062 degrees     QTc Int : 459 ms    Normal sinus rhythm  Normal ECG  When compared with ECG of 18-JAN-2019 23:22,  No significant change was found  Confirmed by Terry Haines MD ([id number]) on 6/10/2020 2:07:14 PM    Referred By: [email or web address]   SELF           Confirmed By:Terry Haines MD                            Imaging Results          X-Ray Chest AP Portable (Final result)  Result time 06/10/20 10:03:31   Procedure changed from X-Ray Chest PA And Lateral     Final result by Joseph Murillo MD (06/10/20 10:03:31)                 Impression:      1. No acute radiographic findings in the chest on this single view.      Electronically signed by: Joseph Murillo MD  Date:    06/10/2020  Time:    10:03             Narrative:    EXAMINATION:  XR CHEST AP PORTABLE    CLINICAL HISTORY:  Cough ; Cough    TECHNIQUE:  Single frontal view of the chest was performed.    COMPARISON:  Radiograph 10/08/2012.    FINDINGS:  Cardiac monitoring leads project over the bilateral hemithoraces.  Mediastinal structures are midline.  Hilar contours are unremarkable.  Cardiac silhouette is normal in size.  Lung volumes are symmetric.  No consolidation.  No pneumothorax or pleural effusion.  No free air beneath the diaphragm.  No acute osseous abnormalities.  Visualized soft tissues are unremarkable.                               US OB <14 Wks TransAbd & TransVag, Single Gestation (XPD) (Final result)  Result time 06/10/20 08:34:53    Final result by Joseph Murillo MD (06/10/20 08:34:53)                 Impression:      Single live intrauterine pregnancy with estimated gestational age 6 weeks 2 days. and an REHAN of 02/01/2021.  Continued follow-up is recommended with  serial beta HCGs and repeat short-term ultrasound as clinically warranted.      Electronically signed by: Joseph Murillo MD  Date:    06/10/2020  Time:    08:34             Narrative:    EXAMINATION:  US OB <14 WEEKS, TRANSABDOM & TRANSVAG, SINGLE GESTATION (XPD)    CLINICAL HISTORY:  abd pain;    TECHNIQUE:  Transabdominal sonography of the pelvis was performed, followed by transvaginal sonography to better evaluate the uterus and ovaries.    COMPARISON:  None.    FINDINGS:  Intrauterine gestation(s): Single    Mean gestational sac diameter: 1.3 cm    Yolk sac: 0.2 cm    Crown-rump length (CRL): 5 cm    Cardiac activity: 110 bpm    Subchorionic hemorrhage: None.    Right ovary: Normal.    Left ovary: Probable corpus luteal cyst identified.    Miscellaneous: No Free Fluid.                            MDM  Patient presents with pregnancy and abdominal pain with beta  23,000 and decreased from previous.  and viable pregnancy 6 W 2D on u/s. There is no vaginal bleeding.    DDX included but not limited to: normal pregnancy, miscarriage, AUB, UTI, ectopic, PID, viral URI, dehydration, migraine headache, COVID    Unremarkable labs, UA with UTI and patient given Macrobid.  Please cells on pelvic screen and patient given Metrogel.  At this time, this is likely abdominal pain of pregnancy and there are no findings on exam/lab to suspect acute process. No unilateral pain/adnexal TTP and normal u/s and labs. Follow up with OBGYN in 2-3 days or return for new or worsening sx such as devleopment of vaginal bleeding, syncope, severe pain or fever. She was given the opportunity to ask questions which were reasonably addressed to the best of my ability and her apparent satisfaction.                Scribe Attestation:   Scribe #1: I performed the above scribed service and the documentation accurately describes the services I performed. I attest to the accuracy of the note.                          Clinical Impression:        ICD-10-CM ICD-9-CM   1. Urinary tract infection without hematuria, site unspecified N39.0 599.0   2. Cough R05 786.2   3. Dizziness R42 780.4   4. Generalized weakness R53.1 780.79   5. Nausea and vomiting in pregnancy O21.9 643.90             ED Disposition Condition    Discharge Stable        ED Prescriptions     Medication Sig Dispense Start Date End Date Auth. Provider    pyridoxine, vitamin B6, (B-6) 25 MG Tab Take 1 tablet (25 mg total) by mouth every 8 (eight) hours as needed (nausea). 20 tablet 6/10/2020  Swetha Cho MD    ondansetron (ZOFRAN-ODT) 4 MG TbDL Take 1 tablet (4 mg total) by mouth every 12 (twelve) hours as needed (vomiting). 10 tablet 6/10/2020  Swetha Cho MD    nitrofurantoin, macrocrystal-monohydrate, (MACROBID) 100 MG capsule Take 1 capsule (100 mg total) by mouth 2 (two) times daily. for 5 days 10 capsule 6/10/2020 6/15/2020 Swetha Cho MD    metroNIDAZOLE (METROGEL) 0.75 % vaginal gel Place 1 applicator vaginally 2 (two) times daily. for 5 days 70 g 6/10/2020 6/15/2020 Swetha Cho MD        Follow-up Information     Follow up With Specialties Details Why Contact Info    Neeraj Guerra MD Internal Medicine Schedule an appointment as soon as possible for a visit in 2 days to discuss recent ED visit, to establish primary doctor [address] [place] [id number]  [phone]      [facility] Emergency Medicine  As needed, If symptoms worsen [address]  [facility] [address] [phone]    Your OBGYN  Schedule an appointment as soon as possible for a visit in 1 day to discuss recent ED visit     PROV  OB/GYN Obstetrics and Gynecology Schedule an appointment as soon as possible for a visit in 2 days to discuss recent ED visit [address]  [facility] [address]  [phone]                      Swetha HEDRICK, personally performed the services described in this documentation. All medical record entries made by the scribe were  at my direction and in my presence. I have reviewed the chart and agree that the record reflects my personal performance and is accurate and complete.               Swetha Cho MD  06/10/20 1954

## 2020-06-10 NOTE — ED TRIAGE NOTES
Pt arrives to ED with c/o fever and chills x2 days and abdominal pain. Pt is 6 weeks and 2 days pregnant and was seen here on 6/8 and was told no fetal heart tones present. Pt states she has had generalized body aches and weakness, congestion, sob, cough, and sputum production. Pt also c/o periumbilical pain and lower back pain. Pt is afebrile at this time but states she took tylenol around 0100 for a fever. Pt denies vaginal bleeding/ discharge

## 2020-06-10 NOTE — DISCHARGE INSTRUCTIONS
Please return to the ED if symptoms worsening or return. Return if you are unable to keep down liquids, fever, urinary symptoms, abdominal pain or any other concerns. Follow up with OBGYN in 1-2 days to discuss recent ED visit.     Thank you for coming to our Emergency Department today. It is important to remember that some problems are difficult to diagnose and may not be found during your first visit. Be sure to follow up with your primary care doctor and review any labs/imaging that was performed with them. If you do not have a primary care doctor, you may contact the one listed on your discharge paperwork or you may also call the [facility] Appointment Desk at [phone] to schedule an appointment with one.     All medications may potentially have side effects and it is impossible to predict which medications may give you side effects. If you feel that you are having a negative effect of any medication you should immediately stop taking them and seek medical attention.    Return to the ER with any questions/concerns, new/concerning symptoms, worsening or failure to improve. Do not drive or make any important decisions for 24 hours if you have received any pain medications, sedatives or mood altering drugs during your ER visit.

## 2020-06-12 ENCOUNTER — TELEPHONE (OUTPATIENT)
Dept: OBSTETRICS AND GYNECOLOGY | Facility: CLINIC | Age: 30
End: 2020-06-12

## 2020-06-12 LAB — BACTERIA UR CULT: NORMAL

## 2020-06-12 NOTE — TELEPHONE ENCOUNTER
Attempted to return pt.'s call no answer left message for call back     ----- Message from Jena Mark sent at 6/12/2020  1:41 PM CDT -----  Contact: Patient   Type:  Needs Medical Advice/Symptom-based Call    Who Called:  Patient     Symptoms (please be specific):  Haven't eaten     How long has patient had these symptoms:   5 days     Would the patient rather a call back or a response via [facility]? Call back     Best Call Back Number: [phone]      Additional Information:  Pt want to know if she could drink ensure

## 2020-06-13 ENCOUNTER — NURSE TRIAGE (OUTPATIENT)
Dept: ADMINISTRATIVE | Facility: CLINIC | Age: 30
End: 2020-06-13

## 2020-06-13 NOTE — TELEPHONE ENCOUNTER
"  Spoke with patient she states that she is 6 weeks pregnant.  Reports that she has been unable to to keep any food or fluids down over the last week.  Patient states that the last time she vomited she has blood tinged sputum in her vomit.  Denies that blood was a large amount.  Patient states that she feels weak.  Advised patient to go to ER and have another adult drive.  Patient states that her boyfriend will bring her.  Advised patient to call 911 if symptoms worsen or if she feels like passing out.  Patient verbalized understanding.  Reason for Disposition   Weak, dizzy or lightheaded    Additional Information   Negative: Shock suspected (e.g., cold/pale/clammy skin, too weak to stand, low BP, rapid pulse)   Negative: Difficult to awaken or acting confused  (e.g., disoriented, slurred speech)   Negative: Unable to walk, or can only walk with assistance (e.g., requires support)   Negative: Fainted   Negative: [1] Vomited blood AND [2] large amount (example: "a cup of blood")   Negative: Rectal bleeding (i.e., bloody stool, blood in stool)   Negative: Sounds like a life-threatening emergency to the triager    Protocols used: ST VOMITING OF BLOOD-A-AH      "

## 2020-06-15 ENCOUNTER — HOSPITAL ENCOUNTER (OUTPATIENT)
Facility: HOSPITAL | Age: 30
Discharge: HOME | End: 2020-06-16
Attending: EMERGENCY MEDICINE | Admitting: HOSPITALIST
Payer: MEDICAID

## 2020-06-15 ENCOUNTER — NURSE TRIAGE (OUTPATIENT)
Dept: ADMINISTRATIVE | Facility: CLINIC | Age: 30
End: 2020-06-15

## 2020-06-15 DIAGNOSIS — R10.2 PELVIC PAIN: ICD-10-CM

## 2020-06-15 DIAGNOSIS — R10.2 SUPRAPUBIC PAIN: ICD-10-CM

## 2020-06-15 DIAGNOSIS — R10.9 BILATERAL FLANK PAIN: ICD-10-CM

## 2020-06-15 DIAGNOSIS — O23.01 PYELONEPHRITIS AFFECTING PREGNANCY IN FIRST TRIMESTER: Primary | ICD-10-CM

## 2020-06-15 DIAGNOSIS — Z3A.09 9 WEEKS GESTATION OF PREGNANCY: ICD-10-CM

## 2020-06-15 DIAGNOSIS — R10.9 FLANK PAIN: ICD-10-CM

## 2020-06-15 DIAGNOSIS — E87.6 HYPOKALEMIA: ICD-10-CM

## 2020-06-15 DIAGNOSIS — R50.9 FEVER, UNSPECIFIED FEVER CAUSE: ICD-10-CM

## 2020-06-15 DIAGNOSIS — O28.3 ABNORMAL PREGNANCY US: ICD-10-CM

## 2020-06-15 DIAGNOSIS — R11.2 NAUSEA AND VOMITING, INTRACTABILITY OF VOMITING NOT SPECIFIED, UNSPECIFIED VOMITING TYPE: ICD-10-CM

## 2020-06-15 LAB
ALBUMIN SERPL BCP-MCNC: 4.5 G/DL (ref 3.5–5.2)
ALP SERPL-CCNC: 77 U/L (ref 55–135)
ALT SERPL W/O P-5'-P-CCNC: 13 U/L (ref 10–44)
ANION GAP SERPL CALC-SCNC: 12 MMOL/L (ref 8–16)
AST SERPL-CCNC: 16 U/L (ref 10–40)
BACTERIA #/AREA URNS HPF: ABNORMAL /HPF
BASOPHILS # BLD AUTO: 0.06 K/UL (ref 0–0.2)
BASOPHILS NFR BLD: 0.6 % (ref 0–1.9)
BILIRUB SERPL-MCNC: 1.3 MG/DL (ref 0.1–1)
BILIRUB UR QL STRIP: ABNORMAL
BUN SERPL-MCNC: 5 MG/DL (ref 6–20)
CALCIUM SERPL-MCNC: 9.3 MG/DL (ref 8.7–10.5)
CHLORIDE SERPL-SCNC: 95 MMOL/L (ref 95–110)
CLARITY UR: ABNORMAL
CO2 SERPL-SCNC: 29 MMOL/L (ref 23–29)
COLOR UR: ABNORMAL
CREAT SERPL-MCNC: 0.8 MG/DL (ref 0.5–1.4)
DIFFERENTIAL METHOD: ABNORMAL
EOSINOPHIL # BLD AUTO: 0.2 K/UL (ref 0–0.5)
EOSINOPHIL NFR BLD: 1.6 % (ref 0–8)
ERYTHROCYTE [DISTWIDTH] IN BLOOD BY AUTOMATED COUNT: 12.2 % (ref 11.5–14.5)
EST. GFR  (AFRICAN AMERICAN): >60 ML/MIN/1.73 M^2
EST. GFR  (NON AFRICAN AMERICAN): >60 ML/MIN/1.73 M^2
GLUCOSE SERPL-MCNC: 106 MG/DL (ref 70–110)
GLUCOSE UR QL STRIP: NEGATIVE
HCT VFR BLD AUTO: 40.7 % (ref 37–48.5)
HGB BLD-MCNC: 13.6 G/DL (ref 12–16)
HGB UR QL STRIP: ABNORMAL
HYALINE CASTS #/AREA URNS LPF: 0 /LPF
IMM GRANULOCYTES # BLD AUTO: 0.03 K/UL (ref 0–0.04)
IMM GRANULOCYTES NFR BLD AUTO: 0.3 % (ref 0–0.5)
KETONES UR QL STRIP: NEGATIVE
LACTATE SERPL-SCNC: 2.1 MMOL/L (ref 0.5–2.2)
LEUKOCYTE ESTERASE UR QL STRIP: NEGATIVE
LIPASE SERPL-CCNC: 4 U/L (ref 4–60)
LYMPHOCYTES # BLD AUTO: 2.3 K/UL (ref 1–4.8)
LYMPHOCYTES NFR BLD: 25.1 % (ref 18–48)
MCH RBC QN AUTO: 28.6 PG (ref 27–31)
MCHC RBC AUTO-ENTMCNC: 33.4 G/DL (ref 32–36)
MCV RBC AUTO: 86 FL (ref 82–98)
MICROSCOPIC COMMENT: ABNORMAL
MONOCYTES # BLD AUTO: 0.7 K/UL (ref 0.3–1)
MONOCYTES NFR BLD: 7.6 % (ref 4–15)
NEUTROPHILS # BLD AUTO: 6 K/UL (ref 1.8–7.7)
NEUTROPHILS NFR BLD: 64.8 % (ref 38–73)
NITRITE UR QL STRIP: NEGATIVE
NRBC BLD-RTO: 0 /100 WBC
PH UR STRIP: 5 [PH] (ref 5–8)
PLATELET # BLD AUTO: 330 K/UL (ref 150–350)
PMV BLD AUTO: 8.8 FL (ref 9.2–12.9)
POTASSIUM SERPL-SCNC: 2.7 MMOL/L (ref 3.5–5.1)
PROT SERPL-MCNC: 8.2 G/DL (ref 6–8.4)
PROT UR QL STRIP: ABNORMAL
RBC # BLD AUTO: 4.76 M/UL (ref 4–5.4)
RBC #/AREA URNS HPF: 10 /HPF (ref 0–4)
SODIUM SERPL-SCNC: 136 MMOL/L (ref 136–145)
SP GR UR STRIP: >1.03 (ref 1–1.03)
URN SPEC COLLECT METH UR: ABNORMAL
UROBILINOGEN UR STRIP-ACNC: ABNORMAL EU/DL
WBC # BLD AUTO: 9.33 K/UL (ref 3.9–12.7)
WBC #/AREA URNS HPF: 6 /HPF (ref 0–5)

## 2020-06-15 PROCEDURE — 87040 BLOOD CULTURE FOR BACTERIA: CPT

## 2020-06-15 PROCEDURE — 85025 COMPLETE CBC W/AUTO DIFF WBC: CPT

## 2020-06-15 PROCEDURE — 96366 THER/PROPH/DIAG IV INF ADDON: CPT

## 2020-06-15 PROCEDURE — 99285 EMERGENCY DEPT VISIT HI MDM: CPT | Mod: 25

## 2020-06-15 PROCEDURE — 63700000 PHARM REV CODE 250 ALT 637 W/O HCPCS: Performed by: EMERGENCY MEDICINE

## 2020-06-15 PROCEDURE — 96367 TX/PROPH/DG ADDL SEQ IV INF: CPT

## 2020-06-15 PROCEDURE — 63600175 PHARM REV CODE 636 W HCPCS: Performed by: EMERGENCY MEDICINE

## 2020-06-15 PROCEDURE — G0378 HOSPITAL OBSERVATION PER HR: HCPCS

## 2020-06-15 PROCEDURE — 96365 THER/PROPH/DIAG IV INF INIT: CPT

## 2020-06-15 PROCEDURE — 25000003 PHARM REV CODE 250: Performed by: EMERGENCY MEDICINE

## 2020-06-15 PROCEDURE — 83605 ASSAY OF LACTIC ACID: CPT

## 2020-06-15 PROCEDURE — 80053 COMPREHEN METABOLIC PANEL: CPT

## 2020-06-15 PROCEDURE — 81000 URINALYSIS NONAUTO W/SCOPE: CPT

## 2020-06-15 PROCEDURE — 83690 ASSAY OF LIPASE: CPT

## 2020-06-15 RX ORDER — SODIUM CHLORIDE 0.9 % (FLUSH) 0.9 %
10 SYRINGE (ML) INJECTION
Status: DISCONTINUED | OUTPATIENT
Start: 2020-06-16 | End: 2020-06-16 | Stop reason: HOSPADM

## 2020-06-15 RX ORDER — ACETAMINOPHEN 325 MG/1
650 TABLET ORAL EVERY 6 HOURS PRN
Status: DISCONTINUED | OUTPATIENT
Start: 2020-06-16 | End: 2020-06-16 | Stop reason: HOSPADM

## 2020-06-15 RX ORDER — POTASSIUM CHLORIDE 20 MEQ/15ML
40 SOLUTION ORAL EVERY 6 HOURS
Status: DISPENSED | OUTPATIENT
Start: 2020-06-16 | End: 2020-06-16

## 2020-06-15 RX ORDER — FAMOTIDINE 20 MG/1
20 TABLET, FILM COATED ORAL DAILY
Status: DISCONTINUED | OUTPATIENT
Start: 2020-06-16 | End: 2020-06-16 | Stop reason: HOSPADM

## 2020-06-15 RX ORDER — POLYETHYLENE GLYCOL 3350 17 G/17G
17 POWDER, FOR SOLUTION ORAL DAILY
Status: DISCONTINUED | OUTPATIENT
Start: 2020-06-16 | End: 2020-06-16

## 2020-06-15 RX ORDER — MORPHINE SULFATE 10 MG/ML
4 INJECTION INTRAMUSCULAR; INTRAVENOUS; SUBCUTANEOUS EVERY 4 HOURS PRN
Status: DISCONTINUED | OUTPATIENT
Start: 2020-06-16 | End: 2020-06-16

## 2020-06-15 RX ORDER — AMOXICILLIN 250 MG
1 CAPSULE ORAL 2 TIMES DAILY
Status: DISCONTINUED | OUTPATIENT
Start: 2020-06-16 | End: 2020-06-16

## 2020-06-15 RX ORDER — PRENATAL WITH FERROUS FUM AND FOLIC ACID 3080; 920; 120; 400; 22; 1.84; 3; 20; 10; 1; 12; 200; 27; 25; 2 [IU]/1; [IU]/1; MG/1; [IU]/1; MG/1; MG/1; MG/1; MG/1; MG/1; MG/1; UG/1; MG/1; MG/1; MG/1; MG/1
1 TABLET ORAL DAILY
Status: DISCONTINUED | OUTPATIENT
Start: 2020-06-16 | End: 2020-06-16 | Stop reason: HOSPADM

## 2020-06-15 RX ORDER — DEXTROSE MONOHYDRATE, SODIUM CHLORIDE, AND POTASSIUM CHLORIDE 50; 1.49; 9 G/1000ML; G/1000ML; G/1000ML
INJECTION, SOLUTION INTRAVENOUS CONTINUOUS
Status: DISCONTINUED | OUTPATIENT
Start: 2020-06-16 | End: 2020-06-16 | Stop reason: HOSPADM

## 2020-06-15 RX ORDER — ONDANSETRON 2 MG/ML
4 INJECTION INTRAMUSCULAR; INTRAVENOUS EVERY 4 HOURS PRN
Status: DISCONTINUED | OUTPATIENT
Start: 2020-06-16 | End: 2020-06-16 | Stop reason: HOSPADM

## 2020-06-15 RX ORDER — POTASSIUM CHLORIDE 20 MEQ/15ML
60 SOLUTION ORAL
Status: COMPLETED | OUTPATIENT
Start: 2020-06-15 | End: 2020-06-15

## 2020-06-15 RX ORDER — MORPHINE SULFATE 10 MG/ML
2 INJECTION INTRAMUSCULAR; INTRAVENOUS; SUBCUTANEOUS EVERY 4 HOURS PRN
Status: DISCONTINUED | OUTPATIENT
Start: 2020-06-16 | End: 2020-06-16

## 2020-06-15 RX ADMIN — PROMETHAZINE HYDROCHLORIDE 12.5 MG: 25 INJECTION INTRAMUSCULAR; INTRAVENOUS at 08:06

## 2020-06-15 RX ADMIN — POTASSIUM CHLORIDE 60 MEQ: 20 SOLUTION ORAL at 10:06

## 2020-06-15 RX ADMIN — CEFTRIAXONE 2 G: 2 INJECTION, SOLUTION INTRAVENOUS at 09:06

## 2020-06-15 RX ADMIN — SODIUM CHLORIDE 2190 ML: 0.9 INJECTION, SOLUTION INTRAVENOUS at 08:06

## 2020-06-16 VITALS
BODY MASS INDEX: 33.36 KG/M2 | DIASTOLIC BLOOD PRESSURE: 53 MMHG | RESPIRATION RATE: 16 BRPM | HEART RATE: 92 BPM | OXYGEN SATURATION: 99 % | WEIGHT: 188.25 LBS | HEIGHT: 63 IN | SYSTOLIC BLOOD PRESSURE: 100 MMHG | TEMPERATURE: 98 F

## 2020-06-16 PROBLEM — Z3A.09 9 WEEKS GESTATION OF PREGNANCY: Status: ACTIVE | Noted: 2020-06-16

## 2020-06-16 PROBLEM — R11.2 NAUSEA & VOMITING: Status: ACTIVE | Noted: 2020-06-16

## 2020-06-16 PROBLEM — E87.6 HYPOKALEMIA: Status: ACTIVE | Noted: 2020-06-16

## 2020-06-16 PROBLEM — O28.3 ABNORMAL PREGNANCY US: Status: ACTIVE | Noted: 2020-06-16

## 2020-06-16 LAB
ANION GAP SERPL CALC-SCNC: 7 MMOL/L (ref 8–16)
BASOPHILS # BLD AUTO: 0.06 K/UL (ref 0–0.2)
BASOPHILS NFR BLD: 0.7 % (ref 0–1.9)
BUN SERPL-MCNC: 4 MG/DL (ref 6–20)
CALCIUM SERPL-MCNC: 7.8 MG/DL (ref 8.7–10.5)
CHLORIDE SERPL-SCNC: 104 MMOL/L (ref 95–110)
CO2 SERPL-SCNC: 26 MMOL/L (ref 23–29)
CREAT SERPL-MCNC: 0.7 MG/DL (ref 0.5–1.4)
DIFFERENTIAL METHOD: ABNORMAL
EOSINOPHIL # BLD AUTO: 0.2 K/UL (ref 0–0.5)
EOSINOPHIL NFR BLD: 2.3 % (ref 0–8)
ERYTHROCYTE [DISTWIDTH] IN BLOOD BY AUTOMATED COUNT: 12.5 % (ref 11.5–14.5)
EST. GFR  (AFRICAN AMERICAN): >60 ML/MIN/1.73 M^2
EST. GFR  (NON AFRICAN AMERICAN): >60 ML/MIN/1.73 M^2
GLUCOSE SERPL-MCNC: 119 MG/DL (ref 70–110)
HCT VFR BLD AUTO: 32.4 % (ref 37–48.5)
HGB BLD-MCNC: 10.8 G/DL (ref 12–16)
IMM GRANULOCYTES # BLD AUTO: 0.03 K/UL (ref 0–0.04)
IMM GRANULOCYTES NFR BLD AUTO: 0.3 % (ref 0–0.5)
LYMPHOCYTES # BLD AUTO: 2.6 K/UL (ref 1–4.8)
LYMPHOCYTES NFR BLD: 28.8 % (ref 18–48)
MAGNESIUM SERPL-MCNC: 1.9 MG/DL (ref 1.6–2.6)
MCH RBC QN AUTO: 29.3 PG (ref 27–31)
MCHC RBC AUTO-ENTMCNC: 33.3 G/DL (ref 32–36)
MCV RBC AUTO: 88 FL (ref 82–98)
MONOCYTES # BLD AUTO: 0.7 K/UL (ref 0.3–1)
MONOCYTES NFR BLD: 7.7 % (ref 4–15)
NEUTROPHILS # BLD AUTO: 5.4 K/UL (ref 1.8–7.7)
NEUTROPHILS NFR BLD: 60.2 % (ref 38–73)
NRBC BLD-RTO: 0 /100 WBC
PLATELET # BLD AUTO: 260 K/UL (ref 150–350)
PMV BLD AUTO: 9 FL (ref 9.2–12.9)
POTASSIUM SERPL-SCNC: 2.7 MMOL/L (ref 3.5–5.1)
POTASSIUM SERPL-SCNC: 3.3 MMOL/L (ref 3.5–5.1)
RBC # BLD AUTO: 3.69 M/UL (ref 4–5.4)
SODIUM SERPL-SCNC: 137 MMOL/L (ref 136–145)
WBC # BLD AUTO: 9.05 K/UL (ref 3.9–12.7)

## 2020-06-16 PROCEDURE — G0378 HOSPITAL OBSERVATION PER HR: HCPCS

## 2020-06-16 PROCEDURE — 96367 TX/PROPH/DG ADDL SEQ IV INF: CPT

## 2020-06-16 PROCEDURE — 25000003 PHARM REV CODE 250: Performed by: EMERGENCY MEDICINE

## 2020-06-16 PROCEDURE — 83735 ASSAY OF MAGNESIUM: CPT

## 2020-06-16 PROCEDURE — 94761 N-INVAS EAR/PLS OXIMETRY MLT: CPT

## 2020-06-16 PROCEDURE — 96361 HYDRATE IV INFUSION ADD-ON: CPT

## 2020-06-16 PROCEDURE — 96366 THER/PROPH/DIAG IV INF ADDON: CPT

## 2020-06-16 PROCEDURE — 84132 ASSAY OF SERUM POTASSIUM: CPT | Mod: 91

## 2020-06-16 PROCEDURE — 63700000 PHARM REV CODE 250 ALT 637 W/O HCPCS: Performed by: EMERGENCY MEDICINE

## 2020-06-16 PROCEDURE — 96376 TX/PRO/DX INJ SAME DRUG ADON: CPT

## 2020-06-16 PROCEDURE — 85025 COMPLETE CBC W/AUTO DIFF WBC: CPT

## 2020-06-16 PROCEDURE — 80048 BASIC METABOLIC PNL TOTAL CA: CPT

## 2020-06-16 PROCEDURE — 36415 COLL VENOUS BLD VENIPUNCTURE: CPT

## 2020-06-16 PROCEDURE — 63600175 PHARM REV CODE 636 W HCPCS: Performed by: EMERGENCY MEDICINE

## 2020-06-16 PROCEDURE — 25000003 PHARM REV CODE 250: Performed by: NURSE PRACTITIONER

## 2020-06-16 RX ORDER — POTASSIUM CHLORIDE 20 MEQ/1
40 TABLET, EXTENDED RELEASE ORAL ONCE
Status: DISCONTINUED | OUTPATIENT
Start: 2020-06-16 | End: 2020-06-16 | Stop reason: HOSPADM

## 2020-06-16 RX ORDER — POTASSIUM CHLORIDE 20 MEQ/1
40 TABLET, EXTENDED RELEASE ORAL
Status: COMPLETED | OUTPATIENT
Start: 2020-06-16 | End: 2020-06-16

## 2020-06-16 RX ORDER — AMOXICILLIN 250 MG
1 CAPSULE ORAL 2 TIMES DAILY PRN
Status: DISCONTINUED | OUTPATIENT
Start: 2020-06-16 | End: 2020-06-16 | Stop reason: HOSPADM

## 2020-06-16 RX ORDER — PROMETHAZINE HYDROCHLORIDE 12.5 MG/1
12.5 SUPPOSITORY RECTAL EVERY 6 HOURS PRN
Qty: 4 SUPPOSITORY | Refills: 0 | Status: SHIPPED | OUTPATIENT
Start: 2020-06-16 | End: 2020-06-20

## 2020-06-16 RX ORDER — CEPHALEXIN 500 MG/1
500 CAPSULE ORAL EVERY 12 HOURS
Qty: 14 CAPSULE | Refills: 0 | Status: SHIPPED | OUTPATIENT
Start: 2020-06-16 | End: 2020-06-23

## 2020-06-16 RX ADMIN — DEXTROSE MONOHYDRATE, SODIUM CHLORIDE, AND POTASSIUM CHLORIDE: 50; 9; 1.49 INJECTION, SOLUTION INTRAVENOUS at 10:06

## 2020-06-16 RX ADMIN — POTASSIUM CHLORIDE 40 MEQ: 1500 TABLET, EXTENDED RELEASE ORAL at 10:06

## 2020-06-16 RX ADMIN — POTASSIUM CHLORIDE 40 MEQ: 20 SOLUTION ORAL at 01:06

## 2020-06-16 RX ADMIN — FAMOTIDINE 20 MG: 20 TABLET ORAL at 09:06

## 2020-06-16 RX ADMIN — POTASSIUM CHLORIDE 40 MEQ: 1500 TABLET, EXTENDED RELEASE ORAL at 09:06

## 2020-06-16 RX ADMIN — DEXTROSE MONOHYDRATE, SODIUM CHLORIDE, AND POTASSIUM CHLORIDE: 50; 9; 1.49 INJECTION, SOLUTION INTRAVENOUS at 01:06

## 2020-06-16 RX ADMIN — PRENATAL VIT W/ FE FUMARATE-FA TAB 27-0.8 MG 1 TABLET: 27-0.8 TAB at 09:06

## 2020-06-16 RX ADMIN — CEFTRIAXONE 1 G: 1 INJECTION, SOLUTION INTRAVENOUS at 09:06

## 2020-06-16 NOTE — ASSESSMENT & PLAN NOTE
Reports N/V intermittently for few weeks due to pregnancy but worsens for the last 5-6 days.  Patient reports her N/V usually improved after 7-8 weeks for all of her previous pregnancy and N/V never been like this bad. Her symptom much improved after phenergan IV in ED.     - Patient tolerate liquid diet now. Able to drink 5, 6 apple without issue.  - Antiemetic prn

## 2020-06-16 NOTE — ASSESSMENT & PLAN NOTE
. Patient has 5 living children; the oldest is 11 and the youngest is 2. Patient had elevated BP with her last pregnancy and had to be induced. All vaginal deliveries.     Last menstrual period was 2020. Preg urine test positive on 2020. So, Expected due date 2021.   But, US OB reveals estimated gestation age of 7 weeks and 1 day and REHAN of 2021.    - On prenatal vitamins  - First trimester.  - Has scheduled with her OBGYN Dr. Guerra at the end of this month

## 2020-06-16 NOTE — DISCHARGE INSTRUCTIONS
Abnormal pregnancy US  US OB concerning for very small subchorionic hemorrhage. Recommend patient follow up with her OBGYN outpatient.      Complete medications as ordered  Follow all discharge instructions.  Please schedule follow up appointments as necessary      When to Call Your Doctor  Call your doctor immediately if you have any of the following:  · Severe headache  · Severe dizziness, or fainting  · Nausea or vomiting  · Fast heartbeat (higher than 100 beats per minute)  · Fever or chills  · Swollen ankles  · Weakness  · Chest Pain attacks that last longer, occur more often, or are more severe than in the past

## 2020-06-16 NOTE — NURSING
Report received from TATYANA Ignacio. Patient resting comfortably, no complaints, no acute distress noted. IV fluids infusing to peripheral IV. Patient denies nausea at this time. Bed alarm set, call light in reach. Will continue to monitor.

## 2020-06-16 NOTE — ASSESSMENT & PLAN NOTE
K+ 2.7.  Probably secondary to vomiting. Replaced in ED    - Continue replace with oral KCL x 3 doses  - IVF with 20 mEq KCL  - Repeat lab in AM

## 2020-06-16 NOTE — DISCHARGE SUMMARY
"[facility]  Discharge Summary      Patient Name: Christopher Avila  MRN: [id number]  Admission Date: 6/15/2020  Hospital Length of Stay: 0 days  Discharge Date and Time:  2020 5:36 PM  Attending Physician: Traci Nava MD   Discharging Provider: PATRICIA Pathak  Primary Care Provider: Neeraj Guerra MD      HPI:   This is a 29 y.o female with history of anemia, ovarian cyst, and approximately 9 weeks pregnant who presents to the hospital with complaints of worsening UTI symptoms, chill, fever, and nausea/vomiting. Patient reports she has been experiencing fever, chill, nausea/vomiting, and "my urine hot" for 5-6 days. Associated symptoms are suprapubic, back pain, and body aches. Patient states she cannot tolerate food or liquids by mouth even though she is taking Zofran ODT. Patient reports home temp of 103.4F this evening. Her fever and body aches relieved with home Tylenol. She was seen in ED on 2020 for LLQ pain and was prescribed Macrobid for UTI on 6/10/2020 which she could not take due to vomiting.     In ED, patient was not tested for COVID, but she was negative for COVID on 6/10/2020. Labs unremarkable except for low potassium of 2.7. UA with blood, few bacteria and WBC. US renal and OB pending.    Patient is placed in Observation unit for further evaluation and treatment of UTI and hypokalemia.      * No surgery found *      Hospital Course:   Placed in observation for complaint of chills and found to have abnormal urinalysis and hypokalemia. Patient's potassium was replaced with oral supplementation and started on IV ABX which were converted to oral keflex at the time of discharge. She is , has 5 living children; the oldest is 11 and the youngest is 2  was noted to have positive urine pregnancy testing suggestive of about 9 weeks gestation. She was able to tolerate her meds prior to discharge. Potassium improved to 3.3 was given one additional 40 meq prior " to discharge. She is instructed to follow up closely in clinic with GYN/OB Dr. Guerra as scheduled for the end of the month. Lastly, she had abnormal pregnancy US OB concerning for very small subchorionic hemorrhage. Recommend patient follow up with her OBGYN outpatient. Her vitals are grossly stable. Denies abdominal pain, afebrile without leukocytosis. FU in clinic.     Consults:     No new Assessment & Plan notes have been filed under this hospital service since the last note was generated.  Service: Hospital Medicine    Final Active Diagnoses:    Diagnosis Date Noted POA    PRINCIPAL PROBLEM:  Pyelonephritis affecting pregnancy in first trimester [O23.01] 06/15/2020 Yes    9 weeks gestation of pregnancy [Z3A.09] 06/16/2020 Not Applicable    Abnormal pregnancy US [O28.3] 06/16/2020 Unknown    Nausea & vomiting [R11.2] 06/16/2020 Unknown    Hypokalemia [E87.6] 06/16/2020 Unknown      Problems Resolved During this Admission:       Discharged Condition: stable    Disposition: Home or Self Care    Follow Up:  Follow-up Information     Neeraj Guerra MD.    Specialty: Internal Medicine  Why: call to schedule follow up with PCP as needed  Contact information:  [address] [facility] [address] [phone]             Jitendra Guerra MD On 6/26/2020.    Specialty: Obstetrics and Gynecology  Why: Appointment scheduled for June 26th at 1:45pm  Contact information:  [address]  [address]  [facility] [address] [phone]                 Patient Instructions:      Diet Cardiac     Activity as tolerated       Significant Diagnostic Studies: Labs:   CMP   Recent Labs   Lab 06/15/20  2048 06/16/20  0353 06/16/20  1200    137  --    K 2.7* 2.7* 3.3*   CL 95 104  --    CO2 29 26  --     119*  --    BUN 5* 4*  --    CREATININE 0.8 0.7  --    CALCIUM 9.3 7.8*  --    PROT 8.2  --   --    ALBUMIN 4.5  --   --    BILITOT 1.3*  --   --    ALKPHOS 77  --   --    AST 16  --   --    ALT 13  --   --    ANIONGAP 12 7*   --    ESTGFRAFRICA >60 >60  --    EGFRNONAA >60 >60  --    , CBC   Recent Labs   Lab 06/15/20  2048 06/16/20  0353   WBC 9.33 9.05   HGB 13.6 10.8*   HCT 40.7 32.4*    260     Recent Labs   Lab 06/10/20  0828   TROPONINI <0.006       Pending Diagnostic Studies:     None         Medications:  Reconciled Home Medications:      Medication List      START taking these medications    cephALEXin 500 MG capsule  Commonly known as: KEFLEX  Take 1 capsule (500 mg total) by mouth every 12 (twelve) hours. for 7 days     promethazine 12.5 MG Supp  Commonly known as: PHENERGAN  Place 1 suppository (12.5 mg total) rectally every 6 (six) hours as needed.        CONTINUE taking these medications    ondansetron 4 MG Tbdl  Commonly known as: ZOFRAN-ODT  Take 1 tablet (4 mg total) by mouth every 12 (twelve) hours as needed (vomiting).     prenatal 21-iron fu-folic acid 14 mg iron- 400 mcg Tab  Commonly known as: PRENATAL COMPLETE  Take 1 tablet by mouth once daily.        STOP taking these medications    metroNIDAZOLE 0.75 % vaginal gel  Commonly known as: METROGEL     nitrofurantoin (macrocrystal-monohydrate) 100 MG capsule  Commonly known as: MACROBID     pyridoxine (vitamin B6) 25 MG Tab  Commonly known as: B-6            Indwelling Lines/Drains at time of discharge:   Lines/Drains/Airways     None                 Time spent on the discharge of patient: 35 minutes  Patient was seen and examined on the date of discharge and determined to be suitable for discharge.         LIZETH Ferguson, FNP-C  Hospitalist - Department of Hospital Medicine  [address], [place] [address]  Office [phone]; Pager [phone]

## 2020-06-16 NOTE — PROGRESS NOTES
WRITTEN HEALTHCARE DISCHARGE INFORMATION      Things that YOU are RESPONSIBLE for to Manage Your Care At Home:     1. Getting your prescriptions filled.  2. Taking you medications as directed. DO NOT MISS ANY DOSES!  3. Going to your follow-up doctor appointments. This is important because it allows the doctor to monitor your progress and to determine if any changes need to be made to your treatment plan.     If you are unable to make your follow up appointments, please call the number listed and reschedule this appointment.      ____________HELP AT HOME____________________     Experiencing any SIGNS or SYMPTOMS: YOU CAN     Schedule a same day appopintment with your Primary Care Doctor or  you can call [facility] Care Line for 24/7 assistance at [phone]     If you are experience any signs or symptoms that have become severe, Call 911 and come to your nearest Emergency Room.     Thank you for choosing [facility] and allowing us to care for you.   From your care management team:      You should receive a call from [facility] within 48-72 hours to help manage your care after discharge. Please try to make sure that you answer your phone for this important phone call.    Follow-up Information     Neeraj Guerra MD.    Specialty: Internal Medicine  Why: call to schedule follow up with PCP as needed  Contact information:  [address] [facility] [address] [phone]             Jitendra Guerra MD On 6/26/2020.    Specialty: Obstetrics and Gynecology  Why: Appointment scheduled for June 26th at 1:45pm  Contact information:  [address]  [address]  [facility] [id number] [phone]

## 2020-06-16 NOTE — PLAN OF CARE
Problem:  Fall Injury Risk  Goal: Absence of Fall, Infant Drop and Related Injury  Outcome: Ongoing, Progressing     Problem: Adult Inpatient Plan of Care  Goal: Plan of Care Review  Outcome: Ongoing, Progressing  Goal: Patient-Specific Goal (Individualization)  Outcome: Ongoing, Progressing  Goal: Absence of Hospital-Acquired Illness or Injury  Outcome: Ongoing, Progressing  Goal: Optimal Comfort and Wellbeing  Outcome: Ongoing, Progressing  Goal: Readiness for Transition of Care  Outcome: Ongoing, Progressing  Goal: Rounds/Family Conference  Outcome: Ongoing, Progressing

## 2020-06-16 NOTE — PROVIDER PROGRESS NOTES - EMERGENCY DEPT.
Emergency Department TeleTRIAGE Encounter Note      CHIEF COMPLAINT    Chief Complaint   Patient presents with    Fever     Fver, chills, body ache,     Urinary Tract Infection     UTI symptoms Pt advised she has a odor, buring and discolor pt with hx of UTl and pt feels she has it       VITAL SIGNS   Initial Vitals [06/15/20 1904]   BP Pulse Resp Temp SpO2   113/61 106 16 98.2 °F (36.8 °C) --      MAP       --            ALLERGIES    Review of patient's allergies indicates:  No Known Allergies    PROVIDER TRIAGE NOTE  Patient with no significant past medical history presents to the ED for evaluation of dysuria and fever.  Patient reports fever, chills, generalized body aches as well as dysuria and foul-smelling urine for approximately 1 week.  Patient states she has also been vomiting and has been able to keep any medication down.  She took Tylenol prior to arrival.      ORDERS  Labs Reviewed - No data to display    ED Orders (720h ago, onward)    Start Ordered     Status Ordering Provider    06/15/20 1914 06/15/20 1914  Urinalysis, Reflex to Urine Culture Urine, Clean Catch  STAT      Ordered LUIS FERNANDO TANG            Virtual Visit Note: The provider triage portion of this emergency department evaluation and documentation was performed via [employer], a HIPAA-compliant telemedicine application, in concert with a tele-presenter in the room. A face to face patient evaluation with one of my colleagues will occur once the patient is placed in an emergency department room.      DISCLAIMER: This note was prepared with M[employer] voice recognition transcription software. Garbled syntax, mangled pronouns, and other bizarre constructions may be attributed to that software system.

## 2020-06-16 NOTE — HPI
"This is a 29 y.o female with history of anemia, ovarian cyst, and approximately 9 weeks pregnant who presents to the hospital with complaints of worsening UTI symptoms, chill, fever, and nausea/vomiting. Patient reports she has been experiencing fever, chill, nausea/vomiting, and "my urine hot" for 5-6 days. Associated symptoms are suprapubic, back pain, and body aches. Patient states she cannot tolerate food or liquids by mouth even though she is taking Zofran ODT. Patient reports home temp of 103.4F this evening. Her fever and body aches relieved with home Tylenol. She was seen in ED on 6/9/2020 for LLQ pain and was prescribed Macrobid for UTI on 6/10/2020 which she could not take due to vomiting.     In ED, patient was not tested for COVID, but she was negative for COVID on 6/10/2020. Labs unremarkable except for low potassium of 2.7. UA with blood, few bacteria and WBC. US renal and OB pending.    Patient is placed in Observation unit for further evaluation and treatment of UTI and hypokalemia.    "

## 2020-06-16 NOTE — ASSESSMENT & PLAN NOTE
C/o UTI symptoms with fever, chill, suprapubic pain x 6 days. Reports bilateral flank pain improved after antibiotic in ED. No leukocytosis. Lactate normal. UA with blood, WBC, and few bacteria.  UC on 6/10/2020 no growth. Was treated as Pyelonephritis in ED with Ceftriaxone 2 g. ? UTI     - US renal consist with cystitis and no hydronephrosis  - Continue with Ceftriaxone 1 g  - IVF  - Follow up BC/UC

## 2020-06-16 NOTE — NURSING
Pt's K+ 2.7.  Refused to to take liquid oral potassium solution.  Stated she will try to take a potassium pill,but just can not take the liquid potassium.  Terrie Guerra NP notified.

## 2020-06-16 NOTE — NURSING
In preparation for discharge, d/c'ed patient's tele monitor,  NSR prior to removal, d/c'ed patient's saline lock, applied pressure to site, secured site with tape and gauze. Discharge instructions given to patient. Patient verbalized understanding of instructions. Patient states willingness to comply.    Prescription and belongings sent with patient.

## 2020-06-16 NOTE — SUBJECTIVE & OBJECTIVE
Past Medical History:   Diagnosis Date    Abnormal menstrual periods     Anemia     Dental caries     Migraine headache     Ovarian cyst        History reviewed. No pertinent surgical history.    Review of patient's allergies indicates:  No Known Allergies    No current facility-administered medications on file prior to encounter.      Current Outpatient Medications on File Prior to Encounter   Medication Sig    metroNIDAZOLE (METROGEL) 0.75 % vaginal gel Place 1 applicator vaginally 2 (two) times daily. for 5 days    [] nitrofurantoin, macrocrystal-monohydrate, (MACROBID) 100 MG capsule Take 1 capsule (100 mg total) by mouth 2 (two) times daily. for 5 days    ondansetron (ZOFRAN-ODT) 4 MG TbDL Take 1 tablet (4 mg total) by mouth every 12 (twelve) hours as needed (vomiting).    prenatal 21-iron fu-folic acid (PRENATAL COMPLETE) 14 mg iron- 400 mcg Tab Take 1 tablet by mouth once daily.    pyridoxine, vitamin B6, (B-6) 25 MG Tab Take 1 tablet (25 mg total) by mouth every 8 (eight) hours as needed (nausea).     Family History     Problem Relation (Age of Onset)    Cancer Mother    Diabetes Mother, Maternal Grandmother        Tobacco Use    Smoking status: Former Smoker     Packs/day: 1.00     Quit date: 2020     Years since quittin.0    Smokeless tobacco: Never Used   Substance and Sexual Activity    Alcohol use: Not Currently     Comment: occasional prior to pregnancy    Drug use: Yes     Types: Marijuana    Sexual activity: Yes     Partners: Male     Birth control/protection: None     Review of Systems   Constitutional: Positive for appetite change, chills and fever. Negative for diaphoresis.   HENT: Negative for congestion and sore throat.    Eyes: Negative for visual disturbance.   Respiratory: Negative for cough, chest tightness, shortness of breath and wheezing.    Cardiovascular: Negative for chest pain, palpitations and leg swelling.   Gastrointestinal: Positive for abdominal  pain, nausea and vomiting.   Genitourinary: Positive for dysuria and flank pain. Negative for hematuria.   Musculoskeletal: Positive for back pain and myalgias. Negative for neck pain and neck stiffness.   Skin: Negative.    Neurological: Negative for dizziness, tremors, seizures, weakness and headaches.   Psychiatric/Behavioral: Negative for confusion.     Objective:     Vital Signs (Most Recent):  Temp: 97.8 °F (36.6 °C) (06/16/20 0143)  Pulse: 89 (06/16/20 0143)  Resp: 18 (06/16/20 0143)  BP: (!) 111/53 (06/16/20 0143)  SpO2: 100 % (06/16/20 0143) Vital Signs (24h Range):  Temp:  [97.8 °F (36.6 °C)-98.2 °F (36.8 °C)] 97.8 °F (36.6 °C)  Pulse:  [] 89  Resp:  [16-18] 18  SpO2:  [99 %-100 %] 100 %  BP: (107-113)/(53-70) 111/53     Weight: 85.4 kg (188 lb 4.4 oz)  Body mass index is 33.35 kg/m².    Physical Exam  Constitutional:       General: She is not in acute distress.     Appearance: Normal appearance.   HENT:      Head: Normocephalic and atraumatic.      Nose: No congestion.      Mouth/Throat:      Mouth: Mucous membranes are dry.   Eyes:      Pupils: Pupils are equal, round, and reactive to light.   Neck:      Musculoskeletal: Normal range of motion and neck supple. No neck rigidity.   Cardiovascular:      Rate and Rhythm: Normal rate and regular rhythm.      Pulses: Normal pulses.      Heart sounds: Normal heart sounds.   Pulmonary:      Effort: Pulmonary effort is normal.      Breath sounds: Normal breath sounds. No wheezing or rales.   Chest:      Chest wall: No tenderness.   Abdominal:      General: Bowel sounds are normal.      Palpations: Abdomen is soft.      Tenderness: There is abdominal tenderness (suprapubic TTP). There is no right CVA tenderness, left CVA tenderness or guarding.   Musculoskeletal: Normal range of motion.         General: No swelling or tenderness.      Right lower leg: No edema.      Left lower leg: No edema.   Skin:     General: Skin is warm and dry.      Capillary Refill:  Capillary refill takes less than 2 seconds.   Neurological:      Mental Status: She is alert and oriented to person, place, and time.   Psychiatric:         Mood and Affect: Mood normal.         Thought Content: Thought content normal.           CRANIAL NERVES     CN III, IV, VI   Pupils are equal, round, and reactive to light.       Significant Labs:   CBC:   Recent Labs   Lab 06/15/20  2048   WBC 9.33   HGB 13.6   HCT 40.7        CMP:   Recent Labs   Lab 06/15/20  2048      K 2.7*   CL 95   CO2 29      BUN 5*   CREATININE 0.8   CALCIUM 9.3   PROT 8.2   ALBUMIN 4.5   BILITOT 1.3*   ALKPHOS 77   AST 16   ALT 13   ANIONGAP 12   EGFRNONAA >60     Cardiac Markers: No results for input(s): CKMB, MYOGLOBIN, BNP, TROPISTAT in the last 48 hours.  Magnesium: No results for input(s): MG in the last 48 hours.  POCT Glucose: No results for input(s): POCTGLUCOSE in the last 48 hours.  Troponin: No results for input(s): TROPONINI in the last 48 hours.  Urine Studies:   Recent Labs   Lab 06/15/20  1950   COLORU Sheela   APPEARANCEUA Hazy*   PHUR 5.0   SPECGRAV >1.030*   PROTEINUA 2+*   GLUCUA Negative   KETONESU Negative   BILIRUBINUA 1+*   OCCULTUA 2+*   NITRITE Negative   UROBILINOGEN 4.0-6.0*   LEUKOCYTESUR Negative   RBCUA 10*   WBCUA 6*   BACTERIA Few*   HYALINECASTS 0       Significant Imaging: I have reviewed and interpreted all pertinent imaging results/findings within the past 24 hours.

## 2020-06-16 NOTE — PLAN OF CARE
06/16/20 1624   Final Note   Assessment Type Final Discharge Note   Anticipated Discharge Disposition Home   Hospital Follow Up  Appt(s) scheduled? Yes   Discharge plans and expectations educations in teach back method with documentation complete? Yes   Right Care Referral Info   Post Acute Recommendation No Care   Post-Acute Status   Post-Acute Authorization Other   Other Status No Post-Acute Service Needs

## 2020-06-16 NOTE — ED PROVIDER NOTES
"Encounter Date: 6/15/2020       History     Chief Complaint   Patient presents with    Chills     Initial Fever, chills, body ache,     Urinary Tract Infection     UTI symptoms Pt advised she has a odor, buring and discolor pt with hx of UTl and pt feels she has it     28 yo female approximately 9 weeks pregnant by LMP presents via personal transportation with "my urine is hot," nausea/vomiting, suprapubic and back pain, and body aches and fever. Symptoms have been ongoing for about 5-6 days, but are worsening. Patient states she cannot tolerate food or liquids by mouth even though she is taking Zofran ODT. Patient reports home temp of 103.4F this evening, but she took 2 APAP PTA. Patient states urine is both "hot" and dark. Suprapubic pain is mild, back pain is also mild.    Patient was seen in ED 20 for LLQ pain. She was then seen 6/10/20 and started on Macrobid for UTI. Patient has been vomiting up abx. Urine culture from 6/10/20 was negative.    . Patient has 5 living children; the oldest is 11 and the youngest is 2. Patient had elevated BP with her last pregnancy and had to be induced. All vaginal deliveries.     Patient has an appointment with Dr. Guerra later this month.     PMH: abnormal menses, ovarian cyst, migraines, dental caries, anemia        Review of patient's allergies indicates:  No Known Allergies  Past Medical History:   Diagnosis Date    Abnormal menstrual periods     Anemia     Dental caries     Migraine headache     Ovarian cyst      History reviewed. No pertinent surgical history.  Family History   Problem Relation Age of Onset    Diabetes Mother     Cancer Mother     Diabetes Maternal Grandmother      Social History     Tobacco Use    Smoking status: Former Smoker     Packs/day: 1.00     Quit date: 2020     Years since quittin.0    Smokeless tobacco: Never Used   Substance Use Topics    Alcohol use: Not Currently     Comment: occasional prior to pregnancy    " Drug use: Yes     Types: Marijuana     Review of Systems   Constitutional: Positive for fever.   HENT: Negative for sore throat.    Eyes: Negative for photophobia.   Respiratory: Negative for shortness of breath.    Cardiovascular: Negative for chest pain.   Gastrointestinal: Positive for abdominal pain (suprapubic), nausea and vomiting.   Genitourinary: Positive for dysuria and flank pain.   Musculoskeletal: Negative for neck stiffness.   Skin: Negative for rash.   Neurological: Negative for syncope.       Physical Exam     Initial Vitals   BP Pulse Resp Temp SpO2   06/15/20 1904 06/15/20 1904 06/15/20 1904 06/15/20 1904 06/15/20 2232   113/61 106 16 98.2 °F (36.8 °C) 99 %      MAP       --                Physical Exam    Nursing note and vitals reviewed.  Constitutional: She appears well-developed and well-nourished. She is not diaphoretic.   Awake, alert adult female, vomiting into emesis bag.    HENT:   Head: Normocephalic and atraumatic.   Mouth/Throat: Oropharynx is clear and moist.   Eyes: Conjunctivae and EOM are normal. Pupils are equal, round, and reactive to light.   Neck: Normal range of motion. Neck supple.   Cardiovascular: Regular rhythm, normal heart sounds and intact distal pulses.   No murmur heard.  Borderline tachycardic.   Pulmonary/Chest: Breath sounds normal. No respiratory distress. She has no wheezes. She has no rhonchi. She has no rales.   Abdominal: Soft. There is abdominal tenderness (suprapubic). There is no rebound and no guarding.   Scant blood in emesis.   Musculoskeletal: Normal range of motion. Tenderness (bilateral CVA) present. No edema.   Neurological: She is alert and oriented to person, place, and time. She has normal strength.   Moving all extremities.   Skin: Skin is warm and dry. No erythema. No pallor.   Psychiatric: Her behavior is normal.         ED Course   Procedures  Labs Reviewed   URINALYSIS, REFLEX TO URINE CULTURE - Abnormal; Notable for the following components:        Result Value    Appearance, UA Hazy (*)     Specific Gravity, UA >1.030 (*)     Protein, UA 2+ (*)     Bilirubin (UA) 1+ (*)     Occult Blood UA 2+ (*)     Urobilinogen, UA 4.0-6.0 (*)     All other components within normal limits    Narrative:     Preferred Collection Type->Urine, Clean Catch  Specimen Source->Urine   URINALYSIS MICROSCOPIC - Abnormal; Notable for the following components:    RBC, UA 10 (*)     WBC, UA 6 (*)     Bacteria Few (*)     All other components within normal limits    Narrative:     Preferred Collection Type->Urine, Clean Catch  Specimen Source->Urine   CBC W/ AUTO DIFFERENTIAL - Abnormal; Notable for the following components:    MPV 8.8 (*)     All other components within normal limits   COMPREHENSIVE METABOLIC PANEL - Abnormal; Notable for the following components:    Potassium 2.7 (*)     BUN, Bld 5 (*)     Total Bilirubin 1.3 (*)     All other components within normal limits    Narrative:     Potassium critical result(s) called and verbal readback obtained from   Diana Cleary by [facility] 06/15/2020 21:53   CULTURE, BLOOD   LACTIC ACID, PLASMA   LIPASE          Imaging Results          US Retroperitoneal Complete (Final result)  Result time 06/16/20 01:58:30   Procedure changed from US Retroperitoneal Limited     Final result by Michelle Mccloud MD (06/16/20 01:58:30)                 Impression:      1. No evidence of hydronephrosis.  2. Post void residual bladder volume of 3 mL.  Visualization of bilateral ureteral jets.  3. Slight bladder wall thickening which could relate to the degree of urinary bladder distension although findings can be seen with cystitis.  Correlation with urinalysis is advised.      Electronically signed by: Michelle Mccloud MD  Date:    06/16/2020  Time:    01:58             Narrative:    EXAMINATION:  US RETROPERITONEAL COMPLETE    CLINICAL HISTORY:  flank pain; Unspecified abdominal pain    TECHNIQUE:  Ultrasound of the kidneys and urinary bladder was performed  including color flow and Doppler evaluation of the kidneys.    COMPARISON:  None.    FINDINGS:  Right kidney: The right kidney measures 11.5 cm. No cortical thinning. No loss of corticomedullary distinction. Resistive index measures 0.63.  No mass. No renal stone. No hydronephrosis.    Left kidney: The left kidney measures 11.5 cm. No cortical thinning. No loss of corticomedullary distinction. Resistive index measures 0.57.  No mass. No renal stone. No hydronephrosis.    The bladder is partially distended at the time of scanning.  There is slight bladder wall thickening.  Bilateral ureteral jets are visualized.  The postvoid residual bladder volume is approximately 3 mL.                               US OB <14 Wks TransAbd & TransVag, Single Gestation (XPD) (Final result)  Result time 06/16/20 01:54:15   Procedure changed from US OB <14 Wks, TransAbd, Single Gestation     Final result by Michelle Mccloud MD (06/16/20 01:54:15)                 Impression:      1. Single live intrauterine pregnancy with estimated gestational age of 7 weeks and 1 day by crown-rump length with an REHAN of 02/01/2021.  Continued follow-up is recommended with OBGYN consultation (if not previously performed), serial beta HCG values and repeat short-term ultrasound follow-up as warranted.  2. Findings concerning for very small subchorionic hemorrhage, attention on follow-up exam is advised.  3. Complex/hemorrhagic left corpus luteum cyst.      Electronically signed by: Michelle Mccloud MD  Date:    06/16/2020  Time:    01:54             Narrative:    EXAMINATION:  US OB <14 WEEKS, TRANSABDOM & TRANSVAG, SINGLE GESTATION (XPD)    CLINICAL HISTORY:  pelvic pain; Pelvic and perineal pain    TECHNIQUE:  Transabdominal sonography of the pelvis was performed, followed by transvaginal sonography to better evaluate the uterus and ovaries.    COMPARISON:  Pelvic ultrasound 06/10/2020    FINDINGS:  Intrauterine gestation(s): Single    Mean gestational sac  "diameter: 2.1 cm    Yolk sac: Present measuring 0.4 cm    Crown-rump length (CRL): 1.0 cm    Cardiac activity: Present with heart rate of 142 bpm    Subchorionic hemorrhage: There is a there is small subchorionic hemorrhage present.    Right ovary: The right ovary measures 3.2 x 2.0 x 2.2 cm    Left ovary: The left ovary measures 4.1 x 1.9 x 3.6 cm.  There is a complex/hemorrhagic corpus luteum cyst measuring 2.0 x 1.9 x 2.1 cm    Miscellaneous: No Free Fluid.                                 Medical Decision Making:   History:   Old Medical Records: I decided to obtain old medical records.  Old Records Summarized: records from clinic visits and records from previous admission(s).  Initial Assessment:   29 y.o. female, 9 weeks pregnant, here with "my urine is hot," nausea/vomiting, suprapubic and back pain, and body aches and fever.   Differential Diagnosis:   Ddx includes UTI, pyelonephritis, sepsis, MINERVA, dehydration, other.  Clinical Tests:   Lab Tests: Ordered and Reviewed  ED Management:  CBC reassuring. CMP with K 2.7, c/w vomiting. Lipase normal. Lactate 2.1. UA with 2+ occult blood but no leuks, no nitrites.     Due to reported fever, dysuria, flank pain, patient tx'ed as pyelonephritis with rocephin 2g IV. She had NS 1L bolus, phenergan 12.5mg IV for nausea/vomiting and dehydration. She had PO potassium.    Patient placed in OBS for correction of electrolytes and continued tx of pyelonephritis in pregnancy. I have written courtesy orders. I have discussed her case with NP Tiffany Guerra and I have written courtesy orders.   Other:   I have discussed this case with another health care provider.                                 Clinical Impression:       ICD-10-CM ICD-9-CM   1. Pyelonephritis affecting pregnancy in first trimester  O23.01 646.63     590.80   2. Suprapubic pain  R10.2 789.09   3. Bilateral flank pain  R10.9 789.09   4. Hypokalemia  E87.6 276.8   5. Fever, unspecified fever cause  R50.9 780.60   6. " Nausea and vomiting, intractability of vomiting not specified, unspecified vomiting type  R11.2 787.01   7. Flank pain  R10.9 789.09   8. Pelvic pain  R10.2 [id number]             ED Disposition Condition    Observation                           Jelly Benz MD  06/16/20 0735

## 2020-06-16 NOTE — H&P
"[facility] Hospital Medicine  History & Physical    Patient Name: Christopher Avila  MRN: [id number]  Admission Date: 6/15/2020  Attending Physician: Sarah Ferguson MD   Primary Care Provider: Neeraj Guerra MD         Patient information was obtained from patient and ER records.     Subjective:     Principal Problem:Pyelonephritis affecting pregnancy in first trimester    Chief Complaint:   Chief Complaint   Patient presents with    Chills     Initial Fever, chills, body ache,     Urinary Tract Infection     UTI symptoms Pt advised she has a odor, buring and discolor pt with hx of UTl and pt feels she has it        HPI: This is a 29 y.o female with history of anemia, ovarian cyst, and approximately 9 weeks pregnant who presents to the hospital with complaints of worsening UTI symptoms, chill, fever, and nausea/vomiting. Patient reports she has been experiencing fever, chill, nausea/vomiting, and "my urine hot" for 5-6 days. Associated symptoms are suprapubic, back pain, and body aches. Patient states she cannot tolerate food or liquids by mouth even though she is taking Zofran ODT. Patient reports home temp of 103.4F this evening. Her fever and body aches relieved with home Tylenol. She was seen in ED on 6/9/2020 for LLQ pain and was prescribed Macrobid for UTI on 6/10/2020 which she could not take due to vomiting.     In ED, patient was not tested for COVID, but she was negative for COVID on 6/10/2020. Labs unremarkable except for low potassium of 2.7. UA with blood, few bacteria and WBC. US renal and OB pending.    Patient is placed in Observation unit for further evaluation and treatment of UTI and hypokalemia.      Past Medical History:   Diagnosis Date    Abnormal menstrual periods     Anemia     Dental caries     Migraine headache     Ovarian cyst        History reviewed. No pertinent surgical history.    Review of patient's allergies indicates:  No Known Allergies    No current " facility-administered medications on file prior to encounter.      Current Outpatient Medications on File Prior to Encounter   Medication Sig    metroNIDAZOLE (METROGEL) 0.75 % vaginal gel Place 1 applicator vaginally 2 (two) times daily. for 5 days    [] nitrofurantoin, macrocrystal-monohydrate, (MACROBID) 100 MG capsule Take 1 capsule (100 mg total) by mouth 2 (two) times daily. for 5 days    ondansetron (ZOFRAN-ODT) 4 MG TbDL Take 1 tablet (4 mg total) by mouth every 12 (twelve) hours as needed (vomiting).    prenatal 21-iron fu-folic acid (PRENATAL COMPLETE) 14 mg iron- 400 mcg Tab Take 1 tablet by mouth once daily.    pyridoxine, vitamin B6, (B-6) 25 MG Tab Take 1 tablet (25 mg total) by mouth every 8 (eight) hours as needed (nausea).     Family History     Problem Relation (Age of Onset)    Cancer Mother    Diabetes Mother, Maternal Grandmother        Tobacco Use    Smoking status: Former Smoker     Packs/day: 1.00     Quit date: 2020     Years since quittin.0    Smokeless tobacco: Never Used   Substance and Sexual Activity    Alcohol use: Not Currently     Comment: occasional prior to pregnancy    Drug use: Yes     Types: Marijuana    Sexual activity: Yes     Partners: Male     Birth control/protection: None     Review of Systems   Constitutional: Positive for appetite change, chills and fever. Negative for diaphoresis.   HENT: Negative for congestion and sore throat.    Eyes: Negative for visual disturbance.   Respiratory: Negative for cough, chest tightness, shortness of breath and wheezing.    Cardiovascular: Negative for chest pain, palpitations and leg swelling.   Gastrointestinal: Positive for abdominal pain, nausea and vomiting.   Genitourinary: Positive for dysuria and flank pain. Negative for hematuria.   Musculoskeletal: Positive for back pain and myalgias. Negative for neck pain and neck stiffness.   Skin: Negative.    Neurological: Negative for dizziness, tremors,  seizures, weakness and headaches.   Psychiatric/Behavioral: Negative for confusion.     Objective:     Vital Signs (Most Recent):  Temp: 97.8 °F (36.6 °C) (06/16/20 0143)  Pulse: 89 (06/16/20 0143)  Resp: 18 (06/16/20 0143)  BP: (!) 111/53 (06/16/20 0143)  SpO2: 100 % (06/16/20 0143) Vital Signs (24h Range):  Temp:  [97.8 °F (36.6 °C)-98.2 °F (36.8 °C)] 97.8 °F (36.6 °C)  Pulse:  [] 89  Resp:  [16-18] 18  SpO2:  [99 %-100 %] 100 %  BP: (107-113)/(53-70) 111/53     Weight: 85.4 kg (188 lb 4.4 oz)  Body mass index is 33.35 kg/m².    Physical Exam  Constitutional:       General: She is not in acute distress.     Appearance: Normal appearance.   HENT:      Head: Normocephalic and atraumatic.      Nose: No congestion.      Mouth/Throat:      Mouth: Mucous membranes are dry.   Eyes:      Pupils: Pupils are equal, round, and reactive to light.   Neck:      Musculoskeletal: Normal range of motion and neck supple. No neck rigidity.   Cardiovascular:      Rate and Rhythm: Normal rate and regular rhythm.      Pulses: Normal pulses.      Heart sounds: Normal heart sounds.   Pulmonary:      Effort: Pulmonary effort is normal.      Breath sounds: Normal breath sounds. No wheezing or rales.   Chest:      Chest wall: No tenderness.   Abdominal:      General: Bowel sounds are normal.      Palpations: Abdomen is soft.      Tenderness: There is abdominal tenderness (suprapubic TTP). There is no right CVA tenderness, left CVA tenderness or guarding.   Musculoskeletal: Normal range of motion.         General: No swelling or tenderness.      Right lower leg: No edema.      Left lower leg: No edema.   Skin:     General: Skin is warm and dry.      Capillary Refill: Capillary refill takes less than 2 seconds.   Neurological:      Mental Status: She is alert and oriented to person, place, and time.   Psychiatric:         Mood and Affect: Mood normal.         Thought Content: Thought content normal.           CRANIAL NERVES     CN III,  IV, VI   Pupils are equal, round, and reactive to light.       Significant Labs:   CBC:   Recent Labs   Lab 06/15/20  2048   WBC 9.33   HGB 13.6   HCT 40.7        CMP:   Recent Labs   Lab 06/15/20  2048      K 2.7*   CL 95   CO2 29      BUN 5*   CREATININE 0.8   CALCIUM 9.3   PROT 8.2   ALBUMIN 4.5   BILITOT 1.3*   ALKPHOS 77   AST 16   ALT 13   ANIONGAP 12   EGFRNONAA >60     Cardiac Markers: No results for input(s): CKMB, MYOGLOBIN, BNP, TROPISTAT in the last 48 hours.  Magnesium: No results for input(s): MG in the last 48 hours.  POCT Glucose: No results for input(s): POCTGLUCOSE in the last 48 hours.  Troponin: No results for input(s): TROPONINI in the last 48 hours.  Urine Studies:   Recent Labs   Lab 06/15/20  1950   COLORU Sheela   APPEARANCEUA Hazy*   PHUR 5.0   SPECGRAV >1.030*   PROTEINUA 2+*   GLUCUA Negative   KETONESU Negative   BILIRUBINUA 1+*   OCCULTUA 2+*   NITRITE Negative   UROBILINOGEN 4.0-6.0*   LEUKOCYTESUR Negative   RBCUA 10*   WBCUA 6*   BACTERIA Few*   HYALINECASTS 0       Significant Imaging: I have reviewed and interpreted all pertinent imaging results/findings within the past 24 hours.    Assessment/Plan:     * Pyelonephritis affecting pregnancy in first trimester  C/o UTI symptoms with fever, chill, suprapubic pain x 6 days. Reports bilateral flank pain improved after antibiotic in ED. No leukocytosis. Lactate normal. UA with blood, WBC, and few bacteria.  UC on 6/10/2020 no growth. Was treated as Pyelonephritis in ED with Ceftriaxone 2 g. ? UTI     - US renal consist with cystitis and no hydronephrosis  - Continue with Ceftriaxone 1 g  - IVF  - Follow up BC/UC      9 weeks gestation of pregnancy  . Patient has 5 living children; the oldest is 11 and the youngest is 2. Patient had elevated BP with her last pregnancy and had to be induced. All vaginal deliveries.     Last menstrual period was 2020. Preg urine test positive on 2020. So, Expected due date  1/17/2021.   But, US OB reveals estimated gestation age of 7 weeks and 1 day and REHAN of 2/1/2021.    - On prenatal vitamins  - First trimester.  - Has scheduled with her OBGYN Dr. Guerra at the end of this month        Abnormal pregnancy US  US OB concerning for very small subchorionic hemorrhage. Recommend patient follow up with her OBGYN outpatient.      Nausea & vomiting  Reports N/V intermittently for few weeks due to pregnancy but worsens for the last 5-6 days.  Patient reports her N/V usually improved after 7-8 weeks for all of her previous pregnancy and N/V never been like this bad. Her symptom much improved after phenergan IV in ED.     - Patient tolerate liquid diet now. Able to drink 5, 6 apple without issue.  - Antiemetic prn    Hypokalemia  K+ 2.7.  Probably secondary to vomiting. Replaced in ED    - Continue replace with oral KCL x 3 doses  - IVF with 20 mEq KCL  - Repeat lab in AM        VTE Risk Mitigation (From admission, onward)         Ordered     IP VTE LOW RISK PATIENT  Once      06/15/20 2359     Place ARMOND hose  Until discontinued      06/15/20 2359     Place sequential compression device  Until discontinued      06/15/20 2359                   Tiffany Guerra NP  Department of Hospital Medicine   [facility] [place]

## 2020-06-16 NOTE — PLAN OF CARE
06/16/20 1325   Discharge Assessment   Assessment Type Discharge Planning Assessment   Assessment information obtained from? Medical Record   Prior to hospitilization cognitive status: Alert/Oriented   Prior to hospitalization functional status: Independent   Current cognitive status: Alert/Oriented   Current Functional Status: Independent   Facility Arrived From: home   Lives With other (see comments)   Able to Return to Prior Arrangements yes   Is patient able to care for self after discharge? Yes   Who are your caregiver(s) and their phone number(s)? [employer]- [phone]   Readmission Within the Last 30 Days no previous admission in last 30 days   Patient currently being followed by outpatient case management? No   Patient currently receives any other outside agency services? No   Equipment Currently Used at Home none   Do you have any problems affording any of your prescribed medications? No   Is the patient taking medications as prescribed? yes   Does the patient have transportation home? Yes   Transportation Anticipated family or friend will provide   Does the patient receive services at the Coumadin Clinic? No   Discharge Plan A Home with family  (with follow up appointment)   Patient/Family in Agreement with Plan yes

## 2020-06-16 NOTE — PLAN OF CARE
Problem:  Fall Injury Risk  Goal: Absence of Fall, Infant Drop and Related Injury  Outcome: Ongoing, Progressing  Intervention: Identify and Manage Contributors to Fall Injury Risk  Flowsheets (Taken 2020 1249)  Self-Care Promotion: BADL personal routines maintained  Medication Review/Management: medications reviewed  Intervention: Promote Injury-Free Environment  Flowsheets (Taken 2020 1249)  Safety Promotion/Fall Prevention:   assistive device/personal item within reach   bed alarm set   commode/urinal/bedpan at bedside   Fall Risk reviewed with patient/family   lighting adjusted   medications reviewed   nonskid shoes/socks when out of bed   room near unit station   side rails raised x 2   instructed to call staff for mobility  Environmental Safety Modification:   assistive device/personal items within reach   clutter free environment maintained   lighting adjusted   room organization consistent  Intervention: Prevent Winside Drop or Fall  Flowsheets (Taken 2020 1249)  Safety/Security Measures: bed alarm set

## 2020-06-16 NOTE — HOSPITAL COURSE
Placed in observation for complaint of chills and found to have abnormal urinalysis and hypokalemia. Patient's potassium was replaced with oral supplementation and started on IV ABX which were converted to oral keflex at the time of discharge. She is , has 5 living children; the oldest is 11 and the youngest is 2  was noted to have positive urine pregnancy testing suggestive of about 9 weeks gestation. She was able to tolerate her meds prior to discharge. Potassium improved to 3.3 was given one additional 40 meq prior to discharge. She is instructed to follow up closely in clinic with GYN/OB Dr. Guerra as scheduled for the end of the month. Lastly, she had abnormal pregnancy US OB concerning for very small subchorionic hemorrhage. Recommend patient follow up with her OBGYN outpatient. Her vitals are grossly stable. Denies abdominal pain, afebrile without leukocytosis. FU in clinic.

## 2020-06-16 NOTE — ASSESSMENT & PLAN NOTE
US OB concerning for very small subchorionic hemorrhage. Recommend patient follow up with her OBGYN outpatient.

## 2020-06-16 NOTE — PROGRESS NOTES
Discharge Instructions for Pyelonephritis  You have been told you have a kidney infection. This is called pyelonephritis. The infection can be serious. It can damage your kidneys and cause bacteria to enter your bloodstream. You were treated in the hospital. Once you return home, heres what you can do at home to aid in your recovery and prevent future infections.  Home care  · Take all the medicine you were prescribed, even if you feel better. Not finishing the medicine can make the infection come back. It may also make a future infection harder to treat.  · Unless told not to by your healthcare provider, drink 8 to 12 glasses of fluid every day. Clear fluids, such as water, are best. This may help flush the infection from your system.  Preventing future infection  · Keep your genital area clean. Use mild soap. Rinse with water.  · If you are a woman, always wipe the genital area from front to back.  · Urinate frequently. Avoid holding urine in the bladder for a long time.  · Always urinate after sexual intercourse.  Follow-up care  Follow up with your healthcare provider, or as advised. And see your healthcare provider for regular lab tests as directed.     When to call your healthcare provider  Call your healthcare provider right away if you have any of the following:  · Decreased urine output or trouble urinating  · Severe pain in the lower back or flank  · Fever of 100.4°F (38°C) or higher, or as directed by your healthcare provider  · Shaking chills  · Vomiting  · Blood in your urine  · Dark-colored or foul-smelling urine  · Nausea or other problems that prevent you from taking your prescribed medicine   Date Last Reviewed: 2/1/2017

## 2020-06-17 NOTE — PLAN OF CARE
Problem:  Fall Injury Risk  Goal: Absence of Fall, Infant Drop and Related Injury  2020 1950 by Marzena Melgar RN  Outcome: Met  2020 1249 by Marzena Melgar RN  Outcome: Ongoing, Progressing  Intervention: Identify and Manage Contributors to Fall Injury Risk  Flowsheets (Taken 2020 1249)  Self-Care Promotion: BADL personal routines maintained  Medication Review/Management: medications reviewed  Intervention: Promote Injury-Free Environment  Flowsheets (Taken 2020 1249)  Safety Promotion/Fall Prevention:   assistive device/personal item within reach   bed alarm set   commode/urinal/bedpan at bedside   Fall Risk reviewed with patient/family   lighting adjusted   medications reviewed   nonskid shoes/socks when out of bed   room near unit station   side rails raised x 2   instructed to call staff for mobility  Environmental Safety Modification:   assistive device/personal items within reach   clutter free environment maintained   lighting adjusted   room organization consistent  Intervention: Prevent Buckley Drop or Fall  Flowsheets (Taken 2020 1249)  Safety/Security Measures: bed alarm set     Problem: Adult Inpatient Plan of Care  Goal: Plan of Care Review  Outcome: Met  Goal: Patient-Specific Goal (Individualization)  Outcome: Met  Goal: Absence of Hospital-Acquired Illness or Injury  Outcome: Met  Goal: Optimal Comfort and Wellbeing  Outcome: Met  Goal: Readiness for Transition of Care  Outcome: Met  Goal: Rounds/Family Conference  Outcome: Met

## 2020-06-19 LAB — BACTERIA BLD CULT: NORMAL

## 2020-06-20 LAB — BACTERIA BLD CULT: NORMAL

## 2020-06-30 ENCOUNTER — NURSE TRIAGE (OUTPATIENT)
Dept: ADMINISTRATIVE | Facility: CLINIC | Age: 30
End: 2020-06-30

## 2020-06-30 NOTE — TELEPHONE ENCOUNTER
"    Reason for Disposition   Recent abdominal injury (within last 3 days)    Additional Information   Negative: Sounds like a life-threatening emergency to the triager   Negative: Vaginal bleeding and pregnant < 20 weeks   Negative: Abdominal pain and pregnant < 20 weeks   Negative: Vomiting red blood or black (coffee ground) material   Negative: Insulin-dependent diabetes (Type I) and glucose > 400 mg/dl (22 mmol/l)   Negative: Recent head injury (within last 3 days)    Protocols used: ST PREGNANCY - MORNING SICKNESS-A-OH    Christopher is 8 wk pregnant, and says she has been vomiting for the last 3 days, can't hold anything down, and has not had BM for 4 days.  She is vomiting during this call x 2.  Says did have one episode of black vomitus yesterday.  She also reports weakness, abdominal pain, chills, fever, SOB, HA during Covid 19 screening. She says that one of her children did "jump on my stomach a couple of days ago, and I still have pain there".  Per [facility] triage protocol, recommend ED now for evaluation. Explained that if she does not have immediate transportation to the ED, she should call 911.  She states she will make a call for someone to come watch her children and will call 911.  Message to Jitendra Guerra MD, her OB.  Please contact caller directly with any additional care advice.    "

## 2020-07-01 ENCOUNTER — HOSPITAL ENCOUNTER (EMERGENCY)
Facility: HOSPITAL | Age: 30
Discharge: HOME | End: 2020-07-01
Attending: EMERGENCY MEDICINE
Payer: MEDICAID

## 2020-07-01 VITALS
TEMPERATURE: 98 F | HEIGHT: 63 IN | HEART RATE: 91 BPM | WEIGHT: 180 LBS | OXYGEN SATURATION: 99 % | SYSTOLIC BLOOD PRESSURE: 121 MMHG | RESPIRATION RATE: 18 BRPM | DIASTOLIC BLOOD PRESSURE: 56 MMHG | BODY MASS INDEX: 31.89 KG/M2

## 2020-07-01 DIAGNOSIS — E87.6 HYPOKALEMIA: ICD-10-CM

## 2020-07-01 DIAGNOSIS — Z3A.09 9 WEEKS GESTATION OF PREGNANCY: ICD-10-CM

## 2020-07-01 DIAGNOSIS — O21.9 NAUSEA/VOMITING IN PREGNANCY: Primary | ICD-10-CM

## 2020-07-01 DIAGNOSIS — O23.41 URINARY TRACT INFECTION IN MOTHER DURING FIRST TRIMESTER OF PREGNANCY: ICD-10-CM

## 2020-07-01 LAB
ALBUMIN SERPL BCP-MCNC: 3.7 G/DL (ref 3.5–5.2)
ALP SERPL-CCNC: 73 U/L (ref 55–135)
ALT SERPL W/O P-5'-P-CCNC: 13 U/L (ref 10–44)
ANION GAP SERPL CALC-SCNC: 11 MMOL/L (ref 8–16)
AST SERPL-CCNC: 21 U/L (ref 10–40)
BACTERIA #/AREA URNS HPF: ABNORMAL /HPF
BASOPHILS # BLD AUTO: 0.04 K/UL (ref 0–0.2)
BASOPHILS NFR BLD: 0.6 % (ref 0–1.9)
BILIRUB SERPL-MCNC: 0.9 MG/DL (ref 0.1–1)
BILIRUB UR QL STRIP: NEGATIVE
BUN SERPL-MCNC: 6 MG/DL (ref 6–20)
CALCIUM SERPL-MCNC: 8.8 MG/DL (ref 8.7–10.5)
CHLORIDE SERPL-SCNC: 102 MMOL/L (ref 95–110)
CK SERPL-CCNC: 47 U/L (ref 20–180)
CLARITY UR: ABNORMAL
CO2 SERPL-SCNC: 23 MMOL/L (ref 23–29)
COLOR UR: ABNORMAL
CREAT SERPL-MCNC: 0.7 MG/DL (ref 0.5–1.4)
DIFFERENTIAL METHOD: ABNORMAL
EOSINOPHIL # BLD AUTO: 0.1 K/UL (ref 0–0.5)
EOSINOPHIL NFR BLD: 0.9 % (ref 0–8)
ERYTHROCYTE [DISTWIDTH] IN BLOOD BY AUTOMATED COUNT: 12.1 % (ref 11.5–14.5)
EST. GFR  (AFRICAN AMERICAN): >60 ML/MIN/1.73 M^2
EST. GFR  (NON AFRICAN AMERICAN): >60 ML/MIN/1.73 M^2
GLUCOSE SERPL-MCNC: 95 MG/DL (ref 70–110)
GLUCOSE UR QL STRIP: ABNORMAL
HCG INTACT+B SERPL-ACNC: NORMAL MIU/ML
HCT VFR BLD AUTO: 36.9 % (ref 37–48.5)
HGB BLD-MCNC: 12.5 G/DL (ref 12–16)
HGB UR QL STRIP: ABNORMAL
HYALINE CASTS #/AREA URNS LPF: 0 /LPF
IMM GRANULOCYTES # BLD AUTO: 0.03 K/UL (ref 0–0.04)
IMM GRANULOCYTES NFR BLD AUTO: 0.4 % (ref 0–0.5)
KETONES UR QL STRIP: ABNORMAL
LEUKOCYTE ESTERASE UR QL STRIP: ABNORMAL
LIPASE SERPL-CCNC: 5 U/L (ref 4–60)
LYMPHOCYTES # BLD AUTO: 1.3 K/UL (ref 1–4.8)
LYMPHOCYTES NFR BLD: 18.7 % (ref 18–48)
MCH RBC QN AUTO: 28.5 PG (ref 27–31)
MCHC RBC AUTO-ENTMCNC: 33.9 G/DL (ref 32–36)
MCV RBC AUTO: 84 FL (ref 82–98)
MICROSCOPIC COMMENT: ABNORMAL
MONOCYTES # BLD AUTO: 0.6 K/UL (ref 0.3–1)
MONOCYTES NFR BLD: 9.3 % (ref 4–15)
NEUTROPHILS # BLD AUTO: 4.8 K/UL (ref 1.8–7.7)
NEUTROPHILS NFR BLD: 70.1 % (ref 38–73)
NITRITE UR QL STRIP: NEGATIVE
NON-SQ EPI CELLS #/AREA URNS HPF: 2 /HPF
NRBC BLD-RTO: 0 /100 WBC
PH UR STRIP: 6 [PH] (ref 5–8)
PLATELET # BLD AUTO: 328 K/UL (ref 150–350)
PMV BLD AUTO: 8.8 FL (ref 9.2–12.9)
POTASSIUM SERPL-SCNC: 3.1 MMOL/L (ref 3.5–5.1)
PROT SERPL-MCNC: 7.3 G/DL (ref 6–8.4)
PROT UR QL STRIP: ABNORMAL
RBC # BLD AUTO: 4.38 M/UL (ref 4–5.4)
RBC #/AREA URNS HPF: 5 /HPF (ref 0–4)
SARS-COV-2 RDRP RESP QL NAA+PROBE: NEGATIVE
SODIUM SERPL-SCNC: 136 MMOL/L (ref 136–145)
SP GR UR STRIP: 1.03 (ref 1–1.03)
SQUAMOUS #/AREA URNS HPF: 12 /HPF
URN SPEC COLLECT METH UR: ABNORMAL
UROBILINOGEN UR STRIP-ACNC: ABNORMAL EU/DL
WBC # BLD AUTO: 6.85 K/UL (ref 3.9–12.7)
WBC #/AREA URNS HPF: 4 /HPF (ref 0–5)
YEAST URNS QL MICRO: ABNORMAL

## 2020-07-01 PROCEDURE — 96375 TX/PRO/DX INJ NEW DRUG ADDON: CPT

## 2020-07-01 PROCEDURE — 84702 CHORIONIC GONADOTROPIN TEST: CPT

## 2020-07-01 PROCEDURE — 99284 EMERGENCY DEPT VISIT MOD MDM: CPT | Mod: 25

## 2020-07-01 PROCEDURE — U0002 COVID-19 LAB TEST NON-CDC: HCPCS

## 2020-07-01 PROCEDURE — 82550 ASSAY OF CK (CPK): CPT

## 2020-07-01 PROCEDURE — 63600175 PHARM REV CODE 636 W HCPCS: Performed by: NURSE PRACTITIONER

## 2020-07-01 PROCEDURE — 85025 COMPLETE CBC W/AUTO DIFF WBC: CPT

## 2020-07-01 PROCEDURE — 80053 COMPREHEN METABOLIC PANEL: CPT

## 2020-07-01 PROCEDURE — 96365 THER/PROPH/DIAG IV INF INIT: CPT

## 2020-07-01 PROCEDURE — 81000 URINALYSIS NONAUTO W/SCOPE: CPT

## 2020-07-01 PROCEDURE — 87086 URINE CULTURE/COLONY COUNT: CPT

## 2020-07-01 PROCEDURE — 83690 ASSAY OF LIPASE: CPT

## 2020-07-01 PROCEDURE — 25000003 PHARM REV CODE 250: Performed by: NURSE PRACTITIONER

## 2020-07-01 PROCEDURE — 96361 HYDRATE IV INFUSION ADD-ON: CPT

## 2020-07-01 RX ORDER — POTASSIUM CHLORIDE 20 MEQ/1
40 TABLET, EXTENDED RELEASE ORAL
Status: DISCONTINUED | OUTPATIENT
Start: 2020-07-01 | End: 2020-07-01

## 2020-07-01 RX ORDER — ONDANSETRON 2 MG/ML
8 INJECTION INTRAMUSCULAR; INTRAVENOUS
Status: COMPLETED | OUTPATIENT
Start: 2020-07-01 | End: 2020-07-01

## 2020-07-01 RX ORDER — CEPHALEXIN 500 MG/1
500 CAPSULE ORAL EVERY 12 HOURS
Qty: 14 CAPSULE | Refills: 0 | Status: SHIPPED | OUTPATIENT
Start: 2020-07-01 | End: 2020-07-08

## 2020-07-01 RX ORDER — FAMOTIDINE 20 MG
1 TABLET ORAL DAILY
Qty: 30 TABLET | Refills: 0 | Status: ON HOLD | OUTPATIENT
Start: 2020-07-01 | End: 2021-01-27 | Stop reason: HOSPADM

## 2020-07-01 RX ORDER — POTASSIUM CHLORIDE 20 MEQ/1
40 TABLET, EXTENDED RELEASE ORAL
Status: COMPLETED | OUTPATIENT
Start: 2020-07-01 | End: 2020-07-01

## 2020-07-01 RX ORDER — PROMETHAZINE HYDROCHLORIDE 25 MG/1
25 SUPPOSITORY RECTAL EVERY 6 HOURS PRN
Qty: 15 SUPPOSITORY | Refills: 0 | Status: ON HOLD | OUTPATIENT
Start: 2020-07-01 | End: 2021-01-27 | Stop reason: HOSPADM

## 2020-07-01 RX ORDER — ONDANSETRON 4 MG/1
4 TABLET, ORALLY DISINTEGRATING ORAL EVERY 6 HOURS PRN
Qty: 20 TABLET | Refills: 0 | Status: SHIPPED | OUTPATIENT
Start: 2020-07-01 | End: 2020-07-14 | Stop reason: SDUPTHER

## 2020-07-01 RX ADMIN — PROMETHAZINE HYDROCHLORIDE 25 MG: 25 INJECTION INTRAMUSCULAR; INTRAVENOUS at 05:07

## 2020-07-01 RX ADMIN — POTASSIUM CHLORIDE 40 MEQ: 1500 TABLET, EXTENDED RELEASE ORAL at 05:07

## 2020-07-01 RX ADMIN — SODIUM CHLORIDE 1000 ML: 0.9 INJECTION, SOLUTION INTRAVENOUS at 03:07

## 2020-07-01 RX ADMIN — ONDANSETRON HYDROCHLORIDE 8 MG: 2 SOLUTION INTRAMUSCULAR; INTRAVENOUS at 03:07

## 2020-07-01 NOTE — DISCHARGE INSTRUCTIONS
Take all medications as prescribed.  Drink plenty of water and other hydrating fluids.  Follow-up with your OBGYN.  Return to the emergency department for any new or worsening symptoms.    Thank you for coming to our Emergency Department today. It is important to remember that some problems are difficult to diagnose and may not be found during your first visit. Be sure to follow up with your primary care doctor.  If you do not have one, you may contact the one listed on your discharge paperwork or you may also call the [facility] Appointment Desk at [phone] to schedule an appointment with one.     Return to the ER with any questions/concerns, new/concerning symptoms, worsening or failure to improve. Do not drive or make any important decisions for 24 hours if you have received any pain medications, sedatives or mood altering drugs during your ER visit.

## 2020-07-01 NOTE — ED PROVIDER NOTES
Encounter Date: 2020       History     Chief Complaint   Patient presents with    Emesis     pt 9 weeks pregnant c/o vomiting and constipation x 4 days. Pt dx with UTI 2 weeks ago. Finished course of abx. Afebrile here in ED      29-year-old pregnant female presenting for evaluation of nausea, vomiting, and weakness.  Also reports lower abdominal cramping.  Reports that she is about 9 weeks pregnant but is unsure of her exact dates.  She has had issues with vomiting throughout her pregnancy but states that they have recently worsened.  She has taken ibuprofen and Tylenol for her symptoms without relief.  She is out of antiemetics and states that they were not helping much when she did have them.  She denies fever, cough, shortness of breath, vaginal bleeding, vaginal discharge, dysuria, urinary frequency, hematuria, flank pain, or any other symptoms.  No known sick contacts.  Her OBGYN is Dr. Jitendra Guerra.  She has not been taking prenatal vitamins.        Review of patient's allergies indicates:  No Known Allergies  Past Medical History:   Diagnosis Date    Abnormal menstrual periods     Anemia     Dental caries     Migraine headache     Ovarian cyst      History reviewed. No pertinent surgical history.  Family History   Problem Relation Age of Onset    Diabetes Mother     Cancer Mother     Diabetes Maternal Grandmother      Social History     Tobacco Use    Smoking status: Former Smoker     Packs/day: 1.00     Quit date: 2020     Years since quittin.0    Smokeless tobacco: Never Used   Substance Use Topics    Alcohol use: Not Currently     Comment: occasional prior to pregnancy    Drug use: Not Currently     Types: Marijuana     Review of Systems   Constitutional: Negative for chills, fatigue and fever.   HENT: Negative for congestion, ear pain, nosebleeds, postnasal drip, rhinorrhea, sinus pressure and sore throat.    Eyes: Negative for photophobia and pain.   Respiratory: Negative for  apnea, cough, choking, chest tightness, shortness of breath, wheezing and stridor.    Cardiovascular: Negative for chest pain, palpitations and leg swelling.   Gastrointestinal: Positive for abdominal pain (cramping), nausea and vomiting. Negative for diarrhea.   Genitourinary: Negative for dysuria, flank pain, frequency, hematuria, pelvic pain, vaginal bleeding, vaginal discharge and vaginal pain.   Musculoskeletal: Negative for arthralgias, back pain, gait problem and neck pain.   Skin: Negative for color change, pallor, rash and wound.   Neurological: Positive for weakness. Negative for dizziness, seizures, syncope, facial asymmetry, light-headedness and headaches.   Hematological: Negative for adenopathy.   Psychiatric/Behavioral: Negative for confusion. The patient is not nervous/anxious.        Physical Exam     Initial Vitals [07/01/20 1451]   BP Pulse Resp Temp SpO2   110/70 110 18 98.1 °F (36.7 °C) 99 %      MAP       --         Physical Exam    Nursing note and vitals reviewed.  Constitutional: She appears well-developed and well-nourished. She is not diaphoretic. She is Obese . She is active and cooperative.  Non-toxic appearance. She does not have a sickly appearance. No distress.   HENT:   Head: Normocephalic and atraumatic.   Right Ear: External ear normal.   Left Ear: External ear normal.   Nose: Nose normal.   TMs and ear canals normal bilaterally.  No oropharyngeal abnormalities.  No trismus.  Mucous membranes are moist.  No stridor, drooling, or change in phonation.   Eyes: Conjunctivae and EOM are normal. Right eye exhibits no discharge. Left eye exhibits no discharge.   Neck: Trachea normal, normal range of motion, full passive range of motion without pain and phonation normal. Neck supple. No stridor present. No tracheal tenderness, no spinous process tenderness and no muscular tenderness present. No tracheal deviation, no edema, no erythema and normal range of motion present. No neck rigidity.    No neck pain or tenderness.  No nuchal rigidity.  Neck is supple with full nonpainful active and passive range of motion.   Cardiovascular: Normal rate.   Pulmonary/Chest: No stridor. No respiratory distress.   Abdominal: Soft. She exhibits no distension. There is no abdominal tenderness.   Abdomen soft and nontender without rigidity or guarding.   Musculoskeletal: Normal range of motion. No tenderness.   Neurological: She is alert and oriented to person, place, and time. She has normal strength. No cranial nerve deficit or sensory deficit.   Skin: Skin is warm and dry.   Psychiatric: She has a normal mood and affect. Her behavior is normal. Judgment and thought content normal.         ED Course   Procedures  Labs Reviewed   CBC W/ AUTO DIFFERENTIAL - Abnormal; Notable for the following components:       Result Value    Hematocrit 36.9 (*)     MPV 8.8 (*)     All other components within normal limits   COMPREHENSIVE METABOLIC PANEL - Abnormal; Notable for the following components:    Potassium 3.1 (*)     All other components within normal limits   URINALYSIS - Abnormal; Notable for the following components:    Appearance, UA Hazy (*)     Protein, UA 2+ (*)     Glucose, UA 3+ (*)     Ketones, UA Trace (*)     Occult Blood UA 1+ (*)     Urobilinogen, UA 4.0-6.0 (*)     Leukocytes, UA 1+ (*)     All other components within normal limits   URINALYSIS MICROSCOPIC - Abnormal; Notable for the following components:    RBC, UA 5 (*)     Bacteria Moderate (*)     Yeast, UA Rare (*)     Non-Squam Epith 2 (*)     All other components within normal limits   CULTURE, URINE   LIPASE   SARS-COV-2 RNA AMPLIFICATION, QUAL   HCG, QUANTITATIVE, PREGNANCY   CK          Imaging Results          US OB <14 Wks, TransAbd, Single Gestation (Final result)  Result time 07/01/20 17:40:09   Procedure changed from US OB <14 Wks TransAbd & TransVag, Single Gestation (XPD)     Final result by Shama Mcelroy MD (07/01/20 17:40:09)                  Impression:      Single intrauterine pregnancy which corresponds to a 9 weeks 4 days gestation by crown rump length. Fetal heart rate is 163 BPM.      Electronically signed by: Shama Mcelroy MD  Date:    2020  Time:    17:40             Narrative:    EXAMINATION:  US OB <14 WEEKS TRANSABDOM, SINGLE GESTATION    CLINICAL HISTORY:  abd cramping;    TECHNIQUE:  Transabdominal sonography of the pelvis was performed, followed by transvaginal sonography to better evaluate the uterus and ovaries.    COMPARISON:  2020.    FINDINGS:  The uterus measures 11.5 x 7.6 x 7.8 cm. Uterine parenchyma is heterogenous in echotexture. In the uterine cavity there is a gestational sac with a mean diameter of 4.4 cm. The fetal pole measures 2.7 cm by crown rump length and corresponds to a 9 weeks 4 days gestation. Fetal heart rate is 163 BPM. The yolk sac measures 0.5 cm.    The right ovary measures 3.4 x 1.3 x 2.2 cm. The left ovary measures 4.0 x 2.3 x 2.9 cm. Arterial and venous flow are preserved bilaterally. A suspected corpus luteum cyst measuring 2.6 x 1.7 x 2.1 cm is seen in the left ovary. No significant free fluid is seen.                                 Medical Decision Making:   History:   Old Medical Records: I decided to obtain old medical records.  Differential Diagnosis:   Viral syndrome, COVID-19, dehydration, hyperemesis gravidarum, electrolyte abnormality, anemia, spontaneous , incomplete , UTI, pyelonephritis, sepsis, others  Clinical Tests:   Lab Tests: Ordered and Reviewed  Radiological Study: Ordered and Reviewed  ED Management:  HPI and physical exam as above.    Physical exam is unremarkable.  COVID-19 swab is negative.  Labs concerning for hypokalemia and possible urinary tract infection.  Urine culture is in process.  Will treat with antibiotics given patient's higher risk.  HCG is within the expected range for the patient's gestational age.  Ultrasound shows a single IUP  measuring 9 weeks and 4 days with a heart rate of 163 beats per minute.  Flow is preserved to the ovaries bilaterally.  Treated with Zofran and Phenergan after which the patient states she feels much better.  She is tolerating p.o. at this time without difficulty.  She was given potassium p.o. for her hypokalemia.  Prescribed Zofran and Phenergan at discharge. Advised patient to follow up with her Ob/Gyn for re-evaluation and further management.  ED return precautions given. All questions regarding diagnosis and plan were answered to the patient's fullest possible satisfaction. Patient expressed understanding of diagnosis, discharge instructions, and return precautions.            Patient note was created using [employer] voice dictation software.  Any errors in syntax or information may not have been identified and edited prior to signing this note.                                   Clinical Impression:       ICD-10-CM ICD-9-CM   1. Nausea/vomiting in pregnancy  O21.9 643.90   2. Urinary tract infection in mother during first trimester of pregnancy  O23.41 646.63     599.0   3. 9 weeks gestation of pregnancy  Z3A.09 V22.2   4. Hypokalemia  E87.6 276.8         Disposition:   Disposition: Discharged  Condition: Stable     ED Disposition Condition    Discharge Stable        ED Prescriptions     Medication Sig Dispense Start Date End Date Auth. Provider    cephALEXin (KEFLEX) 500 MG capsule Take 1 capsule (500 mg total) by mouth every 12 (twelve) hours. for 7 days 14 capsule 7/1/2020 7/8/2020 Waqar Bazan NP    ondansetron (ZOFRAN-ODT) 4 MG TbDL Take 1 tablet (4 mg total) by mouth every 6 (six) hours as needed (Nausea). 20 tablet 7/1/2020  Waqar Bazan NP    promethazine (PHENERGAN) 25 MG suppository Place 1 suppository (25 mg total) rectally every 6 (six) hours as needed for Nausea. 15 suppository 7/1/2020  Waqar Bazan NP    prenatal 21-iron fu-folic acid (PRENATAL COMPLETE) 14 mg iron- 400 mcg Tab Take 1 tablet  by mouth once daily. 30 tablet 7/1/2020 7/1/2021 Waqar Bazan NP        Follow-up Information     Follow up With Specialties Details Why Contact Info    Jitendra Guerra MD Obstetrics and Gynecology Schedule an appointment as soon as possible for a visit in 1 week For further evaluation [address]  [address]  [facility] [address]  [phone]      [facility] Emergency Medicine Go to  If symptoms worsen, As needed [address] [facility] [address] [phone]                                     Waqar Bazan NP  07/01/20 8860

## 2020-07-01 NOTE — ED TRIAGE NOTES
Pt arrives via EMS complaining of nausea, vomiting, abdominal cramping and constipation x4 days.  Pt denies vaginal bleeding/discharge.  OBGYN is Dr. Guerra, missed her last appt on Friday.

## 2020-07-03 LAB — BACTERIA UR CULT: NORMAL

## 2020-07-14 RX ORDER — ONDANSETRON 4 MG/1
4 TABLET, ORALLY DISINTEGRATING ORAL EVERY 6 HOURS PRN
Qty: 30 TABLET | Refills: 1 | Status: SHIPPED | OUTPATIENT
Start: 2020-07-14 | End: 2020-07-22

## 2020-07-22 ENCOUNTER — INITIAL PRENATAL (OUTPATIENT)
Dept: OBSTETRICS AND GYNECOLOGY | Facility: CLINIC | Age: 30
End: 2020-07-22
Payer: MEDICAID

## 2020-07-22 ENCOUNTER — LAB VISIT (OUTPATIENT)
Dept: LAB | Facility: HOSPITAL | Age: 30
End: 2020-07-22
Attending: OBSTETRICS & GYNECOLOGY
Payer: MEDICAID

## 2020-07-22 VITALS
SYSTOLIC BLOOD PRESSURE: 118 MMHG | DIASTOLIC BLOOD PRESSURE: 60 MMHG | HEART RATE: 72 BPM | BODY MASS INDEX: 34.37 KG/M2 | WEIGHT: 194 LBS

## 2020-07-22 DIAGNOSIS — Z3A.12 12 WEEKS GESTATION OF PREGNANCY: Primary | ICD-10-CM

## 2020-07-22 DIAGNOSIS — Z3A.12 12 WEEKS GESTATION OF PREGNANCY: ICD-10-CM

## 2020-07-22 LAB
ABO + RH BLD: NORMAL
AMPHET+METHAMPHET UR QL: NEGATIVE
BARBITURATES UR QL SCN>200 NG/ML: NEGATIVE
BENZODIAZ UR QL SCN>200 NG/ML: NEGATIVE
BLD GP AB SCN CELLS X3 SERPL QL: NORMAL
BZE UR QL SCN: NEGATIVE
CANNABINOIDS UR QL SCN: NORMAL
CREAT UR-MCNC: 137 MG/DL (ref 15–325)
METHADONE UR QL SCN>300 NG/ML: NEGATIVE
OPIATES UR QL SCN: NEGATIVE
PCP UR QL SCN>25 NG/ML: NEGATIVE
TOXICOLOGY INFORMATION: NORMAL

## 2020-07-22 PROCEDURE — 86803 HEPATITIS C AB TEST: CPT

## 2020-07-22 PROCEDURE — 87340 HEPATITIS B SURFACE AG IA: CPT

## 2020-07-22 PROCEDURE — 83036 HEMOGLOBIN GLYCOSYLATED A1C: CPT

## 2020-07-22 PROCEDURE — 80307 DRUG TEST PRSMV CHEM ANLYZR: CPT

## 2020-07-22 PROCEDURE — 86592 SYPHILIS TEST NON-TREP QUAL: CPT

## 2020-07-22 PROCEDURE — 99999 PR PBB SHADOW E&M-EST. PATIENT-LVL III: CPT | Mod: PBBFAC,,, | Performed by: OBSTETRICS & GYNECOLOGY

## 2020-07-22 PROCEDURE — 86703 HIV-1/HIV-2 1 RESULT ANTBDY: CPT

## 2020-07-22 PROCEDURE — 36415 COLL VENOUS BLD VENIPUNCTURE: CPT

## 2020-07-22 PROCEDURE — 99213 OFFICE O/P EST LOW 20 MIN: CPT | Mod: PBBFAC,25 | Performed by: OBSTETRICS & GYNECOLOGY

## 2020-07-22 PROCEDURE — 99204 PR OFFICE/OUTPT VISIT, NEW, LEVL IV, 45-59 MIN: ICD-10-PCS | Mod: TH,S$PBB,, | Performed by: OBSTETRICS & GYNECOLOGY

## 2020-07-22 PROCEDURE — 86762 RUBELLA ANTIBODY: CPT

## 2020-07-22 PROCEDURE — 99999 PR PBB SHADOW E&M-EST. PATIENT-LVL III: ICD-10-PCS | Mod: PBBFAC,,, | Performed by: OBSTETRICS & GYNECOLOGY

## 2020-07-22 PROCEDURE — 99204 OFFICE O/P NEW MOD 45 MIN: CPT | Mod: TH,S$PBB,, | Performed by: OBSTETRICS & GYNECOLOGY

## 2020-07-22 PROCEDURE — 86901 BLOOD TYPING SEROLOGIC RH(D): CPT

## 2020-07-22 RX ORDER — ONDANSETRON 4 MG/1
4 TABLET, ORALLY DISINTEGRATING ORAL EVERY 6 HOURS PRN
Qty: 30 TABLET | Refills: 1 | Status: SHIPPED | OUTPATIENT
Start: 2020-07-22 | End: 2020-08-19 | Stop reason: SDUPTHER

## 2020-07-22 RX ORDER — OMEPRAZOLE 20 MG/1
20 TABLET, DELAYED RELEASE ORAL DAILY PRN
Qty: 30 TABLET | Refills: 1 | Status: SHIPPED | OUTPATIENT
Start: 2020-07-22

## 2020-07-22 NOTE — PROGRESS NOTES
12w4d here for initial OB visit.    Pt c/o nausea and acid reflux.  Pt requesting refill of zofran to use prn for nausea/vomiting.   Trail of PPI, tums for acid reflux.  Risks, benefits, and alternatives to zofran/PPI discussed.  Dietary advice. Cautioned on drossiness, no driving or operating machinery.    Otherwise, pt has no major complaints today.  Denies vaginal bleeding, leakage of fluid, or contractions.    Order initial OB lab profile.  Pt declined first trimester aneuploidy screening, and state she would not terminate the pregnancy for any reasons.  Refer to [facility] US as pregnancy progresses.    Discussed daily low dose ASA ~14 wks for prevention of preE, IUGR, and stillbirths due to maternal risk factors.    Discussed Connective MOM program.  Rationale for program including risks, benefits, and alternatives discussed.  Pt interested, orders placed.  Pt advised to complete registration online.    Discussed with pt our local health care system response to Covid-19.  Discussed preventive measures, social distancing, and parameters for testing.  Clinic and hospital mitigation policies and preventative strategies discussed.    Principles of prenatal care and precautions reviewed.  Return 4 wks or sooner prn.  All questions answered, voiced understanding.      Jitendra Guerra MD

## 2020-07-23 LAB
ESTIMATED AVG GLUCOSE: 117 MG/DL (ref 68–131)
HBA1C MFR BLD HPLC: 5.7 % (ref 4–5.6)
HBV SURFACE AG SERPL QL IA: NEGATIVE
HCV AB SERPL QL IA: NEGATIVE
HIV 1+2 AB+HIV1 P24 AG SERPL QL IA: NEGATIVE
RPR SER QL: NORMAL
RUBV IGG SER-ACNC: 15.8 IU/ML
RUBV IGG SER-IMP: REACTIVE

## 2020-08-19 ENCOUNTER — LAB VISIT (OUTPATIENT)
Dept: LAB | Facility: HOSPITAL | Age: 30
End: 2020-08-19
Attending: OBSTETRICS & GYNECOLOGY
Payer: MEDICAID

## 2020-08-19 ENCOUNTER — ROUTINE PRENATAL (OUTPATIENT)
Dept: OBSTETRICS AND GYNECOLOGY | Facility: CLINIC | Age: 30
End: 2020-08-19
Payer: MEDICAID

## 2020-08-19 VITALS — WEIGHT: 187 LBS | DIASTOLIC BLOOD PRESSURE: 64 MMHG | BODY MASS INDEX: 33.13 KG/M2 | SYSTOLIC BLOOD PRESSURE: 124 MMHG

## 2020-08-19 DIAGNOSIS — O21.9 NAUSEA AND VOMITING DURING PREGNANCY: ICD-10-CM

## 2020-08-19 DIAGNOSIS — Z3A.16 16 WEEKS GESTATION OF PREGNANCY: Primary | ICD-10-CM

## 2020-08-19 DIAGNOSIS — Z3A.16 16 WEEKS GESTATION OF PREGNANCY: ICD-10-CM

## 2020-08-19 PROCEDURE — 99212 OFFICE O/P EST SF 10 MIN: CPT | Mod: PBBFAC,TH | Performed by: OBSTETRICS & GYNECOLOGY

## 2020-08-19 PROCEDURE — 99999 PR PBB SHADOW E&M-EST. PATIENT-LVL II: ICD-10-PCS | Mod: PBBFAC,,, | Performed by: OBSTETRICS & GYNECOLOGY

## 2020-08-19 PROCEDURE — 99213 OFFICE O/P EST LOW 20 MIN: CPT | Mod: TH,S$PBB,, | Performed by: OBSTETRICS & GYNECOLOGY

## 2020-08-19 PROCEDURE — 99999 PR PBB SHADOW E&M-EST. PATIENT-LVL II: CPT | Mod: PBBFAC,,, | Performed by: OBSTETRICS & GYNECOLOGY

## 2020-08-19 PROCEDURE — 81511 FTL CGEN ABNOR FOUR ANAL: CPT

## 2020-08-19 PROCEDURE — 36415 COLL VENOUS BLD VENIPUNCTURE: CPT

## 2020-08-19 PROCEDURE — 99213 PR OFFICE/OUTPT VISIT, EST, LEVL III, 20-29 MIN: ICD-10-PCS | Mod: TH,S$PBB,, | Performed by: OBSTETRICS & GYNECOLOGY

## 2020-08-19 RX ORDER — ONDANSETRON 4 MG/1
4 TABLET, ORALLY DISINTEGRATING ORAL EVERY 6 HOURS PRN
Qty: 30 TABLET | Refills: 1 | Status: SHIPPED | OUTPATIENT
Start: 2020-08-19 | End: 2020-10-09 | Stop reason: SDUPTHER

## 2020-08-19 NOTE — PROGRESS NOTES
16w4d here for OB visit.    Pt has no major complaints today.  Reports active fetus.  Denies vaginal bleeding, leakage of fluid, or contractions.    Initial OB lab results d/w pt.  Discussed borderline A1c, risk of DM.  Offer early DM screen, pt declined.  Encourage healthy lifestyle modifications.    Pt requesting refill of zofran to use prn for nausea/vomiting.   Risks, benefits, and alternatives to zofran reviewed.   Dietary advice.   Cautioned on drossiness, no driving or operating machinery.    Discussed aneuploidy screening options at this gestational age.  Pt is interested in only quad screen, declined cfDNA or invasive testing/amnio.  Limitation of quad screen discussed.  Refer to [facility] for anatomy ultrasound.    Discussed daily low dose ASA ~14 wks for prevention of preE, IUGR, and stillbirths due to maternal risk factors.  Encourage participation in Connective MOM program.  COVID precautions.    Principles of prenatal care and precautions reviewed.  Return 4 wks or sooner prn.  All questions answered, voiced understanding.      Jitendra Guerra MD

## 2020-08-24 LAB
# FETUSES US: NORMAL
2ND TRIMESTER 4 SCREEN PNL SERPL: NEGATIVE
2ND TRIMESTER 4 SCREEN SERPL-IMP: NORMAL
AFP MOM SERPL: 1.08
AFP SERPL-MCNC: 37.8 NG/ML
AGE AT DELIVERY: 29
B-HCG MOM SERPL: 0.83
B-HCG SERPL-ACNC: 31.7 IU/ML
FET TS 21 RISK FROM MAT AGE: NORMAL
GA (DAYS): 4 D
GA (WEEKS): 16 WK
GA METHOD: NORMAL
IDDM PATIENT QL: NORMAL
INHIBIN A MOM SERPL: 1.61
INHIBIN A SERPL-MCNC: 206.6 PG/ML
SMOKING STATUS FTND: NORMAL
TS 18 RISK FETUS: NORMAL
TS 21 RISK FETUS: NORMAL
U ESTRIOL MOM SERPL: 0.74
U ESTRIOL SERPL-MCNC: 0.77 NG/ML

## 2020-09-03 ENCOUNTER — PROCEDURE VISIT (OUTPATIENT)
Dept: MATERNAL FETAL MEDICINE | Facility: CLINIC | Age: 30
End: 2020-09-03
Payer: MEDICAID

## 2020-09-03 DIAGNOSIS — Z3A.16 16 WEEKS GESTATION OF PREGNANCY: ICD-10-CM

## 2020-09-03 DIAGNOSIS — Z36.89 ENCOUNTER FOR FETAL ANATOMIC SURVEY: ICD-10-CM

## 2020-09-03 PROCEDURE — 76805 OB US >/= 14 WKS SNGL FETUS: CPT | Mod: PBBFAC,PO | Performed by: OBSTETRICS & GYNECOLOGY

## 2020-09-03 PROCEDURE — 76805 PR US, OB 14+WKS, TRANSABD, SINGLE GESTATION: ICD-10-PCS | Mod: 26,S$PBB,, | Performed by: OBSTETRICS & GYNECOLOGY

## 2020-09-03 PROCEDURE — 76805 OB US >/= 14 WKS SNGL FETUS: CPT | Mod: 26,S$PBB,, | Performed by: OBSTETRICS & GYNECOLOGY

## 2020-09-16 ENCOUNTER — ROUTINE PRENATAL (OUTPATIENT)
Dept: OBSTETRICS AND GYNECOLOGY | Facility: CLINIC | Age: 30
End: 2020-09-16
Payer: MEDICAID

## 2020-09-16 VITALS
DIASTOLIC BLOOD PRESSURE: 60 MMHG | BODY MASS INDEX: 34.46 KG/M2 | WEIGHT: 194.56 LBS | SYSTOLIC BLOOD PRESSURE: 122 MMHG

## 2020-09-16 DIAGNOSIS — Z3A.20 20 WEEKS GESTATION OF PREGNANCY: Primary | ICD-10-CM

## 2020-09-16 PROCEDURE — 99213 OFFICE O/P EST LOW 20 MIN: CPT | Mod: TH,S$PBB,, | Performed by: OBSTETRICS & GYNECOLOGY

## 2020-09-16 PROCEDURE — 99213 PR OFFICE/OUTPT VISIT, EST, LEVL III, 20-29 MIN: ICD-10-PCS | Mod: TH,S$PBB,, | Performed by: OBSTETRICS & GYNECOLOGY

## 2020-09-16 PROCEDURE — 99999 PR PBB SHADOW E&M-EST. PATIENT-LVL III: CPT | Mod: PBBFAC,,, | Performed by: OBSTETRICS & GYNECOLOGY

## 2020-09-16 PROCEDURE — 99999 PR PBB SHADOW E&M-EST. PATIENT-LVL III: ICD-10-PCS | Mod: PBBFAC,,, | Performed by: OBSTETRICS & GYNECOLOGY

## 2020-09-16 PROCEDURE — 99213 OFFICE O/P EST LOW 20 MIN: CPT | Mod: PBBFAC | Performed by: OBSTETRICS & GYNECOLOGY

## 2020-09-17 NOTE — PROGRESS NOTES
20w4d here for OB visit.    No major complaints today.  Reports active fetus.  Denies vaginal bleeding, leakage of fluid, or contractions.    Pt seen MFM on 9/3:    Impression   =========   A hsu living IUP is identified. Fetal size is appropriate for established dating. No fetal structural malformations are identified; however, the anatomic survey is   incomplete as noted above. The patient will be scheduled for a f/u exam to complete the anatomic survey.   Cervical length is normal.   The placenta in anterior and low lying.     Quad screen negative.    Discussed borderline A1c.  Pt declined early DM screen.  Encourage healthy lifestyle modifications.    Encourage daily low dose ASA for prevention of preE, IUGR, and stillbirths due to maternal risk factors.  Encourage participation in Connective MOM program.  COVID precautions.    Labor/preE precautions.  Kick counts.  Return 4 wks or sooner prn.  Voiced understanding.      Jitendra Guerra MD

## 2020-09-24 ENCOUNTER — TELEPHONE (OUTPATIENT)
Dept: OBSTETRICS AND GYNECOLOGY | Facility: CLINIC | Age: 30
End: 2020-09-24

## 2020-09-24 NOTE — TELEPHONE ENCOUNTER
----- Message from Ade Monae sent at 2020 12:48 PM CDT -----  Type: Patient Call Back    Who called: Self     What is the request in detail: patient states she lost her social security card and in order to get a new one they told her she need something for her doctor with her name address and . Please call     Can the clinic reply by [facility]? No     Would the patient rather a call back or a response via [facility]?  Call     Best call back number:[phone] (home)

## 2020-10-06 ENCOUNTER — PROCEDURE VISIT (OUTPATIENT)
Dept: MATERNAL FETAL MEDICINE | Facility: CLINIC | Age: 30
End: 2020-10-06
Payer: MEDICAID

## 2020-10-06 PROCEDURE — 76816 OB US FOLLOW-UP PER FETUS: CPT | Mod: PBBFAC,PO | Performed by: OBSTETRICS & GYNECOLOGY

## 2020-10-06 PROCEDURE — 76816 PR  US,PREGNANT UTERUS,F/U,TRANSABD APP: ICD-10-PCS | Mod: 26,S$PBB,, | Performed by: OBSTETRICS & GYNECOLOGY

## 2020-10-06 PROCEDURE — 76816 OB US FOLLOW-UP PER FETUS: CPT | Mod: 26,S$PBB,, | Performed by: OBSTETRICS & GYNECOLOGY

## 2020-10-09 DIAGNOSIS — O21.9 NAUSEA AND VOMITING DURING PREGNANCY: ICD-10-CM

## 2020-10-09 RX ORDER — ONDANSETRON 4 MG/1
4 TABLET, ORALLY DISINTEGRATING ORAL EVERY 6 HOURS PRN
Qty: 30 TABLET | Refills: 1 | Status: ON HOLD | OUTPATIENT
Start: 2020-10-09 | End: 2021-01-27 | Stop reason: HOSPADM

## 2020-10-26 ENCOUNTER — ROUTINE PRENATAL (OUTPATIENT)
Dept: OBSTETRICS AND GYNECOLOGY | Facility: CLINIC | Age: 30
End: 2020-10-26
Payer: MEDICAID

## 2020-10-26 ENCOUNTER — TELEPHONE (OUTPATIENT)
Dept: OBSTETRICS AND GYNECOLOGY | Facility: CLINIC | Age: 30
End: 2020-10-26

## 2020-10-26 VITALS — SYSTOLIC BLOOD PRESSURE: 118 MMHG | DIASTOLIC BLOOD PRESSURE: 60 MMHG | WEIGHT: 187.5 LBS | BODY MASS INDEX: 33.21 KG/M2

## 2020-10-26 DIAGNOSIS — Z3A.26 26 WEEKS GESTATION OF PREGNANCY: Primary | ICD-10-CM

## 2020-10-26 PROCEDURE — 99999 PR PBB SHADOW E&M-EST. PATIENT-LVL III: CPT | Mod: PBBFAC,,, | Performed by: OBSTETRICS & GYNECOLOGY

## 2020-10-26 PROCEDURE — 99213 OFFICE O/P EST LOW 20 MIN: CPT | Mod: PBBFAC | Performed by: OBSTETRICS & GYNECOLOGY

## 2020-10-26 PROCEDURE — 99213 OFFICE O/P EST LOW 20 MIN: CPT | Mod: TH,S$PBB,, | Performed by: OBSTETRICS & GYNECOLOGY

## 2020-10-26 PROCEDURE — 99999 PR PBB SHADOW E&M-EST. PATIENT-LVL III: ICD-10-PCS | Mod: PBBFAC,,, | Performed by: OBSTETRICS & GYNECOLOGY

## 2020-10-26 PROCEDURE — 99213 PR OFFICE/OUTPT VISIT, EST, LEVL III, 20-29 MIN: ICD-10-PCS | Mod: TH,S$PBB,, | Performed by: OBSTETRICS & GYNECOLOGY

## 2020-10-26 NOTE — PROGRESS NOTES
26w2d here for OB visit.    Pt has no major complaints today.  Reports active fetus.  Denies vaginal bleeding, leakage of fluid, or contractions.  Order 1 hr glucola, CBC.  Encourage healthy lifestyle modifications.  Encourage daily low dose ASA.  Encourage participation in Connective MOM program.  COVID precautions.  Labor/preE precautions.  Kick counts.  Return 2 wks or sooner prn.  Voiced understanding.      Jitendra Guerra MD

## 2020-10-26 NOTE — TELEPHONE ENCOUNTER
Pt scheduled for 11/3/2020 for glucose and was advised not to eat or drink 4 hours prior. Verbalized understanding. ----- Message from Jitendra Guerra MD sent at 10/26/2020  3:30 PM CDT -----  Regarding: Please schedule pt apt to go to lab for glucose test. Thanks.  Please schedule pt apt to go to lab for glucose test.   Remind pt to not eat 4 hrs prior to test.  Water OK.  Thanks.

## 2020-11-03 ENCOUNTER — PROCEDURE VISIT (OUTPATIENT)
Dept: MATERNAL FETAL MEDICINE | Facility: CLINIC | Age: 30
End: 2020-11-03
Payer: MEDICAID

## 2020-11-03 ENCOUNTER — TELEPHONE (OUTPATIENT)
Dept: MATERNAL FETAL MEDICINE | Facility: CLINIC | Age: 30
End: 2020-11-03

## 2020-11-03 ENCOUNTER — LAB VISIT (OUTPATIENT)
Dept: LAB | Facility: HOSPITAL | Age: 30
End: 2020-11-03
Attending: OBSTETRICS & GYNECOLOGY
Payer: MEDICAID

## 2020-11-03 DIAGNOSIS — Z3A.26 26 WEEKS GESTATION OF PREGNANCY: ICD-10-CM

## 2020-11-03 DIAGNOSIS — Z36.89 ENCOUNTER FOR ULTRASOUND TO ASSESS FETAL GROWTH: ICD-10-CM

## 2020-11-03 DIAGNOSIS — O26.92 PREGNANCY, COMPLICATIONS OF, SECOND TRIMESTER: ICD-10-CM

## 2020-11-03 DIAGNOSIS — O44.40 ULTRASOUND SCAN TO RECHECK LOW LYING PLACENTA, ANTEPARTUM: ICD-10-CM

## 2020-11-03 DIAGNOSIS — O40.2XX0 POLYHYDRAMNIOS IN SECOND TRIMESTER, SINGLE OR UNSPECIFIED FETUS: ICD-10-CM

## 2020-11-03 DIAGNOSIS — Z36.89 ENCOUNTER FOR ULTRASOUND TO CHECK FETAL GROWTH: Primary | ICD-10-CM

## 2020-11-03 LAB
BASOPHILS # BLD AUTO: 0.04 K/UL (ref 0–0.2)
BASOPHILS NFR BLD: 0.6 % (ref 0–1.9)
DIFFERENTIAL METHOD: ABNORMAL
EOSINOPHIL # BLD AUTO: 0.1 K/UL (ref 0–0.5)
EOSINOPHIL NFR BLD: 2.1 % (ref 0–8)
ERYTHROCYTE [DISTWIDTH] IN BLOOD BY AUTOMATED COUNT: 13.7 % (ref 11.5–14.5)
GLUCOSE SERPL-MCNC: 119 MG/DL (ref 70–140)
HCT VFR BLD AUTO: 28.1 % (ref 37–48.5)
HGB BLD-MCNC: 8.8 G/DL (ref 12–16)
IMM GRANULOCYTES # BLD AUTO: 0.03 K/UL (ref 0–0.04)
IMM GRANULOCYTES NFR BLD AUTO: 0.4 % (ref 0–0.5)
LYMPHOCYTES # BLD AUTO: 1.7 K/UL (ref 1–4.8)
LYMPHOCYTES NFR BLD: 25.1 % (ref 18–48)
MCH RBC QN AUTO: 25.9 PG (ref 27–31)
MCHC RBC AUTO-ENTMCNC: 31.3 G/DL (ref 32–36)
MCV RBC AUTO: 83 FL (ref 82–98)
MONOCYTES # BLD AUTO: 0.4 K/UL (ref 0.3–1)
MONOCYTES NFR BLD: 5.6 % (ref 4–15)
NEUTROPHILS # BLD AUTO: 4.5 K/UL (ref 1.8–7.7)
NEUTROPHILS NFR BLD: 66.2 % (ref 38–73)
NRBC BLD-RTO: 0 /100 WBC
PLATELET # BLD AUTO: 308 K/UL (ref 150–350)
PMV BLD AUTO: 9.2 FL (ref 9.2–12.9)
RBC # BLD AUTO: 3.4 M/UL (ref 4–5.4)
WBC # BLD AUTO: 6.78 K/UL (ref 3.9–12.7)

## 2020-11-03 PROCEDURE — 76817 TRANSVAGINAL US OBSTETRIC: CPT | Mod: PBBFAC,PO | Performed by: OBSTETRICS & GYNECOLOGY

## 2020-11-03 PROCEDURE — 76816 OB US FOLLOW-UP PER FETUS: CPT | Mod: 26,S$PBB,, | Performed by: OBSTETRICS & GYNECOLOGY

## 2020-11-03 PROCEDURE — 36415 COLL VENOUS BLD VENIPUNCTURE: CPT

## 2020-11-03 PROCEDURE — 76816 OB US FOLLOW-UP PER FETUS: CPT | Mod: PBBFAC,PO | Performed by: OBSTETRICS & GYNECOLOGY

## 2020-11-03 PROCEDURE — 82950 GLUCOSE TEST: CPT

## 2020-11-03 PROCEDURE — 76817 TRANSVAGINAL US OBSTETRIC: CPT | Mod: 26,S$PBB,, | Performed by: OBSTETRICS & GYNECOLOGY

## 2020-11-03 PROCEDURE — 76817 PR US, OB, TRANSVAG APPROACH: ICD-10-PCS | Mod: 26,S$PBB,, | Performed by: OBSTETRICS & GYNECOLOGY

## 2020-11-03 PROCEDURE — 76816 PR  US,PREGNANT UTERUS,F/U,TRANSABD APP: ICD-10-PCS | Mod: 26,S$PBB,, | Performed by: OBSTETRICS & GYNECOLOGY

## 2020-11-03 PROCEDURE — 85025 COMPLETE CBC W/AUTO DIFF WBC: CPT

## 2020-11-03 NOTE — TELEPHONE ENCOUNTER
Called patient and scheduled.      ----- Message from Savannah Gomez MD sent at 11/3/2020 10:44 AM CST -----  Regarding: follow up  Please schedule patient for 4 week  follow up growth at [facility] for LGA growth profile-does not need to be on md day

## 2020-11-04 ENCOUNTER — TELEPHONE (OUTPATIENT)
Dept: OBSTETRICS AND GYNECOLOGY | Facility: CLINIC | Age: 30
End: 2020-11-04

## 2020-11-04 NOTE — TELEPHONE ENCOUNTER
Will come  general guideline in pregnancy   ----- Message from Jena Mark sent at 11/3/2020  8:04 AM CST -----  Regarding: Patient Call Back  Contact: Patient  Type: Patient Call Back    Who called:Patient     What is the request in detail: Patient is requesting a call back in regards to a work note.    Can the clinic reply by [facility]?    Would the patient rather a call back or a response via [facility]? Call back     Best call back number:[phone]

## 2020-11-28 ENCOUNTER — HOSPITAL ENCOUNTER (OUTPATIENT)
Facility: HOSPITAL | Age: 30
Discharge: HOME | End: 2020-11-28
Attending: OBSTETRICS & GYNECOLOGY | Admitting: OBSTETRICS & GYNECOLOGY
Payer: MEDICAID

## 2020-11-28 VITALS
BODY MASS INDEX: 33.66 KG/M2 | HEIGHT: 63 IN | TEMPERATURE: 98 F | RESPIRATION RATE: 20 BRPM | HEART RATE: 97 BPM | OXYGEN SATURATION: 100 % | DIASTOLIC BLOOD PRESSURE: 56 MMHG | SYSTOLIC BLOOD PRESSURE: 122 MMHG | WEIGHT: 190 LBS

## 2020-11-28 PROBLEM — O47.00 PRETERM CONTRACTIONS: Status: ACTIVE | Noted: 2020-11-28

## 2020-11-28 LAB
AMORPH CRY URNS QL MICRO: ABNORMAL
BACTERIA #/AREA URNS HPF: ABNORMAL /HPF
BILIRUB UR QL STRIP: NEGATIVE
CLARITY UR: ABNORMAL
COLOR UR: YELLOW
GLUCOSE UR QL STRIP: NEGATIVE
HGB UR QL STRIP: NEGATIVE
HYALINE CASTS #/AREA URNS LPF: 2 /LPF
KETONES UR QL STRIP: NEGATIVE
LEUKOCYTE ESTERASE UR QL STRIP: NEGATIVE
MICROSCOPIC COMMENT: ABNORMAL
NITRITE UR QL STRIP: NEGATIVE
PH UR STRIP: 7 [PH] (ref 5–8)
PROT UR QL STRIP: ABNORMAL
RBC #/AREA URNS HPF: 0 /HPF (ref 0–4)
SP GR UR STRIP: 1.01 (ref 1–1.03)
SQUAMOUS #/AREA URNS HPF: 10 /HPF
URN SPEC COLLECT METH UR: ABNORMAL
UROBILINOGEN UR STRIP-ACNC: ABNORMAL EU/DL
WBC #/AREA URNS HPF: 6 /HPF (ref 0–5)

## 2020-11-28 PROCEDURE — 59025 FETAL NON-STRESS TEST: CPT | Mod: 26,,, | Performed by: OBSTETRICS & GYNECOLOGY

## 2020-11-28 PROCEDURE — 59025 FETAL NON-STRESS TEST: CPT

## 2020-11-28 PROCEDURE — 99219 PR INITIAL OBSERVATION CARE,LEVL II: ICD-10-PCS | Mod: 25,,, | Performed by: OBSTETRICS & GYNECOLOGY

## 2020-11-28 PROCEDURE — 63600175 PHARM REV CODE 636 W HCPCS: Performed by: OBSTETRICS & GYNECOLOGY

## 2020-11-28 PROCEDURE — 81000 URINALYSIS NONAUTO W/SCOPE: CPT

## 2020-11-28 PROCEDURE — 99219 PR INITIAL OBSERVATION CARE,LEVL II: CPT | Mod: 25,,, | Performed by: OBSTETRICS & GYNECOLOGY

## 2020-11-28 PROCEDURE — 99211 OFF/OP EST MAY X REQ PHY/QHP: CPT | Mod: 25

## 2020-11-28 PROCEDURE — 59025 PR FETAL 2N-STRESS TEST: ICD-10-PCS | Mod: 26,,, | Performed by: OBSTETRICS & GYNECOLOGY

## 2020-11-28 PROCEDURE — 96360 HYDRATION IV INFUSION INIT: CPT

## 2020-11-28 PROCEDURE — 96372 THER/PROPH/DIAG INJ SC/IM: CPT

## 2020-11-28 RX ORDER — TERBUTALINE SULFATE 1 MG/ML
0.25 INJECTION SUBCUTANEOUS ONCE
Status: COMPLETED | OUTPATIENT
Start: 2020-11-28 | End: 2020-11-28

## 2020-11-28 RX ADMIN — SODIUM CHLORIDE, SODIUM LACTATE, POTASSIUM CHLORIDE, AND CALCIUM CHLORIDE 1000 ML: .6; .31; .03; .02 INJECTION, SOLUTION INTRAVENOUS at 06:11

## 2020-11-28 RX ADMIN — TERBUTALINE SULFATE 0.25 MG: 1 INJECTION SUBCUTANEOUS at 06:11

## 2020-11-28 NOTE — ASSESSMENT & PLAN NOTE
No e/o  labor  Ctx now resolved  Will d/c home with increase PO hydration  F/u with Dr. Guerra in 2 days as scheduled

## 2020-11-28 NOTE — NURSING
Rounds made per .No cervical change or s&s of PTL.dIscharge order noted.Written instructions provided and reviewed.Discharged home in stable condition.

## 2020-11-28 NOTE — NURSING
Pt arrived to unit with c/o of abdominal pain that started around 9 pm on 11/27. EFMx2 placed. Full assessment done. History and medications reviewed. Pt reports good fetal movement. Denies vaginal bleeding/discharge, LOF,  dysuria, any changes in urination. Abdomen soft non-tender. Pt was examined by Dr Benz in the ER, unable to collect FFN. SVE 1/20/-4 . POC reviewed with pt, verbalizes understanding. Will continue to monitor pt.

## 2020-11-28 NOTE — DISCHARGE INSTRUCTIONS
Home Undelivered Discharge Instructions    After Discharge Orders:    Future Appointments   Date Time Provider Department Center   11/30/2020 10:25 AM Jitendra Guerra MD [facility]   11/30/2020 11:15 AM INJECTION, WB OB-GYN [facility]   12/1/2020  8:40 AM OB/GN ULTRASOUND [facility]       Call physician or midwife's office for instructions.    Current Discharge Medication List      CONTINUE these medications which have NOT CHANGED    Details   omeprazole (PRILOSEC OTC) 20 MG tablet Take 1 tablet (20 mg total) by mouth daily as needed.  Qty: 30 tablet, Refills: 1      ondansetron (ZOFRAN-ODT) 4 MG TbDL Take 1 tablet (4 mg total) by mouth every 6 (six) hours as needed.  Qty: 30 tablet, Refills: 1    Associated Diagnoses: Nausea and vomiting during pregnancy      prenat.vits,girish,min-iron-folic (PRENATAL VITAMIN) Tab Take 1 tablet by mouth once daily.  Qty: 30 each, Refills: 11    Comments: Please substitute for a comparable prenatal vitamins covered by patient's insurance plan affordable to patient. Also, dispense a 90 days supply when possible if requested by patient. Thanks.      prenatal 21-iron fu-folic acid (PRENATAL COMPLETE) 14 mg iron- 400 mcg Tab Take 1 tablet by mouth once daily.  Qty: 30 tablet, Refills: 0      promethazine (PHENERGAN) 25 MG suppository Place 1 suppository (25 mg total) rectally every 6 (six) hours as needed for Nausea.  Qty: 15 suppository, Refills: 0                     · Diet:  normal diet as tolerated    · Rest: normal activity as tolerated    Other instructions: Do kick counts once a day on your baby. Choose the time of day your baby is most active. Get in a comfortable lying or sitting position and time how long it takes to feel 10 kicks, twists, turns, swishes, or rolls. Call your physician or midwife if there have not been 10 kicks in 2 hours    Call physician or midwife, return to Labor and Delivery, call 911, or go to the nearest Emergency Room  if: increased leakage or fluid, contractions more than  4 per  1 hour, decreased fetal movement, persistent low back pain or cramping, bleeding from vaginal area, difficulty urinating, pain with urination, difficulty breathing, new calf pain, persistent headache or vision change

## 2020-11-28 NOTE — H&P
[facility]  Obstetrics  History & Physical    Patient Name: Christopher Avila  MRN: [id number]  Admission Date: 2020  Primary Care Provider: Neeraj Guerra MD    Subjective:     Principal Problem: contractions    History of Present Illness:  Pt is a 30 yo  @ 31w0d presented to L&D with painful contractions every 2 minutes since this morning    Obstetric HPI:  Patient reports contractions now irregular, active fetal movement, No vaginal bleeding , No loss of fluid     This pregnancy has been complicated by h/o  delivery    OB History    Para Term  AB Living   6 5 4 1 0 5   SAB TAB Ectopic Multiple Live Births   0 0 0 0 5      # Outcome Date GA Lbr Ryley/2nd Weight Sex Delivery Anes PTL Lv   6 Current            5 Term 17 37w3d  3.975 kg (8 lb 12.2 oz) F Vag-Spont EPI N MARIUM      Name: ROSARIO, GIRL CHRISTOPHER      Apgar1: 9  Apgar5: 9   4  16 36w5d  3.192 kg (7 lb 0.6 oz) M Vag-Spont EPI N MARIUM      Apgar1: 6  Apgar5: 8   3 Term 14 38w4d  3.386 kg (7 lb 7.4 oz) F Vag-Spont EPI N MARIUM      Birth Comments: extra digit noted  to each hand      Apgar1: 9  Apgar5: 9   2 Term 09 39w0d  2.863 kg (6 lb 5 oz) M Vag-Spont EPI  MARIUM      Name: Rafat   1 Term 08 39w0d  3.345 kg (7 lb 6 oz) M Vag-Spont EPI  MARIUM      Name: Lucas     Past Medical History:   Diagnosis Date    Abnormal menstrual periods     Anemia     Dental caries     Migraine headache     Ovarian cyst      History reviewed. No pertinent surgical history.    PTA Medications   Medication Sig    omeprazole (PRILOSEC OTC) 20 MG tablet Take 1 tablet (20 mg total) by mouth daily as needed. (Patient not taking: Reported on 10/26/2020)    ondansetron (ZOFRAN-ODT) 4 MG TbDL Take 1 tablet (4 mg total) by mouth every 6 (six) hours as needed.    prenat.vits,girish,min-iron-folic (PRENATAL VITAMIN) Tab Take 1 tablet by mouth once daily. (Patient not taking: Reported on 10/26/2020)    prenatal  21-iron fu-folic acid (PRENATAL COMPLETE) 14 mg iron- 400 mcg Tab Take 1 tablet by mouth once daily. (Patient not taking: Reported on 10/26/2020)    promethazine (PHENERGAN) 25 MG suppository Place 1 suppository (25 mg total) rectally every 6 (six) hours as needed for Nausea. (Patient not taking: Reported on 10/26/2020)       Review of patient's allergies indicates:  No Known Allergies     Family History     Problem Relation (Age of Onset)    Cancer Mother    Diabetes Mother, Maternal Grandmother        Tobacco Use    Smoking status: Former Smoker     Packs/day: 1.00     Quit date: 2020     Years since quittin.4    Smokeless tobacco: Never Used   Substance and Sexual Activity    Alcohol use: Not Currently     Comment: occasional prior to pregnancy    Drug use: Not Currently     Types: Marijuana    Sexual activity: Yes     Partners: Male     Birth control/protection: None     Review of Systems   Constitutional: Negative for appetite change, chills and fever.   Respiratory: Negative for shortness of breath.    Cardiovascular: Negative for chest pain.   Gastrointestinal: Negative for abdominal pain, blood in stool, constipation, diarrhea, nausea and vomiting.   Endocrine: Negative for hair loss and hot flashes.   Genitourinary: Negative for decreased libido, dysmenorrhea, dyspareunia, dysuria, genital sores, menorrhagia, menstrual problem, pelvic pain, vaginal discharge, vaginal pain, urinary incontinence and vaginal odor.   Musculoskeletal: Negative for back pain.   Integumentary:  Negative for rash, acne, hair changes, breast mass, nipple discharge and breast skin changes.   All other systems reviewed and are negative.  Breast: Negative for mass, mastodynia, nipple discharge and skin changes     Objective:     Vital Signs (Most Recent):  Temp: 98.2 °F (36.8 °C) (20)  Pulse: 97 (20)  Resp: 20 (20)  BP: (!) 122/56 (2040)  SpO2: 100 % (20) Vital Signs  (24h Range):  Temp:  [98.2 °F (36.8 °C)] 98.2 °F (36.8 °C)  Pulse:  [74-97] 97  Resp:  [20] 20  SpO2:  [94 %-100 %] 100 %  BP: (109-122)/(56-59) 122/56     Weight: 86.2 kg (190 lb)  Body mass index is 33.66 kg/m².    FHT: 130 bpm, mod li, +accels, no decels. Cat 1 (reassuring)  TOCO:  Q 2 minutes and now no ctx    Physical Exam:   Constitutional: She is oriented to person, place, and time. She appears well-developed and well-nourished.    HENT:   Head: Normocephalic and atraumatic.    Eyes: Pupils are equal, round, and reactive to light. EOM are normal.     Cardiovascular: Exam reveals no clubbing and no cyanosis.     Pulmonary/Chest: Effort normal.        Abdominal: Soft. She exhibits no mass. There is no rebound and no guarding. No hernia.   Gravid.               Musculoskeletal: Normal range of motion and moves all extremeties.       Neurological: She is alert and oriented to person, place, and time.    Skin: Skin is warm. No cyanosis. Nails show no clubbing.    Psychiatric: She has a normal mood and affect. Her behavior is normal. Thought content normal.       Cervix:  Dilation:  1  Effacement:  20%  Station: high       Significant Labs:  Lab Results   Component Value Date    GROUPTRH A POS 2020    HEPBSAG Negative 2020    STREPBCULT No Group B Streptococcus isolated 09/15/2017       I have personallly reviewed all pertinent lab results from the last 24 hours.    Assessment/Plan:     29 y.o. female  at 31w0d for:     contractions  No e/o  labor  Ctx now resolved  Will d/c home with increase PO hydration  F/u with Dr. Guerra in 2 days as scheduled        Brenda Díaz MD  Obstetrics  [facility]

## 2020-11-28 NOTE — HPI
Pt is a 28 yo  @ 31w0d presented to L&D with painful contractions every 2 minutes since this morning

## 2020-11-28 NOTE — HOSPITAL COURSE
Her cervix was one centimeter dilated, IVF hydration was given with terbutaline x 1 dose given.  Her contractions resolved and pt stated pain resolved. She felt much better  Cervix remained unchanged at 1 cm

## 2020-11-28 NOTE — NURSING
Assisted with pillows for comfort.States she is going to sleep.Denies an ctx pain states she is uncomfortable with this pregnancy.Info provided re: a maternity band and other comfort measures that she can try at home.receptive to feedback.

## 2020-11-28 NOTE — SUBJECTIVE & OBJECTIVE
Obstetric HPI:  Patient reports contractions now irregular, active fetal movement, No vaginal bleeding , No loss of fluid     This pregnancy has been complicated by h/o  delivery    OB History    Para Term  AB Living   6 5 4 1 0 5   SAB TAB Ectopic Multiple Live Births   0 0 0 0 5      # Outcome Date GA Lbr Ryley/2nd Weight Sex Delivery Anes PTL Lv   6 Current            5 Term 17 37w3d  3.975 kg (8 lb 12.2 oz) F Vag-Spont EPI N MARIUM      Name: ROSARIO, GIRL SOL      Apgar1: 9  Apgar5: 9   4  16 36w5d  3.192 kg (7 lb 0.6 oz) M Vag-Spont EPI N MARIUM      Apgar1: 6  Apgar5: 8   3 Term 14 38w4d  3.386 kg (7 lb 7.4 oz) F Vag-Spont EPI N MARIUM      Birth Comments: extra digit noted  to each hand      Apgar1: 9  Apgar5: 9   2 Term 09 39w0d  2.863 kg (6 lb 5 oz) M Vag-Spont EPI  MARIUM      Name: Sabine   1 Term 08 39w0d  3.345 kg (7 lb 6 oz) M Vag-Spont EPI  MARIUM      Name: Lucas     Past Medical History:   Diagnosis Date    Abnormal menstrual periods     Anemia     Dental caries     Migraine headache     Ovarian cyst      History reviewed. No pertinent surgical history.    PTA Medications   Medication Sig    omeprazole (PRILOSEC OTC) 20 MG tablet Take 1 tablet (20 mg total) by mouth daily as needed. (Patient not taking: Reported on 10/26/2020)    ondansetron (ZOFRAN-ODT) 4 MG TbDL Take 1 tablet (4 mg total) by mouth every 6 (six) hours as needed.    prenat.vits,girish,min-iron-folic (PRENATAL VITAMIN) Tab Take 1 tablet by mouth once daily. (Patient not taking: Reported on 10/26/2020)    prenatal 21-iron fu-folic acid (PRENATAL COMPLETE) 14 mg iron- 400 mcg Tab Take 1 tablet by mouth once daily. (Patient not taking: Reported on 10/26/2020)    promethazine (PHENERGAN) 25 MG suppository Place 1 suppository (25 mg total) rectally every 6 (six) hours as needed for Nausea. (Patient not taking: Reported on 10/26/2020)       Review of patient's allergies indicates:  No  Known Allergies     Family History     Problem Relation (Age of Onset)    Cancer Mother    Diabetes Mother, Maternal Grandmother        Tobacco Use    Smoking status: Former Smoker     Packs/day: 1.00     Quit date: 2020     Years since quittin.4    Smokeless tobacco: Never Used   Substance and Sexual Activity    Alcohol use: Not Currently     Comment: occasional prior to pregnancy    Drug use: Not Currently     Types: Marijuana    Sexual activity: Yes     Partners: Male     Birth control/protection: None     Review of Systems   Constitutional: Negative for appetite change, chills and fever.   Respiratory: Negative for shortness of breath.    Cardiovascular: Negative for chest pain.   Gastrointestinal: Negative for abdominal pain, blood in stool, constipation, diarrhea, nausea and vomiting.   Endocrine: Negative for hair loss and hot flashes.   Genitourinary: Negative for decreased libido, dysmenorrhea, dyspareunia, dysuria, genital sores, menorrhagia, menstrual problem, pelvic pain, vaginal discharge, vaginal pain, urinary incontinence and vaginal odor.   Musculoskeletal: Negative for back pain.   Integumentary:  Negative for rash, acne, hair changes, breast mass, nipple discharge and breast skin changes.   All other systems reviewed and are negative.  Breast: Negative for mass, mastodynia, nipple discharge and skin changes     Objective:     Vital Signs (Most Recent):  Temp: 98.2 °F (36.8 °C) (20 05)  Pulse: 97 (20 0642)  Resp: 20 (20 05)  BP: (!) 122/56 (20 0640)  SpO2: 100 % (20 0642) Vital Signs (24h Range):  Temp:  [98.2 °F (36.8 °C)] 98.2 °F (36.8 °C)  Pulse:  [74-97] 97  Resp:  [20] 20  SpO2:  [94 %-100 %] 100 %  BP: (109-122)/(56-59) 122/56     Weight: 86.2 kg (190 lb)  Body mass index is 33.66 kg/m².    FHT: 130 bpm, mod li, +accels, no decels. Cat 1 (reassuring)  TOCO:  Q 2 minutes and now no ctx    Physical Exam:   Constitutional: She is oriented to person,  place, and time. She appears well-developed and well-nourished.    HENT:   Head: Normocephalic and atraumatic.    Eyes: Pupils are equal, round, and reactive to light. EOM are normal.     Cardiovascular: Exam reveals no clubbing and no cyanosis.     Pulmonary/Chest: Effort normal.        Abdominal: Soft. She exhibits no mass. There is no rebound and no guarding. No hernia.   Gravid.               Musculoskeletal: Normal range of motion and moves all extremeties.       Neurological: She is alert and oriented to person, place, and time.    Skin: Skin is warm. No cyanosis. Nails show no clubbing.    Psychiatric: She has a normal mood and affect. Her behavior is normal. Thought content normal.       Cervix:  Dilation:  1  Effacement:  20%  Station: high       Significant Labs:  Lab Results   Component Value Date    GROUPTRH A POS 07/22/2020    HEPBSAG Negative 07/22/2020    STREPBCULT No Group B Streptococcus isolated 09/15/2017       I have personallly reviewed all pertinent lab results from the last 24 hours.

## 2020-11-28 NOTE — NURSING
Call placed to Dr Díaz. Informed of SVE, and contraction pattern. Orders to give LR bolus, 1x dose of terbutaline, collect UA and recheck pt in one hour.

## 2020-11-30 ENCOUNTER — ROUTINE PRENATAL (OUTPATIENT)
Dept: OBSTETRICS AND GYNECOLOGY | Facility: CLINIC | Age: 30
End: 2020-11-30
Payer: MEDICAID

## 2020-11-30 VITALS
BODY MASS INDEX: 35.05 KG/M2 | WEIGHT: 197.88 LBS | DIASTOLIC BLOOD PRESSURE: 68 MMHG | SYSTOLIC BLOOD PRESSURE: 124 MMHG

## 2020-11-30 DIAGNOSIS — Z3A.31 31 WEEKS GESTATION OF PREGNANCY: Primary | ICD-10-CM

## 2020-11-30 DIAGNOSIS — O99.019 ANEMIA DURING PREGNANCY: ICD-10-CM

## 2020-11-30 PROCEDURE — 99999 PR PBB SHADOW E&M-EST. PATIENT-LVL III: ICD-10-PCS | Mod: PBBFAC,,, | Performed by: OBSTETRICS & GYNECOLOGY

## 2020-11-30 PROCEDURE — 99213 OFFICE O/P EST LOW 20 MIN: CPT | Mod: PBBFAC,TH | Performed by: OBSTETRICS & GYNECOLOGY

## 2020-11-30 PROCEDURE — 99213 PR OFFICE/OUTPT VISIT, EST, LEVL III, 20-29 MIN: ICD-10-PCS | Mod: TH,S$PBB,, | Performed by: OBSTETRICS & GYNECOLOGY

## 2020-11-30 PROCEDURE — 99999 PR PBB SHADOW E&M-EST. PATIENT-LVL III: CPT | Mod: PBBFAC,,, | Performed by: OBSTETRICS & GYNECOLOGY

## 2020-11-30 PROCEDURE — 99213 OFFICE O/P EST LOW 20 MIN: CPT | Mod: TH,S$PBB,, | Performed by: OBSTETRICS & GYNECOLOGY

## 2020-11-30 RX ORDER — FERROUS SULFATE 325(65) MG
325 TABLET ORAL 2 TIMES DAILY
Qty: 60 TABLET | Refills: 1 | Status: ON HOLD | OUTPATIENT
Start: 2020-11-30 | End: 2021-01-27 | Stop reason: HOSPADM

## 2020-11-30 RX ORDER — DOCUSATE SODIUM 100 MG/1
100 CAPSULE, LIQUID FILLED ORAL 2 TIMES DAILY PRN
Qty: 60 CAPSULE | Refills: 1 | Status: ON HOLD | OUTPATIENT
Start: 2020-11-30 | End: 2021-01-27 | Stop reason: HOSPADM

## 2020-12-01 ENCOUNTER — PROCEDURE VISIT (OUTPATIENT)
Dept: MATERNAL FETAL MEDICINE | Facility: CLINIC | Age: 30
End: 2020-12-01
Payer: MEDICAID

## 2020-12-01 DIAGNOSIS — Z36.89 ENCOUNTER FOR ULTRASOUND TO CHECK FETAL GROWTH: ICD-10-CM

## 2020-12-01 PROCEDURE — 76816 PR  US,PREGNANT UTERUS,F/U,TRANSABD APP: ICD-10-PCS | Mod: 26,S$PBB,, | Performed by: OBSTETRICS & GYNECOLOGY

## 2020-12-01 PROCEDURE — 76816 OB US FOLLOW-UP PER FETUS: CPT | Mod: PBBFAC,PO | Performed by: OBSTETRICS & GYNECOLOGY

## 2020-12-01 PROCEDURE — 76816 OB US FOLLOW-UP PER FETUS: CPT | Mod: 26,S$PBB,, | Performed by: OBSTETRICS & GYNECOLOGY

## 2020-12-01 NOTE — Clinical Note
Jitendra,     Growth overall is normal but the abdominal circumference and head circumference measure greater than the 95th percentile and she has mild poly.  This looks like the fetus is going to be macrosomic so would repeat a scan for fluid in growth in 3 weeks.  We have scheduled it.  I do not think she needs testing at this point but depending on which she think about her gestational diabetes screen and her weight gain you could consider a repeat gestational diabetes screen.    Reyes sena

## 2020-12-01 NOTE — PROGRESS NOTES
31w2d here for OB visit.    Pt was seen on L&D over wkend for rule out labor.    Pt has no major complaints today.  Reports active fetus.  Denies vaginal bleeding, leakage of fluid, or contractions.    NST: Baseline 130's, moderate variability, positive acceleration, no decelerations. [place]: No contraction.  Monitored ~30 mins.  MVP: 4.2 cm.    1 hr glucola normal.  Discussed anemia, start fergon/colace.    Encourage healthy lifestyle modifications.  Encourage daily low dose ASA.  Encourage participation in Connective MOM program.  COVID precautions.    Labor/preE precautions.  Kick counts.  Return 2 wks or sooner prn.  Voiced understanding.      Jitendra Guerra MD

## 2020-12-15 ENCOUNTER — PATIENT MESSAGE (OUTPATIENT)
Dept: OBSTETRICS AND GYNECOLOGY | Facility: CLINIC | Age: 30
End: 2020-12-15

## 2020-12-15 ENCOUNTER — TELEPHONE (OUTPATIENT)
Dept: OBSTETRICS AND GYNECOLOGY | Facility: CLINIC | Age: 30
End: 2020-12-15

## 2020-12-15 ENCOUNTER — HOSPITAL ENCOUNTER (OUTPATIENT)
Facility: HOSPITAL | Age: 30
Discharge: HOME | End: 2020-12-15
Attending: OBSTETRICS & GYNECOLOGY | Admitting: OBSTETRICS & GYNECOLOGY
Payer: MEDICAID

## 2020-12-15 DIAGNOSIS — O47.00 PRETERM CONTRACTIONS: ICD-10-CM

## 2020-12-15 PROCEDURE — 99211 OFF/OP EST MAY X REQ PHY/QHP: CPT

## 2020-12-15 PROCEDURE — 25000003 PHARM REV CODE 250: Performed by: OBSTETRICS & GYNECOLOGY

## 2020-12-15 RX ORDER — SODIUM CHLORIDE 9 MG/ML
INJECTION, SOLUTION INTRAVENOUS CONTINUOUS
Status: DISCONTINUED | OUTPATIENT
Start: 2020-12-15 | End: 2020-12-15 | Stop reason: HOSPADM

## 2020-12-15 RX ORDER — ADHESIVE BANDAGE
30 BANDAGE TOPICAL DAILY PRN
Status: DISCONTINUED | OUTPATIENT
Start: 2020-12-15 | End: 2020-12-15 | Stop reason: HOSPADM

## 2020-12-15 RX ADMIN — MAGNESIUM HYDROXIDE 2400 MG: 400 SUSPENSION ORAL at 05:12

## 2020-12-15 NOTE — NURSING
Written discharge instructions provided and reviewed.Verb understanding.Discharged home in stable cond.

## 2020-12-15 NOTE — DISCHARGE INSTRUCTIONS
Home Undelivered Discharge Instructions    After Discharge Orders:    Future Appointments   Date Time Provider Department Center   12/18/2020  8:30 AM Jitendra Guerra MD [facility] OBN [facility]   12/24/2020  8:40 AM OB/GN ULTRASOUND [facility]   1/6/2021 10:20 AM Jitendra Guerra MD [facility]       Call physician or midwife's office  for instructions.    Current Discharge Medication List      CONTINUE these medications which have NOT CHANGED    Details   docusate sodium (COLACE) 100 MG capsule Take 1 capsule (100 mg total) by mouth 2 (two) times daily as needed for Constipation.  Qty: 60 capsule, Refills: 1    Associated Diagnoses: Anemia during pregnancy      ferrous sulfate (FEOSOL) 325 mg (65 mg iron) Tab tablet Take 1 tablet (325 mg total) by mouth 2 (two) times daily.  Qty: 60 tablet, Refills: 1    Associated Diagnoses: Anemia during pregnancy      omeprazole (PRILOSEC OTC) 20 MG tablet Take 1 tablet (20 mg total) by mouth daily as needed.  Qty: 30 tablet, Refills: 1      ondansetron (ZOFRAN-ODT) 4 MG TbDL Take 1 tablet (4 mg total) by mouth every 6 (six) hours as needed.  Qty: 30 tablet, Refills: 1    Associated Diagnoses: Nausea and vomiting during pregnancy      prenat.vits,girish,min-iron-folic (PRENATAL VITAMIN) Tab Take 1 tablet by mouth once daily.  Qty: 30 each, Refills: 11    Comments: Please substitute for a comparable prenatal vitamins covered by patient's insurance plan affordable to patient. Also, dispense a 90 days supply when possible if requested by patient. Thanks.      prenatal 21-iron fu-folic acid (PRENATAL COMPLETE) 14 mg iron- 400 mcg Tab Take 1 tablet by mouth once daily.  Qty: 30 tablet, Refills: 0      promethazine (PHENERGAN) 25 MG suppository Place 1 suppository (25 mg total) rectally every 6 (six) hours as needed for Nausea.  Qty: 15 suppository, Refills: 0                     · Diet:  normal diet as tolerated    · Rest: normal activity as tolerated    Other  instructions: Do kick counts once a day on your baby. Choose the time of day your baby is most active. Get in a comfortable lying or sitting position and time how long it takes to feel 10 kicks, twists, turns, swishes, or rolls. Call your physician or midwife if there have not been 10 kicks in 2 hours    Call physician or midwife, return to Labor and Delivery, call 911, or go to the nearest Emergency Room if: increased leakage or fluid, contractions more than  4 per  1 hour, decreased fetal movement, persistent low back pain or cramping, bleeding from vaginal area, difficulty urinating, pain with urination, difficulty breathing, new calf pain, persistent headache or vision change

## 2020-12-15 NOTE — NURSING
Admit reporting abd cramping.States she has not had a BM for at least 3 days.Abd soft with active fetus.SVE cvx closed no blood or fluid on glove.

## 2020-12-18 ENCOUNTER — TELEPHONE (OUTPATIENT)
Dept: OBSTETRICS AND GYNECOLOGY | Facility: CLINIC | Age: 30
End: 2020-12-18

## 2020-12-18 ENCOUNTER — ROUTINE PRENATAL (OUTPATIENT)
Dept: OBSTETRICS AND GYNECOLOGY | Facility: CLINIC | Age: 30
End: 2020-12-18
Payer: MEDICAID

## 2020-12-18 VITALS — SYSTOLIC BLOOD PRESSURE: 124 MMHG | WEIGHT: 190.5 LBS | BODY MASS INDEX: 33.74 KG/M2 | DIASTOLIC BLOOD PRESSURE: 62 MMHG

## 2020-12-18 DIAGNOSIS — O40.9XX0 POLYHYDRAMNIOS AFFECTING PREGNANCY: ICD-10-CM

## 2020-12-18 DIAGNOSIS — Z3A.33 33 WEEKS GESTATION OF PREGNANCY: Primary | ICD-10-CM

## 2020-12-18 DIAGNOSIS — O36.60X0 EXCESSIVE FETAL GROWTH AFFECTING MANAGEMENT OF PREGNANCY, ANTEPARTUM, SINGLE OR UNSPECIFIED FETUS: ICD-10-CM

## 2020-12-18 PROCEDURE — 99999 PR PBB SHADOW E&M-EST. PATIENT-LVL III: ICD-10-PCS | Mod: PBBFAC,,, | Performed by: OBSTETRICS & GYNECOLOGY

## 2020-12-18 PROCEDURE — 99213 OFFICE O/P EST LOW 20 MIN: CPT | Mod: TH,S$PBB,, | Performed by: OBSTETRICS & GYNECOLOGY

## 2020-12-18 PROCEDURE — 99213 OFFICE O/P EST LOW 20 MIN: CPT | Mod: PBBFAC,TH | Performed by: OBSTETRICS & GYNECOLOGY

## 2020-12-18 PROCEDURE — 99999 PR PBB SHADOW E&M-EST. PATIENT-LVL III: CPT | Mod: PBBFAC,,, | Performed by: OBSTETRICS & GYNECOLOGY

## 2020-12-18 PROCEDURE — 99213 PR OFFICE/OUTPT VISIT, EST, LEVL III, 20-29 MIN: ICD-10-PCS | Mod: TH,S$PBB,, | Performed by: OBSTETRICS & GYNECOLOGY

## 2020-12-18 NOTE — TELEPHONE ENCOUNTER
Spoke to patient, told her to continue working as tolerated for as long as she can. Voiced understanding.         ----- Message from Pratibha Ortiz sent at 12/18/2020 10:39 AM CST -----  Regarding: self [phone]  .Type: Patient Call Back    Who called: self     What is the request in detail: Requesting  to know when her last day of work should 1/5 or 1/10     Can the clinic reply by DAVIDSRACHID? Call back     Would the patient rather a call back or a response via [facility]? Call back     Best call back number: [phone]

## 2020-12-19 NOTE — PROGRESS NOTES
33w6d here for OB visit.    Pregnancy complicated by:  LGA profile  Mild polyhydramnios    Pt has no major complaints today.  Reports active fetus.  Denies vaginal bleeding, leakage of fluid, or contractions.    Pt seen MFM on 12/1:    Impression   =========   The biometry obtained on today's scan is consistent with a macrosomic growth profile. The overall growth is at the 70th percentile. The AC and the HC is >95th percentile.   Mild polyhydramnios is noted.     Recommendation   ==============   We recommend repeat evaluation for fetal growth and fluid assessment in 3 weeks.     MFM US findings d/w pt.  1 hr glucola normal.  [facility] recommends repeating DM screen, pt advised on timely testing.  Encourage healthy lifestyle modifications.  Encourage fergon/colace for anemia.    Encourage healthy lifestyle modifications.  Encourage daily low dose ASA.  Encourage participation in Connective MOM program.  COVID precautions.    Labor/preE precautions.  Kick counts.  Return 2 wks or sooner prn.  Voiced understanding.      Jitendra Guerra MD

## 2020-12-21 ENCOUNTER — HOSPITAL ENCOUNTER (OUTPATIENT)
Facility: HOSPITAL | Age: 30
Discharge: HOME | End: 2020-12-21
Attending: EMERGENCY MEDICINE | Admitting: OBSTETRICS & GYNECOLOGY
Payer: MEDICAID

## 2020-12-21 ENCOUNTER — TELEPHONE (OUTPATIENT)
Dept: OBSTETRICS AND GYNECOLOGY | Facility: CLINIC | Age: 30
End: 2020-12-21

## 2020-12-21 VITALS
BODY MASS INDEX: 34.55 KG/M2 | WEIGHT: 195 LBS | SYSTOLIC BLOOD PRESSURE: 118 MMHG | RESPIRATION RATE: 18 BRPM | HEIGHT: 63 IN | HEART RATE: 88 BPM | OXYGEN SATURATION: 100 % | TEMPERATURE: 99 F | DIASTOLIC BLOOD PRESSURE: 66 MMHG

## 2020-12-21 DIAGNOSIS — Z34.90 PREGNANCY: ICD-10-CM

## 2020-12-21 PROBLEM — R52 PAIN DURING LABOR: Status: ACTIVE | Noted: 2020-12-21

## 2020-12-21 LAB
BILIRUB UR QL STRIP: NEGATIVE
CLARITY UR: CLEAR
COLOR UR: YELLOW
CTP QC/QA: YES
GLUCOSE UR QL STRIP: NEGATIVE
HGB UR QL STRIP: NEGATIVE
KETONES UR QL STRIP: NEGATIVE
LEUKOCYTE ESTERASE UR QL STRIP: NEGATIVE
NITRITE UR QL STRIP: NEGATIVE
PH UR STRIP: 8 [PH] (ref 5–8)
POCT GLUCOSE: 77 MG/DL (ref 70–110)
PROT UR QL STRIP: NEGATIVE
SARS-COV-2 RDRP RESP QL NAA+PROBE: NEGATIVE
SP GR UR STRIP: 1 (ref 1–1.03)
URN SPEC COLLECT METH UR: NORMAL
UROBILINOGEN UR STRIP-ACNC: NEGATIVE EU/DL

## 2020-12-21 PROCEDURE — U0002 COVID-19 LAB TEST NON-CDC: HCPCS | Performed by: EMERGENCY MEDICINE

## 2020-12-21 PROCEDURE — 93010 ELECTROCARDIOGRAM REPORT: CPT | Mod: ,,, | Performed by: INTERNAL MEDICINE

## 2020-12-21 PROCEDURE — 99283 EMERGENCY DEPT VISIT LOW MDM: CPT | Mod: 25,27

## 2020-12-21 PROCEDURE — 81003 URINALYSIS AUTO W/O SCOPE: CPT

## 2020-12-21 PROCEDURE — 99211 OFF/OP EST MAY X REQ PHY/QHP: CPT | Mod: 25

## 2020-12-21 PROCEDURE — G0378 HOSPITAL OBSERVATION PER HR: HCPCS

## 2020-12-21 PROCEDURE — 93010 EKG 12-LEAD: ICD-10-PCS | Mod: ,,, | Performed by: INTERNAL MEDICINE

## 2020-12-21 PROCEDURE — 93005 ELECTROCARDIOGRAM TRACING: CPT

## 2020-12-21 RX ORDER — SODIUM CHLORIDE 9 MG/ML
INJECTION, SOLUTION INTRAVENOUS CONTINUOUS
Status: DISCONTINUED | OUTPATIENT
Start: 2020-12-21 | End: 2020-12-21 | Stop reason: HOSPADM

## 2020-12-21 RX ORDER — ONDANSETRON 8 MG/1
8 TABLET, ORALLY DISINTEGRATING ORAL EVERY 8 HOURS PRN
Status: DISCONTINUED | OUTPATIENT
Start: 2020-12-21 | End: 2020-12-21 | Stop reason: HOSPADM

## 2020-12-21 RX ORDER — ACETAMINOPHEN 500 MG
500 TABLET ORAL EVERY 6 HOURS PRN
Status: DISCONTINUED | OUTPATIENT
Start: 2020-12-21 | End: 2020-12-21 | Stop reason: HOSPADM

## 2020-12-21 NOTE — TREATMENT PLAN
Dr Guerra reviewed strip. Made aware of pt with no change in sve and urine negative. Orders for reactive strip and may dc home after. Pt requesting letter for work for today and also a letter to be home from work until delivery. Dr Guerra states that pt may have letter for being in hospital today. Will make pt aware.

## 2020-12-21 NOTE — TREATMENT PLAN
BG 77. Pt given water and apple juice for PO hydration. Pt states she had not had anything to eat or drink today. Instructed on importance of staying hydrated.

## 2020-12-21 NOTE — TREATMENT PLAN
Pt arrived to unit with complaints of abdominal pain and back pain that started last night. She states she is also short of breath but states that has been going on for a while. Will assess pt and call Dr Guerra for orders.

## 2020-12-21 NOTE — LETTER
December 21, 2020         [place] [id number]  Phone: [phone]       Patient: Christopher Avila   YOB: 1990  Date of Visit: 12/21/2020    To Whom It May Concern:    Shashi Avila  was at [facility] on 12/21/2020. She may return to work on 12/23/20 with no restrictions. If you have any questions or concerns, or if I can be of further assistance, please do not hesitate to contact me.    Sincerely,    Manolo Perez RN

## 2020-12-21 NOTE — ED TRIAGE NOTES
"Pt presents to the ED w/ c/o epigastric and lower abdominal and back pain that began last night and shortness of breath that began 3 hours ago. States she has had other pregnancies where her children were delivered earlier than their due date. Also reports throwing up frequently, but thinking this is because "her baby is full." NAD noted. VSS. Will continue to monitor.   "

## 2020-12-21 NOTE — TELEPHONE ENCOUNTER
Pt called to let us know that tylenol is not helping her and she is in the ER as we speak and would like to get a note from Dr Guerra for work. I explained to her that I will pass the message to him and get back with her. Voiced understanding.       ----- Message from Shruthi Chase sent at 12/21/2020  9:39 AM CST -----  Regarding: self : [phone]  .Type: Patient Call Back    Who called: self     What is the request in detail: Pt has concerns regarding taking tylenol     Can the clinic reply by [facility]? Call back     Would the patient rather a call back or a response via [facility]? Call back     Best call back number: [phone]

## 2020-12-21 NOTE — DISCHARGE INSTRUCTIONS
Home Undelivered Discharge Instructions    After Discharge Orders:    Future Appointments   Date Time Provider Department Center   12/22/2020  6:55 AM LAB,  HOSPITAL [facility] LAB [facility]   12/24/2020  8:40 AM OB/GN ULTRASOUND [facility]   12/30/2020  1:00 PM Marco Sandhu MD Health system OBN [facility]   12/30/2020  1:45 PM INJECTION,  OB-GYN [facility]   1/6/2021 10:20 AM Jitendra Guerra MD [facility]       Call physician's office for any questions or concerns  Current Discharge Medication List      CONTINUE these medications which have NOT CHANGED    Details   docusate sodium (COLACE) 100 MG capsule Take 1 capsule (100 mg total) by mouth 2 (two) times daily as needed for Constipation.  Qty: 60 capsule, Refills: 1    Associated Diagnoses: Anemia during pregnancy      ferrous sulfate (FEOSOL) 325 mg (65 mg iron) Tab tablet Take 1 tablet (325 mg total) by mouth 2 (two) times daily.  Qty: 60 tablet, Refills: 1    Associated Diagnoses: Anemia during pregnancy      omeprazole (PRILOSEC OTC) 20 MG tablet Take 1 tablet (20 mg total) by mouth daily as needed.  Qty: 30 tablet, Refills: 1      ondansetron (ZOFRAN-ODT) 4 MG TbDL Take 1 tablet (4 mg total) by mouth every 6 (six) hours as needed.  Qty: 30 tablet, Refills: 1    Associated Diagnoses: Nausea and vomiting during pregnancy      prenat.vits,girish,min-iron-folic (PRENATAL VITAMIN) Tab Take 1 tablet by mouth once daily.  Qty: 30 each, Refills: 11    Comments: Please substitute for a comparable prenatal vitamins covered by patient's insurance plan affordable to patient. Also, dispense a 90 days supply when possible if requested by patient. Thanks.      prenatal 21-iron fu-folic acid (PRENATAL COMPLETE) 14 mg iron- 400 mcg Tab Take 1 tablet by mouth once daily.  Qty: 30 tablet, Refills: 0      promethazine (PHENERGAN) 25 MG suppository Place 1 suppository (25 mg total) rectally every 6 (six) hours as needed for Nausea.  Qty: 15  suppository, Refills: 0                     · Diet:  normal diet as tolerated    · Rest: normal activity as tolerated    Other instructions: Do kick counts once a day on your baby. Choose the time of day your baby is most active. Get in a comfortable lying or sitting position and time how long it takes to feel 10 kicks, twists, turns, swishes, or rolls. Call your physician or midwife if there have not been 10 kicks in 1 hours    Call physician or midwife, return to Labor and Delivery, call 911, or go to the nearest Emergency Room if: increased leakage or fluid, contractions more than  10 per  1 hour, decreased fetal movement, persistent low back pain or cramping, bleeding from vaginal area, difficulty urinating, pain with urination, difficulty breathing, new calf pain, persistent headache or vision change

## 2020-12-21 NOTE — TREATMENT PLAN
Discharge instructions given and explained. Return to work form given. Follow up instructions given and explained.

## 2020-12-21 NOTE — TREATMENT PLAN
Dr Guerra made aware of pt's arrival and complaints. Made aware of ffn collected and sve with pt 0.5cm/ 30%/ -3. Urine collected as well. Orders given to assess pt for labor and recheck cervix in an hour, random blood glucose, and send urine if pt has urinary symptoms. Verbal recall.

## 2020-12-21 NOTE — LETTER
December 21, 2020         [place] [id number]  Phone: [phone]       Patient: Christopher Avila   YOB: 1990  Date of Visit: 12/21/2020    To Whom It May Concern:    Shashi Avila  was at [facility] on 12/21/2020. She may return to work on 12/22/20 with no restrictions. If you have any questions or concerns, or if I can be of further assistance, please do not hesitate to contact me.    Sincerely,    Manolo Perez RN

## 2020-12-21 NOTE — ED PROVIDER NOTES
Encounter Date: 2020    SCRIBE #1 NOTE: I, Derek Doll, am scribing for, and in the presence of,  Clem Astorga MD. I have scribed the following portions of the note - Other sections scribed: HPI, ROS, PE.       History     Chief Complaint   Patient presents with    Back Pain     started last night    Abdominal Cramping     started last night 35 weeks pregnant    Shortness of Breath     started x 3 hrs ago      The patient is a 30-year-old female with a PMHx of Anemia who presents to the ED complaining of abdominal cramping that onset last night. She has associated back pain. The patient is 35 weeks pregnant and notes this is not her first child. She attempted treatment with Tylenol unsuccessfully. She reports intermittent shortness of breath which she states has been frequent throughout her pregnancy.  Not worse than baseline. She notes increased urination which she attributes to pregnancy. She denies dysuria. She denies loss of fluid. She denies vaginal bleeding. No pertinent past surgical history. No tobacco, alcohol, or drug use. Her OB/GYN is Dr. Guerra.    The history is provided by the patient. No  was used.     Review of patient's allergies indicates:  No Known Allergies  Past Medical History:   Diagnosis Date    Abnormal menstrual periods     Anemia     Dental caries     Migraine headache     Ovarian cyst      History reviewed. No pertinent surgical history.  Family History   Problem Relation Age of Onset    Diabetes Mother     Cancer Mother     Diabetes Maternal Grandmother      Social History     Tobacco Use    Smoking status: Former Smoker     Packs/day: 1.00     Quit date: 2020     Years since quittin.5    Smokeless tobacco: Never Used   Substance Use Topics    Alcohol use: Not Currently     Comment: occasional prior to pregnancy    Drug use: Not Currently     Types: Marijuana     Review of Systems   Constitutional: Negative for chills and fever.    HENT: Negative for congestion and sore throat.    Eyes: Negative for pain and visual disturbance.   Respiratory: Positive for shortness of breath. Negative for chest tightness.    Cardiovascular: Negative for chest pain.   Gastrointestinal: Positive for abdominal pain (cramping). Negative for nausea.   Endocrine: Negative for polydipsia and polyuria.   Musculoskeletal: Positive for back pain. Negative for neck pain and neck stiffness.   Skin: Negative for rash.   Allergic/Immunologic: Negative for immunocompromised state.   Neurological: Negative for dizziness and weakness.   Hematological: Does not bruise/bleed easily.   Psychiatric/Behavioral: Negative for agitation and behavioral problems.   All other systems reviewed and are negative.      Physical Exam     Initial Vitals [12/21/20 1135]   BP Pulse Resp Temp SpO2   118/66 86 18 98.5 °F (36.9 °C) 100 %      MAP       --         Physical Exam    Nursing note and vitals reviewed.  Constitutional: She appears well-developed and well-nourished.   HENT:   Head: Normocephalic and atraumatic.   Mouth/Throat: Oropharynx is clear and moist and mucous membranes are normal.   Eyes: Conjunctivae and EOM are normal. Pupils are equal, round, and reactive to light. Right conjunctiva is not injected. Left conjunctiva is not injected. No scleral icterus.   Neck: Normal range of motion and full passive range of motion without pain. Neck supple.   Cardiovascular: Normal rate, regular rhythm, S1 normal, S2 normal, normal heart sounds and normal pulses. Exam reveals no gallop and no friction rub.    No murmur heard.  Pulses:       Radial pulses are 2+ on the right side and 2+ on the left side.   Pulmonary/Chest: Effort normal and breath sounds normal. No respiratory distress.   Abdominal: Soft. She exhibits no distension. There is no abdominal tenderness.   Gravid  Bedside ultrasound showed good fetal heart rate (140s) and amniotic fluid and good movement.   Musculoskeletal: Normal  range of motion. No edema.      Comments: Good active ROM of all extremities. No lower extremity edema or cyanosis.    Neurological: No cranial nerve deficit. Gait normal.   A&Ox4, normal speech.   Skin: Skin is warm. No ecchymosis and no rash noted.   Psychiatric: She has a normal mood and affect. Thought content normal.         ED Course   Procedures  Labs Reviewed - No data to display  EKG Readings: (Independently Interpreted)   EKG done 11:49 a.m. showing normal sinus rhythm with a rate 82.  No ST elevation T-wave abnormality.  Normal axis and QRS.  Normal EKG.  Compared to previous and unchanged.       Imaging Results    None          Medical Decision Making:   Initial Assessment:   30-year-old female presenting secondary to lower abdominal and back cramps.  No obvious loss of fluid.  Patient feels like she is not having a child at this 2nd.  Patient feels comfortable with going up to L&D.  EKG is reassuring.  Patient does not appear to be in respiratory distress and her O2 sats 100%.  No unilateral leg swelling.  States that this is her baseline shortness of breath that she has throughout her pregnancy.  Will hold off on doing a pelvic exam if OBGYN would like to do fibrinogen levels.  Transferring up to Labor and delivery.  Bedside ultrasound reassuring.  Clem Astorga    Clinical Tests:   Medical Tests: Ordered and Reviewed            Scribe Attestation:   Scribe #1: I performed the above scribed service and the documentation accurately describes the services I performed. I attest to the accuracy of the note.                      Clinical Impression:     ICD-10-CM ICD-9-CM   1. Pregnancy  Z34.90 V22.2                   1. Pregnancy            ED Disposition Condition    Send to L&D                Iclem, personally performed the services described in this documentation. All medical record entries made by the scribe were at my direction and in my presence. I have reviewed the chart and agree that the  record reflects my personal performance and is accurate and complete.         Clem Astorga MD  12/21/20 0261

## 2020-12-23 ENCOUNTER — TELEPHONE (OUTPATIENT)
Dept: OBSTETRICS AND GYNECOLOGY | Facility: CLINIC | Age: 30
End: 2020-12-23

## 2020-12-23 NOTE — TELEPHONE ENCOUNTER
Attempted to contact patient. No answer-left patient a voice message to return phone call to [phone].

## 2020-12-24 ENCOUNTER — PROCEDURE VISIT (OUTPATIENT)
Dept: MATERNAL FETAL MEDICINE | Facility: CLINIC | Age: 30
End: 2020-12-24
Payer: MEDICAID

## 2020-12-24 DIAGNOSIS — Z36.89 ENCOUNTER FOR ULTRASOUND TO ASSESS FETAL GROWTH: ICD-10-CM

## 2020-12-24 DIAGNOSIS — O26.92 PREGNANCY, COMPLICATIONS OF, SECOND TRIMESTER: ICD-10-CM

## 2020-12-24 PROCEDURE — 76816 OB US FOLLOW-UP PER FETUS: CPT | Mod: PBBFAC,PO | Performed by: OBSTETRICS & GYNECOLOGY

## 2020-12-24 PROCEDURE — 76816 PR  US,PREGNANT UTERUS,F/U,TRANSABD APP: ICD-10-PCS | Mod: 26,S$PBB,, | Performed by: OBSTETRICS & GYNECOLOGY

## 2020-12-24 PROCEDURE — 76816 OB US FOLLOW-UP PER FETUS: CPT | Mod: 26,S$PBB,, | Performed by: OBSTETRICS & GYNECOLOGY

## 2020-12-28 ENCOUNTER — HOSPITAL ENCOUNTER (OUTPATIENT)
Facility: HOSPITAL | Age: 30
LOS: 1 days | Discharge: LEFT BEFORE DISCHARGE | End: 2020-12-28
Attending: STUDENT IN AN ORGANIZED HEALTH CARE EDUCATION/TRAINING PROGRAM | Admitting: OBSTETRICS & GYNECOLOGY
Payer: MEDICAID

## 2020-12-28 VITALS
TEMPERATURE: 99 F | OXYGEN SATURATION: 99 % | DIASTOLIC BLOOD PRESSURE: 55 MMHG | HEIGHT: 63 IN | WEIGHT: 197 LBS | BODY MASS INDEX: 34.91 KG/M2 | RESPIRATION RATE: 18 BRPM | SYSTOLIC BLOOD PRESSURE: 106 MMHG | HEART RATE: 88 BPM

## 2020-12-28 DIAGNOSIS — M54.9 BACK PAIN, UNSPECIFIED BACK LOCATION, UNSPECIFIED BACK PAIN LATERALITY, UNSPECIFIED CHRONICITY: ICD-10-CM

## 2020-12-28 DIAGNOSIS — R10.9 ABDOMINAL PAIN: Primary | ICD-10-CM

## 2020-12-28 DIAGNOSIS — Z3A.36 36 WEEKS GESTATION OF PREGNANCY: ICD-10-CM

## 2020-12-28 LAB
CTP QC/QA: YES
SARS-COV-2 RDRP RESP QL NAA+PROBE: NEGATIVE

## 2020-12-28 PROCEDURE — 25000003 PHARM REV CODE 250: Performed by: STUDENT IN AN ORGANIZED HEALTH CARE EDUCATION/TRAINING PROGRAM

## 2020-12-28 PROCEDURE — U0002 COVID-19 LAB TEST NON-CDC: HCPCS | Performed by: STUDENT IN AN ORGANIZED HEALTH CARE EDUCATION/TRAINING PROGRAM

## 2020-12-28 PROCEDURE — G0378 HOSPITAL OBSERVATION PER HR: HCPCS

## 2020-12-28 PROCEDURE — 99211 OFF/OP EST MAY X REQ PHY/QHP: CPT | Mod: 27

## 2020-12-28 PROCEDURE — 99285 EMERGENCY DEPT VISIT HI MDM: CPT | Mod: 25

## 2020-12-28 RX ORDER — ACETAMINOPHEN 325 MG/1
650 TABLET ORAL
Status: COMPLETED | OUTPATIENT
Start: 2020-12-28 | End: 2020-12-28

## 2020-12-28 RX ADMIN — ACETAMINOPHEN 650 MG: 325 TABLET ORAL at 10:12

## 2020-12-28 NOTE — ED TRIAGE NOTES
Pt presents to the ED because she fell today. She reports falling and hitting her butt and injuring her back. Denies hitting her head or LOC. Reports abdominal cramping, but states she has felt the baby kick since the falling. NAD noted. VSS. Will continue to monitor.

## 2020-12-28 NOTE — PLAN OF CARE
Pt transferred to 225 per wheelchair post being cleared in ER for fall at home down 4 steps, hitting lower back, landing on buttock.  Pain 7/10 to back with mild lower pelvic discomfort 3/10.  Pt states is starving and requesting food.  EFM applied.  Plan of care discussed with pt.

## 2020-12-28 NOTE — ED PROVIDER NOTES
Encounter Date: 2020    SCRIBE #1 NOTE: I, Cori Herndon, am scribing for, and in the presence of,  Leonor Sheffield DO. I have scribed the following portions of the note - Other sections scribed: HPI, ROS, PE.       History     Chief Complaint   Patient presents with    Fall     36 weeks gestation.  pt woke up this am feeling general weakness.  came down stairs, slip and fall, falling approx 8-10 steps.  denies hitting head, denies head or neck pains.  complaints of lower back pain and lower abd cramping.     CC: Fall    HPI: This is a 30 year old 36 weeks gravid female who presents to the ED for emergent evaluation of fall PTA. Pt reports experiencing lower abdominal pain and weakness in bilateral lower extremities after she slipped and fell walking down the stairs. Pt's abdominal pain is intermittent and she describes the abdominal pain as a cramping sensation. She has been taking Tylenol for the abdominal pain, but she did not take Tylenol after the fall. She states that the weakness in the bilateral lower extremities prior to the fall that has spontaneously resolved. She reports landing onto her back and buttocks during the fall, which she currently has back pain and pain in the gluteal region. She denies head trauma or syncope during the fall. Pt states that she has not eaten this morning. She notes no issues during this pregnancy. This is  A0. Pt's Ob/Gyn is Dr. Guerra. She reports no issues with previous pregnancies, which were all vaginal deliveries. Pt denies vaginal bleeding, vaginal discharge, nausea, vomiting, generalized body aches, cough, nasal congestion, headache, vision changes, possible COVID exposure, or concerns for COVID-19. She does admit to runny nose.      The history is provided by the patient. No  was used.     Review of patient's allergies indicates:  No Known Allergies  Past Medical History:   Diagnosis Date    Abnormal menstrual periods      Anemia     Dental caries     Migraine headache     Ovarian cyst      History reviewed. No pertinent surgical history.  Family History   Problem Relation Age of Onset    Diabetes Mother     Cancer Mother     Diabetes Maternal Grandmother      Social History     Tobacco Use    Smoking status: Former Smoker     Packs/day: 1.00     Quit date: 2020     Years since quittin.5    Smokeless tobacco: Never Used   Substance Use Topics    Alcohol use: Not Currently     Comment: occasional prior to pregnancy    Drug use: Not Currently     Types: Marijuana     Review of Systems   Constitutional: Negative for chills and fever.   HENT: Positive for rhinorrhea. Negative for congestion and sore throat.    Eyes: Negative for visual disturbance.   Respiratory: Negative for cough and shortness of breath.    Cardiovascular: Negative for chest pain.   Gastrointestinal: Positive for abdominal pain. Negative for diarrhea, nausea and vomiting.   Genitourinary: Negative for dysuria, pelvic pain, vaginal bleeding, vaginal discharge and vaginal pain.   Musculoskeletal: Positive for back pain and myalgias (in the gluteal region).        (-) Generalized body aches   Skin: Negative for rash.   Neurological: Negative for weakness and headaches.   Hematological: Does not bruise/bleed easily.   Psychiatric/Behavioral: Negative for confusion.       Physical Exam     Initial Vitals [20 0855]   BP Pulse Resp Temp SpO2   114/62 90 20 98.5 °F (36.9 °C) 98 %      MAP       --         Physical Exam    Nursing note and vitals reviewed.  Constitutional: She appears well-developed and well-nourished. She is not diaphoretic. No distress.   HENT:   Head: Normocephalic and atraumatic.   Right Ear: External ear normal.   Left Ear: External ear normal.   Nose: Nose normal.   Eyes: Conjunctivae and EOM are normal. Pupils are equal, round, and reactive to light.   Neck: Normal range of motion. Neck supple.   Cardiovascular: Normal rate,  regular rhythm, normal heart sounds and intact distal pulses.   Pulmonary/Chest: Breath sounds normal. No respiratory distress.   Abdominal: Soft. There is no abdominal tenderness.   Genitourinary:    Genitourinary Comments: Gravid uterus, good fetal movement on bedside US with .     Musculoskeletal: Normal range of motion.      Cervical back: Normal.      Thoracic back: Normal.      Lumbar back: She exhibits tenderness. She exhibits no swelling, no deformity, no pain and normal pulse.      Comments: Diffuse lumbar back TTP. No obvious deformity or step off. TTP to the right gluteal.   Neurological: She is alert and oriented to person, place, and time. She has normal strength. She displays no atrophy and no tremor. She exhibits normal muscle tone. She displays no seizure activity.   Moving all extremities, no focal neurological deficits, follows all commands.   Skin: Skin is warm and dry. Capillary refill takes less than 2 seconds. No rash noted. No erythema.   Psychiatric: She has a normal mood and affect.         ED Course   Procedures  Labs Reviewed   SARS-COV-2 RDRP GENE   POCT GLUCOSE MONITORING CONTINUOUS          Imaging Results    None          Medical Decision Making:   Clinical Tests:   Lab Tests: Ordered and Reviewed  ED Management:   [facility]  This is an emergent evaluation of a 30 year old  36 weeks gravid female who presents with abdominal pain, back pain after a fall today with head trauma or LOC. Vitals in the ED unremarkable. Physical exam notable for a non-toxica appearing female with a gravid uterus.  There is diffuse tenderness to palpation of the lumbar spine with no midline crepitus, step-offs or deformities.  There is also tenderness over the right gluteal region.  Fetal heart rate 145 on bedside ultrasound with good fetal movement.  Remainder of exam benign.  DDx includes but is not limited to musculoskeletal pain, hypoglycemia.  Also considered however clinically less likely to be  occult intra-abdominal injury versus occult fracture or dislocation, fetal distress, pre term labor. Labs including point of care glucose and COVID screening obtained.  No clinical indication for imaging at this time.  Point of care glucose 81.  COVID negative.  Patient was treated in the department with Tylenol p.o..  On reassessment her symptoms were improved.  Case discussed with FELIPE Villagomez&LEONILA JOE. Will admit to observation to Dr. Giles. Patient aware and agreeable to the plan.       Leonor Sheffield D.O  EMERGENCY MEDICINE  11:17 AM 12/28/2020              Scribe Attestation:   Scribe #1: I performed the above scribed service and the documentation accurately describes the services I performed. I attest to the accuracy of the note.                       I, Leonor Sheffield DO, personally performed the services described in this documentation. All medical record entries made by the scribe were at my direction and in my presence. I have reviewed the chart and agree that the record reflects my personal performance and is accurate and complete.    Clinical Impression:     ICD-10-CM ICD-9-CM   1. Abdominal pain  R10.9 789.00   2. Back pain, unspecified back location, unspecified back pain laterality, unspecified chronicity  M54.9 724.5   3. 36 weeks gestation of pregnancy  Z3A.36 V22.2                          ED Disposition Condition    Observation                             Leonor Sheffield DO  12/28/20 1131

## 2020-12-28 NOTE — TREATMENT PLAN
Pt refusing ultrasound.  States can not wait any longer.  Informed pt the importance of ultrasound because of fall today to confirm fetal well being/possible placental abruption and would need to sign out against medical advice.  Pt states baby is fine and wants to leave now.  Refusal of advised medical care/advise form signed and witnessed per TATYANA Chino.  Pt left at 1330 per ambulatory.  Dr. Sandhu notified.

## 2020-12-28 NOTE — TREATMENT PLAN
Dr. Sandhu visited pt.  EFM strip reviewed.  Vag exam done, 1-2 cm, 30 %, posterior, -4, soft with no blood noted on glove.  Order for ultrasound received.  Plan of care discussed with pt per OB.  Pt requesting to eat food.  Instructed had to wait for US result before meal.  Pt verified understanding.

## 2020-12-30 ENCOUNTER — CLINICAL SUPPORT (OUTPATIENT)
Dept: OBSTETRICS AND GYNECOLOGY | Facility: CLINIC | Age: 30
End: 2020-12-30
Payer: MEDICAID

## 2020-12-30 ENCOUNTER — ROUTINE PRENATAL (OUTPATIENT)
Dept: OBSTETRICS AND GYNECOLOGY | Facility: CLINIC | Age: 30
End: 2020-12-30
Payer: MEDICAID

## 2020-12-30 VITALS
DIASTOLIC BLOOD PRESSURE: 64 MMHG | SYSTOLIC BLOOD PRESSURE: 122 MMHG | BODY MASS INDEX: 33.94 KG/M2 | WEIGHT: 191.63 LBS

## 2020-12-30 DIAGNOSIS — Z3A.35 35 WEEKS GESTATION OF PREGNANCY: Primary | ICD-10-CM

## 2020-12-30 DIAGNOSIS — Z23 NEED FOR DIPHTHERIA-TETANUS-PERTUSSIS (TDAP) VACCINE: Primary | ICD-10-CM

## 2020-12-30 DIAGNOSIS — O40.9XX0 POLYHYDRAMNIOS AFFECTING PREGNANCY: ICD-10-CM

## 2020-12-30 DIAGNOSIS — M54.9 BACK PAIN, UNSPECIFIED BACK LOCATION, UNSPECIFIED BACK PAIN LATERALITY, UNSPECIFIED CHRONICITY: ICD-10-CM

## 2020-12-30 PROCEDURE — 90471 IMMUNIZATION ADMIN: CPT | Mod: PBBFAC

## 2020-12-30 PROCEDURE — 99213 OFFICE O/P EST LOW 20 MIN: CPT | Mod: PBBFAC,TH | Performed by: OBSTETRICS & GYNECOLOGY

## 2020-12-30 PROCEDURE — 99213 OFFICE O/P EST LOW 20 MIN: CPT | Mod: TH,S$PBB,, | Performed by: OBSTETRICS & GYNECOLOGY

## 2020-12-30 PROCEDURE — 87081 CULTURE SCREEN ONLY: CPT

## 2020-12-30 PROCEDURE — 99999 PR PBB SHADOW E&M-EST. PATIENT-LVL III: ICD-10-PCS | Mod: PBBFAC,,, | Performed by: OBSTETRICS & GYNECOLOGY

## 2020-12-30 PROCEDURE — 99999 PR PBB SHADOW E&M-EST. PATIENT-LVL III: CPT | Mod: PBBFAC,,, | Performed by: OBSTETRICS & GYNECOLOGY

## 2020-12-30 PROCEDURE — 99213 PR OFFICE/OUTPT VISIT, EST, LEVL III, 20-29 MIN: ICD-10-PCS | Mod: TH,S$PBB,, | Performed by: OBSTETRICS & GYNECOLOGY

## 2020-12-30 NOTE — PROGRESS NOTES
35w4d  No new complaint  Occasional contractions  No vaginal bleeding or rupture of membranes  No discharge  Good fetal movements    Pregnancy complicated by:  LGA profile  Mild polyhydramnios    Exam with cervix at fingertip    GBS, HIV, and consents    Labor precautions  Fetal kick count instructed and encouraged  Back next week

## 2021-01-01 LAB — BACTERIA SPEC AEROBE CULT: NORMAL

## 2021-01-02 ENCOUNTER — HOSPITAL ENCOUNTER (OUTPATIENT)
Facility: HOSPITAL | Age: 31
Discharge: HOME | End: 2021-01-02
Attending: OBSTETRICS & GYNECOLOGY | Admitting: OBSTETRICS & GYNECOLOGY
Payer: MEDICAID

## 2021-01-02 VITALS
WEIGHT: 186 LBS | HEART RATE: 101 BPM | RESPIRATION RATE: 16 BRPM | SYSTOLIC BLOOD PRESSURE: 105 MMHG | OXYGEN SATURATION: 97 % | HEIGHT: 63 IN | TEMPERATURE: 98 F | DIASTOLIC BLOOD PRESSURE: 56 MMHG | BODY MASS INDEX: 32.96 KG/M2

## 2021-01-02 DIAGNOSIS — R10.9 ABDOMINAL PAIN: ICD-10-CM

## 2021-01-02 LAB
BILIRUB UR QL STRIP: NEGATIVE
CLARITY UR: CLEAR
COLOR UR: YELLOW
GLUCOSE UR QL STRIP: NEGATIVE
HGB UR QL STRIP: NEGATIVE
KETONES UR QL STRIP: NEGATIVE
LEUKOCYTE ESTERASE UR QL STRIP: NEGATIVE
NITRITE UR QL STRIP: NEGATIVE
PH UR STRIP: 7 [PH] (ref 5–8)
PROT UR QL STRIP: NEGATIVE
RUPTURE OF MEMBRANE: NEGATIVE
SP GR UR STRIP: 1.01 (ref 1–1.03)
URN SPEC COLLECT METH UR: NORMAL
UROBILINOGEN UR STRIP-ACNC: NEGATIVE EU/DL

## 2021-01-02 PROCEDURE — 63600175 PHARM REV CODE 636 W HCPCS: Performed by: OBSTETRICS & GYNECOLOGY

## 2021-01-02 PROCEDURE — 99211 OFF/OP EST MAY X REQ PHY/QHP: CPT | Mod: 25

## 2021-01-02 PROCEDURE — 59025 FETAL NON-STRESS TEST: CPT

## 2021-01-02 PROCEDURE — 84112 EVAL AMNIOTIC FLUID PROTEIN: CPT

## 2021-01-02 PROCEDURE — 81003 URINALYSIS AUTO W/O SCOPE: CPT

## 2021-01-02 RX ORDER — SODIUM CHLORIDE, SODIUM LACTATE, POTASSIUM CHLORIDE, CALCIUM CHLORIDE 600; 310; 30; 20 MG/100ML; MG/100ML; MG/100ML; MG/100ML
INJECTION, SOLUTION INTRAVENOUS CONTINUOUS
Status: DISCONTINUED | OUTPATIENT
Start: 2021-01-02 | End: 2021-01-02 | Stop reason: HOSPADM

## 2021-01-02 RX ORDER — ACETAMINOPHEN 500 MG
500 TABLET ORAL EVERY 6 HOURS PRN
Status: DISCONTINUED | OUTPATIENT
Start: 2021-01-02 | End: 2021-01-02 | Stop reason: HOSPADM

## 2021-01-02 RX ADMIN — SODIUM CHLORIDE, SODIUM LACTATE, POTASSIUM CHLORIDE, AND CALCIUM CHLORIDE 1000 ML: .6; .31; .03; .02 INJECTION, SOLUTION INTRAVENOUS at 06:01

## 2021-01-04 ENCOUNTER — NURSE TRIAGE (OUTPATIENT)
Dept: ADMINISTRATIVE | Facility: CLINIC | Age: 31
End: 2021-01-04

## 2021-01-04 ENCOUNTER — HOSPITAL ENCOUNTER (OUTPATIENT)
Facility: HOSPITAL | Age: 31
Discharge: HOME | End: 2021-01-04
Attending: OBSTETRICS & GYNECOLOGY | Admitting: OBSTETRICS & GYNECOLOGY
Payer: MEDICAID

## 2021-01-04 VITALS
OXYGEN SATURATION: 100 % | DIASTOLIC BLOOD PRESSURE: 65 MMHG | HEART RATE: 96 BPM | RESPIRATION RATE: 18 BRPM | SYSTOLIC BLOOD PRESSURE: 110 MMHG | TEMPERATURE: 99 F

## 2021-01-04 LAB
BILIRUB UR QL STRIP: NEGATIVE
CLARITY UR: CLEAR
COLOR UR: YELLOW
GLUCOSE UR QL STRIP: NEGATIVE
HGB UR QL STRIP: NEGATIVE
KETONES UR QL STRIP: NEGATIVE
LEUKOCYTE ESTERASE UR QL STRIP: NEGATIVE
NITRITE UR QL STRIP: NEGATIVE
PH UR STRIP: 8 [PH] (ref 5–8)
POCT GLUCOSE: 95 MG/DL (ref 70–110)
PROT UR QL STRIP: NEGATIVE
SP GR UR STRIP: 1.01 (ref 1–1.03)
URN SPEC COLLECT METH UR: ABNORMAL
UROBILINOGEN UR STRIP-ACNC: ABNORMAL EU/DL

## 2021-01-04 PROCEDURE — 99211 OFF/OP EST MAY X REQ PHY/QHP: CPT | Mod: 25

## 2021-01-04 PROCEDURE — 82962 GLUCOSE BLOOD TEST: CPT

## 2021-01-04 PROCEDURE — 59025 FETAL NON-STRESS TEST: CPT

## 2021-01-04 PROCEDURE — 81003 URINALYSIS AUTO W/O SCOPE: CPT

## 2021-01-06 ENCOUNTER — ROUTINE PRENATAL (OUTPATIENT)
Dept: OBSTETRICS AND GYNECOLOGY | Facility: CLINIC | Age: 31
End: 2021-01-06
Payer: MEDICAID

## 2021-01-06 VITALS — SYSTOLIC BLOOD PRESSURE: 110 MMHG | BODY MASS INDEX: 34.01 KG/M2 | DIASTOLIC BLOOD PRESSURE: 68 MMHG | WEIGHT: 192 LBS

## 2021-01-06 DIAGNOSIS — O40.9XX0 POLYHYDRAMNIOS AFFECTING PREGNANCY: ICD-10-CM

## 2021-01-06 DIAGNOSIS — Z3A.36 36 WEEKS GESTATION OF PREGNANCY: Primary | ICD-10-CM

## 2021-01-06 DIAGNOSIS — O36.60X0 EXCESSIVE FETAL GROWTH AFFECTING MANAGEMENT OF PREGNANCY, ANTEPARTUM, SINGLE OR UNSPECIFIED FETUS: ICD-10-CM

## 2021-01-06 PROCEDURE — 99999 PR PBB SHADOW E&M-EST. PATIENT-LVL III: ICD-10-PCS | Mod: PBBFAC,,, | Performed by: OBSTETRICS & GYNECOLOGY

## 2021-01-06 PROCEDURE — 99213 OFFICE O/P EST LOW 20 MIN: CPT | Mod: PBBFAC,TH | Performed by: OBSTETRICS & GYNECOLOGY

## 2021-01-06 PROCEDURE — 99213 PR OFFICE/OUTPT VISIT, EST, LEVL III, 20-29 MIN: ICD-10-PCS | Mod: TH,S$PBB,, | Performed by: OBSTETRICS & GYNECOLOGY

## 2021-01-06 PROCEDURE — 99213 OFFICE O/P EST LOW 20 MIN: CPT | Mod: TH,S$PBB,, | Performed by: OBSTETRICS & GYNECOLOGY

## 2021-01-06 PROCEDURE — 99999 PR PBB SHADOW E&M-EST. PATIENT-LVL III: CPT | Mod: PBBFAC,,, | Performed by: OBSTETRICS & GYNECOLOGY

## 2021-01-07 ENCOUNTER — LAB VISIT (OUTPATIENT)
Dept: LAB | Facility: HOSPITAL | Age: 31
End: 2021-01-07
Attending: OBSTETRICS & GYNECOLOGY
Payer: MEDICAID

## 2021-01-07 DIAGNOSIS — Z3A.36 36 WEEKS GESTATION OF PREGNANCY: ICD-10-CM

## 2021-01-07 LAB
BASOPHILS # BLD AUTO: 0.05 K/UL (ref 0–0.2)
BASOPHILS NFR BLD: 0.6 % (ref 0–1.9)
DIFFERENTIAL METHOD: ABNORMAL
EOSINOPHIL # BLD AUTO: 0.1 K/UL (ref 0–0.5)
EOSINOPHIL NFR BLD: 1.7 % (ref 0–8)
ERYTHROCYTE [DISTWIDTH] IN BLOOD BY AUTOMATED COUNT: 16.6 % (ref 11.5–14.5)
ESTIMATED AVG GLUCOSE: 108 MG/DL (ref 68–131)
GLUCOSE SERPL-MCNC: 118 MG/DL (ref 70–140)
HBA1C MFR BLD HPLC: 5.4 % (ref 4–5.6)
HCT VFR BLD AUTO: 27.3 % (ref 37–48.5)
HGB BLD-MCNC: 8.3 G/DL (ref 12–16)
IMM GRANULOCYTES # BLD AUTO: 0.09 K/UL (ref 0–0.04)
IMM GRANULOCYTES NFR BLD AUTO: 1.1 % (ref 0–0.5)
LYMPHOCYTES # BLD AUTO: 1.6 K/UL (ref 1–4.8)
LYMPHOCYTES NFR BLD: 20.1 % (ref 18–48)
MCH RBC QN AUTO: 23.1 PG (ref 27–31)
MCHC RBC AUTO-ENTMCNC: 30.4 G/DL (ref 32–36)
MCV RBC AUTO: 76 FL (ref 82–98)
MONOCYTES # BLD AUTO: 0.5 K/UL (ref 0.3–1)
MONOCYTES NFR BLD: 6.6 % (ref 4–15)
NEUTROPHILS # BLD AUTO: 5.7 K/UL (ref 1.8–7.7)
NEUTROPHILS NFR BLD: 69.9 % (ref 38–73)
NRBC BLD-RTO: 0 /100 WBC
PLATELET # BLD AUTO: 299 K/UL (ref 150–350)
PMV BLD AUTO: 9 FL (ref 9.2–12.9)
RBC # BLD AUTO: 3.6 M/UL (ref 4–5.4)
WBC # BLD AUTO: 8.17 K/UL (ref 3.9–12.7)

## 2021-01-07 PROCEDURE — 86703 HIV-1/HIV-2 1 RESULT ANTBDY: CPT

## 2021-01-07 PROCEDURE — 36415 COLL VENOUS BLD VENIPUNCTURE: CPT

## 2021-01-07 PROCEDURE — 82950 GLUCOSE TEST: CPT

## 2021-01-07 PROCEDURE — 85025 COMPLETE CBC W/AUTO DIFF WBC: CPT

## 2021-01-07 PROCEDURE — 83036 HEMOGLOBIN GLYCOSYLATED A1C: CPT

## 2021-01-08 LAB — HIV 1+2 AB+HIV1 P24 AG SERPL QL IA: NEGATIVE

## 2021-01-12 ENCOUNTER — HOSPITAL ENCOUNTER (OUTPATIENT)
Facility: HOSPITAL | Age: 31
Discharge: HOME | End: 2021-01-12
Attending: OBSTETRICS & GYNECOLOGY | Admitting: OBSTETRICS & GYNECOLOGY
Payer: MEDICAID

## 2021-01-12 VITALS — SYSTOLIC BLOOD PRESSURE: 105 MMHG | HEART RATE: 92 BPM | OXYGEN SATURATION: 100 % | DIASTOLIC BLOOD PRESSURE: 65 MMHG

## 2021-01-12 LAB
AMPHET+METHAMPHET UR QL: NEGATIVE
BACTERIA #/AREA URNS HPF: NORMAL /HPF
BARBITURATES UR QL SCN>200 NG/ML: NEGATIVE
BENZODIAZ UR QL SCN>200 NG/ML: NEGATIVE
BILIRUB UR QL STRIP: NEGATIVE
BZE UR QL SCN: NEGATIVE
CANNABINOIDS UR QL SCN: NEGATIVE
CLARITY UR: ABNORMAL
COLOR UR: YELLOW
CREAT UR-MCNC: 69.9 MG/DL (ref 15–325)
GLUCOSE UR QL STRIP: NEGATIVE
HGB UR QL STRIP: NEGATIVE
KETONES UR QL STRIP: ABNORMAL
LEUKOCYTE ESTERASE UR QL STRIP: ABNORMAL
METHADONE UR QL SCN>300 NG/ML: NEGATIVE
MICROSCOPIC COMMENT: NORMAL
NITRITE UR QL STRIP: NEGATIVE
OPIATES UR QL SCN: NEGATIVE
PCP UR QL SCN>25 NG/ML: NEGATIVE
PH UR STRIP: 7 [PH] (ref 5–8)
PROT UR QL STRIP: NEGATIVE
RBC #/AREA URNS HPF: 2 /HPF (ref 0–4)
SP GR UR STRIP: 1.01 (ref 1–1.03)
SQUAMOUS #/AREA URNS HPF: 33 /HPF
TOXICOLOGY INFORMATION: NORMAL
UNIDENT CRYS URNS QL MICRO: NORMAL
URN SPEC COLLECT METH UR: ABNORMAL
UROBILINOGEN UR STRIP-ACNC: ABNORMAL EU/DL
WBC #/AREA URNS HPF: 4 /HPF (ref 0–5)
WBC CLUMPS URNS QL MICRO: NORMAL

## 2021-01-12 PROCEDURE — 99211 OFF/OP EST MAY X REQ PHY/QHP: CPT | Mod: 25

## 2021-01-12 PROCEDURE — 81000 URINALYSIS NONAUTO W/SCOPE: CPT | Mod: 59

## 2021-01-12 PROCEDURE — 80307 DRUG TEST PRSMV CHEM ANLYZR: CPT

## 2021-01-12 PROCEDURE — 59025 FETAL NON-STRESS TEST: CPT

## 2021-01-13 ENCOUNTER — ROUTINE PRENATAL (OUTPATIENT)
Dept: OBSTETRICS AND GYNECOLOGY | Facility: CLINIC | Age: 31
End: 2021-01-13
Payer: MEDICAID

## 2021-01-13 VITALS
BODY MASS INDEX: 33.55 KG/M2 | WEIGHT: 189.38 LBS | SYSTOLIC BLOOD PRESSURE: 112 MMHG | DIASTOLIC BLOOD PRESSURE: 68 MMHG

## 2021-01-13 DIAGNOSIS — O40.9XX0 POLYHYDRAMNIOS AFFECTING PREGNANCY: ICD-10-CM

## 2021-01-13 DIAGNOSIS — Z3A.37 37 WEEKS GESTATION OF PREGNANCY: Primary | ICD-10-CM

## 2021-01-13 DIAGNOSIS — O36.60X0 EXCESSIVE FETAL GROWTH AFFECTING MANAGEMENT OF PREGNANCY, ANTEPARTUM, SINGLE OR UNSPECIFIED FETUS: ICD-10-CM

## 2021-01-13 PROCEDURE — 99999 PR PBB SHADOW E&M-EST. PATIENT-LVL III: ICD-10-PCS | Mod: PBBFAC,,, | Performed by: OBSTETRICS & GYNECOLOGY

## 2021-01-13 PROCEDURE — 99213 PR OFFICE/OUTPT VISIT, EST, LEVL III, 20-29 MIN: ICD-10-PCS | Mod: TH,S$PBB,, | Performed by: OBSTETRICS & GYNECOLOGY

## 2021-01-13 PROCEDURE — 99999 PR PBB SHADOW E&M-EST. PATIENT-LVL III: CPT | Mod: PBBFAC,,, | Performed by: OBSTETRICS & GYNECOLOGY

## 2021-01-13 PROCEDURE — 99213 OFFICE O/P EST LOW 20 MIN: CPT | Mod: PBBFAC,TH | Performed by: OBSTETRICS & GYNECOLOGY

## 2021-01-13 PROCEDURE — 99213 OFFICE O/P EST LOW 20 MIN: CPT | Mod: TH,S$PBB,, | Performed by: OBSTETRICS & GYNECOLOGY

## 2021-01-17 ENCOUNTER — HOSPITAL ENCOUNTER (OUTPATIENT)
Facility: HOSPITAL | Age: 31
Discharge: HOME | End: 2021-01-17
Attending: OBSTETRICS & GYNECOLOGY | Admitting: OBSTETRICS & GYNECOLOGY
Payer: MEDICAID

## 2021-01-17 VITALS
OXYGEN SATURATION: 98 % | TEMPERATURE: 98 F | RESPIRATION RATE: 16 BRPM | SYSTOLIC BLOOD PRESSURE: 95 MMHG | DIASTOLIC BLOOD PRESSURE: 62 MMHG | HEART RATE: 90 BPM

## 2021-01-17 DIAGNOSIS — R51.9 HEADACHE: ICD-10-CM

## 2021-01-17 LAB
BILIRUB UR QL STRIP: NEGATIVE
CLARITY UR: CLEAR
COLOR UR: YELLOW
GLUCOSE UR QL STRIP: NEGATIVE
HGB UR QL STRIP: NEGATIVE
KETONES UR QL STRIP: NEGATIVE
LEUKOCYTE ESTERASE UR QL STRIP: NEGATIVE
NITRITE UR QL STRIP: NEGATIVE
PH UR STRIP: 7 [PH] (ref 5–8)
PROT UR QL STRIP: NEGATIVE
SP GR UR STRIP: 1.01 (ref 1–1.03)
URN SPEC COLLECT METH UR: ABNORMAL
UROBILINOGEN UR STRIP-ACNC: ABNORMAL EU/DL

## 2021-01-17 PROCEDURE — 59025 FETAL NON-STRESS TEST: CPT

## 2021-01-17 PROCEDURE — 81003 URINALYSIS AUTO W/O SCOPE: CPT

## 2021-01-17 PROCEDURE — 99211 OFF/OP EST MAY X REQ PHY/QHP: CPT | Mod: 25

## 2021-01-17 PROCEDURE — 25000003 PHARM REV CODE 250: Performed by: OBSTETRICS & GYNECOLOGY

## 2021-01-17 RX ORDER — BUTALBITAL, ACETAMINOPHEN AND CAFFEINE 50; 325; 40 MG/1; MG/1; MG/1
1 TABLET ORAL ONCE
Status: COMPLETED | OUTPATIENT
Start: 2021-01-17 | End: 2021-01-17

## 2021-01-17 RX ADMIN — BUTALBITAL, ACETAMINOPHEN AND CAFFEINE 1 TABLET: 50; 325; 40 TABLET ORAL at 04:01

## 2021-01-19 ENCOUNTER — TELEPHONE (OUTPATIENT)
Dept: OBSTETRICS AND GYNECOLOGY | Facility: CLINIC | Age: 31
End: 2021-01-19

## 2021-01-20 ENCOUNTER — ROUTINE PRENATAL (OUTPATIENT)
Dept: OBSTETRICS AND GYNECOLOGY | Facility: CLINIC | Age: 31
End: 2021-01-20
Payer: MEDICAID

## 2021-01-20 ENCOUNTER — LAB VISIT (OUTPATIENT)
Dept: LAB | Facility: HOSPITAL | Age: 31
End: 2021-01-20
Attending: OBSTETRICS & GYNECOLOGY
Payer: MEDICAID

## 2021-01-20 VITALS
SYSTOLIC BLOOD PRESSURE: 136 MMHG | BODY MASS INDEX: 33.98 KG/M2 | WEIGHT: 191.81 LBS | DIASTOLIC BLOOD PRESSURE: 80 MMHG

## 2021-01-20 DIAGNOSIS — Z3A.38 38 WEEKS GESTATION OF PREGNANCY: ICD-10-CM

## 2021-01-20 DIAGNOSIS — Z3A.38 38 WEEKS GESTATION OF PREGNANCY: Primary | ICD-10-CM

## 2021-01-20 LAB
CREAT UR-MCNC: 71.8 MG/DL (ref 15–325)
PROT UR-MCNC: 9 MG/DL
PROT/CREAT UR: 0.13 MG/G{CREAT} (ref 0–0.2)

## 2021-01-20 PROCEDURE — 99999 PR PBB SHADOW E&M-EST. PATIENT-LVL II: CPT | Mod: PBBFAC,,, | Performed by: OBSTETRICS & GYNECOLOGY

## 2021-01-20 PROCEDURE — 99212 OFFICE O/P EST SF 10 MIN: CPT | Mod: PBBFAC | Performed by: OBSTETRICS & GYNECOLOGY

## 2021-01-20 PROCEDURE — 99999 PR PBB SHADOW E&M-EST. PATIENT-LVL II: ICD-10-PCS | Mod: PBBFAC,,, | Performed by: OBSTETRICS & GYNECOLOGY

## 2021-01-20 PROCEDURE — 82570 ASSAY OF URINE CREATININE: CPT

## 2021-01-20 PROCEDURE — 99213 OFFICE O/P EST LOW 20 MIN: CPT | Mod: TH,S$PBB,, | Performed by: OBSTETRICS & GYNECOLOGY

## 2021-01-20 PROCEDURE — 99213 PR OFFICE/OUTPT VISIT, EST, LEVL III, 20-29 MIN: ICD-10-PCS | Mod: TH,S$PBB,, | Performed by: OBSTETRICS & GYNECOLOGY

## 2021-01-20 RX ORDER — OXYTOCIN/RINGER'S LACTATE 30/500 ML
0-30 PLASTIC BAG, INJECTION (ML) INTRAVENOUS CONTINUOUS
Status: CANCELLED | OUTPATIENT
Start: 2021-01-25

## 2021-01-20 RX ORDER — ONDANSETRON 4 MG/1
8 TABLET, ORALLY DISINTEGRATING ORAL EVERY 8 HOURS PRN
Status: CANCELLED | OUTPATIENT
Start: 2021-01-20

## 2021-01-20 RX ORDER — OXYTOCIN/RINGER'S LACTATE 30/500 ML
334 PLASTIC BAG, INJECTION (ML) INTRAVENOUS ONCE
Status: CANCELLED | OUTPATIENT
Start: 2021-01-20 | End: 2021-01-20

## 2021-01-20 RX ORDER — CALCIUM CARBONATE 200(500)MG
500 TABLET,CHEWABLE ORAL 3 TIMES DAILY PRN
Status: CANCELLED | OUTPATIENT
Start: 2021-01-20

## 2021-01-20 RX ORDER — SIMETHICONE 80 MG
1 TABLET,CHEWABLE ORAL 4 TIMES DAILY PRN
Status: CANCELLED | OUTPATIENT
Start: 2021-01-20

## 2021-01-20 RX ORDER — OXYTOCIN/RINGER'S LACTATE 30/500 ML
95 PLASTIC BAG, INJECTION (ML) INTRAVENOUS ONCE
Status: CANCELLED | OUTPATIENT
Start: 2021-01-20 | End: 2021-01-20

## 2021-01-25 ENCOUNTER — HOSPITAL ENCOUNTER (INPATIENT)
Facility: HOSPITAL | Age: 31
LOS: 2 days | Discharge: HOME | End: 2021-01-27
Attending: OBSTETRICS & GYNECOLOGY | Admitting: OBSTETRICS & GYNECOLOGY
Payer: MEDICAID

## 2021-01-25 ENCOUNTER — ANESTHESIA (OUTPATIENT)
Dept: OBSTETRICS AND GYNECOLOGY | Facility: HOSPITAL | Age: 31
End: 2021-01-25
Payer: MEDICAID

## 2021-01-25 ENCOUNTER — ANESTHESIA EVENT (OUTPATIENT)
Dept: OBSTETRICS AND GYNECOLOGY | Facility: HOSPITAL | Age: 31
End: 2021-01-25
Payer: MEDICAID

## 2021-01-25 DIAGNOSIS — Z98.891 S/P CESAREAN SECTION: ICD-10-CM

## 2021-01-25 DIAGNOSIS — Z3A.38 38 WEEKS GESTATION OF PREGNANCY: ICD-10-CM

## 2021-01-25 PROBLEM — E87.6 HYPOKALEMIA: Status: RESOLVED | Noted: 2020-06-16 | Resolved: 2021-01-25

## 2021-01-25 PROBLEM — R11.2 NAUSEA & VOMITING: Status: RESOLVED | Noted: 2020-06-16 | Resolved: 2021-01-25

## 2021-01-25 PROBLEM — Z3A.09 9 WEEKS GESTATION OF PREGNANCY: Status: RESOLVED | Noted: 2020-06-16 | Resolved: 2021-01-25

## 2021-01-25 PROBLEM — R51.9 HEADACHE: Status: RESOLVED | Noted: 2021-01-17 | Resolved: 2021-01-25

## 2021-01-25 PROBLEM — R52 PAIN DURING LABOR: Status: RESOLVED | Noted: 2020-12-21 | Resolved: 2021-01-25

## 2021-01-25 PROBLEM — O45.90 PLACENTA ABRUPTION, DELIVERED, CURRENT HOSPITALIZATION: Status: ACTIVE | Noted: 2021-01-25

## 2021-01-25 PROBLEM — O23.01 PYELONEPHRITIS AFFECTING PREGNANCY IN FIRST TRIMESTER: Status: RESOLVED | Noted: 2020-06-15 | Resolved: 2021-01-25

## 2021-01-25 PROBLEM — Z34.90 PREGNANCY: Status: RESOLVED | Noted: 2020-12-21 | Resolved: 2021-01-25

## 2021-01-25 PROBLEM — O47.00 PRETERM CONTRACTIONS: Status: RESOLVED | Noted: 2020-11-28 | Resolved: 2021-01-25

## 2021-01-25 PROBLEM — R10.9 ABDOMINAL PAIN: Status: RESOLVED | Noted: 2020-12-28 | Resolved: 2021-01-25

## 2021-01-25 PROBLEM — Z3A.39 39 WEEKS GESTATION OF PREGNANCY: Status: ACTIVE | Noted: 2021-01-25

## 2021-01-25 PROBLEM — O28.3 ABNORMAL PREGNANCY US: Status: RESOLVED | Noted: 2020-06-16 | Resolved: 2021-01-25

## 2021-01-25 LAB
ABO + RH BLD: NORMAL
APTT BLDCRRT: 26.8 SEC (ref 21–32)
BASOPHILS # BLD AUTO: 0.03 K/UL (ref 0–0.2)
BASOPHILS # BLD AUTO: 0.04 K/UL (ref 0–0.2)
BASOPHILS NFR BLD: 0.2 % (ref 0–1.9)
BASOPHILS NFR BLD: 0.4 % (ref 0–1.9)
BLD GP AB SCN CELLS X3 SERPL QL: NORMAL
DIFFERENTIAL METHOD: ABNORMAL
DIFFERENTIAL METHOD: ABNORMAL
EOSINOPHIL # BLD AUTO: 0 K/UL (ref 0–0.5)
EOSINOPHIL # BLD AUTO: 0.2 K/UL (ref 0–0.5)
EOSINOPHIL NFR BLD: 0.1 % (ref 0–8)
EOSINOPHIL NFR BLD: 1.8 % (ref 0–8)
ERYTHROCYTE [DISTWIDTH] IN BLOOD BY AUTOMATED COUNT: 17.1 % (ref 11.5–14.5)
ERYTHROCYTE [DISTWIDTH] IN BLOOD BY AUTOMATED COUNT: 17.2 % (ref 11.5–14.5)
FIBRINOGEN PPP-MCNC: 389 MG/DL (ref 182–366)
HCT VFR BLD AUTO: 22.8 % (ref 37–48.5)
HCT VFR BLD AUTO: 26.1 % (ref 37–48.5)
HGB BLD-MCNC: 6.9 G/DL (ref 12–16)
HGB BLD-MCNC: 8 G/DL (ref 12–16)
IMM GRANULOCYTES # BLD AUTO: 0.07 K/UL (ref 0–0.04)
IMM GRANULOCYTES # BLD AUTO: 0.08 K/UL (ref 0–0.04)
IMM GRANULOCYTES NFR BLD AUTO: 0.6 % (ref 0–0.5)
IMM GRANULOCYTES NFR BLD AUTO: 0.8 % (ref 0–0.5)
INR PPP: 1 (ref 0.8–1.2)
LYMPHOCYTES # BLD AUTO: 1.1 K/UL (ref 1–4.8)
LYMPHOCYTES # BLD AUTO: 2.2 K/UL (ref 1–4.8)
LYMPHOCYTES NFR BLD: 23.8 % (ref 18–48)
LYMPHOCYTES NFR BLD: 8.4 % (ref 18–48)
MCH RBC QN AUTO: 22.6 PG (ref 27–31)
MCH RBC QN AUTO: 22.7 PG (ref 27–31)
MCHC RBC AUTO-ENTMCNC: 30.3 G/DL (ref 32–36)
MCHC RBC AUTO-ENTMCNC: 30.7 G/DL (ref 32–36)
MCV RBC AUTO: 74 FL (ref 82–98)
MCV RBC AUTO: 75 FL (ref 82–98)
MONOCYTES # BLD AUTO: 0.6 K/UL (ref 0.3–1)
MONOCYTES # BLD AUTO: 0.7 K/UL (ref 0.3–1)
MONOCYTES NFR BLD: 4.6 % (ref 4–15)
MONOCYTES NFR BLD: 7.1 % (ref 4–15)
NEUTROPHILS # BLD AUTO: 11.4 K/UL (ref 1.8–7.7)
NEUTROPHILS # BLD AUTO: 6 K/UL (ref 1.8–7.7)
NEUTROPHILS NFR BLD: 66.1 % (ref 38–73)
NEUTROPHILS NFR BLD: 86.1 % (ref 38–73)
NRBC BLD-RTO: 0 /100 WBC
NRBC BLD-RTO: 0 /100 WBC
PLATELET # BLD AUTO: 261 K/UL (ref 150–350)
PLATELET # BLD AUTO: 304 K/UL (ref 150–350)
PMV BLD AUTO: 9.1 FL (ref 9.2–12.9)
PMV BLD AUTO: 9.4 FL (ref 9.2–12.9)
PROTHROMBIN TIME: 10.4 SEC (ref 9–12.5)
RBC # BLD AUTO: 3.05 M/UL (ref 4–5.4)
RBC # BLD AUTO: 3.52 M/UL (ref 4–5.4)
SARS-COV-2 RDRP RESP QL NAA+PROBE: NEGATIVE
WBC # BLD AUTO: 13.24 K/UL (ref 3.9–12.7)
WBC # BLD AUTO: 9.12 K/UL (ref 3.9–12.7)

## 2021-01-25 PROCEDURE — 72100002 HC LABOR CARE, 1ST 8 HOURS

## 2021-01-25 PROCEDURE — 36000684 HC CESAREAN SECTION, UNSCHEDULED

## 2021-01-25 PROCEDURE — 37000008 HC ANESTHESIA 1ST 15 MINUTES: Performed by: OBSTETRICS & GYNECOLOGY

## 2021-01-25 PROCEDURE — 25000003 PHARM REV CODE 250: Performed by: OBSTETRICS & GYNECOLOGY

## 2021-01-25 PROCEDURE — 01968 ANES/ANALG CS DLVR NEURAXIAL: CPT | Mod: AA,,, | Performed by: ANESTHESIOLOGY

## 2021-01-25 PROCEDURE — 27100025 HC TUBING, SET FLUID WARMER: Performed by: ANESTHESIOLOGY

## 2021-01-25 PROCEDURE — 36415 COLL VENOUS BLD VENIPUNCTURE: CPT

## 2021-01-25 PROCEDURE — 59514 PR CESAREAN DELIVERY ONLY: ICD-10-PCS | Mod: 80,GB,, | Performed by: OBSTETRICS & GYNECOLOGY

## 2021-01-25 PROCEDURE — 85610 PROTHROMBIN TIME: CPT

## 2021-01-25 PROCEDURE — 27200710 HC EPIDURAL INFUSION PUMP SET: Performed by: ANESTHESIOLOGY

## 2021-01-25 PROCEDURE — 25000003 PHARM REV CODE 250: Performed by: NURSE ANESTHETIST, CERTIFIED REGISTERED

## 2021-01-25 PROCEDURE — 85025 COMPLETE CBC W/AUTO DIFF WBC: CPT | Mod: 91

## 2021-01-25 PROCEDURE — 25000003 PHARM REV CODE 250: Performed by: ANESTHESIOLOGY

## 2021-01-25 PROCEDURE — 51702 INSERT TEMP BLADDER CATH: CPT

## 2021-01-25 PROCEDURE — U0002 COVID-19 LAB TEST NON-CDC: HCPCS

## 2021-01-25 PROCEDURE — 59514 CESAREAN DELIVERY ONLY: CPT | Mod: 80,GB,, | Performed by: OBSTETRICS & GYNECOLOGY

## 2021-01-25 PROCEDURE — 59514 CESAREAN DELIVERY ONLY: CPT | Mod: AA,,, | Performed by: ANESTHESIOLOGY

## 2021-01-25 PROCEDURE — 59514 CESAREAN DELIVERY ONLY: CPT | Mod: GB,,, | Performed by: OBSTETRICS & GYNECOLOGY

## 2021-01-25 PROCEDURE — 37000009 HC ANESTHESIA EA ADD 15 MINS: Performed by: OBSTETRICS & GYNECOLOGY

## 2021-01-25 PROCEDURE — 85384 FIBRINOGEN ACTIVITY: CPT

## 2021-01-25 PROCEDURE — 63600175 PHARM REV CODE 636 W HCPCS: Performed by: NURSE ANESTHETIST, CERTIFIED REGISTERED

## 2021-01-25 PROCEDURE — 01968 PR INSERT CATH,ART,PERCUT,SHORTTERM: ICD-10-PCS | Mod: AA,,, | Performed by: ANESTHESIOLOGY

## 2021-01-25 PROCEDURE — 63600175 PHARM REV CODE 636 W HCPCS: Performed by: OBSTETRICS & GYNECOLOGY

## 2021-01-25 PROCEDURE — 59514 PRA REAN DELIVERY ONLY: ICD-10-PCS | Mod: AA,,, | Performed by: ANESTHESIOLOGY

## 2021-01-25 PROCEDURE — 85730 THROMBOPLASTIN TIME PARTIAL: CPT

## 2021-01-25 PROCEDURE — 62326 NJX INTERLAMINAR LMBR/SAC: CPT | Performed by: ANESTHESIOLOGY

## 2021-01-25 PROCEDURE — 11000001 HC ACUTE MED/SURG PRIVATE ROOM

## 2021-01-25 PROCEDURE — 59514 PR CESAREAN DELIVERY ONLY: ICD-10-PCS | Mod: GB,,, | Performed by: OBSTETRICS & GYNECOLOGY

## 2021-01-25 PROCEDURE — C1751 CATH, INF, PER/CENT/MIDLINE: HCPCS | Performed by: ANESTHESIOLOGY

## 2021-01-25 PROCEDURE — 86592 SYPHILIS TEST NON-TREP QUAL: CPT

## 2021-01-25 PROCEDURE — 86900 BLOOD TYPING SEROLOGIC ABO: CPT

## 2021-01-25 RX ORDER — PRENATAL WITH FERROUS FUM AND FOLIC ACID 3080; 920; 120; 400; 22; 1.84; 3; 20; 10; 1; 12; 200; 27; 25; 2 [IU]/1; [IU]/1; MG/1; [IU]/1; MG/1; MG/1; MG/1; MG/1; MG/1; MG/1; UG/1; MG/1; MG/1; MG/1; MG/1
1 TABLET ORAL DAILY
Status: DISCONTINUED | OUTPATIENT
Start: 2021-01-26 | End: 2021-01-27 | Stop reason: HOSPADM

## 2021-01-25 RX ORDER — PHENYLEPHRINE HYDROCHLORIDE 10 MG/ML
INJECTION INTRAVENOUS
Status: DISCONTINUED | OUTPATIENT
Start: 2021-01-25 | End: 2021-01-25

## 2021-01-25 RX ORDER — OXYTOCIN/RINGER'S LACTATE 30/500 ML
95 PLASTIC BAG, INJECTION (ML) INTRAVENOUS ONCE
Status: COMPLETED | OUTPATIENT
Start: 2021-01-25 | End: 2021-01-25

## 2021-01-25 RX ORDER — KETOROLAC TROMETHAMINE 30 MG/ML
30 INJECTION, SOLUTION INTRAMUSCULAR; INTRAVENOUS EVERY 6 HOURS
Status: DISPENSED | OUTPATIENT
Start: 2021-01-26 | End: 2021-01-26

## 2021-01-25 RX ORDER — OXYTOCIN 10 [USP'U]/ML
INJECTION, SOLUTION INTRAMUSCULAR; INTRAVENOUS
Status: DISCONTINUED | OUTPATIENT
Start: 2021-01-25 | End: 2021-01-25

## 2021-01-25 RX ORDER — CEFAZOLIN SODIUM 2 G/50ML
2 SOLUTION INTRAVENOUS ONCE
Status: COMPLETED | OUTPATIENT
Start: 2021-01-25 | End: 2021-01-25

## 2021-01-25 RX ORDER — BUPIVACAINE HYDROCHLORIDE 2.5 MG/ML
INJECTION, SOLUTION EPIDURAL; INFILTRATION; INTRACAUDAL
Status: DISCONTINUED | OUTPATIENT
Start: 2021-01-25 | End: 2021-01-25

## 2021-01-25 RX ORDER — BUPIVACAINE HYDROCHLORIDE 2.5 MG/ML
INJECTION, SOLUTION INFILTRATION; PERINEURAL CONTINUOUS PRN
Status: DISCONTINUED | OUTPATIENT
Start: 2021-01-25 | End: 2021-01-25

## 2021-01-25 RX ORDER — KETAMINE HYDROCHLORIDE 100 MG/ML
INJECTION, SOLUTION INTRAMUSCULAR; INTRAVENOUS
Status: DISCONTINUED | OUTPATIENT
Start: 2021-01-25 | End: 2021-01-25

## 2021-01-25 RX ORDER — ONDANSETRON 2 MG/ML
4 INJECTION INTRAMUSCULAR; INTRAVENOUS EVERY 6 HOURS PRN
Status: ACTIVE | OUTPATIENT
Start: 2021-01-25 | End: 2021-01-26

## 2021-01-25 RX ORDER — LIDOCAINE HYDROCHLORIDE AND EPINEPHRINE 15; 5 MG/ML; UG/ML
INJECTION, SOLUTION EPIDURAL
Status: DISCONTINUED | OUTPATIENT
Start: 2021-01-25 | End: 2021-01-25

## 2021-01-25 RX ORDER — ONDANSETRON 8 MG/1
8 TABLET, ORALLY DISINTEGRATING ORAL EVERY 8 HOURS PRN
Status: DISCONTINUED | OUTPATIENT
Start: 2021-01-25 | End: 2021-01-27 | Stop reason: HOSPADM

## 2021-01-25 RX ORDER — SODIUM CHLORIDE, SODIUM LACTATE, POTASSIUM CHLORIDE, CALCIUM CHLORIDE 600; 310; 30; 20 MG/100ML; MG/100ML; MG/100ML; MG/100ML
INJECTION, SOLUTION INTRAVENOUS CONTINUOUS
Status: ACTIVE | OUTPATIENT
Start: 2021-01-25 | End: 2021-01-26

## 2021-01-25 RX ORDER — FENTANYL CITRATE 50 UG/ML
INJECTION, SOLUTION INTRAMUSCULAR; INTRAVENOUS
Status: DISCONTINUED | OUTPATIENT
Start: 2021-01-25 | End: 2021-01-25

## 2021-01-25 RX ORDER — SODIUM CHLORIDE, SODIUM LACTATE, POTASSIUM CHLORIDE, CALCIUM CHLORIDE 600; 310; 30; 20 MG/100ML; MG/100ML; MG/100ML; MG/100ML
INJECTION, SOLUTION INTRAVENOUS CONTINUOUS
Status: DISCONTINUED | OUTPATIENT
Start: 2021-01-25 | End: 2021-01-25

## 2021-01-25 RX ORDER — MORPHINE SULFATE 4 MG/ML
2 INJECTION, SOLUTION INTRAMUSCULAR; INTRAVENOUS EVERY 6 HOURS PRN
Status: ACTIVE | OUTPATIENT
Start: 2021-01-25 | End: 2021-01-26

## 2021-01-25 RX ORDER — OXYTOCIN/RINGER'S LACTATE 30/500 ML
0-30 PLASTIC BAG, INJECTION (ML) INTRAVENOUS CONTINUOUS
Status: DISCONTINUED | OUTPATIENT
Start: 2021-01-25 | End: 2021-01-25

## 2021-01-25 RX ORDER — DIPHENHYDRAMINE HCL 25 MG
25 CAPSULE ORAL EVERY 4 HOURS PRN
Status: DISCONTINUED | OUTPATIENT
Start: 2021-01-25 | End: 2021-01-27 | Stop reason: HOSPADM

## 2021-01-25 RX ORDER — SIMETHICONE 80 MG
1 TABLET,CHEWABLE ORAL 4 TIMES DAILY PRN
Status: DISCONTINUED | OUTPATIENT
Start: 2021-01-25 | End: 2021-01-25

## 2021-01-25 RX ORDER — FAMOTIDINE 10 MG/ML
INJECTION INTRAVENOUS
Status: DISCONTINUED | OUTPATIENT
Start: 2021-01-25 | End: 2021-01-25

## 2021-01-25 RX ORDER — LIDOCAINE HCL/EPINEPHRINE/PF 2%-1:200K
VIAL (ML) INJECTION
Status: DISCONTINUED | OUTPATIENT
Start: 2021-01-25 | End: 2021-01-25

## 2021-01-25 RX ORDER — OXYCODONE HYDROCHLORIDE 5 MG/1
10 TABLET ORAL EVERY 4 HOURS PRN
Status: DISPENSED | OUTPATIENT
Start: 2021-01-25 | End: 2021-01-26

## 2021-01-25 RX ORDER — SIMETHICONE 80 MG
1 TABLET,CHEWABLE ORAL EVERY 6 HOURS PRN
Status: DISCONTINUED | OUTPATIENT
Start: 2021-01-25 | End: 2021-01-27 | Stop reason: HOSPADM

## 2021-01-25 RX ORDER — HYDROCORTISONE 25 MG/G
CREAM TOPICAL 3 TIMES DAILY PRN
Status: DISCONTINUED | OUTPATIENT
Start: 2021-01-25 | End: 2021-01-27 | Stop reason: HOSPADM

## 2021-01-25 RX ORDER — ONDANSETRON 8 MG/1
8 TABLET, ORALLY DISINTEGRATING ORAL EVERY 8 HOURS PRN
Status: DISCONTINUED | OUTPATIENT
Start: 2021-01-25 | End: 2021-01-25

## 2021-01-25 RX ORDER — MIDAZOLAM HYDROCHLORIDE 1 MG/ML
INJECTION INTRAMUSCULAR; INTRAVENOUS
Status: DISCONTINUED | OUTPATIENT
Start: 2021-01-25 | End: 2021-01-25

## 2021-01-25 RX ORDER — ADHESIVE BANDAGE
30 BANDAGE TOPICAL 2 TIMES DAILY PRN
Status: DISCONTINUED | OUTPATIENT
Start: 2021-01-26 | End: 2021-01-27 | Stop reason: HOSPADM

## 2021-01-25 RX ORDER — OXYTOCIN/RINGER'S LACTATE 30/500 ML
95 PLASTIC BAG, INJECTION (ML) INTRAVENOUS ONCE
Status: DISCONTINUED | OUTPATIENT
Start: 2021-01-25 | End: 2021-01-27 | Stop reason: HOSPADM

## 2021-01-25 RX ORDER — METHYLERGONOVINE MALEATE 0.2 MG/ML
INJECTION INTRAVENOUS
Status: DISCONTINUED
Start: 2021-01-25 | End: 2021-01-25 | Stop reason: WASHOUT

## 2021-01-25 RX ORDER — BUPIVACAINE HYDROCHLORIDE 5 MG/ML
INJECTION, SOLUTION EPIDURAL; INTRACAUDAL
Status: DISCONTINUED | OUTPATIENT
Start: 2021-01-25 | End: 2021-01-25

## 2021-01-25 RX ORDER — AMOXICILLIN 250 MG
1 CAPSULE ORAL NIGHTLY PRN
Status: DISCONTINUED | OUTPATIENT
Start: 2021-01-25 | End: 2021-01-27 | Stop reason: HOSPADM

## 2021-01-25 RX ORDER — DOCUSATE SODIUM 100 MG/1
200 CAPSULE, LIQUID FILLED ORAL 2 TIMES DAILY
Status: DISCONTINUED | OUTPATIENT
Start: 2021-01-25 | End: 2021-01-27 | Stop reason: HOSPADM

## 2021-01-25 RX ORDER — CARBOPROST TROMETHAMINE 250 UG/ML
INJECTION, SOLUTION INTRAMUSCULAR
Status: DISCONTINUED
Start: 2021-01-25 | End: 2021-01-25 | Stop reason: WASHOUT

## 2021-01-25 RX ORDER — MORPHINE SULFATE 1 MG/ML
INJECTION, SOLUTION EPIDURAL; INTRATHECAL; INTRAVENOUS
Status: DISCONTINUED | OUTPATIENT
Start: 2021-01-25 | End: 2021-01-25

## 2021-01-25 RX ORDER — BISACODYL 10 MG
10 SUPPOSITORY, RECTAL RECTAL ONCE AS NEEDED
Status: DISCONTINUED | OUTPATIENT
Start: 2021-01-25 | End: 2021-01-27 | Stop reason: HOSPADM

## 2021-01-25 RX ORDER — ACETAMINOPHEN 325 MG/1
650 TABLET ORAL EVERY 6 HOURS
Status: COMPLETED | OUTPATIENT
Start: 2021-01-26 | End: 2021-01-26

## 2021-01-25 RX ORDER — SODIUM CITRATE AND CITRIC ACID MONOHYDRATE 334; 500 MG/5ML; MG/5ML
SOLUTION ORAL
Status: DISCONTINUED | OUTPATIENT
Start: 2021-01-25 | End: 2021-01-25

## 2021-01-25 RX ORDER — CALCIUM CARBONATE 200(500)MG
500 TABLET,CHEWABLE ORAL 3 TIMES DAILY PRN
Status: DISCONTINUED | OUTPATIENT
Start: 2021-01-25 | End: 2021-01-25

## 2021-01-25 RX ORDER — MUPIROCIN 20 MG/G
OINTMENT TOPICAL 2 TIMES DAILY
Status: DISCONTINUED | OUTPATIENT
Start: 2021-01-25 | End: 2021-01-27 | Stop reason: HOSPADM

## 2021-01-25 RX ORDER — FENTANYL/BUPIVACAINE/NS/PF 2MCG/ML-.1
PLASTIC BAG, INJECTION (ML) INJECTION CONTINUOUS PRN
Status: DISCONTINUED | OUTPATIENT
Start: 2021-01-25 | End: 2021-01-25

## 2021-01-25 RX ORDER — OXYCODONE AND ACETAMINOPHEN 10; 325 MG/1; MG/1
1 TABLET ORAL EVERY 4 HOURS PRN
Status: DISCONTINUED | OUTPATIENT
Start: 2021-01-25 | End: 2021-01-27 | Stop reason: HOSPADM

## 2021-01-25 RX ORDER — OXYCODONE HYDROCHLORIDE 5 MG/1
5 TABLET ORAL EVERY 4 HOURS PRN
Status: ACTIVE | OUTPATIENT
Start: 2021-01-25 | End: 2021-01-26

## 2021-01-25 RX ORDER — OXYTOCIN/RINGER'S LACTATE 30/500 ML
334 PLASTIC BAG, INJECTION (ML) INTRAVENOUS ONCE
Status: DISCONTINUED | OUTPATIENT
Start: 2021-01-25 | End: 2021-01-25

## 2021-01-25 RX ADMIN — OXYCODONE 10 MG: 5 TABLET ORAL at 10:01

## 2021-01-25 RX ADMIN — BUPIVACAINE HYDROCHLORIDE 5 MG: 5 INJECTION, SOLUTION EPIDURAL; INTRACAUDAL; PERINEURAL at 06:01

## 2021-01-25 RX ADMIN — SODIUM CHLORIDE, SODIUM LACTATE, POTASSIUM CHLORIDE, AND CALCIUM CHLORIDE: .6; .31; .03; .02 INJECTION, SOLUTION INTRAVENOUS at 05:01

## 2021-01-25 RX ADMIN — Medication 95 MILLI-UNITS/MIN: at 07:01

## 2021-01-25 RX ADMIN — MUPIROCIN: 20 OINTMENT TOPICAL at 08:01

## 2021-01-25 RX ADMIN — LIDOCAINE HYDROCHLORIDE,EPINEPHRINE BITARTRATE 1 ML: 15; .005 INJECTION, SOLUTION EPIDURAL; INFILTRATION; INTRACAUDAL; PERINEURAL at 03:01

## 2021-01-25 RX ADMIN — CEFAZOLIN SODIUM 2 G: 2 SOLUTION INTRAVENOUS at 06:01

## 2021-01-25 RX ADMIN — PHENYLEPHRINE HYDROCHLORIDE 100 MCG: 10 INJECTION INTRAVENOUS at 02:01

## 2021-01-25 RX ADMIN — SODIUM CHLORIDE, SODIUM LACTATE, POTASSIUM CHLORIDE, AND CALCIUM CHLORIDE: .6; .31; .03; .02 INJECTION, SOLUTION INTRAVENOUS at 06:01

## 2021-01-25 RX ADMIN — Medication 2 MILLI-UNITS/MIN: at 03:01

## 2021-01-25 RX ADMIN — FENTANYL CITRATE 25 MCG: 50 INJECTION, SOLUTION INTRAMUSCULAR; INTRAVENOUS at 07:01

## 2021-01-25 RX ADMIN — PHENYLEPHRINE HYDROCHLORIDE 200 MCG: 10 INJECTION INTRAVENOUS at 07:01

## 2021-01-25 RX ADMIN — Medication 10 ML/HR: at 03:01

## 2021-01-25 RX ADMIN — FAMOTIDINE 20 MG: 10 INJECTION, SOLUTION INTRAVENOUS at 06:01

## 2021-01-25 RX ADMIN — BUPIVACAINE HYDROCHLORIDE 3 ML: 2.5 INJECTION, SOLUTION INFILTRATION; PERINEURAL at 01:01

## 2021-01-25 RX ADMIN — FENTANYL CITRATE 25 MCG: 50 INJECTION, SOLUTION INTRAMUSCULAR; INTRAVENOUS at 06:01

## 2021-01-25 RX ADMIN — LIDOCAINE HYDROCHLORIDE,EPINEPHRINE BITARTRATE 10 MG: 20; .005 INJECTION, SOLUTION EPIDURAL; INFILTRATION; INTRACAUDAL; PERINEURAL at 06:01

## 2021-01-25 RX ADMIN — SODIUM CHLORIDE, SODIUM LACTATE, POTASSIUM CHLORIDE, AND CALCIUM CHLORIDE: .6; .31; .03; .02 INJECTION, SOLUTION INTRAVENOUS at 04:01

## 2021-01-25 RX ADMIN — OXYTOCIN 30 UNITS: 10 INJECTION, SOLUTION INTRAMUSCULAR; INTRAVENOUS at 06:01

## 2021-01-25 RX ADMIN — PHENYLEPHRINE HYDROCHLORIDE 200 MCG: 10 INJECTION INTRAVENOUS at 06:01

## 2021-01-25 RX ADMIN — ONDANSETRON 4 MG: 8 TABLET, ORALLY DISINTEGRATING ORAL at 01:01

## 2021-01-25 RX ADMIN — SODIUM CHLORIDE, SODIUM LACTATE, POTASSIUM CHLORIDE, AND CALCIUM CHLORIDE: .6; .31; .03; .02 INJECTION, SOLUTION INTRAVENOUS at 02:01

## 2021-01-25 RX ADMIN — Medication 3 ML: at 03:01

## 2021-01-25 RX ADMIN — KETAMINE HYDROCHLORIDE 25 MG: 100 INJECTION, SOLUTION, CONCENTRATE INTRAMUSCULAR; INTRAVENOUS at 06:01

## 2021-01-25 RX ADMIN — LIDOCAINE HYDROCHLORIDE,EPINEPHRINE BITARTRATE 5 MG: 20; .005 INJECTION, SOLUTION EPIDURAL; INFILTRATION; INTRACAUDAL; PERINEURAL at 06:01

## 2021-01-25 RX ADMIN — Medication 2 ML: at 03:01

## 2021-01-25 RX ADMIN — SODIUM CHLORIDE, SODIUM LACTATE, POTASSIUM CHLORIDE, AND CALCIUM CHLORIDE 1000 ML: .6; .31; .03; .02 INJECTION, SOLUTION INTRAVENOUS at 03:01

## 2021-01-25 RX ADMIN — SODIUM CITRATE AND CITRIC ACID MONOHYDRATE 30 ML: 500; 334 SOLUTION ORAL at 06:01

## 2021-01-25 RX ADMIN — LIDOCAINE HYDROCHLORIDE,EPINEPHRINE BITARTRATE 3 ML: 15; .005 INJECTION, SOLUTION EPIDURAL; INFILTRATION; INTRACAUDAL; PERINEURAL at 03:01

## 2021-01-25 RX ADMIN — MIDAZOLAM HYDROCHLORIDE 2 MG: 1 INJECTION, SOLUTION INTRAMUSCULAR; INTRAVENOUS at 07:01

## 2021-01-25 RX ADMIN — AZITHROMYCIN 500 MG: 500 INJECTION, POWDER, LYOPHILIZED, FOR SOLUTION INTRAVENOUS at 06:01

## 2021-01-25 RX ADMIN — BUPIVACAINE HYDROCHLORIDE 6 ML: 2.5 INJECTION, SOLUTION EPIDURAL; INFILTRATION; INTRACAUDAL; PERINEURAL at 04:01

## 2021-01-25 RX ADMIN — Medication 2 MG: at 07:01

## 2021-01-25 RX ADMIN — SODIUM CHLORIDE, SODIUM LACTATE, POTASSIUM CHLORIDE, AND CALCIUM CHLORIDE 500 ML: .6; .31; .03; .02 INJECTION, SOLUTION INTRAVENOUS at 07:01

## 2021-01-26 LAB
BASOPHILS # BLD AUTO: 0.04 K/UL (ref 0–0.2)
BASOPHILS NFR BLD: 0.3 % (ref 0–1.9)
DIFFERENTIAL METHOD: ABNORMAL
EOSINOPHIL # BLD AUTO: 0 K/UL (ref 0–0.5)
EOSINOPHIL NFR BLD: 0.1 % (ref 0–8)
ERYTHROCYTE [DISTWIDTH] IN BLOOD BY AUTOMATED COUNT: 17.1 % (ref 11.5–14.5)
HCT VFR BLD AUTO: 20 % (ref 37–48.5)
HGB BLD-MCNC: 6.3 G/DL (ref 12–16)
IMM GRANULOCYTES # BLD AUTO: 0.11 K/UL (ref 0–0.04)
IMM GRANULOCYTES NFR BLD AUTO: 0.7 % (ref 0–0.5)
LYMPHOCYTES # BLD AUTO: 2.1 K/UL (ref 1–4.8)
LYMPHOCYTES NFR BLD: 13.7 % (ref 18–48)
MCH RBC QN AUTO: 23.4 PG (ref 27–31)
MCHC RBC AUTO-ENTMCNC: 31.5 G/DL (ref 32–36)
MCV RBC AUTO: 74 FL (ref 82–98)
MONOCYTES # BLD AUTO: 1.1 K/UL (ref 0.3–1)
MONOCYTES NFR BLD: 7.5 % (ref 4–15)
NEUTROPHILS # BLD AUTO: 11.6 K/UL (ref 1.8–7.7)
NEUTROPHILS NFR BLD: 77.7 % (ref 38–73)
NRBC BLD-RTO: 0 /100 WBC
PLATELET # BLD AUTO: 249 K/UL (ref 150–350)
PMV BLD AUTO: 9.5 FL (ref 9.2–12.9)
RBC # BLD AUTO: 2.69 M/UL (ref 4–5.4)
RPR SER QL: NORMAL
WBC # BLD AUTO: 14.93 K/UL (ref 3.9–12.7)

## 2021-01-26 PROCEDURE — 25000003 PHARM REV CODE 250: Performed by: OBSTETRICS & GYNECOLOGY

## 2021-01-26 PROCEDURE — 36415 COLL VENOUS BLD VENIPUNCTURE: CPT

## 2021-01-26 PROCEDURE — 11000001 HC ACUTE MED/SURG PRIVATE ROOM

## 2021-01-26 PROCEDURE — 85025 COMPLETE CBC W/AUTO DIFF WBC: CPT

## 2021-01-26 PROCEDURE — 63600175 PHARM REV CODE 636 W HCPCS: Performed by: ANESTHESIOLOGY

## 2021-01-26 PROCEDURE — 99231 PR SUBSEQUENT HOSPITAL CARE,LEVL I: ICD-10-PCS | Mod: ,,, | Performed by: OBSTETRICS & GYNECOLOGY

## 2021-01-26 PROCEDURE — 25000003 PHARM REV CODE 250: Performed by: ANESTHESIOLOGY

## 2021-01-26 PROCEDURE — 99231 SBSQ HOSP IP/OBS SF/LOW 25: CPT | Mod: ,,, | Performed by: OBSTETRICS & GYNECOLOGY

## 2021-01-26 RX ORDER — FERROUS SULFATE 325(65) MG
325 TABLET, DELAYED RELEASE (ENTERIC COATED) ORAL 3 TIMES DAILY
Status: DISCONTINUED | OUTPATIENT
Start: 2021-01-26 | End: 2021-01-27 | Stop reason: HOSPADM

## 2021-01-26 RX ADMIN — DOCUSATE SODIUM 200 MG: 100 CAPSULE, LIQUID FILLED ORAL at 08:01

## 2021-01-26 RX ADMIN — MUPIROCIN: 20 OINTMENT TOPICAL at 08:01

## 2021-01-26 RX ADMIN — ACETAMINOPHEN 650 MG: 325 TABLET ORAL at 01:01

## 2021-01-26 RX ADMIN — OXYCODONE HYDROCHLORIDE AND ACETAMINOPHEN 1 TABLET: 10; 325 TABLET ORAL at 01:01

## 2021-01-26 RX ADMIN — SIMETHICONE 80 MG: 80 TABLET, CHEWABLE ORAL at 08:01

## 2021-01-26 RX ADMIN — OXYCODONE HYDROCHLORIDE AND ACETAMINOPHEN 1 TABLET: 10; 325 TABLET ORAL at 07:01

## 2021-01-26 RX ADMIN — ACETAMINOPHEN 650 MG: 325 TABLET ORAL at 05:01

## 2021-01-26 RX ADMIN — PRENATAL VIT W/ FE FUMARATE-FA TAB 27-0.8 MG 1 TABLET: 27-0.8 TAB at 08:01

## 2021-01-26 RX ADMIN — OXYCODONE HYDROCHLORIDE AND ACETAMINOPHEN 1 TABLET: 10; 325 TABLET ORAL at 08:01

## 2021-01-26 RX ADMIN — Medication 325 MG: at 08:01

## 2021-01-26 RX ADMIN — KETOROLAC TROMETHAMINE 30 MG: 30 INJECTION, SOLUTION INTRAMUSCULAR at 05:01

## 2021-01-26 RX ADMIN — KETOROLAC TROMETHAMINE 30 MG: 30 INJECTION, SOLUTION INTRAMUSCULAR at 01:01

## 2021-01-26 RX ADMIN — ACETAMINOPHEN 650 MG: 325 TABLET ORAL at 11:01

## 2021-01-27 VITALS
WEIGHT: 191.81 LBS | OXYGEN SATURATION: 99 % | RESPIRATION RATE: 18 BRPM | TEMPERATURE: 99 F | HEIGHT: 63 IN | HEART RATE: 88 BPM | SYSTOLIC BLOOD PRESSURE: 112 MMHG | DIASTOLIC BLOOD PRESSURE: 58 MMHG | BODY MASS INDEX: 33.98 KG/M2

## 2021-01-27 PROBLEM — Z3A.39 39 WEEKS GESTATION OF PREGNANCY: Status: RESOLVED | Noted: 2021-01-25 | Resolved: 2021-01-27

## 2021-01-27 PROCEDURE — 99238 HOSP IP/OBS DSCHRG MGMT 30/<: CPT | Mod: ,,, | Performed by: OBSTETRICS & GYNECOLOGY

## 2021-01-27 PROCEDURE — 99238 PR HOSPITAL DISCHARGE DAY,<30 MIN: ICD-10-PCS | Mod: ,,, | Performed by: OBSTETRICS & GYNECOLOGY

## 2021-01-27 PROCEDURE — 25000003 PHARM REV CODE 250: Performed by: OBSTETRICS & GYNECOLOGY

## 2021-01-27 RX ORDER — DOCUSATE SODIUM 100 MG/1
100 CAPSULE, LIQUID FILLED ORAL 2 TIMES DAILY PRN
Qty: 60 CAPSULE | Refills: 2 | Status: SHIPPED | OUTPATIENT
Start: 2021-01-27

## 2021-01-27 RX ORDER — FERROUS SULFATE 325(65) MG
325 TABLET, DELAYED RELEASE (ENTERIC COATED) ORAL 2 TIMES DAILY
Qty: 60 TABLET | Refills: 2 | Status: SHIPPED | OUTPATIENT
Start: 2021-01-27

## 2021-01-27 RX ORDER — OXYCODONE AND ACETAMINOPHEN 5; 325 MG/1; MG/1
1 TABLET ORAL EVERY 4 HOURS PRN
Qty: 20 TABLET | Refills: 0 | Status: SHIPPED | OUTPATIENT
Start: 2021-01-27

## 2021-01-27 RX ORDER — IBUPROFEN 600 MG/1
600 TABLET ORAL EVERY 6 HOURS PRN
Qty: 40 TABLET | Refills: 0 | Status: SHIPPED | OUTPATIENT
Start: 2021-01-27 | End: 2021-02-25 | Stop reason: SDUPTHER

## 2021-01-27 RX ADMIN — MUPIROCIN: 20 OINTMENT TOPICAL at 08:01

## 2021-01-27 RX ADMIN — DOCUSATE SODIUM 200 MG: 100 CAPSULE, LIQUID FILLED ORAL at 08:01

## 2021-01-27 RX ADMIN — OXYCODONE HYDROCHLORIDE AND ACETAMINOPHEN 1 TABLET: 10; 325 TABLET ORAL at 03:01

## 2021-01-27 RX ADMIN — Medication 325 MG: at 08:01

## 2021-01-27 RX ADMIN — PRENATAL VIT W/ FE FUMARATE-FA TAB 27-0.8 MG 1 TABLET: 27-0.8 TAB at 08:01

## 2021-01-27 RX ADMIN — OXYCODONE HYDROCHLORIDE AND ACETAMINOPHEN 1 TABLET: 10; 325 TABLET ORAL at 08:01

## 2021-02-10 ENCOUNTER — TELEPHONE (OUTPATIENT)
Dept: OBSTETRICS AND GYNECOLOGY | Facility: CLINIC | Age: 31
End: 2021-02-10

## 2021-02-22 ENCOUNTER — POSTPARTUM VISIT (OUTPATIENT)
Dept: OBSTETRICS AND GYNECOLOGY | Facility: CLINIC | Age: 31
End: 2021-02-22
Payer: MEDICAID

## 2021-02-22 ENCOUNTER — CLINICAL SUPPORT (OUTPATIENT)
Dept: OBSTETRICS AND GYNECOLOGY | Facility: CLINIC | Age: 31
End: 2021-02-22
Payer: MEDICAID

## 2021-02-22 VITALS — BODY MASS INDEX: 32.41 KG/M2 | WEIGHT: 183 LBS

## 2021-02-22 VITALS
BODY MASS INDEX: 32.48 KG/M2 | HEIGHT: 63 IN | WEIGHT: 183.31 LBS | SYSTOLIC BLOOD PRESSURE: 104 MMHG | DIASTOLIC BLOOD PRESSURE: 60 MMHG

## 2021-02-22 DIAGNOSIS — Z30.09 FAMILY PLANNING COUNSELING: ICD-10-CM

## 2021-02-22 DIAGNOSIS — Z30.42 ENCOUNTER FOR MANAGEMENT AND INJECTION OF DEPO-PROVERA: Primary | ICD-10-CM

## 2021-02-22 PROBLEM — O45.90 PLACENTA ABRUPTION, DELIVERED, CURRENT HOSPITALIZATION: Status: RESOLVED | Noted: 2021-01-25 | Resolved: 2021-02-22

## 2021-02-22 PROCEDURE — 59430 PR CARE AFTER DELIVERY ONLY: ICD-10-PCS | Mod: ,,, | Performed by: OBSTETRICS & GYNECOLOGY

## 2021-02-22 PROCEDURE — 99213 OFFICE O/P EST LOW 20 MIN: CPT | Mod: PBBFAC | Performed by: OBSTETRICS & GYNECOLOGY

## 2021-02-22 PROCEDURE — 99999 PR PBB SHADOW E&M-EST. PATIENT-LVL III: CPT | Mod: PBBFAC,,, | Performed by: OBSTETRICS & GYNECOLOGY

## 2021-02-22 PROCEDURE — 99999 PR PBB SHADOW E&M-EST. PATIENT-LVL II: CPT | Mod: PBBFAC,,,

## 2021-02-22 PROCEDURE — 99999 PR PBB SHADOW E&M-EST. PATIENT-LVL II: ICD-10-PCS | Mod: PBBFAC,,,

## 2021-02-22 PROCEDURE — 96372 THER/PROPH/DIAG INJ SC/IM: CPT | Mod: PBBFAC

## 2021-02-22 PROCEDURE — 99999 PR PBB SHADOW E&M-EST. PATIENT-LVL III: ICD-10-PCS | Mod: PBBFAC,,, | Performed by: OBSTETRICS & GYNECOLOGY

## 2021-02-22 RX ORDER — MEDROXYPROGESTERONE ACETATE 150 MG/ML
150 INJECTION, SUSPENSION INTRAMUSCULAR
Status: COMPLETED | OUTPATIENT
Start: 2021-02-22 | End: 2021-02-22

## 2021-02-22 RX ORDER — MEDROXYPROGESTERONE ACETATE 150 MG/ML
INJECTION, SUSPENSION INTRAMUSCULAR
COMMUNITY
Start: 2021-02-15

## 2021-02-22 RX ADMIN — MEDROXYPROGESTERONE ACETATE 150 MG: 150 INJECTION, SUSPENSION INTRAMUSCULAR at 04:02

## 2021-02-24 ENCOUNTER — TELEPHONE (OUTPATIENT)
Dept: OBSTETRICS AND GYNECOLOGY | Facility: CLINIC | Age: 31
End: 2021-02-24

## 2021-02-25 ENCOUNTER — POSTPARTUM VISIT (OUTPATIENT)
Dept: OBSTETRICS AND GYNECOLOGY | Facility: CLINIC | Age: 31
End: 2021-02-25
Payer: MEDICAID

## 2021-02-25 VITALS
BODY MASS INDEX: 32.17 KG/M2 | DIASTOLIC BLOOD PRESSURE: 60 MMHG | HEIGHT: 63 IN | WEIGHT: 181.56 LBS | SYSTOLIC BLOOD PRESSURE: 108 MMHG

## 2021-02-25 DIAGNOSIS — G89.18 POSTOPERATIVE PAIN: ICD-10-CM

## 2021-02-25 DIAGNOSIS — L02.224 BOIL OF GROIN: Primary | ICD-10-CM

## 2021-02-25 PROCEDURE — 99999 PR PBB SHADOW E&M-EST. PATIENT-LVL III: CPT | Mod: PBBFAC,,, | Performed by: OBSTETRICS & GYNECOLOGY

## 2021-02-25 PROCEDURE — 99213 OFFICE O/P EST LOW 20 MIN: CPT | Mod: PBBFAC | Performed by: OBSTETRICS & GYNECOLOGY

## 2021-02-25 PROCEDURE — 99212 PR OFFICE/OUTPT VISIT, EST, LEVL II, 10-19 MIN: ICD-10-PCS | Mod: S$PBB,,, | Performed by: OBSTETRICS & GYNECOLOGY

## 2021-02-25 PROCEDURE — 99999 PR PBB SHADOW E&M-EST. PATIENT-LVL III: ICD-10-PCS | Mod: PBBFAC,,, | Performed by: OBSTETRICS & GYNECOLOGY

## 2021-02-25 PROCEDURE — 99212 OFFICE O/P EST SF 10 MIN: CPT | Mod: S$PBB,,, | Performed by: OBSTETRICS & GYNECOLOGY

## 2021-02-25 RX ORDER — IBUPROFEN 600 MG/1
600 TABLET ORAL EVERY 6 HOURS PRN
Qty: 40 TABLET | Refills: 0 | Status: SHIPPED | OUTPATIENT
Start: 2021-02-25 | End: 2022-02-25

## 2021-02-25 RX ORDER — SULFAMETHOXAZOLE AND TRIMETHOPRIM 800; 160 MG/1; MG/1
1 TABLET ORAL 2 TIMES DAILY
Qty: 14 TABLET | Refills: 0 | Status: SHIPPED | OUTPATIENT
Start: 2021-02-25 | End: 2021-03-04

## 2021-05-25 ENCOUNTER — CLINICAL SUPPORT (OUTPATIENT)
Dept: OBSTETRICS AND GYNECOLOGY | Facility: CLINIC | Age: 31
End: 2021-05-25
Payer: MEDICAID

## 2021-05-25 VITALS — BODY MASS INDEX: 33.45 KG/M2 | WEIGHT: 188.81 LBS

## 2021-05-25 DIAGNOSIS — Z30.42 ENCOUNTER FOR MANAGEMENT AND INJECTION OF DEPO-PROVERA: Primary | ICD-10-CM

## 2021-05-25 PROCEDURE — 96372 THER/PROPH/DIAG INJ SC/IM: CPT | Mod: PBBFAC

## 2021-05-25 PROCEDURE — 99999 PR PBB SHADOW E&M-EST. PATIENT-LVL II: CPT | Mod: PBBFAC,,,

## 2021-05-25 PROCEDURE — 99999 PR PBB SHADOW E&M-EST. PATIENT-LVL II: ICD-10-PCS | Mod: PBBFAC,,,

## 2021-05-25 RX ORDER — MEDROXYPROGESTERONE ACETATE 150 MG/ML
150 INJECTION, SUSPENSION INTRAMUSCULAR
Status: COMPLETED | OUTPATIENT
Start: 2021-05-25 | End: 2021-11-22

## 2021-05-25 RX ADMIN — MEDROXYPROGESTERONE ACETATE 150 MG: 150 INJECTION, SUSPENSION INTRAMUSCULAR at 02:05

## 2021-06-04 ENCOUNTER — TELEPHONE (OUTPATIENT)
Dept: OBSTETRICS AND GYNECOLOGY | Facility: CLINIC | Age: 31
End: 2021-06-04

## 2021-08-25 ENCOUNTER — OFFICE VISIT (OUTPATIENT)
Dept: OBSTETRICS AND GYNECOLOGY | Facility: CLINIC | Age: 31
End: 2021-08-25
Payer: MEDICAID

## 2021-08-25 ENCOUNTER — CLINICAL SUPPORT (OUTPATIENT)
Dept: OBSTETRICS AND GYNECOLOGY | Facility: CLINIC | Age: 31
End: 2021-08-25
Payer: MEDICAID

## 2021-08-25 VITALS
WEIGHT: 190.69 LBS | HEIGHT: 63 IN | BODY MASS INDEX: 33.81 KG/M2 | BODY MASS INDEX: 33.78 KG/M2 | WEIGHT: 190.81 LBS | SYSTOLIC BLOOD PRESSURE: 104 MMHG | DIASTOLIC BLOOD PRESSURE: 60 MMHG

## 2021-08-25 DIAGNOSIS — B37.31 VAGINAL YEAST INFECTION: ICD-10-CM

## 2021-08-25 DIAGNOSIS — Z30.42 ENCOUNTER FOR MANAGEMENT AND INJECTION OF DEPO-PROVERA: Primary | ICD-10-CM

## 2021-08-25 DIAGNOSIS — Z01.419 WELL WOMAN EXAM WITH ROUTINE GYNECOLOGICAL EXAM: Primary | ICD-10-CM

## 2021-08-25 DIAGNOSIS — F43.23 ADJUSTMENT DISORDER WITH MIXED ANXIETY AND DEPRESSED MOOD: ICD-10-CM

## 2021-08-25 PROCEDURE — 99213 OFFICE O/P EST LOW 20 MIN: CPT | Mod: PBBFAC | Performed by: OBSTETRICS & GYNECOLOGY

## 2021-08-25 PROCEDURE — 99999 PR PBB SHADOW E&M-EST. PATIENT-LVL III: CPT | Mod: PBBFAC,,, | Performed by: OBSTETRICS & GYNECOLOGY

## 2021-08-25 PROCEDURE — 99999 PR PBB SHADOW E&M-EST. PATIENT-LVL III: ICD-10-PCS | Mod: PBBFAC,,, | Performed by: OBSTETRICS & GYNECOLOGY

## 2021-08-25 PROCEDURE — 99395 PR PREVENTIVE VISIT,EST,18-39: ICD-10-PCS | Mod: S$PBB,,, | Performed by: OBSTETRICS & GYNECOLOGY

## 2021-08-25 PROCEDURE — 96372 THER/PROPH/DIAG INJ SC/IM: CPT | Mod: PBBFAC

## 2021-08-25 PROCEDURE — 99395 PREV VISIT EST AGE 18-39: CPT | Mod: S$PBB,,, | Performed by: OBSTETRICS & GYNECOLOGY

## 2021-08-25 PROCEDURE — 99999 PR PBB SHADOW E&M-EST. PATIENT-LVL I: ICD-10-PCS | Mod: PBBFAC,,,

## 2021-08-25 PROCEDURE — 99999 PR PBB SHADOW E&M-EST. PATIENT-LVL I: CPT | Mod: PBBFAC,,,

## 2021-08-25 RX ORDER — ACETAMINOPHEN AND CODEINE PHOSPHATE 300; 30 MG/1; MG/1
1 TABLET ORAL EVERY 6 HOURS PRN
COMMUNITY
Start: 2021-05-26

## 2021-08-25 RX ORDER — FLUCONAZOLE 150 MG/1
150 TABLET ORAL
Qty: 6 TABLET | Refills: 0 | Status: SHIPPED | OUTPATIENT
Start: 2021-08-25

## 2021-08-25 RX ORDER — AMOXICILLIN 500 MG/1
500 CAPSULE ORAL EVERY 8 HOURS
COMMUNITY
Start: 2021-05-20

## 2021-08-25 RX ORDER — FLUOXETINE 10 MG/1
10 CAPSULE ORAL DAILY
Qty: 30 CAPSULE | Refills: 2 | Status: SHIPPED | OUTPATIENT
Start: 2021-08-25

## 2021-08-25 RX ORDER — NAPROXEN 500 MG/1
500 TABLET ORAL 2 TIMES DAILY
COMMUNITY
Start: 2021-03-31

## 2021-08-25 RX ORDER — TRAMADOL HYDROCHLORIDE 50 MG/1
50 TABLET ORAL 3 TIMES DAILY
COMMUNITY
Start: 2021-03-31

## 2021-08-25 RX ORDER — CYCLOBENZAPRINE HCL 5 MG
5-10 TABLET ORAL NIGHTLY
COMMUNITY
Start: 2021-03-31

## 2021-08-25 RX ADMIN — MEDROXYPROGESTERONE ACETATE 150 MG: 150 INJECTION, SUSPENSION INTRAMUSCULAR at 02:08

## 2021-11-22 ENCOUNTER — CLINICAL SUPPORT (OUTPATIENT)
Dept: OBSTETRICS AND GYNECOLOGY | Facility: CLINIC | Age: 31
End: 2021-11-22
Payer: MEDICAID

## 2021-11-22 VITALS — BODY MASS INDEX: 32.36 KG/M2 | WEIGHT: 182.63 LBS

## 2021-11-22 DIAGNOSIS — Z30.42 ENCOUNTER FOR MANAGEMENT AND INJECTION OF DEPO-PROVERA: Primary | ICD-10-CM

## 2021-11-22 PROCEDURE — 99999 PR PBB SHADOW E&M-EST. PATIENT-LVL II: CPT | Mod: PBBFAC,,,

## 2021-11-22 PROCEDURE — 99999 PR PBB SHADOW E&M-EST. PATIENT-LVL II: ICD-10-PCS | Mod: PBBFAC,,,

## 2021-11-22 PROCEDURE — 96372 THER/PROPH/DIAG INJ SC/IM: CPT | Mod: PBBFAC

## 2021-11-22 RX ADMIN — MEDROXYPROGESTERONE ACETATE 150 MG: 150 INJECTION, SUSPENSION INTRAMUSCULAR at 10:11

## 2022-07-18 NOTE — ED NOTES
Bed: RWR 01  Expected date:   Expected time:   Means of arrival:   Comments:  
Dr. Arzola with patient to review results and plan of care.   
Dr. Sandoval with the patient   
Patient identifiers verified and correct for Christopher Avila.    LOC: The patient is awake, alert and aware of environment with an appropriate affect, the patient is oriented x 3 and speaking appropriately.  APPEARANCE: Patient resting comfortably and in no acute distress, patient is clean and well groomed, patient's clothing is properly fastened.  SKIN: The skin is warm and dry, color consistent with ethnicity, patient has normal skin turgor and moist mucus membranes, skin intact, no breakdown or bruising noted.  MUSCULOSKELETAL: Patient moving all extremities spontaneously, no obvious swelling or deformities noted.  RESPIRATORY: Airway is open and patent, respirations are spontaneous, patient has a normal effort and rate, no accessory muscle use noted, bilateral breath sounds present.  CARDIAC: Patient has a normal rate and regular rhythm, no periphreal edema noted, capillary refill < 3 seconds.  ABDOMEN: Soft and non tender to palpation, no distention noted, normoactive bowel sounds present in all four quadrants.  NEUROLOGIC: PERRL, 3 mm bilaterally, eyes open spontaneously, behavior appropriate to situation, follows commands, facial expression symmetrical, bilateral hand grasp equal and even, purposeful motor response noted, normal sensation in all extremities when touched with a finger.  
Pt gowned and pelvic exam set up at bedside.   
Pt reports headache, back pain, nausea and abd pain x 1 week. LMP February. Last depot injection January.   
Yes

## 2023-11-27 PROCEDURE — 99285 EMERGENCY DEPT VISIT HI MDM: CPT

## 2023-11-28 ENCOUNTER — HOSPITAL ENCOUNTER (EMERGENCY)
Facility: HOSPITAL | Age: 33
Discharge: HOME | End: 2023-11-28
Attending: EMERGENCY MEDICINE

## 2023-11-28 VITALS
DIASTOLIC BLOOD PRESSURE: 56 MMHG | WEIGHT: 209 LBS | SYSTOLIC BLOOD PRESSURE: 113 MMHG | RESPIRATION RATE: 18 BRPM | TEMPERATURE: 99 F | OXYGEN SATURATION: 98 % | BODY MASS INDEX: 37.03 KG/M2 | HEART RATE: 88 BPM | HEIGHT: 63 IN

## 2023-11-28 DIAGNOSIS — M54.50 ACUTE BILATERAL LOW BACK PAIN WITHOUT SCIATICA: Primary | ICD-10-CM

## 2023-11-28 DIAGNOSIS — R10.9 RIGHT SIDED ABDOMINAL PAIN: ICD-10-CM

## 2023-11-28 LAB
B-HCG UR QL: NEGATIVE
BILIRUB UR QL STRIP: NEGATIVE
CLARITY UR: CLEAR
COLOR UR: YELLOW
CTP QC/QA: YES
GLUCOSE UR QL STRIP: NEGATIVE
HGB UR QL STRIP: ABNORMAL
KETONES UR QL STRIP: NEGATIVE
LEUKOCYTE ESTERASE UR QL STRIP: NEGATIVE
MICROSCOPIC COMMENT: ABNORMAL
NITRITE UR QL STRIP: NEGATIVE
PH UR STRIP: 6 [PH] (ref 5–8)
PROT UR QL STRIP: ABNORMAL
RBC #/AREA URNS HPF: 12 /HPF (ref 0–4)
SP GR UR STRIP: 1.02 (ref 1–1.03)
SQUAMOUS #/AREA URNS HPF: 13 /HPF
URN SPEC COLLECT METH UR: ABNORMAL
UROBILINOGEN UR STRIP-ACNC: ABNORMAL EU/DL

## 2023-11-28 PROCEDURE — 81025 URINE PREGNANCY TEST: CPT | Performed by: PHYSICIAN ASSISTANT

## 2023-11-28 PROCEDURE — 63600175 PHARM REV CODE 636 W HCPCS: Performed by: PHYSICIAN ASSISTANT

## 2023-11-28 PROCEDURE — 96372 THER/PROPH/DIAG INJ SC/IM: CPT | Performed by: PHYSICIAN ASSISTANT

## 2023-11-28 PROCEDURE — 81000 URINALYSIS NONAUTO W/SCOPE: CPT | Performed by: PHYSICIAN ASSISTANT

## 2023-11-28 RX ORDER — KETOROLAC TROMETHAMINE 30 MG/ML
30 INJECTION, SOLUTION INTRAMUSCULAR; INTRAVENOUS
Status: COMPLETED | OUTPATIENT
Start: 2023-11-28 | End: 2023-11-28

## 2023-11-28 RX ORDER — METHOCARBAMOL 500 MG/1
1000 TABLET, FILM COATED ORAL 3 TIMES DAILY PRN
Qty: 20 TABLET | Refills: 0 | Status: SHIPPED | OUTPATIENT
Start: 2023-11-28 | End: 2023-12-03

## 2023-11-28 RX ORDER — KETOROLAC TROMETHAMINE 30 MG/ML
15 INJECTION, SOLUTION INTRAMUSCULAR; INTRAVENOUS
Status: DISCONTINUED | OUTPATIENT
Start: 2023-11-28 | End: 2023-11-28

## 2023-11-28 RX ADMIN — KETOROLAC TROMETHAMINE 30 MG: 30 INJECTION, SOLUTION INTRAMUSCULAR; INTRAVENOUS at 02:11

## 2023-11-28 NOTE — Clinical Note
"Christopher Avila (Jorann) was seen and treated in our emergency department on 11/27/2023.  She may return to work on 11/30/2023.       If you have any questions or concerns, please don't hesitate to call.      Shirin JOE    "

## 2023-11-28 NOTE — ED PROVIDER NOTES
Encounter Date: 2023       History     Chief Complaint   Patient presents with    Back Pain     PT with lower back pain worse on right than the left and lower ABD pain x 1 day. PT with HX of kidney stone.      33yo F presents to ED with chief complaint acute onset low back pain, R flank pain, RLQ abdominal pain, all beginning this afternoon.    Pain begin mostly to her entire low back, began to involve her right flank, right-sided abdomen.  Pain is worse with weight-bearing ambulation, with movement.  Denies associated nausea vomiting.  She does admit to mild increased urinary frequency over the past few days.  She also admits to frequent UTIs.  No dysuria, hematuria, strong odor to urine.  She does admit to history nephrolithiasis, states pain feels like previous nephrolithiasis.  Pain radiates from the low back to her right-sided abdomen, to the suprapubic abdomen.  No fever chills.  No trauma.  No recent illness.  Pain is intermittent.  No leg weakness.  No bowel or bladder incontinence or retention.  No saddle anesthesia.    Took Tylenol prior to arrival without any relief.    Denies any previous abdominal surgeries    LMP 10/01/2023--admits to irregular menses.  No exogenous estrogen.    PMH:  Anemia  DUB  Migraines  Ovarian cysts      Review of patient's allergies indicates:  No Known Allergies  Past Medical History:   Diagnosis Date    Abnormal menstrual periods     Anemia     Dental caries     Migraine headache     Ovarian cyst      Past Surgical History:   Procedure Laterality Date     SECTION N/A 2021    Procedure:  SECTION;  Surgeon: Jitendra Guerra MD;  Location: [facility] L[facility]D OR;  Service: OB/GYN;  Laterality: N/A;     Family History   Problem Relation Age of Onset    Diabetes Mother     Cancer Mother     Diabetes Maternal Grandmother      Social History     Tobacco Use    Smoking status: Former     Current packs/day: 0.00     Types: Cigarettes     Quit date: 2020     Years since  quitting: 3.4    Smokeless tobacco: Never   Substance Use Topics    Alcohol use: Not Currently     Comment: occasional prior to pregnancy    Drug use: Not Currently     Types: Marijuana     Review of Systems   Constitutional:  Negative for chills and fever.   Gastrointestinal:  Positive for abdominal pain. Negative for nausea and vomiting.   Genitourinary:  Positive for flank pain and frequency. Negative for dysuria, hematuria, vaginal bleeding and vaginal discharge.   Musculoskeletal:  Positive for back pain and gait problem.   Neurological:  Negative for syncope.       Physical Exam     Initial Vitals [11/27/23 2355]   BP Pulse Resp Temp SpO2   129/78 86 18 97.4 °F (36.3 °C) 97 %      MAP       --         Physical Exam    Nursing note and vitals reviewed.  Constitutional: She appears well-developed and well-nourished. She is not diaphoretic. No distress.   HENT:   Head: Normocephalic and atraumatic.   Neck: Neck supple.   Normal range of motion.  Cardiovascular:  Intact distal pulses.           1+ DP bilaterally.   Pulmonary/Chest: No respiratory distress.   Abdominal: Abdomen is soft. Bowel sounds are normal. She exhibits no distension.   TTP right CVA region, right flank, right lower quadrant, suprapubic abdomen.  No rebound or guarding.   Musculoskeletal:         General: No tenderness. Normal range of motion.      Cervical back: Normal range of motion and neck supple.     Neurological: She is alert and oriented to person, place, and time.   Skin: Skin is warm.   Psychiatric: She has a normal mood and affect. Thought content normal.         ED Course   Procedures  Labs Reviewed   URINALYSIS, REFLEX TO URINE CULTURE - Abnormal; Notable for the following components:       Result Value    Protein, UA Trace (*)     Occult Blood UA 1+ (*)     Urobilinogen, UA 2.0-3.0 (*)     All other components within normal limits    Narrative:     Specimen Source->Urine   URINALYSIS MICROSCOPIC - Abnormal; Notable for the  following components:    RBC, UA 12 (*)     All other components within normal limits    Narrative:     Specimen Source->Urine   POCT URINE PREGNANCY          Imaging Results              CT Abdomen Pelvis  Without Contrast (Final result)  Result time 11/28/23 03:54:11      Final result by Atul Mccloud MD (11/28/23 03:54:11)                   Impression:      Few suspected punctate nonobstructing bilateral renal calculi.  Additionally, there is hyperattenuating appearance of the bilateral renal pyramids which may relate to hyperconcentrated urine.  No evidence of hydronephrosis or definite obstructing ureteral calculus.    Hepatomegaly.    Colonic diverticulosis without evidence of acute diverticulitis.    Additional findings as above.      Electronically signed by: Atul Mccloud MD  Date:    11/28/2023  Time:    03:54               Narrative:    EXAMINATION:  CT ABDOMEN PELVIS WITHOUT CONTRAST    CLINICAL HISTORY:  Flank pain, kidney stone suspected;RIGHT;    TECHNIQUE:  Low dose axial images, sagittal and coronal reformations were obtained from the lung bases to the pubic symphysis without IV contrast.  Oral contrast was not administered.    COMPARISON:  Retroperitoneal ultrasound 06/16/2020    FINDINGS:  No confluent airspace consolidation or significant pleural fluid at the visualized lung bases.  The visualized portions of the heart appear normal.    The liver is enlarged measuring 20.0 cm in craniocaudal length.  No focal hepatic abnormality appreciated this limited noncontrast examination.  The gallbladder shows no evidence of stones or pericholecystic fluid.  There is no intra-or extrahepatic biliary ductal dilatation.    The stomach, spleen, pancreas, and adrenal glands appear within normal limits for noncontrast technique.    The kidneys are normal in size and location.  Few suspected punctate nonobstructing calculi noted within the bilateral kidneys.  There is hyperattenuating appearance of the  bilateral renal pyramids which may relate to hyperconcentrated urine.  There is no evidence of hydronephrosis.  No definite obstructing ureteral calculus identified.  Bladder appears within normal limits.  The uterus is unremarkable.  No significant free fluid in the pelvis.    The abdominal aorta is normal in course and caliber without significant atherosclerotic calcifications.    The visualized loops of small and large bowel show no evidence of obstruction or inflammation.  There are a few scattered colonic diverticula without evidence of acute diverticulitis.  There is no CT evidence of acute appendicitis.  There is a small fat containing supraumbilical hernia.  There is no ascites, portal venous gas, or free intraperitoneal air.    Osseous structures demonstrate no acute abnormality.  The extraperitoneal soft tissues are unremarkable.                                       Medications   ketorolac injection 30 mg (30 mg Intramuscular Given 11/28/23 0231)     Medical Decision Making  Differential diagnosis:  Pyelonephritis, nephrolithiasis, appendicitis    Amount and/or Complexity of Data Reviewed  Labs: ordered. Decision-making details documented in ED Course.  Radiology: ordered and independent interpretation performed. Decision-making details documented in ED Course.  Discussion of management or test interpretation with external provider(s): Pain intermittent, no associated fever, no nausea vomiting, mild abdominal tenderness, appendicitis or emergent surgical process felt less likely, pain began to her back, begins to radiate around her side, similar previous nephrolithiasis type pain, CT without contrast is pending, urinalysis is pending given reported frequency.    No radiculopathy down posterior thigh, no previous injury or surgery to the spine, no leg weakness, no reported saddle anesthesia, no bowel or bladder incontinence or retention, no convincing evidence of cauda equina or emergent spinal  process.    Risk  Prescription drug management.               ED Course as of 11/28/23 0402   Tue Nov 28, 2023   0350 Pain much improved on re-evaluation.  No convincing CT evidence of hydronephrosis, bilateral nonobstructing renal calculi.  No convincing evidence of appendicitis.  No pneumoperitoneum.  Overall, reassuring CT.  After discussion, advised NSAIDs, muscle relaxers as needed for back pain, no convincing evidence of infection or need for urologic follow-up at this time.  We have discussed red flags and interim return precautions.  She is comfortable plan. [SM]      ED Course User Index  [SM] Rachid Martínez PA-C                             Clinical Impression:  Final diagnoses:  [M54.50] Acute bilateral low back pain without sciatica (Primary)  [R10.9] Right sided abdominal pain          ED Disposition Condition    Discharge Stable          ED Prescriptions       Medication Sig Dispense Start Date End Date Auth. Provider    methocarbamoL (ROBAXIN) 500 MG Tab Take 2 tablets (1,000 mg total) by mouth 3 (three) times daily as needed (Stiffness/soreness). 20 tablet 11/28/2023 12/3/2023 Rachid Martínez PA-C          Follow-up Information       Follow up With Specialties Details Why Contact Info    Neeraj Guerra MD Internal Medicine Schedule an appointment as soon as possible for a visit  For reevaluation, If symptoms persist [address] [place] [id number]  [phone]               Rachid Martínez PA-C  11/28/23 0402

## 2023-11-28 NOTE — DISCHARGE INSTRUCTIONS
Ibuprofen, Tylenol as needed for pain.  Robaxin for continued stiffness/soreness. Be aware, this medication is sedating.  Do not mix with alcohol or any other sedating medications.  Do not drive or operate machinery when taking this medication.     Follow-up with primary care provider for re-evaluation should your symptoms persist.    Please return to this ED if worsening back pain, if unable to walk or bear weight, if you develop leg weakness, if worsening abdominal pain, if you begin with nausea vomiting, if you begin with fever, if any other problems occur.

## 2024-06-08 NOTE — PROGRESS NOTES
"35w2d here for OB visit.  Last visit 28 wks "busy, transportation problems".  Pt c/o crampy lower back pain.  Reports very active fetus "baby kicking me and it hurts".  Denies vaginal bleeding, leakage of fluid, or contractions.  Again, pt has not completed 1 hr glucola, testing strongly advised.  Abd soft/nontender.  Palpable fetal mov't.  Mild diffuse lower back tenderness.  No CVA or suprapubic tenderness.  Cervix closed, thick, high, posterior.  Trace edema in feet.  We discussed supportive care measures for her lower back/round ligament pain discussed, tylenol prn, maternity belt.  Medicaid transportation line d/w pt.  Continue Fe/colace for anemia.  Labor/preE/anemia precautions.  Kick counts tid.  Return 1 wk.  Voiced understanding.  "
Routine OB. Pt c/o feet swelling.  
Appreciate tox consult. Hold zyprexa for now. For agitation, ativan 2mg with benadryl 50mg, hold off on antipsychotic overnight given 100mg of zyprexa ingestion
Francisco J

## 2024-07-01 ENCOUNTER — PATIENT MESSAGE (OUTPATIENT)
Dept: OBSTETRICS AND GYNECOLOGY | Facility: CLINIC | Age: 34
End: 2024-07-01

## 2024-07-03 ENCOUNTER — OFFICE VISIT (OUTPATIENT)
Dept: OBSTETRICS AND GYNECOLOGY | Facility: CLINIC | Age: 34
End: 2024-07-03

## 2024-07-03 VITALS
HEIGHT: 63 IN | WEIGHT: 176.5 LBS | SYSTOLIC BLOOD PRESSURE: 118 MMHG | BODY MASS INDEX: 31.27 KG/M2 | DIASTOLIC BLOOD PRESSURE: 70 MMHG

## 2024-07-03 DIAGNOSIS — Z32.01 POSITIVE PREGNANCY TEST: ICD-10-CM

## 2024-07-03 DIAGNOSIS — Z32.00 ENCOUNTER FOR CONFIRMATION OF PREGNANCY TEST RESULT WITH PHYSICAL EXAMINATION: Primary | ICD-10-CM

## 2024-07-03 DIAGNOSIS — Z11.3 SCREEN FOR STD (SEXUALLY TRANSMITTED DISEASE): ICD-10-CM

## 2024-07-03 DIAGNOSIS — R11.0 NAUSEA: ICD-10-CM

## 2024-07-03 LAB
B-HCG UR QL: POSITIVE
CTP QC/QA: YES

## 2024-07-03 PROCEDURE — 99999 PR PBB SHADOW E&M-EST. PATIENT-LVL III: CPT | Mod: PBBFAC,,, | Performed by: OBSTETRICS & GYNECOLOGY

## 2024-07-03 PROCEDURE — 81025 URINE PREGNANCY TEST: CPT | Mod: PBBFAC | Performed by: OBSTETRICS & GYNECOLOGY

## 2024-07-03 PROCEDURE — 99213 OFFICE O/P EST LOW 20 MIN: CPT | Mod: S$PBB,,, | Performed by: OBSTETRICS & GYNECOLOGY

## 2024-07-03 PROCEDURE — 87491 CHLMYD TRACH DNA AMP PROBE: CPT | Performed by: OBSTETRICS & GYNECOLOGY

## 2024-07-03 PROCEDURE — 99999PBSHW POCT URINE PREGNANCY: Mod: PBBFAC,,,

## 2024-07-03 PROCEDURE — 99213 OFFICE O/P EST LOW 20 MIN: CPT | Mod: PBBFAC | Performed by: OBSTETRICS & GYNECOLOGY

## 2024-07-03 PROCEDURE — 87086 URINE CULTURE/COLONY COUNT: CPT | Performed by: OBSTETRICS & GYNECOLOGY

## 2024-07-03 PROCEDURE — 87591 N.GONORRHOEAE DNA AMP PROB: CPT | Performed by: OBSTETRICS & GYNECOLOGY

## 2024-07-03 RX ORDER — ONDANSETRON 4 MG/1
4 TABLET, ORALLY DISINTEGRATING ORAL EVERY 6 HOURS PRN
Qty: 30 TABLET | Refills: 1 | Status: SHIPPED | OUTPATIENT
Start: 2024-07-03

## 2024-07-03 NOTE — PROGRESS NOTES
"[facility]  Ambulatory Clinic  Obstetrics & Gynecology    Visit Date:  7/3/2024     Chief Complaint:  Missed my period    History of Present Illness:      Christopher Avila is a 33 y.o. , UPT positive today, here with c/o missed period.  LMP Patient's last menstrual period was 2024..    Pt has no major complaints today and denies any vaginal bleeding, discharge, pain, GI/ compliants.    Pt reports overall good health.    Review of Systems:      Constitutional:  No fever, fatigue  HENT:  No congestion, hearing changes  Eyes:  No visual disturbance  Respiratory:  No cough, shortness of breath  Cardiovascular:  No chest pain, leg swelling  Breast:  No lump, pain, nipple discharge, redness, skin changes  Gastrointestinal:  No abdominal pain, constipation, blood in stool   Genitourinary:  No dysuria, frequency  Endocrine:  No heat or cold intolerance  Musculoskeletal:  No back pain, arthralgias  Skin:  No rash, jaundice  Neurological:  No dizziness, weakness, headaches  Psychiatric/Behavioral:  No sleep disturbance, dysphoric mood     Physical Exam:     /70   Ht 5' 3" (1.6 m)   Wt 80 kg (176 lb 7.7 oz)   LMP 2024   BMI 31.26 kg/m²      GENERAL:  NAD. Well-nourished. A&Ox3.  HEENT:  NCAT, EOMI, moist mucus membranes.  Neck supple w/o masses.  BREAST:  Symmetric, no obvious masses, adenopathy, skin changes or nipple discharge.  LUNGS:  Clear normal respiratory effort  HEART:  RRR, physiologic heart sounds.  ABDOMEN:  Soft, non-tender. Normoactive BS.  No obvious organomegaly.  Size = dates  EXT:  Symmetric w/o cramping, claudication, or edema. +2 distal pulses.   SKIN:  No rashes  NEURO:  Grossly intact bilaterally.   PSYCH:  Mood & affect appropriate.     PELVIC:  Patient deferred today    Chaperone present for exam.    Assessment:     33 y.o. , UPT positive, LMP 24, here for confirmation of pregnancy  Nausea    Plan:    We discussed principles of prenatal " care, weight gain goals, dietary/lifestyle modifications, pregnancy care instructions and precautions.  Continue prenatal vitamins.  SAB and ectopic precautions reviewed.      Pt requesting refill of zofran to use prn for nausea/vomiting. Risks, benefits, and alternatives to zofran reviewed. Dietary advice. Cautioned on drossiness, no driving or operating machinery.    Return in 4 weeks for OB visit, or sooner prn.  All questions answered, pt voiced understanding.      Jitendra Guerra MD

## 2024-07-05 ENCOUNTER — HOSPITAL ENCOUNTER (EMERGENCY)
Facility: HOSPITAL | Age: 34
Discharge: HOME | End: 2024-07-06
Attending: STUDENT IN AN ORGANIZED HEALTH CARE EDUCATION/TRAINING PROGRAM

## 2024-07-05 DIAGNOSIS — Z34.91 FIRST TRIMESTER PREGNANCY: ICD-10-CM

## 2024-07-05 DIAGNOSIS — K80.20 CALCULUS OF GALLBLADDER WITHOUT CHOLECYSTITIS WITHOUT OBSTRUCTION: ICD-10-CM

## 2024-07-05 DIAGNOSIS — O99.891 ASYMPTOMATIC BACTERIURIA DURING PREGNANCY: Primary | ICD-10-CM

## 2024-07-05 DIAGNOSIS — R82.71 ASYMPTOMATIC BACTERIURIA DURING PREGNANCY: Primary | ICD-10-CM

## 2024-07-05 LAB
ABO + RH BLD: NORMAL
ALBUMIN SERPL BCP-MCNC: 4.2 G/DL (ref 3.5–5.2)
ALP SERPL-CCNC: 60 U/L (ref 55–135)
ALT SERPL W/O P-5'-P-CCNC: 10 U/L (ref 10–44)
ANION GAP SERPL CALC-SCNC: 11 MMOL/L (ref 8–16)
AST SERPL-CCNC: 16 U/L (ref 10–40)
B-HCG UR QL: POSITIVE
BACTERIA #/AREA URNS HPF: ABNORMAL /HPF
BACTERIA UR CULT: NORMAL
BASOPHILS # BLD AUTO: 0.05 K/UL (ref 0–0.2)
BASOPHILS NFR BLD: 0.7 % (ref 0–1.9)
BILIRUB SERPL-MCNC: 2.2 MG/DL (ref 0.1–1)
BILIRUB UR QL STRIP: NEGATIVE
BUN SERPL-MCNC: 8 MG/DL (ref 6–20)
CALCIUM SERPL-MCNC: 9.5 MG/DL (ref 8.7–10.5)
CHLORIDE SERPL-SCNC: 106 MMOL/L (ref 95–110)
CLARITY UR: ABNORMAL
CO2 SERPL-SCNC: 21 MMOL/L (ref 23–29)
COLOR UR: YELLOW
CREAT SERPL-MCNC: 0.7 MG/DL (ref 0.5–1.4)
CTP QC/QA: YES
DIFFERENTIAL METHOD BLD: ABNORMAL
EOSINOPHIL # BLD AUTO: 0.1 K/UL (ref 0–0.5)
EOSINOPHIL NFR BLD: 1.9 % (ref 0–8)
ERYTHROCYTE [DISTWIDTH] IN BLOOD BY AUTOMATED COUNT: 12.6 % (ref 11.5–14.5)
EST. GFR  (NO RACE VARIABLE): >60 ML/MIN/1.73 M^2
GLUCOSE SERPL-MCNC: 102 MG/DL (ref 70–110)
GLUCOSE UR QL STRIP: NEGATIVE
HCG INTACT+B SERPL-ACNC: NORMAL MIU/ML
HCT VFR BLD AUTO: 37.8 % (ref 37–48.5)
HGB BLD-MCNC: 12.6 G/DL (ref 12–16)
HGB UR QL STRIP: ABNORMAL
HYALINE CASTS #/AREA URNS LPF: 0 /LPF
IMM GRANULOCYTES # BLD AUTO: 0.01 K/UL (ref 0–0.04)
IMM GRANULOCYTES NFR BLD AUTO: 0.1 % (ref 0–0.5)
KETONES UR QL STRIP: ABNORMAL
LEUKOCYTE ESTERASE UR QL STRIP: NEGATIVE
LYMPHOCYTES # BLD AUTO: 2.6 K/UL (ref 1–4.8)
LYMPHOCYTES NFR BLD: 39 % (ref 18–48)
MCH RBC QN AUTO: 28.6 PG (ref 27–31)
MCHC RBC AUTO-ENTMCNC: 33.3 G/DL (ref 32–36)
MCV RBC AUTO: 86 FL (ref 82–98)
MICROSCOPIC COMMENT: ABNORMAL
MONOCYTES # BLD AUTO: 0.5 K/UL (ref 0.3–1)
MONOCYTES NFR BLD: 7.2 % (ref 4–15)
NEUTROPHILS # BLD AUTO: 3.5 K/UL (ref 1.8–7.7)
NEUTROPHILS NFR BLD: 51.1 % (ref 38–73)
NITRITE UR QL STRIP: NEGATIVE
NRBC BLD-RTO: 0 /100 WBC
PH UR STRIP: 7 [PH] (ref 5–8)
PLATELET # BLD AUTO: 300 K/UL (ref 150–450)
PMV BLD AUTO: 8.5 FL (ref 9.2–12.9)
POC MOLECULAR INFLUENZA A AGN: NEGATIVE
POC MOLECULAR INFLUENZA B AGN: NEGATIVE
POTASSIUM SERPL-SCNC: 3.3 MMOL/L (ref 3.5–5.1)
PROT SERPL-MCNC: 7.7 G/DL (ref 6–8.4)
PROT UR QL STRIP: ABNORMAL
RBC # BLD AUTO: 4.4 M/UL (ref 4–5.4)
RBC #/AREA URNS HPF: 23 /HPF (ref 0–4)
SARS-COV-2 RDRP RESP QL NAA+PROBE: NEGATIVE
SODIUM SERPL-SCNC: 138 MMOL/L (ref 136–145)
SP GR UR STRIP: >1.03 (ref 1–1.03)
SQUAMOUS #/AREA URNS HPF: 4 /HPF
URN SPEC COLLECT METH UR: ABNORMAL
UROBILINOGEN UR STRIP-ACNC: >=8 EU/DL
WBC # BLD AUTO: 6.77 K/UL (ref 3.9–12.7)
WBC #/AREA URNS HPF: 3 /HPF (ref 0–5)

## 2024-07-05 PROCEDURE — 87502 INFLUENZA DNA AMP PROBE: CPT

## 2024-07-05 PROCEDURE — 25000003 PHARM REV CODE 250: Performed by: STUDENT IN AN ORGANIZED HEALTH CARE EDUCATION/TRAINING PROGRAM

## 2024-07-05 PROCEDURE — 96374 THER/PROPH/DIAG INJ IV PUSH: CPT

## 2024-07-05 PROCEDURE — 84702 CHORIONIC GONADOTROPIN TEST: CPT | Performed by: STUDENT IN AN ORGANIZED HEALTH CARE EDUCATION/TRAINING PROGRAM

## 2024-07-05 PROCEDURE — 96361 HYDRATE IV INFUSION ADD-ON: CPT

## 2024-07-05 PROCEDURE — 80053 COMPREHEN METABOLIC PANEL: CPT | Performed by: STUDENT IN AN ORGANIZED HEALTH CARE EDUCATION/TRAINING PROGRAM

## 2024-07-05 PROCEDURE — 99285 EMERGENCY DEPT VISIT HI MDM: CPT | Mod: 25

## 2024-07-05 PROCEDURE — 87635 SARS-COV-2 COVID-19 AMP PRB: CPT | Performed by: STUDENT IN AN ORGANIZED HEALTH CARE EDUCATION/TRAINING PROGRAM

## 2024-07-05 PROCEDURE — 85025 COMPLETE CBC W/AUTO DIFF WBC: CPT | Performed by: STUDENT IN AN ORGANIZED HEALTH CARE EDUCATION/TRAINING PROGRAM

## 2024-07-05 PROCEDURE — 81025 URINE PREGNANCY TEST: CPT | Performed by: EMERGENCY MEDICINE

## 2024-07-05 PROCEDURE — 63600175 PHARM REV CODE 636 W HCPCS: Performed by: STUDENT IN AN ORGANIZED HEALTH CARE EDUCATION/TRAINING PROGRAM

## 2024-07-05 PROCEDURE — 86900 BLOOD TYPING SEROLOGIC ABO: CPT | Performed by: STUDENT IN AN ORGANIZED HEALTH CARE EDUCATION/TRAINING PROGRAM

## 2024-07-05 PROCEDURE — 81000 URINALYSIS NONAUTO W/SCOPE: CPT | Performed by: EMERGENCY MEDICINE

## 2024-07-05 RX ORDER — METOCLOPRAMIDE 10 MG/1
10 TABLET ORAL EVERY 6 HOURS PRN
Qty: 20 TABLET | Refills: 0 | Status: SHIPPED | OUTPATIENT
Start: 2024-07-05 | End: 2024-07-22

## 2024-07-05 RX ORDER — ONDANSETRON HYDROCHLORIDE 2 MG/ML
4 INJECTION, SOLUTION INTRAVENOUS
Status: COMPLETED | OUTPATIENT
Start: 2024-07-05 | End: 2024-07-05

## 2024-07-05 RX ORDER — ACETAMINOPHEN 500 MG
1000 TABLET ORAL
Status: COMPLETED | OUTPATIENT
Start: 2024-07-05 | End: 2024-07-05

## 2024-07-05 RX ORDER — CEPHALEXIN 500 MG/1
500 CAPSULE ORAL EVERY 8 HOURS
Qty: 15 CAPSULE | Refills: 0 | Status: SHIPPED | OUTPATIENT
Start: 2024-07-05 | End: 2024-07-22

## 2024-07-05 RX ORDER — CEPHALEXIN 500 MG/1
500 CAPSULE ORAL
Status: COMPLETED | OUTPATIENT
Start: 2024-07-05 | End: 2024-07-05

## 2024-07-05 RX ORDER — SODIUM CHLORIDE 9 MG/ML
1000 INJECTION, SOLUTION INTRAVENOUS
Status: COMPLETED | OUTPATIENT
Start: 2024-07-05 | End: 2024-07-06

## 2024-07-05 RX ADMIN — SODIUM CHLORIDE 1000 ML: 9 INJECTION, SOLUTION INTRAVENOUS at 10:07

## 2024-07-05 RX ADMIN — ACETAMINOPHEN 1000 MG: 500 TABLET ORAL at 10:07

## 2024-07-05 RX ADMIN — CEPHALEXIN 500 MG: 500 CAPSULE ORAL at 10:07

## 2024-07-05 RX ADMIN — ONDANSETRON 4 MG: 2 INJECTION INTRAMUSCULAR; INTRAVENOUS at 10:07

## 2024-07-05 RX ADMIN — POTASSIUM BICARBONATE 40 MEQ: 391 TABLET, EFFERVESCENT ORAL at 10:07

## 2024-07-06 VITALS
BODY MASS INDEX: 30.12 KG/M2 | RESPIRATION RATE: 14 BRPM | WEIGHT: 170 LBS | HEART RATE: 78 BPM | OXYGEN SATURATION: 98 % | TEMPERATURE: 98 F | HEIGHT: 63 IN | DIASTOLIC BLOOD PRESSURE: 65 MMHG | SYSTOLIC BLOOD PRESSURE: 118 MMHG

## 2024-07-06 NOTE — ED PROVIDER NOTES
Encounter Date: 2024       History     Chief Complaint   Patient presents with    General Illness     Pt presents to the ED with c/o low back, butt and BLE pain with n/v for the last few days. Denies OTC meds. Endorses urinary frequency but denies any other urinary complaints.     33 y.o. female with history below presents for evaluation of low back pain, bilateral lower extremity pain, abdominal pain, nausea, vomiting.  Symptoms have been present for 1 week.  Denies chest pain or shortness of breath.  She has dysuria.  Denies urinary frequency.  Denies vaginal bleeding or discharge.  LMP 2024    Triage vitals were reviewed and are: Initial Vitals [24]  BP: 118/61  Pulse: 89  Resp: 20  Temp: 98.3 °F (36.8 °C)  SpO2: 96 %  MAP: n/a    The Patient:   has a past medical history of Abnormal menstrual periods, Anemia, Dental caries, Migraine headache, and Ovarian cyst.   has a past surgical history that includes  section (N/A, 2021).   reports that she quit smoking about 4 years ago. Her smoking use included cigarettes. She has never used smokeless tobacco. She reports that she does not currently use alcohol. She reports that she does not currently use drugs after having used the following drugs: Marijuana.  Has allergies listed as: Patient has no known allergies.    Has current medications listed as:  No current facility-administered medications for this encounter.  Current Outpatient Medications:  acetaminophen-codeine 300-30mg (TYLENOL #3) 300-30 mg Tab, Take 1 tablet by mouth every 6 (six) hours as needed. (Patient not taking: Reported on 7/3/2024), Disp: , Rfl:   amoxicillin (AMOXIL) 500 MG capsule, Take 500 mg by mouth every 8 (eight) hours. (Patient not taking: Reported on 7/3/2024), Disp: , Rfl:   cyclobenzaprine (FLEXERIL) 5 MG tablet, Take 5-10 mg by mouth nightly. (Patient not taking: Reported on 7/3/2024), Disp: , Rfl:   docusate sodium (COLACE) 100 MG capsule, Take 1 capsule  (100 mg total) by mouth 2 (two) times daily as needed for Constipation. (Patient not taking: Reported on 7/3/2024), Disp: 60 capsule, Rfl: 2  ferrous sulfate 325 (65 FE) MG EC tablet, Take 1 tablet (325 mg total) by mouth 2 (two) times daily. (Patient not taking: Reported on 7/3/2024), Disp: 60 tablet, Rfl: 2  fluconazole (DIFLUCAN) 150 MG Tab, Take 1 tablet (150 mg total) by mouth as needed. Take one pill weekly as needed for yeast infection. (Patient not taking: Reported on 7/3/2024), Disp: 6 tablet, Rfl: 0  FLUoxetine 10 MG capsule, Take 1 capsule (10 mg total) by mouth once daily. (Patient not taking: Reported on 7/3/2024), Disp: 30 capsule, Rfl: 2  medroxyPROGESTERone (DEPO-PROVERA) 150 mg/mL Syrg, , Disp: , Rfl:   omeprazole (PRILOSEC OTC) 20 MG tablet, Take 1 tablet (20 mg total) by mouth daily as needed. (Patient not taking: Reported on 7/3/2024), Disp: 30 tablet, Rfl: 1  ondansetron (ZOFRAN-ODT) 4 MG TbDL, Take 1 tablet (4 mg total) by mouth every 6 (six) hours as needed., Disp: 30 tablet, Rfl: 1  oxyCODONE-acetaminophen (PERCOCET) 5-325 mg per tablet, Take 1 tablet by mouth every 4 (four) hours as needed. (Patient not taking: Reported on 7/3/2024), Disp: 20 tablet, Rfl: 0  PNV,calcium 72-iron-folic acid (PRENATAL VITAMIN PLUS LOW IRON) 27 mg iron- 1 mg Tab, Take 1 tablet (1 each total) by mouth once daily., Disp: 90 tablet, Rfl: 3  prenat.vits,girish,min-iron-folic (PRENATAL VITAMIN) Tab, Take 1 tablet by mouth once daily. (Patient not taking: Reported on 2/22/2021), Disp: 30 each, Rfl: 11  traMADoL (ULTRAM) 50 mg tablet, Take 50 mg by mouth 3 (three) times daily. (Patient not taking: Reported on 7/3/2024), Disp: , Rfl:           The history is provided by the patient. No  was used.     Review of patient's allergies indicates:  No Known Allergies  Past Medical History:   Diagnosis Date    Abnormal menstrual periods     Anemia     Dental caries     Migraine headache     Ovarian cyst       Past Surgical History:   Procedure Laterality Date     SECTION N/A 2021    Procedure:  SECTION;  Surgeon: Jitendra Guerra MD;  Location: [facility] OR;  Service: OB/GYN;  Laterality: N/A;     Family History   Problem Relation Name Age of Onset    Diabetes Mother      Cancer Mother      Diabetes Maternal Grandmother       Social History     Tobacco Use    Smoking status: Former     Current packs/day: 0.00     Types: Cigarettes     Quit date: 2020     Years since quittin.0    Smokeless tobacco: Never   Substance Use Topics    Alcohol use: Not Currently     Comment: occasional prior to pregnancy    Drug use: Not Currently     Types: Marijuana     Review of Systems   Constitutional:  Negative for chills, fatigue and fever.   HENT:  Negative for congestion, hearing loss, sore throat and trouble swallowing.    Eyes:  Negative for visual disturbance.   Respiratory:  Negative for cough, chest tightness and shortness of breath.    Cardiovascular:  Negative for chest pain.   Gastrointestinal:  Positive for abdominal pain, nausea and vomiting. Negative for constipation and diarrhea.   Endocrine: Negative for polyuria.   Genitourinary:  Positive for dysuria. Negative for difficulty urinating, frequency, hematuria, urgency, vaginal bleeding and vaginal discharge.   Musculoskeletal:  Positive for back pain and myalgias. Negative for arthralgias.   Skin:  Negative for rash.   Neurological:  Negative for dizziness and headaches.   Psychiatric/Behavioral:  The patient is not nervous/anxious.        Physical Exam     Initial Vitals [24]   BP Pulse Resp Temp SpO2   118/61 89 20 98.3 °F (36.8 °C) 96 %      MAP       --         Physical Exam    Nursing note and vitals reviewed.  Constitutional: She appears well-developed and well-nourished.   HENT:   Head: Normocephalic and atraumatic.   Eyes: Conjunctivae are normal. Pupils are equal, round, and reactive to light.   Neck: Neck supple.   Normal  range of motion.  Cardiovascular:  Normal rate, regular rhythm, normal heart sounds and intact distal pulses.           Pulmonary/Chest: Breath sounds normal. No respiratory distress.   Abdominal:   Minimal suprapubic tenderness   Musculoskeletal:         General: Normal range of motion.      Cervical back: Normal range of motion and neck supple.     Neurological: She is alert and oriented to person, place, and time. GCS score is 15. GCS eye subscore is 4. GCS verbal subscore is 5. GCS motor subscore is 6.   Skin: Skin is warm and dry. Capillary refill takes less than 2 seconds.   Psychiatric: She has a normal mood and affect. Her behavior is normal. Judgment and thought content normal.         ED Course   Procedures  Labs Reviewed   URINALYSIS, REFLEX TO URINE CULTURE - Abnormal; Notable for the following components:       Result Value    Appearance, UA Hazy (*)     Specific Gravity, UA >1.030 (*)     Protein, UA 1+ (*)     Ketones, UA 1+ (*)     Occult Blood UA Trace (*)     Urobilinogen, UA >=8.0 (*)     All other components within normal limits    Narrative:     Specimen Source->Urine   URINALYSIS MICROSCOPIC - Abnormal; Notable for the following components:    RBC, UA 23 (*)     All other components within normal limits    Narrative:     Specimen Source->Urine   CBC W/ AUTO DIFFERENTIAL - Abnormal; Notable for the following components:    MPV 8.5 (*)     All other components within normal limits    Narrative:     Release to patient->Immediate   COMPREHENSIVE METABOLIC PANEL - Abnormal; Notable for the following components:    Potassium 3.3 (*)     CO2 21 (*)     Total Bilirubin 2.2 (*)     All other components within normal limits    Narrative:     Release to patient->Immediate   POCT URINE PREGNANCY - Abnormal; Notable for the following components:    POC Preg Test, Ur Positive (*)     All other components within normal limits   HCG, QUANTITATIVE    Narrative:     Release to patient->Immediate   SARS-COV-2  RDRP GENE   POCT INFLUENZA A/B MOLECULAR   GROUP & RH          Imaging Results               US OB <14 Wks, TransAbd, Single Gestation (Final result)  Result time 07/05/24 23:27:09   Procedure changed from US OB <14 Wks TransAbd & TransVag, Single Gestation (XPD)     Final result by Chaka Harman MD (07/05/24 23:27:09)                   Impression:      Single live intrauterine gestation corresponding to 5 weeks and 6 days with estimated of delivery of 03/01/2025. No perigestational collection.    Fetal heart rate ranges from 89 to 91 beats per minute, suggestive of borderline fetal bradycardia.  Please consider short-term follow-up for the evaluation of the fetal heart rate.    This report was flagged in Epic as abnormal.      Electronically signed by: Chaka Harman MD  Date:    07/05/2024  Time:    23:27               Narrative:    EXAMINATION:  US OB <14 WEEKS TRANSABDOM, SINGLE GESTATION    CLINICAL HISTORY:  abdominal pain;    TECHNIQUE:  Limited transabdominal pelvic ultrasound was performed.    COMPARISON:  None    FINDINGS:  The uterus measures 8.4 x 5.5 x 6.8 cm.  There is a single intrauterine gestation with mean sac diameter measuring 11.7 mm.  The crown-rump length measures 2.6 mm.  No perigestational collections.  There is a yolk sac present.  The average ultrasound age is 5 weeks and 6 days with estimated date of delivery of 03/01/2025.  The fetal heart rate ranges from 89 to 91 beats per minute.    Both ovaries are unremarkable with normal flow.    There is no evidence of free fluid.                                       US Abdomen Limited (Final result)  Result time 07/05/24 23:31:14      Final result by Chaka Harman MD (07/05/24 23:31:14)                   Impression:      Cholelithiasis.  No secondary findings of acute cholecystitis.    Otherwise, no significant sonographic abnormality.      Electronically signed by: Chaka Harman MD  Date:    07/05/2024  Time:    23:31               Narrative:     EXAMINATION:  US ABDOMEN LIMITED    CLINICAL HISTORY:  hyperbilirubinemia;    TECHNIQUE:  Limited ultrasound of the right upper quadrant of the abdomen.    COMPARISON:  CT scan dated 11/28/2023.    FINDINGS:  Liver: The visualized portions of the liver is unremarkable.    Gallbladder: There is a solitary gallstone present measuring 0.6 x 0.3 x 0.3 cm.  There is no gallbladder wall thickening or pericholecystic fluid.  No sonographic Doll's sign.    Biliary system: The common duct is not dilated, measuring 4.4 mm.  No intrahepatic ductal dilatation.    Pancreas: The visualized portion of the pancreas is unremarkable.    Miscellaneous: The right kidney is unremarkable.                                       Medications   0.9%  NaCl infusion (0 mLs Intravenous Stopped 7/6/24 0011)   acetaminophen tablet 1,000 mg (1,000 mg Oral Given 7/5/24 2244)   potassium bicarbonate disintegrating tablet 40 mEq (40 mEq Oral Given 7/5/24 2244)   ondansetron injection 4 mg (4 mg Intravenous Given 7/5/24 2243)   cephALEXin capsule 500 mg (500 mg Oral Given 7/5/24 2246)     Medical Decision Making  Encounter Date: 7/5/2024    33 y.o. female presents for evaluation of vomiting, myalgias.  Hemodynamically stable. Afebrile. Phonating and protecting the airway spontaneously. No clinical evidence for cardiovascular instability or impending airway compromise. Prior medical records reviewed. PMD note reviewed. Current co-morbidities considered that will impact clinical decision making include as above.   Physical Exam notable for:  Minimal suprapubic TTP.  Vitals range:   Temp:  [97.9 °F (36.6 °C)-98.3 °F (36.8 °C)] 97.9 °F (36.6 °C)  Pulse:  [78-89] 78  Resp:  [14-20] 14  SpO2:  [96 %-98 %] 98 %  BP: (118-125)/(61-69) 118/65    Differential diagnoses includes but is not limited to:   AAA, aortic dissection, mesenteric ischemia, perforated viscous, MI/ACS, SBO/volvulus, incarcerated/strangulated hernia, intussusception, ileus, appendicitis,  cholecystitis, cholangitis, diverticulitis, esophagitis, hepatitis, nephrolithiasis, pancreatitis, gastroenteritis, colitis, IBD/IBS, biliary colic, GERD, PUD, constipation, UTI/pyelonephritis,  disorder.     Clinical picture today most consistent with bacteriuria in pregnancy, cholelithiasis, first-trimester pregnancy.  Patient feels much improved after 1 L IVF, Zofran.  Noted hypokalemia which was repleted.  Workup today noting fetal bradycardia which I have informed the patient about and encouraged follow-up with OBGYN.  Discharge with Reglan, prenatals.  Doubt alternate pathology as listed in the differential above.    ED MEDS GIVEN:  Medications  0.9%  NaCl infusion (0 mLs Intravenous Stopped 7/6/24 0011)  acetaminophen tablet 1,000 mg (1,000 mg Oral Given 7/5/24 2244)  potassium bicarbonate disintegrating tablet 40 mEq (40 mEq Oral Given 7/5/24 2244)  ondansetron injection 4 mg (4 mg Intravenous Given 7/5/24 2243)  cephALEXin capsule 500 mg (500 mg Oral Given 7/5/24 2246)    Clinical Impression:  Final diagnoses:  [O99.891, R82.71] Asymptomatic bacteriuria during pregnancy (Primary)  [K80.20] Calculus of gallbladder without cholecystitis without obstruction  [Z34.91] First trimester pregnancy           I see no indication of an emergent process beyond that addressed during our encounter but have duly counseled the patient/family regarding the need for prompt follow-up as well as the indications that should prompt immediate return to the emergency room should new or worrisome developments occur.  The patient/family has been provided with verbal and printed direction regarding our final diagnosis(es) as well as instructions regarding use of OTC and/or Rx medications intended to manage the patient's conditions.     The patient/family communicates understanding of all this information and all remaining questions and concerns were addressed at this time.    DISCLAIMER: This note was prepared with M*Modal voice  recognition transcription software. Garbled syntax, mangled pronouns, and other bizarre constructions may be attributed to that software system.      Amount and/or Complexity of Data Reviewed  Labs: ordered. Decision-making details documented in ED Course.     Details: Hypokalemia.  Otherwise, CBC, CMP unremarkable.    Radiology: ordered. Decision-making details documented in ED Course.    Risk  OTC drugs.  Prescription drug management.               ED Course as of 07/06/24 0138   Fri Jul 05, 2024 2047 BP: 118/61 [AN]   2047 Temp: 98.3 °F (36.8 °C) [AN]   2047 Pulse: 89 [AN]   2047 Resp: 20 [AN]   2047 SpO2: 96 % [AN]   2047 hCG Qualitative, Urine(!): Positive [AN]   2054 RBC, UA(!): 23 [AN]   2054 Urinalysis, Reflex to Urine Culture Urine, Clean Catch(!)  No convincing evidence of urinary tract infection. [AN]   2156 SARS-CoV-2 RNA, Amplification, Qual: Negative [AN]   2156 POC Molecular Influenza A Ag: Negative [AN]   2156 POC Molecular Influenza B Ag: Negative [AN]   2156 Group & Rh: A POS [AN]   2159 CBC auto differential(!)  No leukocytosis.  No anemia.  Normal platelets. [AN]   2200 Comprehensive metabolic panel(!)  Noted hypokalemia in the setting of nausea and vomiting.  Noted hyperbilirubinemia with otherwise normal LFTs.  Normal renal function. [AN]   2213 Beta HCG Quant: 20605 [AN]   2334 US OB <14 Wks, TransAbd, Single Gestation(!)  Single live intrauterine gestation corresponding to 5 weeks and 6 days with estimated of delivery of 03/01/2025. No perigestational collection.     Fetal heart rate ranges from 89 to 91 beats per minute, suggestive of borderline fetal bradycardia.  Please consider short-term follow-up for the evaluation of the fetal heart rate.   [AN]   2334 US Abdomen Limited  Cholelithiasis.  No secondary findings of acute cholecystitis.     Otherwise, no significant sonographic abnormality.   [AN]      ED Course User Index  [AN] Cristino West, IBAN                           Clinical  Impression:  Final diagnoses:  [O99.891, R82.71] Asymptomatic bacteriuria during pregnancy (Primary)  [K80.20] Calculus of gallbladder without cholecystitis without obstruction  [Z34.91] First trimester pregnancy          ED Disposition Condition    Discharge Stable          ED Prescriptions       Medication Sig Dispense Start Date End Date Auth. Provider    cephALEXin (KEFLEX) 500 MG capsule Take 1 capsule (500 mg total) by mouth every 8 (eight) hours. for 5 days 15 capsule 7/5/2024 7/10/2024 Cristino West PA-C    metoclopramide HCl (REGLAN) 10 MG tablet Take 1 tablet (10 mg total) by mouth every 6 (six) hours as needed (nausea). 20 tablet 7/5/2024 7/10/2024 Cristino West PA-C    prenatal 21-iron fu-folic acid (PRENATAL COMPLETE) 14 mg iron- 400 mcg Tab Take 1 tablet by mouth once daily. 30 tablet 7/5/2024 7/5/2025 Cristino West PA-C          Follow-up Information       Follow up With Specialties Details Why Contact Info    Neeraj Guerra MD Internal Medicine Schedule an appointment as soon as possible for a visit in 1 week  [address] [facility] [id number] [phone]      [facility] [place] [facility] Emergency Medicine Go to  As needed, If symptoms worsen [address] [facility] [place] [id number] [phone]             Cristino West PA-C  07/06/24 0138

## 2024-07-06 NOTE — DISCHARGE INSTRUCTIONS
You were evaluated and treated in the emergency department today. You were found to have a diagnoses that can be managed well at home, however that requires your commitment to getting better.    Diagnoses specific instructions:  Follow-up with your primary care provider.  Follow up with OBGYN.  Take the entire course of antibiotics as prescribed.  Take the medications as prescribed.  Return to the emergency room for any new or worsening symptoms.     If you received or are discharged with pain medicine or muscle relaxers, understand that they can make you sleepy or impair your judgement. Do not make important decisions, drink, drive, swim or perform any other tasks you would not otherwise perform while impaired for at least 24 hours after your last dose.      Ensure you follow up with your Primary Care Provider or any additional providers listed on this discharge sheet. While you may be healthy enough to go home today, I cannot predict the exact course of your diagnoses. It is important to remember that some problems are difficult to diagnose and may not be found during your first visit. As such, it is your responsibility to monitor symptoms, follow-up with another healthcare provider, or return to the emergency room for new or worsening concerns. Unless otherwise instructed, continue all home medications and any new medications prescribed to you in the Emergency Department.     General Maintenance for otherwise healthy adults: Ensure adequate hydration to prevent prolonged illness and recovery. This should include about 14-16 cups of water daily if you don't have any limiting medical conditions.  Monitor your caloric intake with a goal of obtaining and maintaining a healthy weight and BMI to help prevent the development of chronic and life-threatening medical conditions. Talk with your primary care provider about how to start healthy fitness habits and aim for a goal of 30 minutes to an hour of exercise 3-5 times a  week. Avoid the use of tobacco, alcohol, and illicit drugs as these may be detrimental to your health goals.

## 2024-07-09 ENCOUNTER — HOSPITAL ENCOUNTER (EMERGENCY)
Facility: HOSPITAL | Age: 34
Discharge: HOME | End: 2024-07-09
Attending: EMERGENCY MEDICINE

## 2024-07-09 VITALS
RESPIRATION RATE: 14 BRPM | HEIGHT: 63 IN | OXYGEN SATURATION: 98 % | SYSTOLIC BLOOD PRESSURE: 102 MMHG | BODY MASS INDEX: 30.12 KG/M2 | DIASTOLIC BLOOD PRESSURE: 58 MMHG | WEIGHT: 170 LBS | HEART RATE: 68 BPM | TEMPERATURE: 97 F

## 2024-07-09 DIAGNOSIS — O20.0 THREATENED MISCARRIAGE IN EARLY PREGNANCY: Primary | ICD-10-CM

## 2024-07-09 LAB
ABO + RH BLD: NORMAL
ALBUMIN SERPL BCP-MCNC: 4.1 G/DL (ref 3.5–5.2)
ALP SERPL-CCNC: 52 U/L (ref 55–135)
ALT SERPL W/O P-5'-P-CCNC: 9 U/L (ref 10–44)
ANION GAP SERPL CALC-SCNC: 11 MMOL/L (ref 8–16)
AST SERPL-CCNC: 14 U/L (ref 10–40)
B-HCG UR QL: POSITIVE
BACTERIA #/AREA URNS HPF: ABNORMAL /HPF
BASOPHILS # BLD AUTO: 0.04 K/UL (ref 0–0.2)
BASOPHILS NFR BLD: 0.5 % (ref 0–1.9)
BILIRUB SERPL-MCNC: 1.8 MG/DL (ref 0.1–1)
BILIRUB UR QL STRIP: NEGATIVE
BUN SERPL-MCNC: 5 MG/DL (ref 6–20)
C TRACH DNA SPEC QL NAA+PROBE: NOT DETECTED
CALCIUM SERPL-MCNC: 9.4 MG/DL (ref 8.7–10.5)
CHLORIDE SERPL-SCNC: 105 MMOL/L (ref 95–110)
CLARITY UR: CLEAR
CO2 SERPL-SCNC: 21 MMOL/L (ref 23–29)
COLOR UR: YELLOW
CREAT SERPL-MCNC: 0.7 MG/DL (ref 0.5–1.4)
CTP QC/QA: YES
DIFFERENTIAL METHOD BLD: ABNORMAL
EOSINOPHIL # BLD AUTO: 0.2 K/UL (ref 0–0.5)
EOSINOPHIL NFR BLD: 2.1 % (ref 0–8)
ERYTHROCYTE [DISTWIDTH] IN BLOOD BY AUTOMATED COUNT: 12.5 % (ref 11.5–14.5)
EST. GFR  (NO RACE VARIABLE): >60 ML/MIN/1.73 M^2
GLUCOSE SERPL-MCNC: 106 MG/DL (ref 70–110)
GLUCOSE UR QL STRIP: NEGATIVE
HCG INTACT+B SERPL-ACNC: NORMAL MIU/ML
HCT VFR BLD AUTO: 37.2 % (ref 37–48.5)
HGB BLD-MCNC: 12.6 G/DL (ref 12–16)
HGB UR QL STRIP: ABNORMAL
HYALINE CASTS #/AREA URNS LPF: 0 /LPF
IMM GRANULOCYTES # BLD AUTO: 0.03 K/UL (ref 0–0.04)
IMM GRANULOCYTES NFR BLD AUTO: 0.3 % (ref 0–0.5)
KETONES UR QL STRIP: ABNORMAL
LEUKOCYTE ESTERASE UR QL STRIP: ABNORMAL
LYMPHOCYTES # BLD AUTO: 2.4 K/UL (ref 1–4.8)
LYMPHOCYTES NFR BLD: 28.1 % (ref 18–48)
MCH RBC QN AUTO: 29.6 PG (ref 27–31)
MCHC RBC AUTO-ENTMCNC: 33.9 G/DL (ref 32–36)
MCV RBC AUTO: 87 FL (ref 82–98)
MICROSCOPIC COMMENT: ABNORMAL
MONOCYTES # BLD AUTO: 0.5 K/UL (ref 0.3–1)
MONOCYTES NFR BLD: 6.2 % (ref 4–15)
N GONORRHOEA DNA SPEC QL NAA+PROBE: NOT DETECTED
NEUTROPHILS # BLD AUTO: 5.5 K/UL (ref 1.8–7.7)
NEUTROPHILS NFR BLD: 62.8 % (ref 38–73)
NITRITE UR QL STRIP: NEGATIVE
NRBC BLD-RTO: 0 /100 WBC
PH UR STRIP: 6 [PH] (ref 5–8)
PLATELET # BLD AUTO: 294 K/UL (ref 150–450)
PMV BLD AUTO: 8.8 FL (ref 9.2–12.9)
POTASSIUM SERPL-SCNC: 3.2 MMOL/L (ref 3.5–5.1)
PROT SERPL-MCNC: 7.4 G/DL (ref 6–8.4)
PROT UR QL STRIP: ABNORMAL
RBC # BLD AUTO: 4.26 M/UL (ref 4–5.4)
RBC #/AREA URNS HPF: 18 /HPF (ref 0–4)
SODIUM SERPL-SCNC: 137 MMOL/L (ref 136–145)
SP GR UR STRIP: >1.03 (ref 1–1.03)
SQUAMOUS #/AREA URNS HPF: 5 /HPF
URN SPEC COLLECT METH UR: ABNORMAL
UROBILINOGEN UR STRIP-ACNC: >=8 EU/DL
WBC # BLD AUTO: 8.68 K/UL (ref 3.9–12.7)
WBC #/AREA URNS HPF: 5 /HPF (ref 0–5)

## 2024-07-09 PROCEDURE — 86900 BLOOD TYPING SEROLOGIC ABO: CPT | Performed by: EMERGENCY MEDICINE

## 2024-07-09 PROCEDURE — 25000003 PHARM REV CODE 250: Performed by: EMERGENCY MEDICINE

## 2024-07-09 PROCEDURE — 80053 COMPREHEN METABOLIC PANEL: CPT | Performed by: EMERGENCY MEDICINE

## 2024-07-09 PROCEDURE — 84702 CHORIONIC GONADOTROPIN TEST: CPT | Performed by: EMERGENCY MEDICINE

## 2024-07-09 PROCEDURE — 81025 URINE PREGNANCY TEST: CPT | Performed by: EMERGENCY MEDICINE

## 2024-07-09 PROCEDURE — 99284 EMERGENCY DEPT VISIT MOD MDM: CPT | Mod: 25

## 2024-07-09 PROCEDURE — 85025 COMPLETE CBC W/AUTO DIFF WBC: CPT | Performed by: EMERGENCY MEDICINE

## 2024-07-09 PROCEDURE — 96360 HYDRATION IV INFUSION INIT: CPT

## 2024-07-09 PROCEDURE — 81000 URINALYSIS NONAUTO W/SCOPE: CPT | Performed by: EMERGENCY MEDICINE

## 2024-07-09 PROCEDURE — 96361 HYDRATE IV INFUSION ADD-ON: CPT

## 2024-07-09 RX ADMIN — SODIUM CHLORIDE 1000 ML: 9 INJECTION, SOLUTION INTRAVENOUS at 03:07

## 2024-07-09 RX ADMIN — SODIUM CHLORIDE 1000 ML: 9 INJECTION, SOLUTION INTRAVENOUS at 01:07

## 2024-07-09 NOTE — ED PROVIDER NOTES
"Encounter Date: 2024    SCRIBE #1 NOTE: I, Ashlyn Fonseca, am scribing for, and in the presence of,  Clem Astorga MD. I have scribed the following portions of the note - Other sections scribed: HPI, ROS.       History     Chief Complaint   Patient presents with    Threatened Miscarriage     Pt arrived via ems, pt chief complaint is a Possible miscarriage. Pt states about an hour ago felt a popping sensation and then had a large amount of vaginal bleeding, pt states is about 6 weeks pregnant a1        33 y.o. female, A1, 6 weeks gravid, with a PMHx of abnormal mentrual periods, anemia, ovarian cyst, , who presents to the ED with vaginal bleeding onset PTA. Patient reports she was laying in bed and suddenly felt a "pop" like a "bubble," and felt around her vaginal area, stating she then saw blood on her hand and where she was sitting. Also notes some constipation and rectal pain, stating she does not know when her last bowel movement was. States she has been moving things around her house and cleaning, and reports some neck and back soreness. States she has seen an OBGYN for this pregnancy and had an ultrasound to confirm. Denies being on anti-coagulant therapy. No other exacerbating or alleviating factors. Denies chest pain, SOB, dysuria, or other associated symptoms.     OBGYN office visit 2024  Per chart review, pt saw her OBGYN due to a missed period. UPT was positive in office. Last mentrual period was 2024.     US OB <14 Wks, TransAbd, Single Gestation 2024  Per chart review, US findings are as followed: single intrauterine gestation with mean sac diameter measuring 11.7 mm. The average ultrasound age is 5 weeks and 6 days with estimated date of delivery of 2025.  The fetal heart rate ranges from 89 to 91 beats per minute, suggestive of borderline fetal bradycardia.        The history is provided by the patient. No  was used.     Review of " patient's allergies indicates:  No Known Allergies  Past Medical History:   Diagnosis Date    Abnormal menstrual periods     Anemia     Dental caries     Migraine headache     Ovarian cyst      Past Surgical History:   Procedure Laterality Date     SECTION N/A 2021    Procedure:  SECTION;  Surgeon: Jitendra Guerra MD;  Location: [facility] L&D OR;  Service: OB/GYN;  Laterality: N/A;     Family History   Problem Relation Name Age of Onset    Diabetes Mother      Cancer Mother      Diabetes Maternal Grandmother       Social History     Tobacco Use    Smoking status: Former     Current packs/day: 0.00     Types: Cigarettes     Quit date: 2020     Years since quittin.1    Smokeless tobacco: Never   Substance Use Topics    Alcohol use: Not Currently     Comment: occasional prior to pregnancy    Drug use: Not Currently     Types: Marijuana     Review of Systems   Constitutional:  Negative for fever.   HENT:  Negative for sore throat.    Respiratory:  Negative for cough and shortness of breath.    Cardiovascular:  Negative for chest pain and leg swelling.   Gastrointestinal:  Positive for constipation and rectal pain. Negative for abdominal pain, diarrhea, nausea and vomiting.   Genitourinary:  Positive for vaginal bleeding. Negative for dysuria and hematuria.   Musculoskeletal:  Positive for back pain and neck pain.   Skin:  Negative for rash.   Neurological:  Negative for syncope.       Physical Exam     Initial Vitals [24 0051]   BP Pulse Resp Temp SpO2   114/70 68 18 98.8 °F (37.1 °C) 99 %      MAP       --         Physical Exam    Nursing note and vitals reviewed.  Constitutional: She appears well-developed and well-nourished.   HENT:   Head: Normocephalic and atraumatic.   Mouth/Throat: Oropharynx is clear and moist and mucous membranes are normal.   Eyes: Conjunctivae and EOM are normal. Pupils are equal, round, and reactive to light. Right conjunctiva is not injected. Left conjunctiva is  not injected. No scleral icterus.   Neck: Neck supple.   Normal range of motion.   Full passive range of motion without pain.     Cardiovascular:  Normal rate, regular rhythm, S1 normal, S2 normal, normal heart sounds and normal pulses.     Exam reveals no gallop and no friction rub.       No murmur heard.  Pulses:       Radial pulses are 2+ on the right side and 2+ on the left side.   Pulmonary/Chest: Effort normal and breath sounds normal. No respiratory distress.   Abdominal: Abdomen is soft. She exhibits no distension. There is no abdominal tenderness.   Genitourinary:    Genitourinary Comments: Escorted by nursing.  Minimal bleeding.  Os is closed.  No clots.  No discharge.     Musculoskeletal:         General: No edema. Normal range of motion.      Cervical back: Full passive range of motion without pain, normal range of motion and neck supple.      Comments: Good active ROM of all extremities. No lower extremity edema or cyanosis.      Neurological: No cranial nerve deficit. Gait normal.   A&Ox4, normal speech.   Skin: Skin is warm. No ecchymosis and no rash noted.   Psychiatric: She has a normal mood and affect. Thought content normal.         ED Course   Procedures  Labs Reviewed   CBC W/ AUTO DIFFERENTIAL - Abnormal; Notable for the following components:       Result Value    MPV 8.8 (*)     All other components within normal limits    Narrative:     Release to patient->Immediate   COMPREHENSIVE METABOLIC PANEL - Abnormal; Notable for the following components:    Potassium 3.2 (*)     CO2 21 (*)     BUN 5 (*)     Total Bilirubin 1.8 (*)     Alkaline Phosphatase 52 (*)     ALT 9 (*)     All other components within normal limits    Narrative:     Release to patient->Immediate   URINALYSIS, REFLEX TO URINE CULTURE - Abnormal; Notable for the following components:    Specific Gravity, UA >1.030 (*)     Protein, UA 1+ (*)     Ketones, UA Trace (*)     Occult Blood UA 2+ (*)     Urobilinogen, UA >=8.0 (*)      Leukocytes, UA Trace (*)     All other components within normal limits    Narrative:     Specimen Source->Urine   URINALYSIS MICROSCOPIC - Abnormal; Notable for the following components:    RBC, UA 18 (*)     All other components within normal limits    Narrative:     Specimen Source->Urine   POCT URINE PREGNANCY - Abnormal; Notable for the following components:    POC Preg Test, Ur Positive (*)     All other components within normal limits   HCG, QUANTITATIVE    Narrative:     Release to patient->Immediate   GROUP & RH          Imaging Results              US OB <14 Wks TransAbd & TransVag, Single Gestation (XPD) (In process)                      Medications   sodium chloride 0.9% bolus 1,000 mL 1,000 mL (0 mLs Intravenous Stopped 24 0250)   sodium chloride 0.9% bolus 1,000 mL 1,000 mL (1,000 mLs Intravenous New Bag 24 0300)     Medical Decision Making  Differential diagnosis include but are not limited to:  Threatened , septic , incomplete , complete ,        Amount and/or Complexity of Data Reviewed  External Data Reviewed: labs, radiology and notes.     Details: See HPI.   Labs: ordered. Decision-making details documented in ED Course.  Radiology: ordered and independent interpretation performed. Decision-making details documented in ED Course.            Scribe Attestation:   Scribe #1: I performed the above scribed service and the documentation accurately describes the services I performed. I attest to the accuracy of the note.        ED Course as of 24 0425   e 2024   0422 US OB <14 Wks TransAbd & TransVag, Single Gestation (XPD)  1. Single live intrauterine gestation with estimated age of 6 weeks 1 day. 2. Small subchorionic bleed. [AN]   0422 I assumed care of this patient at shift change.  In short, patient with previously identified IUP having acute onset of abdominal pain with vaginal bleeding.  Pending ultrasound.  Ultrasound demonstrating live IUP  EGA 6+ 1.  I discussed these findings with the patient.  Discussed need to follow-up with OBGYN.  No indication for RhoGAM given positive Rh.   [AN]      ED Course User Index  [AN] Cristino West PA-C                           Clinical Impression:  Final diagnoses:  [O20.0] Threatened miscarriage in early pregnancy (Primary)          ED Disposition Condition    Discharge Stable          ED Prescriptions    None       Follow-up Information       Follow up With Specialties Details Why Contact Info    Neeraj Guerra MD Internal Medicine Schedule an appointment as soon as possible for a visit in 1 week  [address] [facility] [address]  [phone]      [facility] Emergency Medicine Go to  As needed, If symptoms worsen [address] [facility] [place] [address] [phone]          I, Clem Astorga, personally performed the services described in this documentation. All medical record entries made by the scribe were at my direction and in my presence.  I have reviewed the chart and agree that the record reflects my personal performance and is accurate and complete.      Cristino West PA-C  07/09/24 0425

## 2024-07-09 NOTE — DISCHARGE INSTRUCTIONS
You were evaluated and treated in the emergency department today. You were found to have a diagnoses that can be managed well at home, however that requires your commitment to getting better.    Diagnoses specific instructions:  Follow-up with OBGYN.       If you received or are discharged with pain medicine or muscle relaxers, understand that they can make you sleepy or impair your judgement. Do not make important decisions, drink, drive, swim or perform any other tasks you would not otherwise perform while impaired for at least 24 hours after your last dose.      Ensure you follow up with your Primary Care Provider or any additional providers listed on this discharge sheet. While you may be healthy enough to go home today, I cannot predict the exact course of your diagnoses. It is important to remember that some problems are difficult to diagnose and may not be found during your first visit. As such, it is your responsibility to monitor symptoms, follow-up with another healthcare provider, or return to the emergency room for new or worsening concerns. Unless otherwise instructed, continue all home medications and any new medications prescribed to you in the Emergency Department.     General Maintenance for otherwise healthy adults: Ensure adequate hydration to prevent prolonged illness and recovery. This should include about 14-16 cups of water daily if you don't have any limiting medical conditions.  Monitor your caloric intake with a goal of obtaining and maintaining a healthy weight and BMI to help prevent the development of chronic and life-threatening medical conditions. Talk with your primary care provider about how to start healthy fitness habits and aim for a goal of 30 minutes to an hour of exercise 3-5 times a week. Avoid the use of tobacco, alcohol, and illicit drugs as these may be detrimental to your health goals.

## 2024-07-09 NOTE — ED TRIAGE NOTES
Patient  presents to ED for the evaluation of possible miscarriage . Pt reports vaginal bleeding few hours PTA, states the bleeding was massive. Pt denies passing clots     No active bleeding at this time

## 2024-07-29 ENCOUNTER — TELEPHONE (OUTPATIENT)
Dept: OBSTETRICS AND GYNECOLOGY | Facility: CLINIC | Age: 34
End: 2024-07-29

## 2024-07-29 NOTE — TELEPHONE ENCOUNTER
----- Message from Lori Bender sent at 7/29/2024  3:38 PM CDT -----  Regarding: self [phone]  Type: Patient Call Back    Who called: self     What is the request in detail: pt is asking for RX of Zofran to be sent to pharmacy     [id number] - [place], [place] - [address] [facility]  [address] [place] [address]  Phone: [phone] Fax: [phone]       Can the clinic reply by [facility]? no    Would the patient rather a call back or a response via [facility]?  Call back     Best call back number: [phone]

## 2024-08-09 ENCOUNTER — TELEPHONE (OUTPATIENT)
Dept: OBSTETRICS AND GYNECOLOGY | Facility: CLINIC | Age: 34
End: 2024-08-09
Payer: MEDICAID

## 2024-08-09 DIAGNOSIS — R11.0 NAUSEA: ICD-10-CM

## 2024-08-09 RX ORDER — ONDANSETRON 4 MG/1
4 TABLET, ORALLY DISINTEGRATING ORAL EVERY 6 HOURS PRN
Qty: 30 TABLET | Refills: 1 | Status: SHIPPED | OUTPATIENT
Start: 2024-08-09

## 2024-08-28 ENCOUNTER — NURSE TRIAGE (OUTPATIENT)
Dept: ADMINISTRATIVE | Facility: CLINIC | Age: 34
End: 2024-08-28
Payer: MEDICAID

## 2024-08-28 NOTE — TELEPHONE ENCOUNTER
Patient calling in to ask if she can take a stool softener. She is 13 weeks pregnant. Last BM was 3-4 days ago. Dispo provided home care. Used [facility] website to provide following information:   Constipation  Constipation is very common throughout all stages of pregnancy. It can be caused by hormones that relax the muscles in your digestive system. Iron often taken during pregnancy may make the constipation worse. In addition, the growing uterus pressing on the lower intestines may also add to the problem. Here are some ways to relieve constipation:  Drink plenty of water. Try to drink 8 to 10 glasses of water per day.  Eat foods high in fiber such as raw fruits, vegetables, wholegrain breads, and cereals.  Eating fruit, especially at night, such as prunes, figs, dates, raisins, peaches, and cherries because of their laxative qualities.  Avoid cheese, bananas, and rice as they may slow down your bowel movements.  Use Metamucil® or Citrucel® as needed.  Try a stool softener such as Colace® or Dulcolax®.  If you go 5 days without a bowel movement or have significant pain, contact your doctor.    Instructed to call back with additional questions or worsening of symptoms. Patient verbalized understanding.     Reason for Disposition   MILD constipation    Additional Information   Negative: Leakage of fluid from vagina   Negative: [1] Pregnant 23 or more weeks AND [2] baby is moving less today (e.g., kick count < 5 in 1 hour or < 10 in 2 hours)   Negative: [1] Vomiting AND [2] contains bile (green color)   Negative: Severe rectal pain   Negative: Patient sounds very sick or weak to the triager   Negative: [1] Vomiting AND [2] abdomen looks much more swollen than usual   Negative: Last bowel movement (BM) > 4 days ago   Negative: Leaking stool   Negative: Unable to have a bowel movement (BM) without laxative or enema   Negative: [1] Rectal pain or itching AND [2] no improvement after using Care Advice   Negative: Taking  new prescription medication   Negative: [1] Minor bleeding from rectum (e.g., blood just on toilet paper, few drops, streaks on surface of normal formed BM) AND [2] 3 or more times   Negative: [1] Uses laxative (e.g., PEG / Miralax, Milk of Magnesia) or enema AND [2] more than once a month   Negative: Constipation is a chronic symptom (recurrent or ongoing AND present > 4 weeks)    Protocols used: Pregnancy - Constipation-A-AH

## 2024-09-03 ENCOUNTER — ROUTINE PRENATAL (OUTPATIENT)
Dept: OBSTETRICS AND GYNECOLOGY | Facility: CLINIC | Age: 34
End: 2024-09-03
Payer: MEDICAID

## 2024-09-03 VITALS
SYSTOLIC BLOOD PRESSURE: 110 MMHG | DIASTOLIC BLOOD PRESSURE: 70 MMHG | WEIGHT: 177.13 LBS | BODY MASS INDEX: 31.38 KG/M2

## 2024-09-03 DIAGNOSIS — R11.0 NAUSEA: ICD-10-CM

## 2024-09-03 DIAGNOSIS — Z3A.14 14 WEEKS GESTATION OF PREGNANCY: ICD-10-CM

## 2024-09-03 DIAGNOSIS — Z32.01 POSITIVE PREGNANCY TEST: ICD-10-CM

## 2024-09-03 DIAGNOSIS — O34.219 HISTORY OF CESAREAN SECTION COMPLICATING PREGNANCY: ICD-10-CM

## 2024-09-03 PROCEDURE — 99999 PR PBB SHADOW E&M-EST. PATIENT-LVL III: CPT | Mod: PBBFAC,,, | Performed by: OBSTETRICS & GYNECOLOGY

## 2024-09-03 PROCEDURE — 99213 OFFICE O/P EST LOW 20 MIN: CPT | Mod: PBBFAC,TH | Performed by: OBSTETRICS & GYNECOLOGY

## 2024-09-03 RX ORDER — ONDANSETRON 4 MG/1
4 TABLET, ORALLY DISINTEGRATING ORAL EVERY 6 HOURS PRN
Qty: 30 TABLET | Refills: 1 | Status: SHIPPED | OUTPATIENT
Start: 2024-09-03

## 2024-09-03 RX ORDER — ASPIRIN 81 MG/1
81 TABLET ORAL DAILY
Qty: 90 TABLET | Refills: 2 | COMMUNITY
Start: 2024-09-03 | End: 2025-06-10

## 2024-09-03 NOTE — PROGRESS NOTES
Discussed daily prune juice for constipation; pt requesting refill of zofran.  Discussed PN labs next week with quad scr.  Pt  permanent sterilization    Assessment/Plan  1. Grand multipara in labor in second trimester    2. 14 weeks gestation of pregnancy      45 minutes spend in total time reviewing labs, prior sonos and previous visits

## 2024-09-04 ENCOUNTER — PATIENT MESSAGE (OUTPATIENT)
Dept: MATERNAL FETAL MEDICINE | Facility: CLINIC | Age: 34
End: 2024-09-04
Payer: MEDICAID

## 2024-09-22 ENCOUNTER — PATIENT MESSAGE (OUTPATIENT)
Dept: OTHER | Facility: OTHER | Age: 34
End: 2024-09-22
Payer: MEDICAID

## 2024-10-02 ENCOUNTER — TELEPHONE (OUTPATIENT)
Dept: OBSTETRICS AND GYNECOLOGY | Facility: CLINIC | Age: 34
End: 2024-10-02
Payer: MEDICAID

## 2024-10-02 NOTE — TELEPHONE ENCOUNTER
Pt call was returned. Pt is schedule to come into the office on Monday 10/07/24. Pt will go to her ultrasound first and come see the provider after.     ----- Message from Lori sent at 10/2/2024 12:22 PM CDT -----  Regarding: self [phone]  Type:  Sooner Appointment Request    Patient is requesting a sooner appointment.  Patient declined first available appointment listed as well as another facility and provider .  Patient will not accept being placed on the waitlist and is requesting a message be sent to doctor.    Name of Caller:  self     When is the first available appointment? 10/8     Symptoms:  EMANUEL. Asked if she can come after US Monday    Would the patient rather a call back or a response via [facility]? Call back    Best Call Back Number:  [phone]

## 2024-10-04 ENCOUNTER — TELEPHONE (OUTPATIENT)
Dept: OBSTETRICS AND GYNECOLOGY | Facility: CLINIC | Age: 34
End: 2024-10-04
Payer: MEDICAID

## 2024-10-04 ENCOUNTER — PATIENT MESSAGE (OUTPATIENT)
Dept: OBSTETRICS AND GYNECOLOGY | Facility: CLINIC | Age: 34
End: 2024-10-04
Payer: MEDICAID

## 2024-10-07 ENCOUNTER — PROCEDURE VISIT (OUTPATIENT)
Dept: MATERNAL FETAL MEDICINE | Facility: CLINIC | Age: 34
End: 2024-10-07
Payer: MEDICAID

## 2024-10-07 DIAGNOSIS — Z3A.14 14 WEEKS GESTATION OF PREGNANCY: ICD-10-CM

## 2024-10-07 DIAGNOSIS — Z36.2 ENCOUNTER FOR FOLLOW-UP ULTRASOUND OF FETAL ANATOMY: Primary | ICD-10-CM

## 2024-10-07 DIAGNOSIS — Z36.86 ENCOUNTER FOR ANTENATAL SCREENING FOR CERVICAL LENGTH: ICD-10-CM

## 2024-10-07 PROCEDURE — 76805 OB US >/= 14 WKS SNGL FETUS: CPT | Mod: PBBFAC | Performed by: OBSTETRICS & GYNECOLOGY

## 2024-10-13 ENCOUNTER — PATIENT MESSAGE (OUTPATIENT)
Dept: OTHER | Facility: OTHER | Age: 34
End: 2024-10-13
Payer: MEDICAID

## 2024-10-17 ENCOUNTER — NURSE TRIAGE (OUTPATIENT)
Dept: ADMINISTRATIVE | Facility: CLINIC | Age: 34
End: 2024-10-17
Payer: MEDICAID

## 2024-10-17 ENCOUNTER — TELEPHONE (OUTPATIENT)
Dept: OBSTETRICS AND GYNECOLOGY | Facility: CLINIC | Age: 34
End: 2024-10-17
Payer: MEDICAID

## 2024-10-17 NOTE — TELEPHONE ENCOUNTER
O[facility] RN   Patient has vaginal swelling.  No patient on line.  Called and LM on VM    Reason for Disposition   Message left on identified voicemail    Additional Information   Negative: Caller has already spoken with the PCP (or office), and has no further questions   Negative: Caller has already spoken with another triager and has no further questions   Negative: Caller has already spoken with another triager or PCP (or office), and has further questions and triager able to answer questions.   Negative: Busy signal.  First attempt to contact caller.  Follow-up call scheduled within 15 minutes.   Negative: No answer.  First attempt to contact caller.  Follow-up call scheduled within 15 minutes.    Protocols used: No Contact or Duplicate Contact Call-A-OH

## 2024-10-31 ENCOUNTER — PATIENT MESSAGE (OUTPATIENT)
Dept: OBSTETRICS AND GYNECOLOGY | Facility: CLINIC | Age: 34
End: 2024-10-31
Payer: MEDICAID

## 2024-11-10 ENCOUNTER — PATIENT MESSAGE (OUTPATIENT)
Dept: OTHER | Facility: OTHER | Age: 34
End: 2024-11-10
Payer: MEDICAID

## 2024-11-11 ENCOUNTER — LAB VISIT (OUTPATIENT)
Dept: LAB | Facility: HOSPITAL | Age: 34
End: 2024-11-11
Attending: OBSTETRICS & GYNECOLOGY
Payer: MEDICAID

## 2024-11-11 ENCOUNTER — PROCEDURE VISIT (OUTPATIENT)
Dept: MATERNAL FETAL MEDICINE | Facility: CLINIC | Age: 34
End: 2024-11-11
Payer: MEDICAID

## 2024-11-11 DIAGNOSIS — Z36.2 ENCOUNTER FOR FOLLOW-UP ULTRASOUND OF FETAL ANATOMY: ICD-10-CM

## 2024-11-11 DIAGNOSIS — Z3A.14 14 WEEKS GESTATION OF PREGNANCY: ICD-10-CM

## 2024-11-11 DIAGNOSIS — Z36.86 ENCOUNTER FOR ANTENATAL SCREENING FOR CERVICAL LENGTH: ICD-10-CM

## 2024-11-11 LAB
ABO + RH BLD: NORMAL
BLD GP AB SCN CELLS X3 SERPL QL: NORMAL
ESTIMATED AVG GLUCOSE: 105 MG/DL (ref 68–131)
HBA1C MFR BLD: 5.3 % (ref 4–5.6)
HBV SURFACE AG SERPL QL IA: NORMAL
HCV AB SERPL QL IA: NORMAL
HGB S BLD QL SOLY: NEGATIVE
HIV 1+2 AB+HIV1 P24 AG SERPL QL IA: NORMAL
SPECIMEN OUTDATE: NORMAL
TREPONEMA PALLIDUM IGG+IGM AB [PRESENCE] IN SERUM OR PLASMA BY IMMUNOASSAY: NONREACTIVE

## 2024-11-11 PROCEDURE — 81511 FTL CGEN ABNOR FOUR ANAL: CPT | Performed by: OBSTETRICS & GYNECOLOGY

## 2024-11-11 PROCEDURE — 36415 COLL VENOUS BLD VENIPUNCTURE: CPT | Performed by: OBSTETRICS & GYNECOLOGY

## 2024-11-11 PROCEDURE — 85660 RBC SICKLE CELL TEST: CPT | Performed by: OBSTETRICS & GYNECOLOGY

## 2024-11-11 PROCEDURE — 86593 SYPHILIS TEST NON-TREP QUANT: CPT | Performed by: OBSTETRICS & GYNECOLOGY

## 2024-11-11 PROCEDURE — 87340 HEPATITIS B SURFACE AG IA: CPT | Performed by: OBSTETRICS & GYNECOLOGY

## 2024-11-11 PROCEDURE — 83036 HEMOGLOBIN GLYCOSYLATED A1C: CPT | Performed by: OBSTETRICS & GYNECOLOGY

## 2024-11-11 PROCEDURE — 87389 HIV-1 AG W/HIV-1&-2 AB AG IA: CPT | Performed by: OBSTETRICS & GYNECOLOGY

## 2024-11-11 PROCEDURE — 86762 RUBELLA ANTIBODY: CPT | Performed by: OBSTETRICS & GYNECOLOGY

## 2024-11-11 PROCEDURE — 76816 OB US FOLLOW-UP PER FETUS: CPT | Mod: PBBFAC | Performed by: OBSTETRICS & GYNECOLOGY

## 2024-11-11 PROCEDURE — 86803 HEPATITIS C AB TEST: CPT | Performed by: OBSTETRICS & GYNECOLOGY

## 2024-11-11 PROCEDURE — 86901 BLOOD TYPING SEROLOGIC RH(D): CPT | Performed by: OBSTETRICS & GYNECOLOGY

## 2024-11-12 LAB
RUBV IGG SER-ACNC: 11.7 IU/ML
RUBV IGG SER-IMP: REACTIVE

## 2024-11-14 LAB
# FETUSES US: ABNORMAL
2ND TRIMESTER 4 SCREEN PNL SERPL: ABNORMAL
2ND TRIMESTER 4 SCREEN SERPL-IMP: ABNORMAL
AFP MOM SERPL: ABNORMAL
AFP SERPL-MCNC: 98 NG/ML
AGE AT DELIVERY: 33
B-HCG MOM SERPL: ABNORMAL
B-HCG SERPL-ACNC: 7.2 IU/ML
FET TS 21 RISK FROM MAT AGE: ABNORMAL
GA (DAYS): 1 D
GA (WEEKS): 24 WK
GA METHOD: ABNORMAL
IDDM PATIENT QL: ABNORMAL
INHIBIN A MOM SERPL: ABNORMAL
INHIBIN A SERPL-MCNC: 316.5 PG/ML
SMOKING STATUS FTND: NO
TS 18 RISK FETUS: ABNORMAL
TS 21 RISK FETUS: ABNORMAL
U ESTRIOL MOM SERPL: ABNORMAL
U ESTRIOL SERPL-MCNC: 4.37 NG/ML

## 2024-11-24 ENCOUNTER — PATIENT MESSAGE (OUTPATIENT)
Dept: OTHER | Facility: OTHER | Age: 34
End: 2024-11-24
Payer: MEDICAID

## 2024-12-01 ENCOUNTER — NURSE TRIAGE (OUTPATIENT)
Dept: ADMINISTRATIVE | Facility: CLINIC | Age: 34
End: 2024-12-01
Payer: MEDICAID

## 2024-12-01 NOTE — TELEPHONE ENCOUNTER
"Pt reports she is 27-28 weeks pregnant and feeling light-headed, and abd is tightening intermittently. Pt advised to go to the L&D now per protocol, Pt encouraged to call back with any worsening symptoms or questions. She verbalized understanding.    Reason for Disposition   MODERATE-SEVERE abdominal pain (e.g., interferes with normal activities, awakens from sleep)    Additional Information   Negative: Passed out (e.g., fainted, lost consciousness, blacked out and was not responding)   Negative: Shock suspected (e.g., cold/pale/clammy skin, too weak to stand, low BP, rapid pulse)   Negative: Difficult to awaken or acting confused (e.g., disoriented, slurred speech)   Negative: [1] SEVERE abdominal pain (e.g., excruciating) AND [2] constant AND [3] present > 1 hour   Negative: SEVERE vaginal bleeding (e.g., continuous red blood from vagina, large blood clots)   Negative: Sounds like a life-threatening emergency to the triager   Negative: [1] Vomiting AND [2] contains red blood or black ("coffee ground") material  (Exception: Few red streaks in vomit that only happened once.)   Negative: [1] SEVERE headache AND [2] not relieved with acetaminophen (e.g., Tylenol)    Protocols used: Pregnancy - Abdominal Pain Greater Than 20 Weeks EGA-A-AH    "

## 2024-12-02 ENCOUNTER — ROUTINE PRENATAL (OUTPATIENT)
Dept: OBSTETRICS AND GYNECOLOGY | Facility: CLINIC | Age: 34
End: 2024-12-02
Payer: MEDICAID

## 2024-12-02 VITALS
SYSTOLIC BLOOD PRESSURE: 120 MMHG | BODY MASS INDEX: 34.66 KG/M2 | DIASTOLIC BLOOD PRESSURE: 64 MMHG | WEIGHT: 195.69 LBS

## 2024-12-02 DIAGNOSIS — O34.219 HISTORY OF CESAREAN SECTION COMPLICATING PREGNANCY: ICD-10-CM

## 2024-12-02 DIAGNOSIS — Z3A.27 PREGNANCY WITH 27 COMPLETED WEEKS GESTATION: ICD-10-CM

## 2024-12-02 DIAGNOSIS — O09.33 INSUFFICIENT PRENATAL CARE IN THIRD TRIMESTER: Primary | ICD-10-CM

## 2024-12-02 PROCEDURE — 99213 OFFICE O/P EST LOW 20 MIN: CPT | Mod: TH,S$PBB,, | Performed by: OBSTETRICS & GYNECOLOGY

## 2024-12-02 PROCEDURE — 99213 OFFICE O/P EST LOW 20 MIN: CPT | Mod: PBBFAC,TH | Performed by: OBSTETRICS & GYNECOLOGY

## 2024-12-02 PROCEDURE — 99999 PR PBB SHADOW E&M-EST. PATIENT-LVL III: CPT | Mod: PBBFAC,,, | Performed by: OBSTETRICS & GYNECOLOGY

## 2024-12-02 NOTE — PROGRESS NOTES
Pt feeling miserable, achy and not sleeping well.  Pt reluctant to take sleep aid due to small children.  Discussed with pt need to sleep and see if someone can watch children for 1-2 nights.    Discussed need for PN II labs this week.  Jerald tubal consents signed    Assessment/Plan  1. Insufficient prenatal care in third trimester    2. Pregnancy with 27 completed weeks gestation    3. History of  section complicating pregnancy      20 minutes spend in total time reviewing labs, prior sonos and previous visits

## 2024-12-05 ENCOUNTER — LAB VISIT (OUTPATIENT)
Dept: LAB | Facility: HOSPITAL | Age: 34
End: 2024-12-05
Attending: OBSTETRICS & GYNECOLOGY
Payer: MEDICAID

## 2024-12-05 ENCOUNTER — TELEPHONE (OUTPATIENT)
Dept: OBSTETRICS AND GYNECOLOGY | Facility: HOSPITAL | Age: 34
End: 2024-12-05
Payer: MEDICAID

## 2024-12-05 DIAGNOSIS — Z3A.27 PREGNANCY WITH 27 COMPLETED WEEKS GESTATION: ICD-10-CM

## 2024-12-05 DIAGNOSIS — O09.33 INSUFFICIENT PRENATAL CARE IN THIRD TRIMESTER: ICD-10-CM

## 2024-12-05 DIAGNOSIS — O34.219 HISTORY OF CESAREAN SECTION COMPLICATING PREGNANCY: ICD-10-CM

## 2024-12-05 DIAGNOSIS — O99.810 ABNORMAL GLUCOSE AFFECTING PREGNANCY: Primary | ICD-10-CM

## 2024-12-05 LAB
ABO + RH BLD: NORMAL
BASOPHILS # BLD AUTO: 0.05 K/UL (ref 0–0.2)
BASOPHILS NFR BLD: 0.6 % (ref 0–1.9)
BLD GP AB SCN CELLS X3 SERPL QL: NORMAL
DIFFERENTIAL METHOD BLD: ABNORMAL
EOSINOPHIL # BLD AUTO: 0.2 K/UL (ref 0–0.5)
EOSINOPHIL NFR BLD: 2.8 % (ref 0–8)
ERYTHROCYTE [DISTWIDTH] IN BLOOD BY AUTOMATED COUNT: 12.8 % (ref 11.5–14.5)
GLUCOSE SERPL-MCNC: 179 MG/DL (ref 70–140)
HCT VFR BLD AUTO: 30.3 % (ref 37–48.5)
HGB BLD-MCNC: 9.8 G/DL (ref 12–16)
IMM GRANULOCYTES # BLD AUTO: 0.1 K/UL (ref 0–0.04)
IMM GRANULOCYTES NFR BLD AUTO: 1.2 % (ref 0–0.5)
LYMPHOCYTES # BLD AUTO: 1.5 K/UL (ref 1–4.8)
LYMPHOCYTES NFR BLD: 17.7 % (ref 18–48)
MCH RBC QN AUTO: 28.5 PG (ref 27–31)
MCHC RBC AUTO-ENTMCNC: 32.3 G/DL (ref 32–36)
MCV RBC AUTO: 88 FL (ref 82–98)
MONOCYTES # BLD AUTO: 0.4 K/UL (ref 0.3–1)
MONOCYTES NFR BLD: 5.2 % (ref 4–15)
NEUTROPHILS # BLD AUTO: 6 K/UL (ref 1.8–7.7)
NEUTROPHILS NFR BLD: 72.5 % (ref 38–73)
NRBC BLD-RTO: 0 /100 WBC
PLATELET # BLD AUTO: 261 K/UL (ref 150–450)
PMV BLD AUTO: 8.4 FL (ref 9.2–12.9)
RBC # BLD AUTO: 3.44 M/UL (ref 4–5.4)
SPECIMEN OUTDATE: NORMAL
WBC # BLD AUTO: 8.26 K/UL (ref 3.9–12.7)

## 2024-12-05 PROCEDURE — 85025 COMPLETE CBC W/AUTO DIFF WBC: CPT | Performed by: OBSTETRICS & GYNECOLOGY

## 2024-12-05 PROCEDURE — 82950 GLUCOSE TEST: CPT | Performed by: OBSTETRICS & GYNECOLOGY

## 2024-12-05 PROCEDURE — 86850 RBC ANTIBODY SCREEN: CPT | Performed by: OBSTETRICS & GYNECOLOGY

## 2024-12-08 ENCOUNTER — PATIENT MESSAGE (OUTPATIENT)
Dept: OTHER | Facility: OTHER | Age: 34
End: 2024-12-08
Payer: MEDICAID

## 2024-12-22 ENCOUNTER — PATIENT MESSAGE (OUTPATIENT)
Dept: OTHER | Facility: OTHER | Age: 34
End: 2024-12-22
Payer: MEDICAID

## 2024-12-27 ENCOUNTER — ROUTINE PRENATAL (OUTPATIENT)
Dept: OBSTETRICS AND GYNECOLOGY | Facility: CLINIC | Age: 34
End: 2024-12-27
Payer: MEDICAID

## 2024-12-27 VITALS — DIASTOLIC BLOOD PRESSURE: 78 MMHG | SYSTOLIC BLOOD PRESSURE: 114 MMHG | WEIGHT: 199.31 LBS | BODY MASS INDEX: 35.3 KG/M2

## 2024-12-27 DIAGNOSIS — Z3A.30 30 WEEKS GESTATION OF PREGNANCY: ICD-10-CM

## 2024-12-27 DIAGNOSIS — O99.810 ABNORMAL GLUCOSE AFFECTING PREGNANCY: ICD-10-CM

## 2024-12-27 DIAGNOSIS — O09.33 INSUFFICIENT PRENATAL CARE IN THIRD TRIMESTER: Primary | ICD-10-CM

## 2024-12-27 PROCEDURE — 99213 OFFICE O/P EST LOW 20 MIN: CPT | Mod: PBBFAC,TH | Performed by: OBSTETRICS & GYNECOLOGY

## 2024-12-27 PROCEDURE — 99999 PR PBB SHADOW E&M-EST. PATIENT-LVL III: CPT | Mod: PBBFAC,,, | Performed by: OBSTETRICS & GYNECOLOGY

## 2024-12-27 NOTE — PROGRESS NOTES
Discussed need for 3 hr GTT; pt never checked her portal to see failure of 1 hr DM scr.  Pt very anxious about delivery.  Still desires tubal.    Assessment/Plan  1. Insufficient prenatal care in third trimester    2. Abnormal glucose affecting pregnancy    3. 30 weeks gestation of pregnancy      20 minutes spend in total time reviewing labs, prior sonos and previous visits

## 2025-01-05 ENCOUNTER — PATIENT MESSAGE (OUTPATIENT)
Dept: OTHER | Facility: OTHER | Age: 35
End: 2025-01-05
Payer: MEDICAID

## 2025-01-18 ENCOUNTER — HOSPITAL ENCOUNTER (OUTPATIENT)
Facility: HOSPITAL | Age: 35
Discharge: HOME | End: 2025-01-18
Attending: OBSTETRICS & GYNECOLOGY | Admitting: OBSTETRICS & GYNECOLOGY
Payer: MEDICAID

## 2025-01-18 DIAGNOSIS — R10.9 ABDOMINAL PAIN: ICD-10-CM

## 2025-01-18 LAB
BILIRUB UR QL STRIP: NEGATIVE
CLARITY UR: CLEAR
COLOR UR: YELLOW
GLUCOSE UR QL STRIP: NEGATIVE
HGB UR QL STRIP: ABNORMAL
KETONES UR QL STRIP: NEGATIVE
LEUKOCYTE ESTERASE UR QL STRIP: NEGATIVE
NITRITE UR QL STRIP: NEGATIVE
PH UR STRIP: 7 [PH] (ref 5–8)
PROT UR QL STRIP: NEGATIVE
SP GR UR STRIP: 1.01 (ref 1–1.03)
URN SPEC COLLECT METH UR: ABNORMAL
UROBILINOGEN UR STRIP-ACNC: NEGATIVE EU/DL

## 2025-01-18 PROCEDURE — 99211 OFF/OP EST MAY X REQ PHY/QHP: CPT | Mod: 25

## 2025-01-18 PROCEDURE — 59025 FETAL NON-STRESS TEST: CPT

## 2025-01-18 PROCEDURE — 81003 URINALYSIS AUTO W/O SCOPE: CPT | Performed by: OBSTETRICS & GYNECOLOGY

## 2025-01-18 NOTE — NURSING
Pt is  at 33 weeks 6 days arrived to L&D unit via wheelchair with complaints of lower abdominal pain and back pain x 3 weeks. Pt denies any urinary symptoms, vaginal bleeding or leaking of fluid. +FM. Care assumed. Full assessment done, history and medications reviewed with pt. Pt updated on plan of care with questions answered. Bed in low locked position, call bell in reach. Pt placed on EFM and TOCO, automatic BP and pulse ox. SVE 1.5/60-3.  Urine collected and sent to lab for analysis.

## 2025-01-18 NOTE — NURSING
Dr Giles updated on patient CC, reactive strip, SVE, urinalysis results and lack of cervical change, orders received to discharge patient at this time.

## 2025-01-18 NOTE — NURSING
Discharge instructions given to patient and explained, patient verbalized understanding.  Patient ambulated from unit in NAD, alone.

## 2025-01-18 NOTE — DISCHARGE INSTRUCTIONS
Home Undelivered Discharge Instructions    After Discharge Orders:    Future Appointments   Date Time Provider Department Center   1/24/2025 10:00 AM [facility]   1/27/2025 11:15 AM  [facility]   2/7/2025 10:00 AM [facility]   2/14/2025 10:00 AM [facility]   2/21/2025 10:00 AM Hugo Giles MD [facility]   2/28/2025 10:00 AM Hugo Giles MD [facility]           Current Discharge Medication List        CONTINUE these medications which have NOT CHANGED    Details   acetaminophen-codeine 300-30mg (TYLENOL #3) 300-30 mg Tab Take 1 tablet by mouth every 6 (six) hours as needed.      aspirin (ECOTRIN) 81 MG EC tablet Take 1 tablet (81 mg total) by mouth once daily.  Qty: 90 tablet, Refills: 2      cyclobenzaprine (FLEXERIL) 5 MG tablet Take 5-10 mg by mouth nightly.      docusate sodium (COLACE) 100 MG capsule Take 1 capsule (100 mg total) by mouth 2 (two) times daily as needed for Constipation.  Qty: 60 capsule, Refills: 2      ferrous sulfate 325 (65 FE) MG EC tablet Take 1 tablet (325 mg total) by mouth 2 (two) times daily.  Qty: 60 tablet, Refills: 2      fluconazole (DIFLUCAN) 150 MG Tab Take 1 tablet (150 mg total) by mouth as needed. Take one pill weekly as needed for yeast infection.  Qty: 6 tablet, Refills: 0    Associated Diagnoses: Vaginal yeast infection      FLUoxetine 10 MG capsule Take 1 capsule (10 mg total) by mouth once daily.  Qty: 30 capsule, Refills: 2    Associated Diagnoses: Adjustment disorder with mixed anxiety and depressed mood      medroxyPROGESTERone (DEPO-PROVERA) 150 mg/mL Syrg       omeprazole (PRILOSEC OTC) 20 MG tablet Take 1 tablet (20 mg total) by mouth daily as needed.  Qty: 30 tablet, Refills: 1      ondansetron (ZOFRAN-ODT) 4 MG TbDL Dissolve 1 tablet (4 mg total) by mouth every 6 (six) hours as needed.  Qty: 30  tablet, Refills: 1    Associated Diagnoses: Nausea      oxyCODONE-acetaminophen (PERCOCET) 5-325 mg per tablet Take 1 tablet by mouth every 4 (four) hours as needed.  Qty: 20 tablet, Refills: 0    Comments: Quantity prescribed more than 7 day supply? Yes, quantity medically necessary      PNV,calcium 72-iron-folic acid (PRENATAL VITAMIN PLUS LOW IRON) 27 mg iron- 1 mg Tab Take 1 tablet (1 each total) by mouth once daily.  Qty: 90 tablet, Refills: 3    Comments: Please substitute to comparable prenatal vitamins if not covered by pt insurance plan or per pt request.  Associated Diagnoses: Positive pregnancy test      prenatal vit no.130-iron-folic 27 mg iron- 800 mcg Tab Take 1 tablet by mouth once daily.  Qty: 30 tablet, Refills: 0                     Diet:  normal diet as tolerated    Rest: normal activity as tolerated    Other instructions: Do kick counts once a day on your baby. Choose the time of day your baby is most active. Get in a comfortable lying or sitting position and time how long it takes to feel 10 kicks, twists, turns, swishes, or rolls. Call your physician or midwife if there have not been 10 kicks in 1 hours    Call  911, or go to the nearest Emergency Room if: increased leakage or fluid, decreased fetal movement, persistent low back pain or cramping, bleeding from vaginal area, difficulty urinating, pain with urination, difficulty breathing, new calf pain, persistent headache, or vision change

## 2025-01-25 ENCOUNTER — HOSPITAL ENCOUNTER (OUTPATIENT)
Facility: HOSPITAL | Age: 35
Discharge: HOME | End: 2025-01-25
Attending: OBSTETRICS & GYNECOLOGY | Admitting: OBSTETRICS & GYNECOLOGY
Payer: MEDICAID

## 2025-01-25 VITALS
DIASTOLIC BLOOD PRESSURE: 73 MMHG | SYSTOLIC BLOOD PRESSURE: 112 MMHG | HEART RATE: 86 BPM | OXYGEN SATURATION: 100 % | RESPIRATION RATE: 18 BRPM

## 2025-01-25 PROCEDURE — 25000003 PHARM REV CODE 250: Performed by: OBSTETRICS & GYNECOLOGY

## 2025-01-25 PROCEDURE — 59025 FETAL NON-STRESS TEST: CPT

## 2025-01-25 PROCEDURE — 99211 OFF/OP EST MAY X REQ PHY/QHP: CPT

## 2025-01-25 RX ORDER — OXYCODONE HYDROCHLORIDE 5 MG/1
5 TABLET ORAL ONCE
Status: COMPLETED | OUTPATIENT
Start: 2025-01-25 | End: 2025-01-25

## 2025-01-25 RX ADMIN — OXYCODONE 5 MG: 5 TABLET ORAL at 02:01

## 2025-01-25 NOTE — NURSING
"Patient presents to triage at this time with complaints of constant lower abdominal pain bilateral across her incision, states that sometimes it feels wet like she is sweating. Also states that baby is up in her ribs and baby is "moving to much".      Patient is also congested, and was nauseous and vomiting yesterday but has been able to eat and drink today.     She has been tired and missed her appointment yesterday because she slept through her alarm.     She also has green mucus with wiping.      Patient states she took 2 normal Tylenol about 2 to 3 hours ago.     SVE was unable to assess. Cervix palpated firm and posterior to the Left.       Dr. Padilla updated on above. MD order for one time dose of oxycodone IR 5mg PO.   "

## 2025-01-25 NOTE — NURSING
Patient arrived to unit at this time via EMS complaints of constant lower abd pain. Patient instructed to change into gown and leave UA if able.

## 2025-01-26 ENCOUNTER — PATIENT MESSAGE (OUTPATIENT)
Dept: OTHER | Facility: OTHER | Age: 35
End: 2025-01-26
Payer: MEDICAID

## 2025-01-26 NOTE — DISCHARGE INSTRUCTIONS
Home Undelivered Discharge Instructions    After Discharge Orders:    Future Appointments   Date Time Provider Department Center   1/27/2025 11:15 AM  [facility] [place]   2/7/2025 10:00 AM [facility]   2/14/2025 10:00 AM Hugo Giles MD [facility]   2/21/2025 10:00 AM Hugo Giles MD [facility]   2/28/2025 10:00 AM [facility]       Call physician or midwife's office on for instructions.    Current Discharge Medication List        CONTINUE these medications which have NOT CHANGED    Details   PNV,calcium 72-iron-folic acid (PRENATAL VITAMIN PLUS LOW IRON) 27 mg iron- 1 mg Tab Take 1 tablet (1 each total) by mouth once daily.  Qty: 90 tablet, Refills: 3    Comments: Please substitute to comparable prenatal vitamins if not covered by pt insurance plan or per pt request.  Associated Diagnoses: Positive pregnancy test      acetaminophen-codeine 300-30mg (TYLENOL #3) 300-30 mg Tab Take 1 tablet by mouth every 6 (six) hours as needed.      aspirin (ECOTRIN) 81 MG EC tablet Take 1 tablet (81 mg total) by mouth once daily.  Qty: 90 tablet, Refills: 2      cyclobenzaprine (FLEXERIL) 5 MG tablet Take 5-10 mg by mouth nightly.      docusate sodium (COLACE) 100 MG capsule Take 1 capsule (100 mg total) by mouth 2 (two) times daily as needed for Constipation.  Qty: 60 capsule, Refills: 2      ferrous sulfate 325 (65 FE) MG EC tablet Take 1 tablet (325 mg total) by mouth 2 (two) times daily.  Qty: 60 tablet, Refills: 2      fluconazole (DIFLUCAN) 150 MG Tab Take 1 tablet (150 mg total) by mouth as needed. Take one pill weekly as needed for yeast infection.  Qty: 6 tablet, Refills: 0    Associated Diagnoses: Vaginal yeast infection      FLUoxetine 10 MG capsule Take 1 capsule (10 mg total) by mouth once daily.  Qty: 30 capsule, Refills: 2    Associated Diagnoses: Adjustment disorder with mixed anxiety and  depressed mood      medroxyPROGESTERone (DEPO-PROVERA) 150 mg/mL Syrg       omeprazole (PRILOSEC OTC) 20 MG tablet Take 1 tablet (20 mg total) by mouth daily as needed.  Qty: 30 tablet, Refills: 1      ondansetron (ZOFRAN-ODT) 4 MG TbDL Dissolve 1 tablet (4 mg total) by mouth every 6 (six) hours as needed.  Qty: 30 tablet, Refills: 1    Associated Diagnoses: Nausea      oxyCODONE-acetaminophen (PERCOCET) 5-325 mg per tablet Take 1 tablet by mouth every 4 (four) hours as needed.  Qty: 20 tablet, Refills: 0    Comments: Quantity prescribed more than 7 day supply? Yes, quantity medically necessary      prenatal vit no.130-iron-folic 27 mg iron- 800 mcg Tab Take 1 tablet by mouth once daily.  Qty: 30 tablet, Refills: 0                     Diet:  normal diet as tolerated    Rest: normal activity as tolerated    Other instructions: Do kick counts once a day on your baby. Choose the time of day your baby is most active. Get in a comfortable lying or sitting position and time how long it takes to feel 10 kicks, twists, turns, swishes, or rolls. Call your physician or midwife if there have not been 10 kicks in 2 hours    Call physician or midwife, return to Labor and Delivery, call 911, or go to the nearest Emergency Room if: increased leakage or fluid, contractions more than  5 per  30 minutes, decreased fetal movement, persistent low back pain or cramping, bleeding from vaginal area, difficulty urinating, pain with urination, difficulty breathing, new calf pain, persistent headache, or vision change

## 2025-01-26 NOTE — NURSING
Dr. Padilla updated on patient's US pending results. Patient states pain has not gotten better or worse. Okay with discharging home.     MD orders for discharge.     Discharge instructions discussed at this time. Patient denies any questions or concerns. VSS. FHT Reactive NST. Mild/occasional contractions.

## 2025-01-31 ENCOUNTER — NURSE TRIAGE (OUTPATIENT)
Dept: ADMINISTRATIVE | Facility: CLINIC | Age: 35
End: 2025-01-31
Payer: MEDICAID

## 2025-01-31 ENCOUNTER — TELEPHONE (OUTPATIENT)
Dept: OBSTETRICS AND GYNECOLOGY | Facility: CLINIC | Age: 35
End: 2025-01-31
Payer: MEDICAID

## 2025-01-31 NOTE — TELEPHONE ENCOUNTER
----- Message from Med Assistant Harden sent at 1/31/2025 10:22 AM CST -----  Type:  Needs Medical Advice/Symptom-based Call    Who Called:  Pt   Symptoms (please be specific):  shes 36 weeks pregnant with serve stomach pain and has a feeling of pushing   How long has patient had these symptoms:      Pharmacy:    Would the patient rather a call back or a response via [facility]?  Callback   Best Call Back Number:  Telephone Information:  Mobile          [phone]     Additional Information:

## 2025-01-31 NOTE — TELEPHONE ENCOUNTER
"Reason for Disposition   SEVERE abdominal pain (e.g., excruciating), constant, and present > 1 hour    Additional Information   Negative: Passed out (e.g., fainted, lost consciousness, blacked out and was not responding)   Negative: Shock suspected (e.g., cold/pale/clammy skin, too weak to stand, low BP, rapid pulse)   Negative: Difficult to awaken or acting confused (e.g., disoriented, slurred speech)    Protocols used: Pregnancy - Abdominal Pain Greater Than 20 Weeks EGA-A-OH  Pt states she is 36 weeks pregnant. States she has severe abdominal pain and vaginal pressure for more than an hour. States she wants her doctor to order pain medication. Pt advised to call 911 for EMS. She refused stating "They will not to anything for me in the hospital, but make me lay in a bed." Pt advised for her and her baby's safety to call EMS.  Message sent to Dr. Giles office staff.  "

## 2025-01-31 NOTE — TELEPHONE ENCOUNTER
Calling patient back, as she called back. Patient states she already spoke with someone and she was told to go go hospital. Clarified that she was advised to call 911, NOT go to hospital. No further questions or concerns at this time.   Reason for Disposition   Caller has already spoken with another triager and has no further questions    Protocols used: No Contact or Duplicate Contact Call-A-[place]

## 2025-02-03 ENCOUNTER — ANESTHESIA (OUTPATIENT)
Dept: OBSTETRICS AND GYNECOLOGY | Facility: HOSPITAL | Age: 35
End: 2025-02-03
Payer: MEDICAID

## 2025-02-03 ENCOUNTER — ANESTHESIA EVENT (OUTPATIENT)
Dept: OBSTETRICS AND GYNECOLOGY | Facility: HOSPITAL | Age: 35
End: 2025-02-03
Payer: MEDICAID

## 2025-02-03 ENCOUNTER — ROUTINE PRENATAL (OUTPATIENT)
Dept: OBSTETRICS AND GYNECOLOGY | Facility: CLINIC | Age: 35
End: 2025-02-03
Payer: MEDICAID

## 2025-02-03 ENCOUNTER — HOSPITAL ENCOUNTER (INPATIENT)
Facility: HOSPITAL | Age: 35
LOS: 4 days | Discharge: HOME | End: 2025-02-07
Attending: OBSTETRICS & GYNECOLOGY | Admitting: OBSTETRICS & GYNECOLOGY
Payer: MEDICAID

## 2025-02-03 VITALS — SYSTOLIC BLOOD PRESSURE: 120 MMHG | DIASTOLIC BLOOD PRESSURE: 72 MMHG | BODY MASS INDEX: 36.32 KG/M2 | WEIGHT: 205 LBS

## 2025-02-03 DIAGNOSIS — O99.810 ABNORMAL GLUCOSE AFFECTING PREGNANCY: ICD-10-CM

## 2025-02-03 DIAGNOSIS — O34.219 HISTORY OF CESAREAN SECTION COMPLICATING PREGNANCY: ICD-10-CM

## 2025-02-03 DIAGNOSIS — O09.33 INSUFFICIENT PRENATAL CARE IN THIRD TRIMESTER: Primary | ICD-10-CM

## 2025-02-03 DIAGNOSIS — Z3A.36 36 WEEKS GESTATION OF PREGNANCY: ICD-10-CM

## 2025-02-03 DIAGNOSIS — O34.219 PREVIOUS CESAREAN SECTION COMPLICATING PREGNANCY, WITH DELIVERY: Primary | ICD-10-CM

## 2025-02-03 DIAGNOSIS — Z34.90 PREGNANCY: ICD-10-CM

## 2025-02-03 PROBLEM — Z30.2 ENCOUNTER FOR FEMALE STERILIZATION PROCEDURE: Status: ACTIVE | Noted: 2025-02-03

## 2025-02-03 LAB
ABO + RH BLD: NORMAL
ALBUMIN SERPL BCP-MCNC: 2.4 G/DL (ref 3.5–5.2)
ALP SERPL-CCNC: 216 U/L (ref 40–150)
ALT SERPL W/O P-5'-P-CCNC: <5 U/L (ref 10–44)
ANION GAP SERPL CALC-SCNC: 8 MMOL/L (ref 8–16)
AST SERPL-CCNC: 13 U/L (ref 10–40)
BASOPHILS # BLD AUTO: 0.03 K/UL (ref 0–0.2)
BASOPHILS NFR BLD: 0.3 % (ref 0–1.9)
BILIRUB SERPL-MCNC: 0.8 MG/DL (ref 0.1–1)
BLD GP AB SCN CELLS X3 SERPL QL: NORMAL
BUN SERPL-MCNC: 3 MG/DL (ref 6–20)
CALCIUM SERPL-MCNC: 8.7 MG/DL (ref 8.7–10.5)
CHLORIDE SERPL-SCNC: 106 MMOL/L (ref 95–110)
CO2 SERPL-SCNC: 25 MMOL/L (ref 23–29)
CREAT SERPL-MCNC: 0.6 MG/DL (ref 0.5–1.4)
DIFFERENTIAL METHOD BLD: ABNORMAL
EOSINOPHIL # BLD AUTO: 0.2 K/UL (ref 0–0.5)
EOSINOPHIL NFR BLD: 1.6 % (ref 0–8)
ERYTHROCYTE [DISTWIDTH] IN BLOOD BY AUTOMATED COUNT: 15.2 % (ref 11.5–14.5)
EST. GFR  (NO RACE VARIABLE): >60 ML/MIN/1.73 M^2
GLUCOSE SERPL-MCNC: 82 MG/DL (ref 70–110)
HCT VFR BLD AUTO: 26.3 % (ref 37–48.5)
HGB BLD-MCNC: 8.3 G/DL (ref 12–16)
HIV 1+2 AB+HIV1 P24 AG SERPL QL IA: NORMAL
IMM GRANULOCYTES # BLD AUTO: 0.07 K/UL (ref 0–0.04)
IMM GRANULOCYTES NFR BLD AUTO: 0.7 % (ref 0–0.5)
LYMPHOCYTES # BLD AUTO: 1.7 K/UL (ref 1–4.8)
LYMPHOCYTES NFR BLD: 17.8 % (ref 18–48)
MCH RBC QN AUTO: 24.4 PG (ref 27–31)
MCHC RBC AUTO-ENTMCNC: 31.6 G/DL (ref 32–36)
MCV RBC AUTO: 77 FL (ref 82–98)
MONOCYTES # BLD AUTO: 0.8 K/UL (ref 0.3–1)
MONOCYTES NFR BLD: 7.9 % (ref 4–15)
NEUTROPHILS # BLD AUTO: 6.8 K/UL (ref 1.8–7.7)
NEUTROPHILS NFR BLD: 71.7 % (ref 38–73)
NRBC BLD-RTO: 0 /100 WBC
PLATELET # BLD AUTO: 246 K/UL (ref 150–450)
PMV BLD AUTO: 9.4 FL (ref 9.2–12.9)
POTASSIUM SERPL-SCNC: 2.8 MMOL/L (ref 3.5–5.1)
PROT SERPL-MCNC: 5.7 G/DL (ref 6–8.4)
RBC # BLD AUTO: 3.4 M/UL (ref 4–5.4)
SODIUM SERPL-SCNC: 139 MMOL/L (ref 136–145)
TREPONEMA PALLIDUM IGG+IGM AB [PRESENCE] IN SERUM OR PLASMA BY IMMUNOASSAY: NONREACTIVE
WBC # BLD AUTO: 9.53 K/UL (ref 3.9–12.7)

## 2025-02-03 PROCEDURE — 59025 FETAL NON-STRESS TEST: CPT

## 2025-02-03 PROCEDURE — 37000009 HC ANESTHESIA EA ADD 15 MINS: Performed by: OBSTETRICS & GYNECOLOGY

## 2025-02-03 PROCEDURE — 63600175 PHARM REV CODE 636 W HCPCS: Performed by: STUDENT IN AN ORGANIZED HEALTH CARE EDUCATION/TRAINING PROGRAM

## 2025-02-03 PROCEDURE — 85025 COMPLETE CBC W/AUTO DIFF WBC: CPT | Performed by: OBSTETRICS & GYNECOLOGY

## 2025-02-03 PROCEDURE — 58611 LIGATE OVIDUCT(S) ADD-ON: CPT | Mod: ,,, | Performed by: PHYSICIAN ASSISTANT

## 2025-02-03 PROCEDURE — 99999 PR PBB SHADOW E&M-EST. PATIENT-LVL I: CPT | Mod: PBBFAC,,, | Performed by: OBSTETRICS & GYNECOLOGY

## 2025-02-03 PROCEDURE — 99213 OFFICE O/P EST LOW 20 MIN: CPT | Mod: TH,S$PBB,, | Performed by: OBSTETRICS & GYNECOLOGY

## 2025-02-03 PROCEDURE — 59514 CESAREAN DELIVERY ONLY: CPT | Mod: AS,AT,, | Performed by: PHYSICIAN ASSISTANT

## 2025-02-03 PROCEDURE — 27800903 OPTIME MED/SURG SUP & DEVICES OTHER IMPLANTS: Performed by: OBSTETRICS & GYNECOLOGY

## 2025-02-03 PROCEDURE — 27200688 HC TRAY, SPINAL-HYPER/ ISOBARIC: Performed by: STUDENT IN AN ORGANIZED HEALTH CARE EDUCATION/TRAINING PROGRAM

## 2025-02-03 PROCEDURE — 0UB70ZZ EXCISION OF BILATERAL FALLOPIAN TUBES, OPEN APPROACH: ICD-10-PCS | Performed by: OBSTETRICS & GYNECOLOGY

## 2025-02-03 PROCEDURE — 37000008 HC ANESTHESIA 1ST 15 MINUTES: Performed by: OBSTETRICS & GYNECOLOGY

## 2025-02-03 PROCEDURE — 25000003 PHARM REV CODE 250: Performed by: NURSE ANESTHETIST, CERTIFIED REGISTERED

## 2025-02-03 PROCEDURE — 11000001 HC ACUTE MED/SURG PRIVATE ROOM

## 2025-02-03 PROCEDURE — 59514 CESAREAN DELIVERY ONLY: CPT | Mod: AT,,, | Performed by: OBSTETRICS & GYNECOLOGY

## 2025-02-03 PROCEDURE — 80053 COMPREHEN METABOLIC PANEL: CPT | Performed by: OBSTETRICS & GYNECOLOGY

## 2025-02-03 PROCEDURE — 25000003 PHARM REV CODE 250: Performed by: OBSTETRICS & GYNECOLOGY

## 2025-02-03 PROCEDURE — 87389 HIV-1 AG W/HIV-1&-2 AB AG IA: CPT | Performed by: OBSTETRICS & GYNECOLOGY

## 2025-02-03 PROCEDURE — 63600175 PHARM REV CODE 636 W HCPCS: Performed by: NURSE ANESTHETIST, CERTIFIED REGISTERED

## 2025-02-03 PROCEDURE — 51702 INSERT TEMP BLADDER CATH: CPT

## 2025-02-03 PROCEDURE — 36004724 HC OB OR TIME LEV III - 1ST 15 MIN: Performed by: OBSTETRICS & GYNECOLOGY

## 2025-02-03 PROCEDURE — 86850 RBC ANTIBODY SCREEN: CPT | Performed by: OBSTETRICS & GYNECOLOGY

## 2025-02-03 PROCEDURE — 71000039 HC RECOVERY, EACH ADD'L HOUR: Performed by: OBSTETRICS & GYNECOLOGY

## 2025-02-03 PROCEDURE — 63600175 PHARM REV CODE 636 W HCPCS: Mod: TB,JZ | Performed by: OBSTETRICS & GYNECOLOGY

## 2025-02-03 PROCEDURE — 58611 LIGATE OVIDUCT(S) ADD-ON: CPT | Mod: ,,, | Performed by: OBSTETRICS & GYNECOLOGY

## 2025-02-03 PROCEDURE — 71000033 HC RECOVERY, INTIAL HOUR: Performed by: OBSTETRICS & GYNECOLOGY

## 2025-02-03 PROCEDURE — 36004725 HC OB OR TIME LEV III - EA ADD 15 MIN: Performed by: OBSTETRICS & GYNECOLOGY

## 2025-02-03 PROCEDURE — 99900035 HC TECH TIME PER 15 MIN (STAT)

## 2025-02-03 PROCEDURE — 37000009 HC ANESTHESIA EA ADD 15 MINS: Mod: SZN

## 2025-02-03 PROCEDURE — 36004725 HC OB OR TIME LEV III - EA ADD 15 MIN: Mod: SZN

## 2025-02-03 PROCEDURE — 86593 SYPHILIS TEST NON-TREP QUANT: CPT | Performed by: OBSTETRICS & GYNECOLOGY

## 2025-02-03 PROCEDURE — 99211 OFF/OP EST MAY X REQ PHY/QHP: CPT | Mod: PBBFAC,TH | Performed by: OBSTETRICS & GYNECOLOGY

## 2025-02-03 PROCEDURE — 99211 OFF/OP EST MAY X REQ PHY/QHP: CPT | Mod: 27

## 2025-02-03 PROCEDURE — D9220A PRA ANESTHESIA: Mod: QX,CRNA,, | Performed by: NURSE ANESTHETIST, CERTIFIED REGISTERED

## 2025-02-03 PROCEDURE — 63600175 PHARM REV CODE 636 W HCPCS: Performed by: OBSTETRICS & GYNECOLOGY

## 2025-02-03 PROCEDURE — D9220A PRA ANESTHESIA: Mod: QK,ANES,, | Performed by: STUDENT IN AN ORGANIZED HEALTH CARE EDUCATION/TRAINING PROGRAM

## 2025-02-03 DEVICE — CLIP FALLOPIAN TUBE FILSHIE: Type: IMPLANTABLE DEVICE | Site: FALLOPIAN TUBE | Status: FUNCTIONAL

## 2025-02-03 RX ORDER — HYDROCORTISONE 25 MG/G
CREAM TOPICAL 3 TIMES DAILY PRN
Status: DISCONTINUED | OUTPATIENT
Start: 2025-02-03 | End: 2025-02-07 | Stop reason: HOSPADM

## 2025-02-03 RX ORDER — TRANEXAMIC ACID 10 MG/ML
1000 INJECTION, SOLUTION INTRAVENOUS EVERY 30 MIN PRN
Status: DISCONTINUED | OUTPATIENT
Start: 2025-02-03 | End: 2025-02-07 | Stop reason: HOSPADM

## 2025-02-03 RX ORDER — NALOXONE HCL 0.4 MG/ML
0.04 VIAL (ML) INJECTION
Status: DISCONTINUED | OUTPATIENT
Start: 2025-02-03 | End: 2025-02-07 | Stop reason: HOSPADM

## 2025-02-03 RX ORDER — SODIUM CHLORIDE, SODIUM LACTATE, POTASSIUM CHLORIDE, CALCIUM CHLORIDE 600; 310; 30; 20 MG/100ML; MG/100ML; MG/100ML; MG/100ML
INJECTION, SOLUTION INTRAVENOUS CONTINUOUS
Status: DISCONTINUED | OUTPATIENT
Start: 2025-02-03 | End: 2025-02-03

## 2025-02-03 RX ORDER — CEFAZOLIN 2 G/1
2 INJECTION, POWDER, FOR SOLUTION INTRAMUSCULAR; INTRAVENOUS ONCE
Status: COMPLETED | OUTPATIENT
Start: 2025-02-03 | End: 2025-02-03

## 2025-02-03 RX ORDER — CARBOPROST TROMETHAMINE 250 UG/ML
250 INJECTION, SOLUTION INTRAMUSCULAR
Status: DISCONTINUED | OUTPATIENT
Start: 2025-02-03 | End: 2025-02-07 | Stop reason: HOSPADM

## 2025-02-03 RX ORDER — DIPHENHYDRAMINE HCL 25 MG
25 CAPSULE ORAL EVERY 4 HOURS PRN
Status: DISCONTINUED | OUTPATIENT
Start: 2025-02-03 | End: 2025-02-07 | Stop reason: HOSPADM

## 2025-02-03 RX ORDER — METOCLOPRAMIDE HYDROCHLORIDE 5 MG/ML
INJECTION INTRAMUSCULAR; INTRAVENOUS
Status: DISCONTINUED | OUTPATIENT
Start: 2025-02-03 | End: 2025-02-03

## 2025-02-03 RX ORDER — PHENYLEPHRINE HYDROCHLORIDE 10 MG/ML
INJECTION INTRAVENOUS
Status: DISCONTINUED | OUTPATIENT
Start: 2025-02-03 | End: 2025-02-03

## 2025-02-03 RX ORDER — OXYTOCIN 10 [USP'U]/ML
10 INJECTION, SOLUTION INTRAMUSCULAR; INTRAVENOUS ONCE AS NEEDED
Status: DISCONTINUED | OUTPATIENT
Start: 2025-02-03 | End: 2025-02-07 | Stop reason: HOSPADM

## 2025-02-03 RX ORDER — FENTANYL CITRATE 50 UG/ML
INJECTION, SOLUTION INTRAMUSCULAR; INTRAVENOUS
Status: DISCONTINUED | OUTPATIENT
Start: 2025-02-03 | End: 2025-02-03

## 2025-02-03 RX ORDER — MUPIROCIN 20 MG/G
OINTMENT TOPICAL 2 TIMES DAILY
Status: DISCONTINUED | OUTPATIENT
Start: 2025-02-03 | End: 2025-02-07 | Stop reason: HOSPADM

## 2025-02-03 RX ORDER — ONDANSETRON HYDROCHLORIDE 2 MG/ML
INJECTION, SOLUTION INTRAMUSCULAR; INTRAVENOUS
Status: DISCONTINUED | OUTPATIENT
Start: 2025-02-03 | End: 2025-02-03

## 2025-02-03 RX ORDER — ADHESIVE BANDAGE
30 BANDAGE TOPICAL 2 TIMES DAILY PRN
Status: DISCONTINUED | OUTPATIENT
Start: 2025-02-04 | End: 2025-02-07 | Stop reason: HOSPADM

## 2025-02-03 RX ORDER — DIPHENOXYLATE HYDROCHLORIDE AND ATROPINE SULFATE 2.5; .025 MG/1; MG/1
2 TABLET ORAL EVERY 6 HOURS PRN
Status: DISCONTINUED | OUTPATIENT
Start: 2025-02-03 | End: 2025-02-07 | Stop reason: HOSPADM

## 2025-02-03 RX ORDER — OXYTOCIN/0.9 % SODIUM CHLORIDE 15/250 ML
95 PLASTIC BAG, INJECTION (ML) INTRAVENOUS CONTINUOUS PRN
Status: DISCONTINUED | OUTPATIENT
Start: 2025-02-03 | End: 2025-02-04 | Stop reason: CLARIF

## 2025-02-03 RX ORDER — OXYTOCIN 10 [USP'U]/ML
INJECTION, SOLUTION INTRAMUSCULAR; INTRAVENOUS
Status: DISCONTINUED | OUTPATIENT
Start: 2025-02-03 | End: 2025-02-03

## 2025-02-03 RX ORDER — MISOPROSTOL 200 UG/1
800 TABLET ORAL ONCE AS NEEDED
Status: DISCONTINUED | OUTPATIENT
Start: 2025-02-03 | End: 2025-02-07 | Stop reason: HOSPADM

## 2025-02-03 RX ORDER — OXYCODONE AND ACETAMINOPHEN 10; 325 MG/1; MG/1
1 TABLET ORAL EVERY 4 HOURS PRN
Status: DISCONTINUED | OUTPATIENT
Start: 2025-02-03 | End: 2025-02-07 | Stop reason: HOSPADM

## 2025-02-03 RX ORDER — IBUPROFEN 400 MG/1
800 TABLET ORAL EVERY 8 HOURS
Status: DISCONTINUED | OUTPATIENT
Start: 2025-02-04 | End: 2025-02-07 | Stop reason: HOSPADM

## 2025-02-03 RX ORDER — OXYTOCIN-SODIUM CHLORIDE 0.9% IV SOLN 30 UNIT/500ML 30-0.9/5 UT/ML-%
SOLUTION INTRAVENOUS CONTINUOUS PRN
Status: DISCONTINUED | OUTPATIENT
Start: 2025-02-03 | End: 2025-02-03

## 2025-02-03 RX ORDER — DIPHENHYDRAMINE HYDROCHLORIDE 50 MG/ML
INJECTION INTRAMUSCULAR; INTRAVENOUS
Status: DISCONTINUED | OUTPATIENT
Start: 2025-02-03 | End: 2025-02-03

## 2025-02-03 RX ORDER — EPHEDRINE SULFATE 50 MG/ML
INJECTION, SOLUTION INTRAVENOUS
Status: DISCONTINUED | OUTPATIENT
Start: 2025-02-03 | End: 2025-02-03

## 2025-02-03 RX ORDER — PROCHLORPERAZINE EDISYLATE 5 MG/ML
5 INJECTION INTRAMUSCULAR; INTRAVENOUS EVERY 6 HOURS PRN
Status: DISCONTINUED | OUTPATIENT
Start: 2025-02-03 | End: 2025-02-07 | Stop reason: HOSPADM

## 2025-02-03 RX ORDER — ONDANSETRON HYDROCHLORIDE 2 MG/ML
4 INJECTION, SOLUTION INTRAVENOUS EVERY 12 HOURS PRN
Status: DISCONTINUED | OUTPATIENT
Start: 2025-02-03 | End: 2025-02-07 | Stop reason: HOSPADM

## 2025-02-03 RX ORDER — SIMETHICONE 80 MG
1 TABLET,CHEWABLE ORAL EVERY 6 HOURS PRN
Status: DISCONTINUED | OUTPATIENT
Start: 2025-02-03 | End: 2025-02-07 | Stop reason: HOSPADM

## 2025-02-03 RX ORDER — ONDANSETRON 8 MG/1
8 TABLET, ORALLY DISINTEGRATING ORAL EVERY 8 HOURS PRN
Status: DISCONTINUED | OUTPATIENT
Start: 2025-02-03 | End: 2025-02-07 | Stop reason: HOSPADM

## 2025-02-03 RX ORDER — AMOXICILLIN 250 MG
1 CAPSULE ORAL NIGHTLY PRN
Status: DISCONTINUED | OUTPATIENT
Start: 2025-02-03 | End: 2025-02-07 | Stop reason: HOSPADM

## 2025-02-03 RX ORDER — DEXAMETHASONE SODIUM PHOSPHATE 4 MG/ML
INJECTION, SOLUTION INTRA-ARTICULAR; INTRALESIONAL; INTRAMUSCULAR; INTRAVENOUS; SOFT TISSUE
Status: DISCONTINUED | OUTPATIENT
Start: 2025-02-03 | End: 2025-02-03

## 2025-02-03 RX ORDER — DOCUSATE SODIUM 100 MG/1
200 CAPSULE, LIQUID FILLED ORAL 2 TIMES DAILY
Status: DISCONTINUED | OUTPATIENT
Start: 2025-02-03 | End: 2025-02-07 | Stop reason: HOSPADM

## 2025-02-03 RX ORDER — MORPHINE SULFATE 0.5 MG/ML
INJECTION, SOLUTION EPIDURAL; INTRATHECAL; INTRAVENOUS
Status: DISCONTINUED | OUTPATIENT
Start: 2025-02-03 | End: 2025-02-03

## 2025-02-03 RX ORDER — DEXTROSE, SODIUM CHLORIDE, SODIUM LACTATE, POTASSIUM CHLORIDE, AND CALCIUM CHLORIDE 5; .6; .31; .03; .02 G/100ML; G/100ML; G/100ML; G/100ML; G/100ML
INJECTION, SOLUTION INTRAVENOUS CONTINUOUS PRN
Status: DISCONTINUED | OUTPATIENT
Start: 2025-02-03 | End: 2025-02-03

## 2025-02-03 RX ORDER — OXYCODONE AND ACETAMINOPHEN 5; 325 MG/1; MG/1
1 TABLET ORAL EVERY 4 HOURS PRN
Status: DISCONTINUED | OUTPATIENT
Start: 2025-02-03 | End: 2025-02-07 | Stop reason: HOSPADM

## 2025-02-03 RX ORDER — ACETAMINOPHEN 10 MG/ML
INJECTION, SOLUTION INTRAVENOUS
Status: DISCONTINUED | OUTPATIENT
Start: 2025-02-03 | End: 2025-02-03

## 2025-02-03 RX ORDER — METHYLERGONOVINE MALEATE 0.2 MG/ML
200 INJECTION INTRAVENOUS ONCE AS NEEDED
Status: DISCONTINUED | OUTPATIENT
Start: 2025-02-03 | End: 2025-02-07 | Stop reason: HOSPADM

## 2025-02-03 RX ORDER — KETOROLAC TROMETHAMINE 30 MG/ML
30 INJECTION, SOLUTION INTRAMUSCULAR; INTRAVENOUS EVERY 8 HOURS
Status: DISPENSED | OUTPATIENT
Start: 2025-02-03 | End: 2025-02-04

## 2025-02-03 RX ORDER — OXYTOCIN-SODIUM CHLORIDE 0.9% IV SOLN 30 UNIT/500ML 30-0.9/5 UT/ML-%
30 SOLUTION INTRAVENOUS ONCE AS NEEDED
Status: DISCONTINUED | OUTPATIENT
Start: 2025-02-03 | End: 2025-02-04 | Stop reason: CLARIF

## 2025-02-03 RX ORDER — BUPIVACAINE HYDROCHLORIDE 7.5 MG/ML
INJECTION, SOLUTION EPIDURAL; RETROBULBAR
Status: COMPLETED | OUTPATIENT
Start: 2025-02-03 | End: 2025-02-03

## 2025-02-03 RX ORDER — SODIUM CHLORIDE 0.9 % (FLUSH) 0.9 %
10 SYRINGE (ML) INJECTION
Status: DISCONTINUED | OUTPATIENT
Start: 2025-02-03 | End: 2025-02-07 | Stop reason: HOSPADM

## 2025-02-03 RX ORDER — BISACODYL 10 MG/1
10 SUPPOSITORY RECTAL ONCE AS NEEDED
Status: DISCONTINUED | OUTPATIENT
Start: 2025-02-03 | End: 2025-02-07 | Stop reason: HOSPADM

## 2025-02-03 RX ORDER — OXYTOCIN/0.9 % SODIUM CHLORIDE 15/250 ML
95 PLASTIC BAG, INJECTION (ML) INTRAVENOUS ONCE AS NEEDED
Status: DISCONTINUED | OUTPATIENT
Start: 2025-02-03 | End: 2025-02-04 | Stop reason: CLARIF

## 2025-02-03 RX ADMIN — PHENYLEPHRINE HYDROCHLORIDE 200 MCG: 10 INJECTION INTRAVENOUS at 03:02

## 2025-02-03 RX ADMIN — DEXTROSE, SODIUM CHLORIDE, SODIUM LACTATE, POTASSIUM CHLORIDE, AND CALCIUM CHLORIDE: 5; .6; .31; .03; .02 INJECTION, SOLUTION INTRAVENOUS at 04:02

## 2025-02-03 RX ADMIN — DOCUSATE SODIUM 200 MG: 100 CAPSULE, LIQUID FILLED ORAL at 09:02

## 2025-02-03 RX ADMIN — FENTANYL CITRATE 90 MCG: 0.05 INJECTION, SOLUTION INTRAMUSCULAR; INTRAVENOUS at 03:02

## 2025-02-03 RX ADMIN — BUPIVACAINE HYDROCHLORIDE 1.6 ML: 7.5 INJECTION, SOLUTION EPIDURAL; RETROBULBAR at 03:02

## 2025-02-03 RX ADMIN — Medication 95 ML/HR: at 04:02

## 2025-02-03 RX ADMIN — EPHEDRINE SULFATE 10 MG: 50 INJECTION INTRAVENOUS at 03:02

## 2025-02-03 RX ADMIN — DIPHENHYDRAMINE HYDROCHLORIDE 25 MG: 25 CAPSULE ORAL at 07:02

## 2025-02-03 RX ADMIN — DIPHENHYDRAMINE HYDROCHLORIDE 12.5 MG: 50 INJECTION INTRAMUSCULAR; INTRAVENOUS at 03:02

## 2025-02-03 RX ADMIN — MORPHINE SULFATE 0.1 MG: 0.5 INJECTION, SOLUTION EPIDURAL; INTRATHECAL; INTRAVENOUS at 03:02

## 2025-02-03 RX ADMIN — ONDANSETRON 4 MG: 2 INJECTION INTRAMUSCULAR; INTRAVENOUS at 03:02

## 2025-02-03 RX ADMIN — OXYTOCIN 40 UNITS: 10 INJECTION, SOLUTION INTRAMUSCULAR; INTRAVENOUS at 03:02

## 2025-02-03 RX ADMIN — ACETAMINOPHEN 1000 MG: 10 INJECTION, SOLUTION INTRAVENOUS at 04:02

## 2025-02-03 RX ADMIN — METOCLOPRAMIDE 10 MG: 5 INJECTION, SOLUTION INTRAMUSCULAR; INTRAVENOUS at 03:02

## 2025-02-03 RX ADMIN — SODIUM CHLORIDE, POTASSIUM CHLORIDE, SODIUM LACTATE AND CALCIUM CHLORIDE: 600; 310; 30; 20 INJECTION, SOLUTION INTRAVENOUS at 01:02

## 2025-02-03 RX ADMIN — GLYCOPYRROLATE 0.2 MG: 0.2 INJECTION, SOLUTION INTRAMUSCULAR; INTRAVITREAL at 03:02

## 2025-02-03 RX ADMIN — CEFAZOLIN 2 G: 2 INJECTION, POWDER, FOR SOLUTION INTRAMUSCULAR; INTRAVENOUS at 02:02

## 2025-02-03 RX ADMIN — OXYCODONE AND ACETAMINOPHEN 1 TABLET: 10; 325 TABLET ORAL at 07:02

## 2025-02-03 RX ADMIN — MUPIROCIN: 20 OINTMENT TOPICAL at 09:02

## 2025-02-03 RX ADMIN — DEXAMETHASONE SODIUM PHOSPHATE 4 MG: 4 INJECTION, SOLUTION INTRA-ARTICULAR; INTRALESIONAL; INTRAMUSCULAR; INTRAVENOUS; SOFT TISSUE at 03:02

## 2025-02-03 RX ADMIN — FENTANYL CITRATE 10 MCG: 0.05 INJECTION, SOLUTION INTRAMUSCULAR; INTRAVENOUS at 03:02

## 2025-02-03 RX ADMIN — KETOROLAC TROMETHAMINE 30 MG: 30 INJECTION, SOLUTION INTRAMUSCULAR; INTRAVENOUS at 09:02

## 2025-02-03 NOTE — LACTATION NOTE
Infant admitted to NICU at delivery. Mother in recovery room. States plans to formula feed. Reviewed benefits of breast milk for infant in NICU and offered support with expressing milk. Mother states she is open to pumping but not feeling well enough right now. Discussed supplementation options for infant-reviewed benefits and screening process of donor breast milk. Mother declines-would like to supplement with formula. All questions answered. Encouraged to call lactation when she is ready to pump.

## 2025-02-03 NOTE — NURSING
Pt is  at 36 weeks 1 days was sent from office to L&D unit with complaints of contractions. Pt denies any urinary symptoms, vaginal bleeding or leaking of fluid. +FM. Care assumed. Full assessment done, history and medications reviewed with pt. Pt updated on plan of care with questions answered. Bed in low locked position, call bell in reach. Pt placed on EFM and TOCO, automatic BP and pulse ox.

## 2025-02-03 NOTE — HPI
34 dicko  @ 36 wk 1 d who was sent to L&D from the office due to stated ctx's and pelvic pain.  Pt checked in office and noted to be 3/-2.

## 2025-02-03 NOTE — HOSPITAL COURSE
During course of observation pt's cervix changed from 3 cm to 4 cm.  Pt then consented for RLTCS and confirmed her desire to PPTL.  Surgery done without complication on 2/3/25.  Infant required NICU admission.    2/4/25:  pt ambulating, tolerating diet, +flatus, pain controlled with meds    2/5/2025 Walking to NICU to see her baby.  Still with some pain.      2/6/2025 Still in pain.  Not yet ready to leave.  Baby just got off oxygen yesterday.  Still with feeding difficulty    2/7/25:  Pt sad that infant cannot be d/c'd today.  Otherwise, all maternal milestones met for d/c. Pt is not breast feeding.

## 2025-02-03 NOTE — H&P
[facility] [place] - Labor & Delivery  Obstetrics  History & Physical    Patient Name: Christopher Avila  MRN: [id number]  Admission Date: 2/3/2025  Primary Care Provider: Neeraj Guerra MD    Subjective:     Principal Problem:<principal problem not specified>    History of Present Illness:  34 yro  @ 36 wk 1 d who was sent to L&D from the office due to stated ctx's and pelvic pain.  Pt checked in office and noted to be 3/80/-2.    Obstetric HPI:  Patient reports Intensity: moderate contractions, active fetal movement, No vaginal bleeding , No loss of fluid     This pregnancy has been complicated by prior Csec with undesired fertility; insufficient prenatal care and possible unconfirmed gestational DM.    OB History    Para Term  AB Living   7 6 5 1 0 6   SAB IAB Ectopic Multiple Live Births   0 0 0 0 6      # Outcome Date GA Lbr Ryley/2nd Weight Sex Type Anes PTL Lv   7 Current            6 Term 21 39w2d 12:00 / 00:49 3.79 kg (8 lb 5.7 oz) M CS-LTranv EPI N MARIUM      Complications: Abruptio Placenta      Name: CUAUHTEMOC AVILA      Apgar1: 2  Apgar5: 8   5 Term 17 37w3d  3.975 kg (8 lb 12.2 oz) F Vag-Spont EPI N MARIUM      Name: JUVENAL AVILA      Apgar1: 9  Apgar5: 9   4  16 36w5d  3.192 kg (7 lb 0.6 oz) M Vag-Spont EPI N MARIUM      Apgar1: 6  Apgar5: 8   3 Term 14 38w4d  3.386 kg (7 lb 7.4 oz) F Vag-Spont EPI N MARIUM      Birth Comments: extra digit noted  to each hand      Apgar1: 9  Apgar5: 9   2 Term 09 39w0d  2.863 kg (6 lb 5 oz) M Vag-Spont EPI  MARIUM      Name: Rafat   1 Term 08 39w0d  3.345 kg (7 lb 6 oz) M Vag-Spont EPI  MARIUM      Name: Lucas     Past Medical History:   Diagnosis Date    Abnormal menstrual periods     Anemia     Dental caries     Migraine headache     Ovarian cyst      Past Surgical History:   Procedure Laterality Date     SECTION N/A 2021    Procedure:  SECTION;  Surgeon: Jitendra Guerra MD;  Location: [facility] L&D OR;   Service: OB/GYN;  Laterality: N/A;       PTA Medications   Medication Sig    acetaminophen-codeine 300-30mg (TYLENOL #3) 300-30 mg Tab Take 1 tablet by mouth every 6 (six) hours as needed. (Patient not taking: Reported on 12/2/2024)    aspirin (ECOTRIN) 81 MG EC tablet Take 1 tablet (81 mg total) by mouth once daily.    cyclobenzaprine (FLEXERIL) 5 MG tablet Take 5-10 mg by mouth nightly. (Patient not taking: Reported on 12/2/2024)    docusate sodium (COLACE) 100 MG capsule Take 1 capsule (100 mg total) by mouth 2 (two) times daily as needed for Constipation. (Patient not taking: Reported on 12/2/2024)    ferrous sulfate 325 (65 FE) MG EC tablet Take 1 tablet (325 mg total) by mouth 2 (two) times daily. (Patient not taking: Reported on 12/2/2024)    fluconazole (DIFLUCAN) 150 MG Tab Take 1 tablet (150 mg total) by mouth as needed. Take one pill weekly as needed for yeast infection. (Patient not taking: Reported on 12/2/2024)    FLUoxetine 10 MG capsule Take 1 capsule (10 mg total) by mouth once daily. (Patient not taking: Reported on 12/2/2024)    medroxyPROGESTERone (DEPO-PROVERA) 150 mg/mL Syrg  (Patient not taking: Reported on 12/2/2024)    omeprazole (PRILOSEC OTC) 20 MG tablet Take 1 tablet (20 mg total) by mouth daily as needed. (Patient not taking: Reported on 12/2/2024)    ondansetron (ZOFRAN-ODT) 4 MG TbDL Dissolve 1 tablet (4 mg total) by mouth every 6 (six) hours as needed.    oxyCODONE-acetaminophen (PERCOCET) 5-325 mg per tablet Take 1 tablet by mouth every 4 (four) hours as needed. (Patient not taking: Reported on 12/2/2024)    PNV,calcium 72-iron-folic acid (PRENATAL VITAMIN PLUS LOW IRON) 27 mg iron- 1 mg Tab Take 1 tablet (1 each total) by mouth once daily.    prenatal vit no.130-iron-folic 27 mg iron- 800 mcg Tab Take 1 tablet by mouth once daily.       Review of patient's allergies indicates:  No Known Allergies     Family History       Problem Relation (Age of Onset)    Cancer Mother    Diabetes  Mother, Maternal Grandmother          Tobacco Use    Smoking status: Former     Current packs/day: 0.00     Types: Cigarettes     Quit date: 2020     Years since quittin.6    Smokeless tobacco: Never   Substance and Sexual Activity    Alcohol use: Not Currently     Comment: occasional prior to pregnancy    Drug use: Not Currently     Types: Marijuana    Sexual activity: Yes     Partners: Male     Birth control/protection: Injection     Review of Systems   Constitutional: Negative.    Respiratory: Negative.     Cardiovascular: Negative.    Gastrointestinal: Negative.    Endocrine: Negative.    Genitourinary: Negative.    Musculoskeletal: Negative.    Neurological: Negative.    Psychiatric/Behavioral: Negative.     Breast: negative.       Objective:     Vital Signs (Most Recent):  Temp: 98.5 °F (36.9 °C) (25)  Pulse: 97 (25 0956)  Resp: 18 (25)  SpO2: 100 % (25) Vital Signs (24h Range):  Temp:  [98.5 °F (36.9 °C)] 98.5 °F (36.9 °C)  Pulse:  [97] 97  Resp:  [18] 18  SpO2:  [100 %] 100 %  BP: (120)/(72) 120/72     Weight: 93 kg (205 lb)  Body mass index is 36.31 kg/m².    FHT: 135, reactive Cat 1 (reassuring)  TOCO:  Q 3-5 minutes     Physical Exam:   Constitutional: She is oriented to person, place, and time. She appears well-developed and well-nourished. No distress.    HENT:   Head: Normocephalic and atraumatic.     Neck: No thyromegaly present.     Pulmonary/Chest: Effort normal. No respiratory distress.        Abdominal: Soft. She exhibits no distension and no mass. There is no abdominal tenderness. There is no rebound and no guarding.             Musculoskeletal: Normal range of motion and moves all extremeties. No tenderness.       Neurological: She is alert and oriented to person, place, and time. No cranial nerve deficit. Coordination normal.    Skin: Skin is warm. She is not diaphoretic. No erythema.    Psychiatric: She has a normal mood and affect. Her behavior  is normal. Judgment and thought content normal.        Cervix:  Dilation:  4  Effacement:  75%  Station: -2  Presentation: Vertex     Significant Labs:  Lab Results   Component Value Date    GROUPTRH A POS 2024    HEPBSAG Non-reactive 2024    STREPBCULT No Group B Streptococcus isolated 2020       I have personallly reviewed all pertinent lab results from the last 24 hours.  Assessment/Plan:     34 y.o. female  at 36w1d for:    Encounter for female sterilization procedure  Proceed with unscheduled RLTCS and PPTL     labor third trimester with  delivery third trimester, not applicable or unspecified  Proceed with unscheduled RLTCS and PPTL    Abnormal glucose affecting pregnancy  Infant to be assessed postpartum for blood sugar needs    Insufficient prenatal care in third trimester  Proceed with unscheduled RLTCS and PPTL    History of  section complicating pregnancy  Proceed with unscheduled RLTCS and PPTL        Hugo Giles MD  Obstetrics  [facility] [place] - Labor & Delivery

## 2025-02-03 NOTE — PLAN OF CARE
Pt seen by lactation, POC reviewed. Pt open to pumping to provide EBM to infant in NICU but declines hand expression at this time. Will call lactation when ready to pump.

## 2025-02-03 NOTE — ANESTHESIA PROCEDURE NOTES
Spinal    Diagnosis: IUP  Patient location during procedure: OR  Start time: 2/3/2025 3:01 PM  Timeout: 2/3/2025 3:01 PM  End time: 2/3/2025 3:03 PM    Staffing  Authorizing Provider: Tony Wilhelm MD  Performing Provider: Tony Wilhelm MD    Staffing  Performed by: Tony Wilhelm MD  Authorized by: Tony Wilhelm MD    Preanesthetic Checklist  Completed: patient identified, IV checked, site marked, risks and benefits discussed, surgical consent, monitors and equipment checked, pre-op evaluation and timeout performed  Spinal Block  Patient position: sitting  Prep: ChloraPrep  Patient monitoring: continuous pulse ox, frequent blood pressure checks and heart rate  Approach: midline  Location: L3-4  Injection technique: single shot  CSF Fluid: clear free-flowing CSF  Needle  Needle type: pencil-tip   Needle gauge: 25 G  Needle length: 3.5 in  Additional Documentation: incremental injection, no paresthesia on injection and negative aspiration for heme  Needle localization: anatomical landmarks  Assessment  Sensory level: T4   Dermatomal levels determined by pinch or prick  Ease of block: easy  Patient's tolerance of the procedure: comfortable throughout block and no complaints  Medications:    Medications: bupivacaine (pf) (MARCAINE) injection 0.75% - Intraspinal   1.6 mL - 2/3/2025 3:03:00 PM

## 2025-02-03 NOTE — PROGRESS NOTES
Pt c/o pelvic pain and ctx's.  Pt never did 3 hr GTT.  Pt not seen since 30 weeks and pt has only had 2 total PNV.    Pt brought to L&D because pt states she is having ctx's.  Pt noted to be 380/-2 on cervical exam    Assessment/Plan  1. Insufficient prenatal care in third trimester    2. Abnormal glucose affecting pregnancy    3. History of  section complicating pregnancy    4. 36 weeks gestation of pregnancy      20 minutes spend in total time reviewing labs, prior sonos and previous visits

## 2025-02-03 NOTE — TRANSFER OF CARE
"Anesthesia Transfer of Care Note    Patient: Christopher Avila    Procedure(s) Performed: Procedure(s) (LRB):   SECTION (N/A)    Patient location: Labor and Delivery    Anesthesia Type: spinal    Transport from OR: Transported from OR on room air with adequate spontaneous ventilation    Post pain: adequate analgesia    Post assessment: no apparent anesthetic complications and tolerated procedure well    Post vital signs: stable    Level of consciousness: awake    Nausea/Vomiting: no nausea/vomiting    Complications: none    Transfer of care protocol was followed      Last vitals: Visit Vitals  /72   Pulse 104   Temp 36.9 °C (98.4 °F)   Resp 18   Ht 5' 3" (1.6 m)   Wt 93 kg (205 lb)   LMP 2024   SpO2 99%   Breastfeeding No   BMI 36.31 kg/m²     "

## 2025-02-03 NOTE — SUBJECTIVE & OBJECTIVE
Obstetric HPI:  Patient reports Intensity: moderate contractions, active fetal movement, No vaginal bleeding , No loss of fluid     This pregnancy has been complicated by prior Csec with undesired fertility; insufficient prenatal care and possible unconfirmed gestational DM.    OB History    Para Term  AB Living   7 6 5 1 0 6   SAB IAB Ectopic Multiple Live Births   0 0 0 0 6      # Outcome Date GA Lbr Ryley/2nd Weight Sex Type Anes PTL Lv   7 Current            6 Term 21 39w2d 12:00 / 00:49 3.79 kg (8 lb 5.7 oz) M CS-LTranv EPI N MARIUM      Complications: Abruptio Placenta      Name: ROSARIO,BOY SOL      Apgar1: 2  Apgar5: 8   5 Term 17 37w3d  3.975 kg (8 lb 12.2 oz) F Vag-Spont EPI N MARIUM      Name: ROSARIO, GIRL SOL      Apgar1: 9  Apgar5: 9   4  16 36w5d  3.192 kg (7 lb 0.6 oz) M Vag-Spont EPI N MARIUM      Apgar1: 6  Apgar5: 8   3 Term 14 38w4d  3.386 kg (7 lb 7.4 oz) F Vag-Spont EPI N MARIUM      Birth Comments: extra digit noted  to each hand      Apgar1: 9  Apgar5: 9   2 Term 09 39w0d  2.863 kg (6 lb 5 oz) M Vag-Spont EPI  MARIUM      Name: Rafat   1 Term 08 39w0d  3.345 kg (7 lb 6 oz) M Vag-Spont EPI  MARIUM      Name: Lucas     Past Medical History:   Diagnosis Date    Abnormal menstrual periods     Anemia     Dental caries     Migraine headache     Ovarian cyst      Past Surgical History:   Procedure Laterality Date     SECTION N/A 2021    Procedure:  SECTION;  Surgeon: Jitendra Guerra MD;  Location: [facility] L&D OR;  Service: OB/GYN;  Laterality: N/A;       PTA Medications   Medication Sig    acetaminophen-codeine 300-30mg (TYLENOL #3) 300-30 mg Tab Take 1 tablet by mouth every 6 (six) hours as needed. (Patient not taking: Reported on 2024)    aspirin (ECOTRIN) 81 MG EC tablet Take 1 tablet (81 mg total) by mouth once daily.    cyclobenzaprine (FLEXERIL) 5 MG tablet Take 5-10 mg by mouth nightly. (Patient not taking: Reported on 2024)     docusate sodium (COLACE) 100 MG capsule Take 1 capsule (100 mg total) by mouth 2 (two) times daily as needed for Constipation. (Patient not taking: Reported on 2024)    ferrous sulfate 325 (65 FE) MG EC tablet Take 1 tablet (325 mg total) by mouth 2 (two) times daily. (Patient not taking: Reported on 2024)    fluconazole (DIFLUCAN) 150 MG Tab Take 1 tablet (150 mg total) by mouth as needed. Take one pill weekly as needed for yeast infection. (Patient not taking: Reported on 2024)    FLUoxetine 10 MG capsule Take 1 capsule (10 mg total) by mouth once daily. (Patient not taking: Reported on 2024)    medroxyPROGESTERone (DEPO-PROVERA) 150 mg/mL Syrg  (Patient not taking: Reported on 2024)    omeprazole (PRILOSEC OTC) 20 MG tablet Take 1 tablet (20 mg total) by mouth daily as needed. (Patient not taking: Reported on 2024)    ondansetron (ZOFRAN-ODT) 4 MG TbDL Dissolve 1 tablet (4 mg total) by mouth every 6 (six) hours as needed.    oxyCODONE-acetaminophen (PERCOCET) 5-325 mg per tablet Take 1 tablet by mouth every 4 (four) hours as needed. (Patient not taking: Reported on 2024)    PNV,calcium 72-iron-folic acid (PRENATAL VITAMIN PLUS LOW IRON) 27 mg iron- 1 mg Tab Take 1 tablet (1 each total) by mouth once daily.    prenatal vit no.130-iron-folic 27 mg iron- 800 mcg Tab Take 1 tablet by mouth once daily.       Review of patient's allergies indicates:  No Known Allergies     Family History       Problem Relation (Age of Onset)    Cancer Mother    Diabetes Mother, Maternal Grandmother          Tobacco Use    Smoking status: Former     Current packs/day: 0.00     Types: Cigarettes     Quit date: 2020     Years since quittin.6    Smokeless tobacco: Never   Substance and Sexual Activity    Alcohol use: Not Currently     Comment: occasional prior to pregnancy    Drug use: Not Currently     Types: Marijuana    Sexual activity: Yes     Partners: Male     Birth control/protection:  Injection     Review of Systems   Constitutional: Negative.    Respiratory: Negative.     Cardiovascular: Negative.    Gastrointestinal: Negative.    Endocrine: Negative.    Genitourinary: Negative.    Musculoskeletal: Negative.    Neurological: Negative.    Psychiatric/Behavioral: Negative.     Breast: negative.       Objective:     Vital Signs (Most Recent):  Temp: 98.5 °F (36.9 °C) (02/03/25 0957)  Pulse: 97 (02/03/25 0956)  Resp: 18 (02/03/25 0957)  SpO2: 100 % (02/03/25 0957) Vital Signs (24h Range):  Temp:  [98.5 °F (36.9 °C)] 98.5 °F (36.9 °C)  Pulse:  [97] 97  Resp:  [18] 18  SpO2:  [100 %] 100 %  BP: (120)/(72) 120/72     Weight: 93 kg (205 lb)  Body mass index is 36.31 kg/m².    FHT: 135, reactive Cat 1 (reassuring)  TOCO:  Q 3-5 minutes     Physical Exam:   Constitutional: She is oriented to person, place, and time. She appears well-developed and well-nourished. No distress.    HENT:   Head: Normocephalic and atraumatic.     Neck: No thyromegaly present.     Pulmonary/Chest: Effort normal. No respiratory distress.        Abdominal: Soft. She exhibits no distension and no mass. There is no abdominal tenderness. There is no rebound and no guarding.             Musculoskeletal: Normal range of motion and moves all extremeties. No tenderness.       Neurological: She is alert and oriented to person, place, and time. No cranial nerve deficit. Coordination normal.    Skin: Skin is warm. She is not diaphoretic. No erythema.    Psychiatric: She has a normal mood and affect. Her behavior is normal. Judgment and thought content normal.        Cervix:  Dilation:  4  Effacement:  75%  Station: -2  Presentation: Vertex     Significant Labs:  Lab Results   Component Value Date    GROUPTRH A POS 12/05/2024    HEPBSAG Non-reactive 11/11/2024    STREPBCULT No Group B Streptococcus isolated 12/30/2020       I have personallly reviewed all pertinent lab results from the last 24 hours.

## 2025-02-03 NOTE — ANESTHESIA PREPROCEDURE EVALUATION
[facility]  Anesthesia Pre-Operative Evaluation         Patient Name: Christopher Avila  YOB: 1990  MRN: [id number]    SUBJECTIVE:     Pre-operative evaluation for Procedure(s) (LRB):   SECTION (N/A)     2025    Christopher Avila is a 34 y.o. female w/ a significant PMHx of former smoker.     Patient now presents for the above procedure(s).      LDA: None documented.    Prev airway: None documented.    Drips: None documented.    Patient Active Problem List   Diagnosis    History of  section complicating pregnancy    Insufficient prenatal care in third trimester    Abnormal glucose affecting pregnancy     labor third trimester with  delivery third trimester, not applicable or unspecified    Encounter for female sterilization procedure       Review of patient's allergies indicates:  No Known Allergies    Current Outpatient Medications:    Current Facility-Administered Medications:     lactated ringers infusion, , Intravenous, Continuous, Hugo Giles MD, Last Rate: 125 mL/hr at 25 1352, New Bag at 25 1352    Past Surgical History:   Procedure Laterality Date     SECTION N/A 2021    Procedure:  SECTION;  Surgeon: Jitendra Guerra MD;  Location: [facility] L[facility]D OR;  Service: OB/GYN;  Laterality: N/A;       Social History     Socioeconomic History    Marital status: Single   Occupational History    Occupation: Nursing home    Tobacco Use    Smoking status: Former     Current packs/day: 0.00     Types: Cigarettes     Quit date: 2020     Years since quittin.6    Smokeless tobacco: Never   Substance and Sexual Activity    Alcohol use: Not Currently     Comment: occasional prior to pregnancy    Drug use: Not Currently     Types: Marijuana    Sexual activity: Yes     Partners: Male     Birth control/protection: Injection       OBJECTIVE:     Vital Signs Range (Last 24H):  Temp:  [36.9 °C (98.5 °F)]   Pulse:  []   Resp:   [18]   BP: (106-138)/(56-81)   SpO2:  [97 %-100 %]       Significant Labs:  Lab Results   Component Value Date    WBC 9.53 02/03/2025    HGB 8.3 (L) 02/03/2025    HCT 26.3 (L) 02/03/2025     02/03/2025    ALT <5 (L) 02/03/2025    AST 13 02/03/2025     02/03/2025    K 2.8 (L) 02/03/2025     02/03/2025    CREATININE 0.6 02/03/2025    BUN 3 (L) 02/03/2025    CO2 25 02/03/2025    TSH 0.994 06/10/2020    INR 1.0 01/25/2021    HGBA1C 5.3 11/11/2024       Diagnostic Studies: No relevant studies.    EKG:   Results for orders placed or performed during the hospital encounter of 12/21/20   EKG 12-lead    Collection Time: 12/21/20 11:49 AM    Narrative    Test Reason : Z34.90,    Vent. Rate : 082 BPM     Atrial Rate : 082 BPM     P-R Int : 162 ms          QRS Dur : 086 ms      QT Int : 396 ms       P-R-T Axes : 065 057 047 degrees     QTc Int : 462 ms    Normal sinus rhythm  Normal ECG  When compared with ECG of 10-DEEPIKA-2020 08:16,  No significant change was found  Confirmed by Terry Haines MD ([id number]) on 12/21/2020 5:06:10 PM    Referred By: System System           Confirmed By:Terry Haines MD       2D ECHO:  TTE:  No results found for this or any previous visit.    FLORINA:  No results found for this or any previous visit.    ASSESSMENT/PLAN:           Pre-op Assessment    I have reviewed the Patient Summary Reports.     I have reviewed the Nursing Notes. I have reviewed the NPO Status.   I have reviewed the Medications.     Review of Systems  Anesthesia Hx:  No problems with previous Anesthesia                Social:  Former Smoker, No Alcohol Use Occasional THC      Hematology/Oncology:       -- Anemia (Hgb 8.3):               Hematology Comments: Platelets 246                    Cardiovascular:  Exercise tolerance: good                                             Pulmonary:  Pulmonary Normal                       Renal/:  Renal/ Normal                 Hepatic/GI:  Hepatic/GI Normal                     Neurological:  Neurology Normal                                      Endocrine:  Endocrine Normal          Obesity / BMI > 30      Physical Exam  General: Well nourished, Cooperative, Alert and Oriented    Airway:  Mallampati: II / II  Mouth Opening: Normal  TM Distance: Normal  Tongue: Normal  Neck ROM: Normal ROM    Dental:  Intact    Chest/Lungs:  Normal Respiratory Rate    Heart:  Rate: Normal  Rhythm: Regular Rhythm        Anesthesia Plan  Type of Anesthesia, risks & benefits discussed:    Anesthesia Type: Spinal, Gen ETT  Intra-op Monitoring Plan: Standard ASA Monitors  Post Op Pain Control Plan: multimodal analgesia and intrathecal opioid  Induction:  IV  Informed Consent: Informed consent signed with the Patient and all parties understand the risks and agree with anesthesia plan.  All questions answered. Patient consented to blood products? Yes  ASA Score: 2    Ready For Surgery From Anesthesia Perspective.     .

## 2025-02-04 LAB
BASOPHILS # BLD AUTO: 0.03 K/UL (ref 0–0.2)
BASOPHILS NFR BLD: 0.2 % (ref 0–1.9)
DIFFERENTIAL METHOD BLD: ABNORMAL
EOSINOPHIL # BLD AUTO: 0 K/UL (ref 0–0.5)
EOSINOPHIL NFR BLD: 0.1 % (ref 0–8)
ERYTHROCYTE [DISTWIDTH] IN BLOOD BY AUTOMATED COUNT: 14.9 % (ref 11.5–14.5)
HCT VFR BLD AUTO: 27.4 % (ref 37–48.5)
HGB BLD-MCNC: 8.5 G/DL (ref 12–16)
IMM GRANULOCYTES # BLD AUTO: 0.13 K/UL (ref 0–0.04)
IMM GRANULOCYTES NFR BLD AUTO: 0.9 % (ref 0–0.5)
LYMPHOCYTES # BLD AUTO: 1.2 K/UL (ref 1–4.8)
LYMPHOCYTES NFR BLD: 8.7 % (ref 18–48)
MCH RBC QN AUTO: 24.3 PG (ref 27–31)
MCHC RBC AUTO-ENTMCNC: 31 G/DL (ref 32–36)
MCV RBC AUTO: 78 FL (ref 82–98)
MONOCYTES # BLD AUTO: 1.1 K/UL (ref 0.3–1)
MONOCYTES NFR BLD: 7.4 % (ref 4–15)
NEUTROPHILS # BLD AUTO: 11.8 K/UL (ref 1.8–7.7)
NEUTROPHILS NFR BLD: 82.7 % (ref 38–73)
NRBC BLD-RTO: 0 /100 WBC
PLATELET # BLD AUTO: 231 K/UL (ref 150–450)
PMV BLD AUTO: 9.7 FL (ref 9.2–12.9)
RBC # BLD AUTO: 3.5 M/UL (ref 4–5.4)
WBC # BLD AUTO: 14.27 K/UL (ref 3.9–12.7)

## 2025-02-04 PROCEDURE — 63600175 PHARM REV CODE 636 W HCPCS: Mod: TB,JZ | Performed by: OBSTETRICS & GYNECOLOGY

## 2025-02-04 PROCEDURE — 25000003 PHARM REV CODE 250: Performed by: OBSTETRICS & GYNECOLOGY

## 2025-02-04 PROCEDURE — 11000001 HC ACUTE MED/SURG PRIVATE ROOM

## 2025-02-04 PROCEDURE — 85025 COMPLETE CBC W/AUTO DIFF WBC: CPT | Performed by: OBSTETRICS & GYNECOLOGY

## 2025-02-04 PROCEDURE — 36415 COLL VENOUS BLD VENIPUNCTURE: CPT | Performed by: OBSTETRICS & GYNECOLOGY

## 2025-02-04 PROCEDURE — 99231 SBSQ HOSP IP/OBS SF/LOW 25: CPT | Mod: ,,, | Performed by: OBSTETRICS & GYNECOLOGY

## 2025-02-04 RX ORDER — OXYTOCIN-SODIUM CHLORIDE 0.9% IV SOLN 30 UNIT/500ML 30-0.9/5 UT/ML-%
95 SOLUTION INTRAVENOUS CONTINUOUS PRN
Status: DISCONTINUED | OUTPATIENT
Start: 2025-02-04 | End: 2025-02-07 | Stop reason: HOSPADM

## 2025-02-04 RX ORDER — HYDROCORTISONE 25 MG/G
CREAM TOPICAL 3 TIMES DAILY PRN
Status: DISCONTINUED | OUTPATIENT
Start: 2025-02-04 | End: 2025-02-04 | Stop reason: SDUPTHER

## 2025-02-04 RX ORDER — OXYTOCIN-SODIUM CHLORIDE 0.9% IV SOLN 30 UNIT/500ML 30-0.9/5 UT/ML-%
95 SOLUTION INTRAVENOUS ONCE AS NEEDED
Status: DISCONTINUED | OUTPATIENT
Start: 2025-02-04 | End: 2025-02-07 | Stop reason: HOSPADM

## 2025-02-04 RX ORDER — OXYTOCIN-SODIUM CHLORIDE 0.9% IV SOLN 30 UNIT/500ML 30-0.9/5 UT/ML-%
30 SOLUTION INTRAVENOUS ONCE AS NEEDED
Status: DISCONTINUED | OUTPATIENT
Start: 2025-02-04 | End: 2025-02-07 | Stop reason: HOSPADM

## 2025-02-04 RX ADMIN — KETOROLAC TROMETHAMINE 30 MG: 30 INJECTION, SOLUTION INTRAMUSCULAR; INTRAVENOUS at 05:02

## 2025-02-04 RX ADMIN — OXYCODONE AND ACETAMINOPHEN 1 TABLET: 10; 325 TABLET ORAL at 12:02

## 2025-02-04 RX ADMIN — MUPIROCIN: 20 OINTMENT TOPICAL at 09:02

## 2025-02-04 RX ADMIN — OXYCODONE AND ACETAMINOPHEN 1 TABLET: 10; 325 TABLET ORAL at 04:02

## 2025-02-04 RX ADMIN — DIPHENHYDRAMINE HYDROCHLORIDE 25 MG: 25 CAPSULE ORAL at 08:02

## 2025-02-04 RX ADMIN — IBUPROFEN 800 MG: 400 TABLET ORAL at 09:02

## 2025-02-04 RX ADMIN — DOCUSATE SODIUM 200 MG: 100 CAPSULE, LIQUID FILLED ORAL at 08:02

## 2025-02-04 RX ADMIN — DOCUSATE SODIUM 200 MG: 100 CAPSULE, LIQUID FILLED ORAL at 09:02

## 2025-02-04 RX ADMIN — DIPHENHYDRAMINE HYDROCHLORIDE 25 MG: 25 CAPSULE ORAL at 12:02

## 2025-02-04 RX ADMIN — MUPIROCIN: 20 OINTMENT TOPICAL at 08:02

## 2025-02-04 RX ADMIN — HYDROCORTISONE: 25 CREAM TOPICAL at 03:02

## 2025-02-04 RX ADMIN — OXYCODONE HYDROCHLORIDE AND ACETAMINOPHEN 1 TABLET: 5; 325 TABLET ORAL at 08:02

## 2025-02-04 RX ADMIN — OXYCODONE AND ACETAMINOPHEN 1 TABLET: 10; 325 TABLET ORAL at 09:02

## 2025-02-04 RX ADMIN — OXYCODONE HYDROCHLORIDE AND ACETAMINOPHEN 1 TABLET: 5; 325 TABLET ORAL at 03:02

## 2025-02-04 RX ADMIN — DIPHENHYDRAMINE HYDROCHLORIDE 25 MG: 25 CAPSULE ORAL at 04:02

## 2025-02-04 NOTE — LACTATION NOTE
Pt seen this am -has breastpumnp at bedside -states has not used and has changed her mind and is no longer interested in pumping -encouraged to call for any assistance if she changes her mind

## 2025-02-04 NOTE — DISCHARGE INSTRUCTIONS
After a Cesearean Birth    General Discharge Instructions  May follow a regular diet, unless otherwise discussed with physician.  Take showers, not baths until instructed by your doctor.   For the next 5 days, continue to use Hibiclens solution when you shower. Always use a clean towel when drying incision.  Incision dressing should be removed 7 days after surgery. If dressing begins to peel up prior to this day, gently remove.    Activity as tolerated.  No lifting or heavy exercise for 6 weeks, no driving for 2 weeks, no sexual intercourse, douching or tampons for 6 weeks  May return to work/school as discussed with physician  Discuss birth control with physician  Breast care support bra worn at all times  Lactation consultant referral number ( [phone] or [phone])    Call Your Healthcare Provider Right Away If You Have:  A temperature of 100.4°F or higher.  If your blood pressure is over 140/90.  You have difficulty catching your breath or trouble breathing.  Heavy vaginal bleeding, clots, or vaginal discharge with foul odor. (heavier than menses)  Persistent nausea or vomiting.  You gain more than 3 pounds in 3 days.  Severe headaches not relieved by Tylenol (acetaminophen) or Motrin (ibuprofen)  Blurry or double vision, see spots or flashing lights.  Dizziness or fainting.  New onset swelling or worsening of existing swelling.  Burning or pain when you urinate.  No bowel movement for 5 days.  Redness, warmth, swelling, or pain in the lower leg.  Redness, discharge, or pain worse than you had in the hospital.  Burning, pain, red streaks, or lumpy areas in your breasts.  Cracks, blisters, or blood on your nipples.  Feelings of extreme sadness or anxiety, or a feeling that you dont want to be with your baby.  If you have any new or unusual symptoms or have questions or concerns    Incision Care  You will be able to shower and pat the incision dry.  Watch your incision for signs of infection, such as  increasing redness or drainage.  For ease of movement, hold a pillow against the incision when you get up from a lying or sitting position, and when you laugh or cough.  Avoid heavy lifting--nothing heavier than your baby until your doctor instructs you otherwise.     Follow-Up  Schedule a  follow-up exam with your healthcare provider for about 6 weeks after delivery. During this exam, your uterus and vaginal area will be checked. Contact your healthcare provider if you think you or your baby are having any problems.                                                                                                              Breastfeeding Discharge Instructions     AAP recommendation of exclusive breastfeeding for the first 6 months of life and continued breastfeeding with the introduction of supplemental foods beyond the first year of life and recommends to delay all bottle and pacifier use until after 4 weeks of age and breastfeeding is well established.  Discussed the benefits of exclusive breastfeeding for both mother and baby.  Discussed the risks of supplementation/pacifier use on the exclusivity of breastfeeding in the first 6 months. Feed the baby at the earliest sign of hunger or comfort  Hands to mouth, sucking motions  Rooting or searching for something to suck on  Dont wait for crying - it is a not a late sign of hunger; it is a sign of distress    The feedings may be 8-12 times per 24hrs and will not follow a schedule  Alternate the breast you start the feeding with, or start with the breast that feels the fullest  Switch breasts when the baby takes himself off the breast or falls asleep  Keep offering breasts until the baby looks full, no longer gives hunger signs, and stays asleep when placed on his back in the crib  If the baby is sleepy and wont wake for a feeding, put the baby skin-to-skin dressed in a diaper against the mothers bare chest  Sleep near your baby  The baby should  be positioned and latched on to the breast correctly  Chest-to-chest, chin in the breast  Babys lips are flipped outward  Babys mouth is stretched open wide like a shout  Babys sucking should feel like tugging to the mother  The baby should be drinking at the breast:  You should hear swallowing or gulping throughout the feeding  You should see milk on the babys lips when he comes off the breast  Your breasts should be softer when the baby is finished feeding  The baby should look relaxed at the end of feedings  After the 4th day and your milk is in:  The babys poop should turn bright yellow and be loose, watery, and seedy  The baby should have at least 3-4 poops the size of the palm of your hand per day  The baby should have at least 6-8 wet diapers per day  The urine should be light yellow in color  You should drink when you are thirsty and eat a healthy diet when you are    hungry.     Take naps to get the rest you need.   Take medications and/or drink alcohol only with permission of your obstetrician    or the babys pediatrician.  You can also call the [facility],   ([phone]), Monday-Friday, 8am-5pm Central time, to get the most   up-to-date evidence-based information on the use of medications during   pregnancy and breastfeeding.      The baby should be examined by a pediatrician at 3-5 days of age; unless ordered sooner by the pediatrician.  Once your milk comes in, the baby should be back to birth weight no later than 10-14 days of age.    Primary Engorgement    If the milk is flowing, use wet or dry heat applied to the breasts for approximately 10min prior to each feeding as a comfort measure to facilitate the milk ejection reflex    Follow heat treatment with breast massage to soften hard/lumpy areas of the breast    Use unrestricted, frequent, effective feedings.      Wake baby to feed if necessary    Avoid pacifier and bottle feedings    Hand express or pump breasts to the point of  comfort prn    Use cold treatments in the form of ice packs/gel packs/ frozen vegetables wrapped in a soft thin cloth and applied to the breasts for approximately 20min after each feeding until engorgement is resolved    Wear comfortable, supportive bra    Take pain medicine prn    Use anti-inflammatory medications if prescribed by physician    Other:    [facility] Pumping Instructions :    Preparation and Hygiene:    Shower daily.  Wear a clean bra each day and wash daily in warm soapy water.  Change wet or moist breast pads frequently.  Moist pads can promote growth of germs.  Actively wash your hands, paying close attention to the area around and under your fingernails, thoroughly with soap and water for 15 seconds before pumping or handling your milk.  Re-wash your hands if you touch anything (scratching your nose, answering the phone, etc) while pumping or handling your milk.   Before pumping your breasts, assemble the pump collection kit and have ready the sterile container and labels.  Place these items on a clean surface next to the breastpump.  Each time after you have finished pumping, take apart all of the parts of the breastpump collection kit and place them in a separate cleaning container (do not place them in the sink).  Be sure to remove the yellow valve from the breastshield and separate the white membrane from the yellow valve.  Rinse all of these parts with cool water.  Then use a new sponge and/or bottle brush and dishwashing detergent to clean the parts.  Rinse off the soapy water with cool water and air dry on a clean towel covered with a clean cloth.  All parts may also be washed after each use in the top rack of a .  Once each day, sterilize all of the parts of the breastpump collection kit.  This can be done by boiling the kit parts for 10 minutes or by using a Quick Clean Micro-Steam Bag made by [employer]  If condensation appears in the tubing, continue to run the pump with the  tubing attached for 1-2 minutes or until the tubing is dry.   Notify your babys nurse or doctor if you become ill or need to take any medication, even over-the-counter medicines.        Collection and Storage of Expressed Breastmilk:         1. Pump your breasts at least 8-10 times every 24 hours.  Double pump (both breasts at  the same time) for at least 15-20 minutes using the most suction that is comfortable.    2. Write the date and time of pumping and the name of any medications you are takingon the babys pre-printed hospital identification label.   3.    Do not touch the inside of the storage containers or lids.      4.        Tightly screw the lid onto the container and place immediately into the                                refrigerator for daily use.  Bottle may remain at room temperature if the next                    feeding is within 4 hours.  5.    Expressed breastmilk should be refrigerated or frozen within 4 hours of                pumping.  6.        Do not store expressed breastmilk on the door of your refrigerator or freeze             where the temperature is warmer.   7.        Refrigerated milk may be stored in for up to 7 days.  At this point it can be              moved to the freezer for 6 -12 months.  8.        Thaw frozen breast milk in overnight in the refrigerator.  Once milk is thawed it              must be used within 24 hours.  9.        Refrigerated breast milk needs to be warmed to room temperature.  Warm by leaving unrefrigerated until it reached room temperature, or place sealed bottle into a cup of warm (not boiling) water or use a bottle warmer.               Never warm breast milk in a microwave or boiling water.    For any questions or concerns call:  Lactation Department at [phone]        Pumping for the Baby in NICU    Preparation and Hygiene:  Shower daily.  Wear a clean bra each day and wash daily in warm soapy water.  Change wet or moist breast pads frequently.   Moist pads can promote growth of germs.  Actively wash your hands, paying close attention to the area around and under your fingernails, thoroughly with soap and water for 15 seconds before pumping or handling your milk.  Re-wash your hands if you touch anything (scratching your nose, answering the phone, etc) while pumping or handling your milk.   Before pumping your breasts, assemble the pump collection kit and have ready the sterile container and labels.  Place these items on a clean surface next to the breast pump.  Each time after you have finished pumping, take apart all of the parts of the breast pump collection kit and place them in a separate cleaning container (do not place them in the sink).  Be sure to remove the yellow valve from the breast shield and separate the white membrane from the yellow valve.  Rinse all of these parts with cool water.  Then use a new sponge and/or bottle brush and dishwashing detergent to clean the parts.  Rinse off the soapy water with cool water and air dry on a clean towel covered with a clean cloth.  All parts may also be washed after each use in the top rack of a .  Once each day, sanitize all of the parts of the breast pump collection kit.  This can be done by boiling the kit parts for 10 minutes or by using a Quick Clean Micro-Steam Bag made by [employer]  If condensation appears in the tubing, continue to run the pump with the tubing attached for 1-2 minutes or until the tubing is dry.   Notify your babys nurse or doctor if you become ill or need to take any medication, even over-the-counter medicines.  Collection and Storage of Expressed Breast milk:       1. Pump your breasts at least 8-10 times every 24 hours.  Double pump (both breasts at the same time) for at least 15-20 minutes using the most suction that is comfortable.    2. Write the date and time of pumping and the name of any medications you are taking on the babys pre-printed hospital  identification label.   3. Place your babys pre-printed hospital identification label on each container of breast milk.  Additional pre-printed labels can be obtained from your babys nurse.  If your expressed breast milk is not correctly labeled, the nurse cannot feed the milk to the baby.       4.    Do not touch the inside of the storage containers or lids.  5.        Pour the amount of expressed milk needed for 1 of your babys feedings into each storage container.  Use a new container(s) for each pumping.  Additional storage containers can be obtained from your babys nurse.  6. Tightly screw the lid onto the container and place immediately into the refrigerator for daily transportation to the hospital.   Do not freeze your milk unless asked to do so by your babys nurse.  However, if you are not able to visit your baby each day, place the expressed breast milk in the freezer.  7.     Expressed breast milk should be refrigerated or frozen within 4 hours of pumping.  8.         Do not store expressed breast milk on the door of your refrigerator or freezer where the temperature is warmer.   Transportation of Expressed Breast milk:  Refrigerated breast milk or frozen milk should be packed tightly together in a cooler with frozen, gel-packs to keep the milk frozen.  DO NOT USE ICE CUBES (WET ICE) TO TRANSPORT FROZEN MILK.   A clean towel can be used to fill any extra space between containers of frozen milk.  2.    Bring your expressed milk from home each time you visit the baby.    Community Resources for Breastfeeding Mothers  Hospital Breastfeeding Centers/Lactation Consultants  [facility]...........................................................................................................................................................[phone]  [facility]..............................................................................................................  [phone]  [facility]..........................................................................................................................................................[phone]  [facility]...........................................................................................................................................[phone]  [facility]........................................................................................................................................................[phone]    [facility] [place] ....................................................................................................ibconline.ca  Dr. Waqar Maier's online resource provides videos, articles, and information sheets.  Coffective .........................................................................................................................................................................................................................coffective.com  Download the free mobile sanket to help get off to a great start with breastfeeding.  Droplet................................................................................................................................................................................................Mobim.Scuttledog  Breastfeeding needs the most attention in the first five days after birth. Droplets encourages parents to take advantage of this critical window with effective breastfeeding techniques in order to prevent common challenges.  [employer]....................................................................................................................................Revolymera.org  Videos that teach and empower mothers and caregivers.  Infant Risk  [place]...............................................................................................................................[phone]  infantBuyapowa  Provides up-to-date information for medication use by moms during pregnancy and while breastfeeding.  AmberMom...............................................................................................................................................................................................kellymom.com  Provides online information on breastfeeding and parenting.  La Leche League.......................................................................................................................................................lllalmsla.org  llli.org  Mother-to-mother support groups with education, information support, and encouragement to women who want to breastfeed.  Work and Pump.......................................................................................................................................................... Cazoomi.com  Information about breastfeeding for working moms.  [place] Resources  [place] Breastfeeding Coalition............................... 1-800-251-BABY (2222)  [facility]  Find local breastfeeding support, including lactation consultants, WIC clinics, support groups and more.  [place] Breastfeeding Support.........................................................................................LaBreastfeedingSupport.org  Zip code search of breastfeeding resources in your area.  [facility] for Healthy Babies............................................................................0-888-944-BABY ([phone]  5955736wloc.org  Connects [place] moms and their families to health and pregnancy resources. Available  24/7.  WIC...................................................................................................................................................................[phone]  ldh.la.gov/WIC  A statewide nutrition program for pregnant, breastfeeding, and postpartum women, infants, and children under 5years old. Provides foods and nutrition information. Also provides breastfeeding support by peer counselors.      [place].............................................................................................................................................................dcsno.org  [facility] are located in [facility]. Offers pediatric services, women's health services, WIC services and more.  Baby Café ...............................................................................................................................................................................babycafeusa.org  Free, drop-in, informal breastfeeding support groups offering professional lactation care and intervention.  [facility]................................................ birthmarkdoulas."[employer]  Infant feeding drop-in clinics, lactation services, support groups and education programs.  Samire au Lait....................................................................[phone]  Photolitec.com/groups/[facility]  Free breastfeeding support group for families of color.  Healthy Start [place].......................................................[phone] ([place])  [phone] ([place])  Serves women of childbearing age and addresses issues for alivia.gov/health-department/healthy-start pregnant women and their children from birth to the age of two.  La Leche League - [place]  Mickey.............................................Biomoti.[employer].com/Codasip  In-person and virtual mother-to-mother support groups with education, information support, and encouragement to women who want to breastfeed.  [facility] [place] at [facility]..............................................................[phone] ([phone])  ..................................................................................................ochsner.org/services/[facility]  Despite their best efforts, sometimes mothers are unable to provide their own milk. If there is ever a time you cannot produce enough milk, donor milk is the next best thing. Also accepting donors.  NAFISA Laura.............................................................................................................................[phone]  nolanesting.com  In-person and virtual support for families through pregnancy, birth and early parenthood.  up to 18 months of age who live in [facility] [place], [facility], [place] and [facility].Community Resources for Breastfeeding Mothers  Hospital Breastfeeding Centers/Lactation Consultants  [facility]...........................................................................................................................................................[phone]  [facility]................................[facility]..............................................  [phone]  [facility]..........................................................................................................................................................[phone]  [facility]...........................................................................................................................................[phone]  [facility]........................................................................................................................................................[phone]    [facility] ....................................................................................................ibconline.ca  Dr. Waqar Maier's online resource provides videos, articles, and information sheets.  Coffective .........................................................................................................................................................................................................................coffective.com  Download the free mobile sanket to help get off to a great start with breastfeeding.  Droplet................................................................................................................................................................................................[employer] Networks.DecideQuick  Breastfeeding needs the most attention in the first five days after birth. Droplets encourages parents to take advantage of this critical window with effective breastfeeding techniques in order to prevent common challenges.  [employer]....................................................................................................................................UNATIONa.org  Videos that teach and empower mothers and caregivers.  Infant Risk Center...............................................................................................................................[phone]  [employer]  Provides up-to-date information for medication use by moms during pregnancy and while breastfeeding.  Workspot...............................................................................................................................................................................................kellymom.com  Provides online information on breastfeeding and  parenting.  La Leche League.......................................................................................................................................................lllalmsla.org  llli.org  Mother-to-mother support groups with education, information support, and encouragement to women who want to breastfeed.  Work and Pump.......................................................................................................................................................... Contatta.com  Information about breastfeeding for working moms.  [place] Resources  [place] Breastfeeding Coalition............................... 5-400-636-BABY (5103)  l[place]abreastfeeding.org  Find local breastfeeding support, including lactation consultants, WIC clinics, support groups and more.  [place] Breastfeeding Support.........................................................................................LaBreastfeedingSupport.org  Zip code search of breastfeeding resources in your area.  [facility] Healthy Babies............................................................................1-800-251-BABY (9320)  8287157rrdo.Spotwish  Connects [place] moms and their families to health and pregnancy resources. Available 24/7.  WIC...................................................................................................................................................................[phone]  [facility].la.gov/WIC  A statewide nutrition program for pregnant, breastfeeding, and postpartum women, infants, and children under 5years old. Provides foods and nutrition information. Also provides breastfeeding support by peer counselors.      [facility] Resources  [facility].............................................................................................................................................................dcsno.org  [facility] are located in [place],  [place], [place], [place], [place], [place], [place], [facility] and [facility]. Offers pediatric services, women's health services, WIC services and more.  Baby Café ...............................................................................................................................................................................babycafeusa.org  Free, drop-in, informal breastfeeding support groups offering professional lactation care and intervention.  [facility]................................................ birthmarkdoulas.Magnetic Software  Infant feeding drop-in clinics, lactation services, support groups and education programs.  Cafe au Lait....................................................................[phone]  SeeChange Health.com/groups/cafeaulaitlocatrachoiana  Free breastfeeding support group for families of color.  Healthy Start [place].......................................................[phone] ([place])  [phone] ([place])  Serves women of childbearing age and addresses issues for nafisa.gov/health-department/healthy-start pregnant women and their children from birth to the age of two.  La Leche League  [place].............................................Purewire.Magnetic Software/IN-PIPE TECHN[employer]Y  In-person and virtual mother-to-mother support groups with education, information support, and encouragement to women who want to breastfeed.  [facility] [place] at [facility]..............................................................[phone] ([phone])  ..................................................................................................[facility].org/services/[facility]  Despite their best efforts, sometimes mothers are unable to provide their own milk. If there is ever a time you cannot produce enough milk, donor milk is the next best thing. Also accepting donors.  NAFISA  Nesting.............................................................................................................................[phone]  nolanesting.com  In-person and virtual support for families through pregnancy, birth and early parenthood.  up to 18 months of age who live in [facility], [facility], [place] and [facility].

## 2025-02-04 NOTE — PLAN OF CARE
Problem: Adult Inpatient Plan of Care  Goal: Plan of Care Review  Outcome: Progressing  Goal: Patient-Specific Goal (Individualized)  Outcome: Progressing  Goal: Absence of Hospital-Acquired Illness or Injury  Outcome: Progressing  Goal: Optimal Comfort and Wellbeing  Outcome: Progressing  Goal: Readiness for Transition of Care  Outcome: Progressing     Problem: Infection  Goal: Absence of Infection Signs and Symptoms  Outcome: Progressing     Problem: Wound  Goal: Optimal Coping  Outcome: Progressing  Goal: Optimal Functional Ability  Outcome: Progressing  Goal: Absence of Infection Signs and Symptoms  Outcome: Progressing  Goal: Improved Oral Intake  Outcome: Progressing  Goal: Optimal Pain Control and Function  Outcome: Progressing  Goal: Skin Health and Integrity  Outcome: Progressing  Goal: Optimal Wound Healing  Outcome: Progressing     Problem: Breastfeeding  Goal: Effective Breastfeeding  Outcome: Progressing

## 2025-02-04 NOTE — L&D DELIVERY NOTE
[facility] - Mother [facility] Baby   Section   Operative Note    SUMMARY     Date of Procedure: 2/3/2025     Procedure: Procedure(s) (LRB):   SECTION (N/A)    Surgeons and Role:     * Hugo Giles MD - Primary    Assisting Surgeon: HAMLET Barajas    Pre-Operative Diagnosis:  history Csec, currently pregnant,  labor @ 36 weeks, undesired fertility, insufficient prenatal care    Post-Operative Diagnosis: sane    Anesthesia: Spinal/Epidural    Technical Procedures Used: RLTCS w/ Filshie clip tubal sterilzation           Description of the Findings of the Procedure: viable male, vtx, clear fluid      Complications: No    Blood Loss: approx 500 cc     With patient in supine position, the legs are  and Leong Catheter placed and positioning to supine done.   Abdomen prepped with Chloroprep and 3 minute drying time allowed prior to draping of the abdomen.   Time out taken with OR team members.  Pfannenstiel Incision made through the skin, transverse fascial incision developed, rectus muscles  in the midline and the peritoneum entered.   no adhesions noted.  The lower uterine segment and position of the fetus identified.   Low Transverse Incision made through well developed lower uterine segment and extended laterally with blunt dissection.   Clear fluid noted.  Infant delivered from vertex presentation.  Cord clamped after one minute and  handed to attending nurse.  Cord blood taken, placenta delivered.  The uterus was exteriorized.  The uterine incision was closed with with running lock 0 monocryl. Observation for bleeding with suture of any bleeding along the hysterotomy line.   Right fallopian tube was then grasped with Filshie clip applicator and clip applied according to 's specifications.  Process repeated with left Fallopian tube.   Right and left adnexa with normal anatomy.     Closure of the abdomen with 2 0 chromic running of the peritoneum, fascial  closure with 1 vicryl in running fashion.  Skin closure with insorb staples.  Wound dressed with aquasil.         Specimens:   Specimen (24h ago, onward)      None            Condition: Good    VTE Risk Mitigation (From admission, onward)           Ordered     IP VTE HIGH RISK PATIENT  Once         25 1553     Place sequential compression device  Until discontinued         25                    Disposition: PACU - hemodynamically stable.    Attestation: Good         Delivery Information for Chandler Avila    Birth information:  YOB: 2024   Time of birth: 3:27 PM   Sex: male   Head Delivery Date/Time: 2/3/2025  3:27 PM   Delivery type: , Low Transverse   Gestational Age: 36w1d       Delivery Providers    Delivering clinician: Hugo Giles MD   Provider Role    Sarah Beth Da Silva, RN Delivery Nurse    Bienvenido Stanley CRNA Nurse Anesthetist    Loli Marietta Surgical Tech    Kate Varner RN Registered Nurse    Diego, Bri RECIO PA-C Physician Assistant              Measurements    Weight: 3290 g  Weight (lbs): 7 lb 4.1 oz  Length:          Apgars    Living status: Living  Apgar Component Scores:  1 min.:  5 min.:  10 min.:  15 min.:  20 min.:    Skin color:  0  1       Heart rate:  2  2       Reflex irritability:  2  2       Muscle tone:  0  2       Respiratory effort:  1  2       Total:  5  9       Apgars assigned by: KATE VARNER         Operative Delivery    Forceps attempted?: No  Vacuum extractor attempted?: No         Shoulder Dystocia    Shoulder dystocia present?: No           Presentation    Presentation: Vertex  Position: Middle Occiput Anterior           Interventions/Resuscitation    Method: Bulb Suctioning, PPV, NICU Attended, Deep Suctioning, Tactile Stimulation       Cord    Vessels: 3 vessels  Complications: None  Delayed Cord Clamping?: No  Cord Clamped Date/Time: 2/3/2025  5:36 PM  Cord Blood Disposition: Sent with Baby  Gases Sent?:  No  Stem Cell Collection (by MD): No       Placenta    Placenta delivery date/time: 2/3/2025 1737  Placenta removal: Manual removal  Placenta appearance: Intact  Placenta disposition: Donation           Labor Events:       labor:       Labor Onset Date/Time:         Dilation Complete Date/Time:         Start Pushing Date/Time:         Start Pushing Date/Time:       Rupture Date/Time:            Rupture type:          Fluid Amount:       Fluid Color:                steroids:       Antibiotics given for GBS:       Induction:       Indications for induction:        Augmentation:       Indications for augmentation:       Labor complications:       Additional complications:          Cervical ripening:                     Delivery:      Episiotomy:       Indication for Episiotomy:       Perineal Lacerations:   Repaired:      Periurethral Laceration:   Repaired:     Labial Laceration:   Repaired:     Sulcus Laceration:   Repaired:     Vaginal Laceration:   Repaired:     Cervical Laceration:   Repaired:     Repair suture:       Repair # of packets:       Last Value - EBL - Nursing (mL):       Sum - EBL - Nursing (mL): 0     Last Value - EBL - Anesthesia (mL):      Calculated QBL (mL): 358     Running total QBL (mL): 358     Vaginal Sweep Performed:       Surgicount Correct:       Vaginal Packing:   Quantity:       Other providers:       Anesthesia    Method: Spinal          Details (if applicable):  Trial of Labor No    Categorization: Primary    Priority: Routine   Indications for :     Incision Type: low transverse     Additional  information:  Forceps:    Vacuum:    Breech:    Observed anomalies    Other (Comments):

## 2025-02-05 PROCEDURE — 11000001 HC ACUTE MED/SURG PRIVATE ROOM

## 2025-02-05 PROCEDURE — 99232 SBSQ HOSP IP/OBS MODERATE 35: CPT | Mod: ,,, | Performed by: OBSTETRICS & GYNECOLOGY

## 2025-02-05 PROCEDURE — 25000003 PHARM REV CODE 250: Performed by: OBSTETRICS & GYNECOLOGY

## 2025-02-05 RX ADMIN — OXYCODONE AND ACETAMINOPHEN 1 TABLET: 10; 325 TABLET ORAL at 07:02

## 2025-02-05 RX ADMIN — DOCUSATE SODIUM 200 MG: 100 CAPSULE, LIQUID FILLED ORAL at 07:02

## 2025-02-05 RX ADMIN — MUPIROCIN: 20 OINTMENT TOPICAL at 07:02

## 2025-02-05 RX ADMIN — OXYCODONE AND ACETAMINOPHEN 1 TABLET: 10; 325 TABLET ORAL at 02:02

## 2025-02-05 RX ADMIN — OXYCODONE AND ACETAMINOPHEN 1 TABLET: 10; 325 TABLET ORAL at 05:02

## 2025-02-05 RX ADMIN — OXYCODONE AND ACETAMINOPHEN 1 TABLET: 10; 325 TABLET ORAL at 12:02

## 2025-02-05 RX ADMIN — DOCUSATE SODIUM 200 MG: 100 CAPSULE, LIQUID FILLED ORAL at 09:02

## 2025-02-05 RX ADMIN — OXYCODONE HYDROCHLORIDE AND ACETAMINOPHEN 1 TABLET: 5; 325 TABLET ORAL at 09:02

## 2025-02-05 RX ADMIN — MUPIROCIN: 20 OINTMENT TOPICAL at 09:02

## 2025-02-05 RX ADMIN — IBUPROFEN 800 MG: 400 TABLET ORAL at 04:02

## 2025-02-05 RX ADMIN — DIPHENHYDRAMINE HYDROCHLORIDE 25 MG: 25 CAPSULE ORAL at 02:02

## 2025-02-05 NOTE — PLAN OF CARE
NICU/MB/LD DISCHARGE ASSESSMENT    BABY'S NAME:***  D.O.B: ***  DX:  ***  Birth Hospital: ***     Birth Wt: ***  Birth Ln: ***  EGA: ***  REHAN: ***    DEMOGRAPHICS    Mother: ***  Address: ***  Phone: ***  Employer:  ***  Job Title:  ***  Education:  ***    Father: ***   ***  Address: ***  Phone: ***  Employer:  ***  Job Title:***  Education: ***    Father's Involvement:   ***Fully    Minimally     None    Marital Status:  ***Single    Engaged   In a relationship  None      Gnosticism Preference: ***    Signed Birth Certificate: ***    Emergency contacts: ***    Siblings: ***    Names:  ***   Ages:  ***    Number of Household Members:  ***    Are you a member of the      Are you a member of a  [place]    CLINICAL    Pediatrician: ***  Pharmacy:***     SW met with pt's mother  to complete NICU assessment.   Pt will be residing with *** at current address.  Pt's mother ***has/does not have the basic essential needs such as crib, clothing, bottles, and carseat.  Mother  will be ***breast feeding or  bottle feeding  (select one).  Mom is /is not linked to [facility].(select one).    Patient's mother informed of the importance of using a hospital grade pump and obtaining one from [facility]. Mother ***has or does not have  transportation to and from the hospital .   Pt's pediatrician will be ***.     Insurance verified and mother i advised to have pt added to  insurance within 30 days.    No concerns or questions at this time. SW will continue to follow patient  while in the NICU.

## 2025-02-05 NOTE — PLAN OF CARE
VSS, breast pumping encouraged every three hours. Fundus and lochia WDL. Voiding, passing flatus, ambulating and tolerating regular diet well. Visited infant in the NICU. Pain being managed with motrin and percocet as needed. Stated an understanding to POC.

## 2025-02-05 NOTE — ANESTHESIA POSTPROCEDURE EVALUATION
Anesthesia Post Evaluation    Patient: Christopher Avila    Procedure(s) Performed: Procedure(s) (LRB):   SECTION (N/A)    Final Anesthesia Type: epidural      Patient location during evaluation: labor & delivery  Patient participation: Yes- Able to Participate  Level of consciousness: awake and alert and oriented  Post-procedure vital signs: reviewed and stable  Pain management: adequate  Airway patency: patent    PONV status at discharge: No PONV  Anesthetic complications: no      Cardiovascular status: blood pressure returned to baseline and hemodynamically stable  Respiratory status: unassisted, spontaneous ventilation and room air  Hydration status: euvolemic  Follow-up not needed.              Vitals Value Taken Time   /75 25   Temp 37 °C (98.6 °F) 25   Pulse 71 25   Resp 20 25   SpO2 100 % 25         Event Time   Out of Recovery 18:30:00         Pain/Albert Score: Pain Rating Prior to Med Admin: 8 (2025  5:09 AM)  Pain Rating Post Med Admin: 0 (2025  6:05 AM)

## 2025-02-05 NOTE — PLAN OF CARE
Problem: Adult Inpatient Plan of Care  Goal: Plan of Care Review  Outcome: Progressing  Flowsheets (Taken 2025)  Plan of Care Reviewed With: patient  Goal: Patient-Specific Goal (Individualized)  Outcome: Progressing  Flowsheets (Taken 2025)  Individualized Care Needs: Pain control/management  Anxieties, Fears or Concerns: Baby in NICU  Patient/Family-Specific Goals (Include Timeframe): Pumping every 3 hours  Goal: Absence of Hospital-Acquired Illness or Injury  Outcome: Progressing  Goal: Optimal Comfort and Wellbeing  Outcome: Progressing  Goal: Readiness for Transition of Care  Outcome: Progressing     Problem: Diabetes in Pregnancy  Goal: Optimal Coping  Outcome: Progressing  Intervention: Support Wellbeing and Self-Management Success  Flowsheets (Taken 2025)  Supportive Measures:   active listening utilized   positive reinforcement provided   relaxation techniques promoted   self-care encouraged   verbalization of feelings encouraged  Family/Support System Care:   caregiver stress acknowledged   self-care encouraged   support provided   involvement promoted   presence promoted  Goal: Blood Glucose Level Within Targeted Range  Outcome: Progressing     Problem:  Fall Injury Risk  Goal: Absence of Fall, Infant Drop and Related Injury  Outcome: Progressing     Problem: Infection  Goal: Absence of Infection Signs and Symptoms  Outcome: Progressing  Intervention: Prevent or Manage Infection  Flowsheets (Taken 2025)  Fever Reduction/Comfort Measures:   lightweight bedding   lightweight clothing  Infection Management: aseptic technique maintained  Isolation Precautions: precautions maintained     Problem: Wound  Goal: Optimal Coping  Outcome: Progressing  Goal: Optimal Functional Ability  Outcome: Progressing  Goal: Absence of Infection Signs and Symptoms  Outcome: Progressing  Goal: Improved Oral Intake  Outcome: Progressing  Goal: Optimal Pain Control and  Function  Outcome: Progressing  Goal: Skin Health and Integrity  Outcome: Progressing  Goal: Optimal Wound Healing  Outcome: Progressing  Intervention: Promote Wound Healing  Flowsheets (Taken 2025 1824)  Sleep/Rest Enhancement:   consistent schedule promoted   family presence promoted   regular sleep/rest pattern promoted   relaxation techniques promoted   therapeutic touch utilized     Problem: Breastfeeding  Goal: Effective Breastfeeding  Outcome: Progressing     Problem: Postpartum ( Delivery)  Goal: Successful Parent Role Transition  Outcome: Progressing  Intervention: Support Parent Role Transition  Flowsheets (Taken 2025 1834)  Supportive Measures:   active listening utilized   relaxation techniques promoted   verbalization of feelings encouraged   self-care encouraged   problem-solving facilitated   decision-making supported  Parent-Child Attachment Promotion:   caring behavior modeled   interaction encouraged   parent/caregiver presence encouraged   participation in care promoted   skin-to-skin contact encouraged   strengths emphasized  Goal: Hemostasis  Outcome: Progressing  Goal: Effective Bowel Elimination  Outcome: Progressing  Goal: Fluid and Electrolyte Balance  Outcome: Progressing  Goal: Absence of Infection Signs and Symptoms  Outcome: Progressing  Goal: Anesthesia/Sedation Recovery  Outcome: Progressing  Goal: Optimal Pain Control and Function  Outcome: Progressing  Intervention: Prevent or Manage Pain  Flowsheets (Taken 2025 1834)  Pain Management Interventions:   around-the-clock dosing utilized   pain management plan reviewed with patient/caregiver   quiet environment facilitated   relaxation techniques promoted  Goal: Nausea and Vomiting Relief  Outcome: Progressing  Goal: Effective Urinary Elimination  Outcome: Progressing  Goal: Effective Oxygenation and Ventilation  Outcome: Progressing

## 2025-02-05 NOTE — PROGRESS NOTES
[facility] Mother & Baby  Obstetrics  Postpartum Progress Note    Patient Name: Christopher Avila  MRN: [id number]  Admission Date: 2/3/2025  Hospital Length of Stay: 1 days  Attending Physician: Hugo Giles MD  Primary Care Provider: Neeraj Guerra MD    Subjective:     Principal Problem:Previous  section complicating pregnancy, with delivery    Hospital Course:  During course of observation pt's cervix changed from 3 cm to 4 cm.  Pt then consented for RLTCS and confirmed her desire to PPTL.  Surgery done without complication.  Infant required NICU admission.    25:  pt ambulating, tolerating diet, +flatus, pain controlled with meds    Interval History: POD #1 s/p RLTCS/PPTL    She is doing well this morning. She is tolerating a regular diet without nausea or vomiting. She is voiding spontaneously. She is ambulating. She has passed flatus, and has not a BM. Vaginal bleeding is mild. She denies fever or chills. Abdominal pain is moderate and controlled with oral medications. She Is not breastfeeding.     Objective:     Vital Signs (Most Recent):  Temp: 98.3 °F (36.8 °C) (25)  Pulse: 84 (25)  Resp: 20 (25)  BP: (!) 115/58 (25)  SpO2: 96 % (25) Vital Signs (24h Range):  Temp:  [97.5 °F (36.4 °C)-98.3 °F (36.8 °C)] 98.3 °F (36.8 °C)  Pulse:  [71-84] 84  Resp:  [16-20] 20  SpO2:  [96 %-99 %] 96 %  BP: (115-129)/(58-76) 115/58     Weight: 93 kg (205 lb)  Body mass index is 36.31 kg/m².      Intake/Output Summary (Last 24 hours) at 2025 2352  Last data filed at 2025 1436  Gross per 24 hour   Intake --   Output 2300 ml   Net -2300 ml         Significant Labs:  Lab Results   Component Value Date    GROUPTRH A POS 2025    HEPBSAG Non-reactive 2024    STREPBCULT No Group B Streptococcus isolated 2020     Recent Labs   Lab 25  0536   HGB 8.5*   HCT 27.4*       I have personallly reviewed all pertinent lab results from the last 24  hours.    Physical Exam:   Constitutional: She is oriented to person, place, and time. She appears well-developed and well-nourished. No distress.    HENT:   Head: Normocephalic and atraumatic.     Neck: No thyromegaly present.     Pulmonary/Chest: Effort normal. No respiratory distress.        Abdominal: Soft. She exhibits abdominal incision (wound CDI). She exhibits no distension and no mass. There is no abdominal tenderness. There is no rebound and no guarding.             Musculoskeletal: Normal range of motion and moves all extremeties. No tenderness.       Neurological: She is alert and oriented to person, place, and time. No cranial nerve deficit. Coordination normal.    Skin: She is not diaphoretic.    Psychiatric: She has a normal mood and affect. Her behavior is normal. Judgment and thought content normal.         Assessment/Plan:     34 y.o. female  for:    * Previous  section complicating pregnancy, with delivery  Routine postop care    Encounter for female sterilization procedure  Done at time of Csec        Disposition: As patient meets milestones, will plan to discharge 1-3 days.    Hugo Giles MD  Obstetrics  [facility] - Mother & Baby

## 2025-02-05 NOTE — PROGRESS NOTES
[facility] Mother & Baby  Obstetrics  Postpartum Progress Note    Patient Name: Christopher Avila  MRN: [id number]  Admission Date: 2/3/2025  Hospital Length of Stay: 2 days  Attending Physician: Hugo Giles MD  Primary Care Provider: Neeraj Guerra MD    Subjective:     Principal Problem:Previous  section complicating pregnancy, with delivery    Hospital Course:  During course of observation pt's cervix changed from 3 cm to 4 cm.  Pt then consented for RLTCS and confirmed her desire to PPTL.  Surgery done without complication.  Infant required NICU admission.    25:  pt ambulating, tolerating diet, +flatus, pain controlled with meds    2025 Walking to NICU to see her baby.  Still with some pain.      Interval History:   Status post CS on 2/3/2025    She is doing well this morning. She is tolerating a regular diet without nausea or vomiting. She is voiding spontaneously. She is ambulating. She has passed flatus, and has not a BM. Vaginal bleeding is mild. She denies fever or chills. Abdominal pain is mild and controlled with oral medications. She Is not breastfeeding. She desires circumcision for her male baby: yes.    Objective:     Vital Signs (Most Recent):  Temp: 97.8 °F (36.6 °C) (25 0736)  Pulse: 79 (25 0736)  Resp: 16 (25 0755)  BP: 119/63 (25 0736)  SpO2: 98 % (25 0736) Vital Signs (24h Range):  Temp:  [97.5 °F (36.4 °C)-98.6 °F (37 °C)] 97.8 °F (36.6 °C)  Pulse:  [71-94] 79  Resp:  [16-20] 16  SpO2:  [96 %-100 %] 98 %  BP: (115-129)/(58-75) 119/63     Weight: 93 kg (205 lb)  Body mass index is 36.31 kg/m².      Intake/Output Summary (Last 24 hours) at 2025 0845  Last data filed at 2025 0300  Gross per 24 hour   Intake 600 ml   Output 1400 ml   Net -800 ml         Significant Labs:  Lab Results   Component Value Date    GROUPTRH A POS 2025    HEPBSAG Non-reactive 2024    STREPBCULT No Group B Streptococcus isolated 2020     Recent Labs    Lab 25  0536   HGB 8.5*   HCT 27.4*       I have personallly reviewed all pertinent lab results from the last 24 hours.    Physical Exam:   Constitutional: She appears well-developed and well-nourished. No distress.    HENT:   Head: Normocephalic and atraumatic.    Eyes: EOM are normal.     Cardiovascular:  Normal rate.             Pulmonary/Chest: Effort normal. No respiratory distress.        Abdominal: Soft. She exhibits no distension. There is no abdominal tenderness. There is no rebound and no guarding.   Pfannenstiel incision in AquaCel dressing.  No evidence of active bleeding.             Musculoskeletal: Normal range of motion.       Neurological: She is alert.    Skin: Skin is warm and dry.    Psychiatric: She has a normal mood and affect.       Review of Systems  Assessment/Plan:     34 y.o. female  for:    * Previous  section complicating pregnancy, with delivery  Routine postpartum care  Watch vaginal bleeding  Pain control  Lactation assistance  Discharge home once milestones are met.      Encounter for female sterilization procedure  Done at time of Csec        Disposition: As patient meets milestones, will plan to discharge home.    Marco Sandhu MD  Obstetrics  [facility] - Mother & Baby

## 2025-02-05 NOTE — ASSESSMENT & PLAN NOTE
Routine postpartum care  Watch vaginal bleeding  Pain control  Lactation assistance  Discharge home once milestones are met.

## 2025-02-05 NOTE — PLAN OF CARE
Problem: Adult Inpatient Plan of Care  Goal: Plan of Care Review  Outcome: Progressing  Flowsheets (Taken 2025)  Plan of Care Reviewed With: patient  Goal: Patient-Specific Goal (Individualized)  Outcome: Progressing  Flowsheets (Taken 2025)  Individualized Care Needs: Pain Control, Pain Management Schedule Reinforced  Anxieties, Fears or Concerns: Baby in NICU  Patient/Family-Specific Goals (Include Timeframe): Pt will visit baby in NICU every 3 hours  Goal: Absence of Hospital-Acquired Illness or Injury  Outcome: Progressing  Goal: Optimal Comfort and Wellbeing  Outcome: Progressing  Intervention: Monitor Pain and Promote Comfort  Flowsheets (Taken 2025)  Pain Management Interventions:   care clustered   medication offered   pain management plan reviewed with patient/caregiver   pillow support provided   relaxation techniques promoted   quiet environment facilitated  Goal: Readiness for Transition of Care  Outcome: Progressing     Problem: Diabetes in Pregnancy  Goal: Optimal Coping  Outcome: Progressing  Goal: Blood Glucose Level Within Targeted Range  Outcome: Progressing     Problem:  Fall Injury Risk  Goal: Absence of Fall, Infant Drop and Related Injury  Outcome: Progressing     Problem: Infection  Goal: Absence of Infection Signs and Symptoms  Outcome: Progressing     Problem: Wound  Goal: Optimal Coping  Outcome: Progressing  Goal: Optimal Functional Ability  Outcome: Progressing  Goal: Absence of Infection Signs and Symptoms  Outcome: Progressing  Goal: Improved Oral Intake  Outcome: Progressing  Goal: Optimal Pain Control and Function  Outcome: Progressing  Goal: Skin Health and Integrity  Outcome: Progressing  Goal: Optimal Wound Healing  Outcome: Progressing     Problem: Breastfeeding  Goal: Effective Breastfeeding  Outcome: Progressing     Problem: Postpartum ( Delivery)  Goal: Successful Parent Role Transition  Outcome: Progressing  Intervention: Support  Parent Role Transition  Flowsheets (Taken 2/5/2025 1657)  Supportive Measures:   active listening utilized   decision-making supported   relaxation techniques promoted   self-care encouraged   self-reflection promoted   self-responsibility promoted   verbalization of feelings encouraged  Parent-Child Attachment Promotion:   caring behavior modeled   cue recognition promoted   interaction encouraged   parent/caregiver presence encouraged   participation in care promoted   skin-to-skin contact encouraged   strengths emphasized  Goal: Hemostasis  Outcome: Progressing  Goal: Effective Bowel Elimination  Outcome: Progressing  Goal: Fluid and Electrolyte Balance  Outcome: Progressing  Goal: Absence of Infection Signs and Symptoms  Outcome: Progressing  Goal: Anesthesia/Sedation Recovery  Outcome: Progressing  Goal: Optimal Pain Control and Function  Outcome: Progressing  Intervention: Prevent or Manage Pain  Flowsheets (Taken 2/5/2025 1657)  Pain Management Interventions:   care clustered   medication offered   pain management plan reviewed with patient/caregiver   pillow support provided   relaxation techniques promoted   quiet environment facilitated  Goal: Nausea and Vomiting Relief  Outcome: Progressing  Goal: Effective Urinary Elimination  Outcome: Progressing  Goal: Effective Oxygenation and Ventilation  Outcome: Progressing

## 2025-02-05 NOTE — SUBJECTIVE & OBJECTIVE
Interval History: POD #1 s/p RLTCS/PPTL    She is doing well this morning. She is tolerating a regular diet without nausea or vomiting. She is voiding spontaneously. She is ambulating. She has passed flatus, and has not a BM. Vaginal bleeding is mild. She denies fever or chills. Abdominal pain is moderate and controlled with oral medications. She Is not breastfeeding.     Objective:     Vital Signs (Most Recent):  Temp: 98.3 °F (36.8 °C) (02/04/25 2032)  Pulse: 84 (02/04/25 2032)  Resp: 20 (02/04/25 2134)  BP: (!) 115/58 (02/04/25 2032)  SpO2: 96 % (02/04/25 2032) Vital Signs (24h Range):  Temp:  [97.5 °F (36.4 °C)-98.3 °F (36.8 °C)] 98.3 °F (36.8 °C)  Pulse:  [71-84] 84  Resp:  [16-20] 20  SpO2:  [96 %-99 %] 96 %  BP: (115-129)/(58-76) 115/58     Weight: 93 kg (205 lb)  Body mass index is 36.31 kg/m².      Intake/Output Summary (Last 24 hours) at 2/4/2025 2352  Last data filed at 2/4/2025 1436  Gross per 24 hour   Intake --   Output 2300 ml   Net -2300 ml         Significant Labs:  Lab Results   Component Value Date    GROUPTRH A POS 02/03/2025    HEPBSAG Non-reactive 11/11/2024    STREPBCULT No Group B Streptococcus isolated 12/30/2020     Recent Labs   Lab 02/04/25  0536   HGB 8.5*   HCT 27.4*       I have personallly reviewed all pertinent lab results from the last 24 hours.    Physical Exam:   Constitutional: She is oriented to person, place, and time. She appears well-developed and well-nourished. No distress.    HENT:   Head: Normocephalic and atraumatic.     Neck: No thyromegaly present.     Pulmonary/Chest: Effort normal. No respiratory distress.        Abdominal: Soft. She exhibits abdominal incision (wound CDI). She exhibits no distension and no mass. There is no abdominal tenderness. There is no rebound and no guarding.             Musculoskeletal: Normal range of motion and moves all extremeties. No tenderness.       Neurological: She is alert and oriented to person, place, and time. No cranial nerve  deficit. Coordination normal.    Skin: She is not diaphoretic.    Psychiatric: She has a normal mood and affect. Her behavior is normal. Judgment and thought content normal.

## 2025-02-05 NOTE — SUBJECTIVE & OBJECTIVE
Interval History:   Status post CS on 2/3/2025    She is doing well this morning. She is tolerating a regular diet without nausea or vomiting. She is voiding spontaneously. She is ambulating. She has passed flatus, and has not a BM. Vaginal bleeding is mild. She denies fever or chills. Abdominal pain is mild and controlled with oral medications. She Is not breastfeeding. She desires circumcision for her male baby: yes.    Objective:     Vital Signs (Most Recent):  Temp: 97.8 °F (36.6 °C) (02/05/25 0736)  Pulse: 79 (02/05/25 0736)  Resp: 16 (02/05/25 0755)  BP: 119/63 (02/05/25 0736)  SpO2: 98 % (02/05/25 0736) Vital Signs (24h Range):  Temp:  [97.5 °F (36.4 °C)-98.6 °F (37 °C)] 97.8 °F (36.6 °C)  Pulse:  [71-94] 79  Resp:  [16-20] 16  SpO2:  [96 %-100 %] 98 %  BP: (115-129)/(58-75) 119/63     Weight: 93 kg (205 lb)  Body mass index is 36.31 kg/m².      Intake/Output Summary (Last 24 hours) at 2/5/2025 0845  Last data filed at 2/5/2025 0300  Gross per 24 hour   Intake 600 ml   Output 1400 ml   Net -800 ml         Significant Labs:  Lab Results   Component Value Date    GROUPTRH A POS 02/03/2025    HEPBSAG Non-reactive 11/11/2024    STREPBCULT No Group B Streptococcus isolated 12/30/2020     Recent Labs   Lab 02/04/25  0536   HGB 8.5*   HCT 27.4*       I have personallly reviewed all pertinent lab results from the last 24 hours.    Physical Exam:   Constitutional: She appears well-developed and well-nourished. No distress.    HENT:   Head: Normocephalic and atraumatic.    Eyes: EOM are normal.     Cardiovascular:  Normal rate.             Pulmonary/Chest: Effort normal. No respiratory distress.        Abdominal: Soft. She exhibits no distension. There is no abdominal tenderness. There is no rebound and no guarding.   Pfannenstiel incision in AquaCel dressing.  No evidence of active bleeding.             Musculoskeletal: Normal range of motion.       Neurological: She is alert.    Skin: Skin is warm and dry.     Psychiatric: She has a normal mood and affect.       Review of Systems

## 2025-02-05 NOTE — PLAN OF CARE
[facility]  OB Initial Discharge Assessment       Primary Care Provider: Neeraj Guerra MD    Expected Discharge Date: 2025    Initial Assessment (most recent)       OB Discharge Planning Assessment - 25 0930          OB Discharge Planning Assessment    Assessment Type Discharge Planning Assessment     Source of Information patient     Verified Demographic and Insurance Information Yes     Insurance Medicaid     Medicaid [employer]     Medicaid Insurance Primary      Contact Status none needed     People in Home child(titus), dependent     Name(s) of People in Home Kaylan, Cherish, Lucas, Rafat, Galdino, Caril ,Javil     Number people in home 8     Relationship Status --   Single    Name of Support/Comfort Primary Source Galdino [place];  [phone]     Other children (include names and ages) Kaylan 11y, Chreish 7y, Lucas 16y, Rafat 15y, Galdino 9y, Cavil 4y     Employed Full Time     Employer [employer]     Job Title CNA     Currently Enrolled in School No     Highest Level of Education High School Diploma     Father's Involvement Fully Involved     Is Father signing the birth certificate Yes     [address]     Father Currently Enrolled in School No     Father's Employer n/a     Received Prenatal Care Yes     Transportation Anticipated car, drives self     Receive WIC Benefits Not certified, will apply for       Arrangements Self     Adoption Planned no     Infant Feeding Plan formula feeding     Does baby have crib or safe sleep space? Yes     Do you have a car seat? Yes     Has other essential care items? Clothing;Bottles;Diapers     Pediatrician Dr. Sanchez     Current Resources [facility]     Other Providers/Services outpatient care     Equipment Currently Used at Home none     Resources/Education Provided [facility];Medicaid transportation;SSI Benefits;Preparing for Your Baby's Discharge  Home;Support Resources for NICU Families;Insurance Coverage of Breast Pumps and Supplies;WIC;Early Intervention Program;Post Partum Depression;Breast Pumps through [facility] [place] insurance plan;My NICU Baby [facility]     DME Needed Upon Discharge  none     DCFS No indications (Indicators for Report)     Discharge Plan A Home     Discharge Plan B Home     Do you have any problems affording any of your prescribed medications? No        Breastfeeding Supplementation    Infant Indication for Supplementation maternal request                            Healthcare Directives:   Advance Directive  (If Adv Dir status is received, view document under Adv Dir in header or Chart Review Media tab): Patient does not have Advance Directive, declines information.    Patient Requests Assistance: Patient will do independently

## 2025-02-06 PROCEDURE — 11000001 HC ACUTE MED/SURG PRIVATE ROOM

## 2025-02-06 PROCEDURE — 25000003 PHARM REV CODE 250: Performed by: OBSTETRICS & GYNECOLOGY

## 2025-02-06 PROCEDURE — 99232 SBSQ HOSP IP/OBS MODERATE 35: CPT | Mod: ,,, | Performed by: OBSTETRICS & GYNECOLOGY

## 2025-02-06 RX ADMIN — IBUPROFEN 800 MG: 400 TABLET ORAL at 05:02

## 2025-02-06 RX ADMIN — OXYCODONE AND ACETAMINOPHEN 1 TABLET: 10; 325 TABLET ORAL at 11:02

## 2025-02-06 RX ADMIN — IBUPROFEN 800 MG: 400 TABLET ORAL at 09:02

## 2025-02-06 RX ADMIN — OXYCODONE AND ACETAMINOPHEN 1 TABLET: 10; 325 TABLET ORAL at 08:02

## 2025-02-06 RX ADMIN — MUPIROCIN: 20 OINTMENT TOPICAL at 08:02

## 2025-02-06 RX ADMIN — HYDROCORTISONE: 25 CREAM TOPICAL at 09:02

## 2025-02-06 RX ADMIN — MUPIROCIN: 20 OINTMENT TOPICAL at 09:02

## 2025-02-06 RX ADMIN — DOCUSATE SODIUM 200 MG: 100 CAPSULE, LIQUID FILLED ORAL at 09:02

## 2025-02-06 RX ADMIN — OXYCODONE HYDROCHLORIDE AND ACETAMINOPHEN 1 TABLET: 5; 325 TABLET ORAL at 06:02

## 2025-02-06 RX ADMIN — DOCUSATE SODIUM 200 MG: 100 CAPSULE, LIQUID FILLED ORAL at 08:02

## 2025-02-06 RX ADMIN — IBUPROFEN 800 MG: 400 TABLET ORAL at 01:02

## 2025-02-06 RX ADMIN — OXYCODONE AND ACETAMINOPHEN 1 TABLET: 10; 325 TABLET ORAL at 03:02

## 2025-02-06 RX ADMIN — OXYCODONE AND ACETAMINOPHEN 1 TABLET: 10; 325 TABLET ORAL at 01:02

## 2025-02-06 NOTE — PROGRESS NOTES
[facility]  Obstetrics  Postpartum Progress Note    Patient Name: Christopher Avila  MRN: [id number]  Admission Date: 2/3/2025  Hospital Length of Stay: 3 days  Attending Physician: Hugo Giles MD  Primary Care Provider: Neeraj Guerra MD    Subjective:     Principal Problem:Previous  section complicating pregnancy, with delivery    Hospital Course:  During course of observation pt's cervix changed from 3 cm to 4 cm.  Pt then consented for RLTCS and confirmed her desire to PPTL.  Surgery done without complication on 2/3/25.  Infant required NICU admission.    25:  pt ambulating, tolerating diet, +flatus, pain controlled with meds    2025 Walking to NICU to see her baby.  Still with some pain.      2025 Still in pain.  Not yet ready to leave.  Baby just got off oxygen yesterday.  Still with feeding difficulty    Interval History:   Status post repeat low transverse  section on 2/3/25 at 36w1   with feeding difficulty and tachypnea of .  Doing better today in NICU    She is doing well this morning. She is tolerating a regular diet without nausea or vomiting. She is voiding spontaneously. She is ambulating. She has passed flatus, and has a BM. Vaginal bleeding is mild. She denies fever or chills. Abdominal pain is moderate and controlled with oral medications. She Is not breastfeeding. She desires circumcision for her male baby: yes.    Objective:     Vital Signs (Most Recent):  Temp: 98.5 °F (36.9 °C) (25)  Pulse: 86 (25)  Resp: 18 (25)  BP: 135/66 (25)  SpO2: 95 % (25) Vital Signs (24h Range):  Temp:  [97.4 °F (36.3 °C)-98.5 °F (36.9 °C)] 98.5 °F (36.9 °C)  Pulse:  [78-87] 86  Resp:  [18-20] 18  SpO2:  [95 %-99 %] 95 %  BP: (109-146)/(57-78) 135/66     Weight: 93 kg (205 lb)  Body mass index is 36.31 kg/m².    No intake or output data in the 24 hours ending 25 0816      Significant Labs:  Lab Results  "  Component Value Date    GROUPTRH A POS 2025    HEPBSAG Non-reactive 2024    STREPBCULT No Group B Streptococcus isolated 2020     No results for input(s): "HGB", "HCT" in the last 48 hours.    CBC: No results for input(s): "WBC", "RBC", "HGB", "HCT", "PLT", "MCV", "MCH", "MCHC" in the last 48 hours.  I have personallly reviewed all pertinent lab results from the last 24 hours.    Physical Exam:   Constitutional: She appears well-developed and well-nourished. No distress.    HENT:   Head: Normocephalic and atraumatic.    Eyes: EOM are normal.     Cardiovascular:  Normal rate.             Pulmonary/Chest: Effort normal. No respiratory distress.        Abdominal: Soft. She exhibits no distension. There is no abdominal tenderness. There is no rebound and no guarding.   Pfannenstiel incision in AquaCel dressing.  No evidence of active bleeding.             Musculoskeletal: Normal range of motion.       Neurological: She is alert.    Skin: Skin is warm and dry.    Psychiatric: She has a normal mood and affect.       Review of Systems  Assessment/Plan:     34 y.o. female  for:    * Previous  section complicating pregnancy, with delivery  Routine postpartum care  Watch vaginal bleeding  Pain control  Lactation assistance  Discharge home once milestones are met.      Encounter for female sterilization procedure  Done at time of Csec        Disposition: As patient meets milestones, will plan to discharge home.    Marco Sandhu MD  Obstetrics  [facility] - [place], [place] - [facility]      "

## 2025-02-06 NOTE — NURSING
The Aquacel dressing on the patients  incisional site required changing due to a moderate amount of bleeding touching the bottom of the dressing . While removing the Aquacel dressing, sterile gloves were worn  prior to opening the sterile dressing as recommended by the . The incision was cleansed with a pH balanced cleanser and patted dry. The appropriate size dressing was chosen to ensure that direct contact was made between the wound/incision and the hydrofiber pad. The patient was educated to shower with the Aquacel dressing and to report to the physician if the dressing does not remain in place, if there is a large amount of fluid that touches the sides of the dressing, and if any of the following signs and symptoms occur: temperature greater than 100.4, unusual pain, redness, swelling or odor from the incisional/dressing site, heat/warmth around the incision, and/or a full/hard feeling in the abdomen around the incision. Patient verbalized an understanding to all.

## 2025-02-06 NOTE — PLAN OF CARE
Problem: Adult Inpatient Plan of Care  Goal: Plan of Care Review  Outcome: Progressing  Goal: Patient-Specific Goal (Individualized)  Outcome: Progressing  Goal: Absence of Hospital-Acquired Illness or Injury  Outcome: Progressing  Goal: Optimal Comfort and Wellbeing  Outcome: Progressing  Goal: Readiness for Transition of Care  Outcome: Progressing     Problem: Diabetes in Pregnancy  Goal: Optimal Coping  Outcome: Progressing  Goal: Blood Glucose Level Within Targeted Range  Outcome: Progressing     Problem:  Fall Injury Risk  Goal: Absence of Fall, Infant Drop and Related Injury  Outcome: Progressing     Problem: Infection  Goal: Absence of Infection Signs and Symptoms  Outcome: Progressing     Problem: Wound  Goal: Optimal Coping  Outcome: Progressing  Goal: Optimal Functional Ability  Outcome: Progressing  Goal: Absence of Infection Signs and Symptoms  Outcome: Progressing  Goal: Improved Oral Intake  Outcome: Progressing  Goal: Optimal Pain Control and Function  Outcome: Progressing  Goal: Skin Health and Integrity  Outcome: Progressing  Goal: Optimal Wound Healing  Outcome: Progressing     Problem: Postpartum ( Delivery)  Goal: Successful Parent Role Transition  Outcome: Progressing  Goal: Hemostasis  Outcome: Progressing  Goal: Effective Bowel Elimination  Outcome: Progressing  Goal: Fluid and Electrolyte Balance  Outcome: Progressing  Goal: Absence of Infection Signs and Symptoms  Outcome: Progressing  Goal: Anesthesia/Sedation Recovery  Outcome: Progressing  Goal: Optimal Pain Control and Function  Outcome: Progressing  Goal: Nausea and Vomiting Relief  Outcome: Progressing  Goal: Effective Urinary Elimination  Outcome: Progressing  Goal: Effective Oxygenation and Ventilation  Outcome: Progressing

## 2025-02-06 NOTE — PLAN OF CARE
Problem: Adult Inpatient Plan of Care  Goal: Plan of Care Review  Outcome: Progressing     Problem: Adult Inpatient Plan of Care  Goal: Optimal Comfort and Wellbeing  Outcome: Progressing     Problem: Diabetes in Pregnancy  Goal: Optimal Coping  Outcome: Progressing     Problem: Wound  Goal: Optimal Pain Control and Function  Outcome: Progressing     Problem: Wound  Goal: Optimal Wound Healing  Outcome: Progressing     Problem: Wound  Goal: Skin Health and Integrity  Outcome: Progressing     Problem: Postpartum ( Delivery)  Goal: Effective Bowel Elimination  Outcome: Progressing

## 2025-02-06 NOTE — SUBJECTIVE & OBJECTIVE
"Interval History:   Status post repeat low transverse  section on 2/3/25 at 36w1   with feeding difficulty and tachypnea of .  Doing better today in NICU    She is doing well this morning. She is tolerating a regular diet without nausea or vomiting. She is voiding spontaneously. She is ambulating. She has passed flatus, and has a BM. Vaginal bleeding is mild. She denies fever or chills. Abdominal pain is moderate and controlled with oral medications. She Is not breastfeeding. She desires circumcision for her male baby: yes.    Objective:     Vital Signs (Most Recent):  Temp: 98.5 °F (36.9 °C) (25)  Pulse: 86 (25)  Resp: 18 (25)  BP: 135/66 (25)  SpO2: 95 % (25) Vital Signs (24h Range):  Temp:  [97.4 °F (36.3 °C)-98.5 °F (36.9 °C)] 98.5 °F (36.9 °C)  Pulse:  [78-87] 86  Resp:  [18-20] 18  SpO2:  [95 %-99 %] 95 %  BP: (109-146)/(57-78) 135/66     Weight: 93 kg (205 lb)  Body mass index is 36.31 kg/m².    No intake or output data in the 24 hours ending 25 0816      Significant Labs:  Lab Results   Component Value Date    GROUPTRH A POS 2025    HEPBSAG Non-reactive 2024    STREPBCULT No Group B Streptococcus isolated 2020     No results for input(s): "HGB", "HCT" in the last 48 hours.    CBC: No results for input(s): "WBC", "RBC", "HGB", "HCT", "PLT", "MCV", "MCH", "MCHC" in the last 48 hours.  I have personallly reviewed all pertinent lab results from the last 24 hours.    Physical Exam:   Constitutional: She appears well-developed and well-nourished. No distress.    HENT:   Head: Normocephalic and atraumatic.    Eyes: EOM are normal.     Cardiovascular:  Normal rate.             Pulmonary/Chest: Effort normal. No respiratory distress.        Abdominal: Soft. She exhibits no distension. There is no abdominal tenderness. There is no rebound and no guarding.   Pfannenstiel incision in AquaCel dressing.  No evidence of " active bleeding.             Musculoskeletal: Normal range of motion.       Neurological: She is alert.    Skin: Skin is warm and dry.    Psychiatric: She has a normal mood and affect.       Review of Systems

## 2025-02-07 VITALS
WEIGHT: 205 LBS | OXYGEN SATURATION: 95 % | SYSTOLIC BLOOD PRESSURE: 124 MMHG | BODY MASS INDEX: 36.32 KG/M2 | HEART RATE: 81 BPM | RESPIRATION RATE: 20 BRPM | DIASTOLIC BLOOD PRESSURE: 65 MMHG | HEIGHT: 63 IN | TEMPERATURE: 98 F

## 2025-02-07 PROCEDURE — 63600175 PHARM REV CODE 636 W HCPCS: Performed by: OBSTETRICS & GYNECOLOGY

## 2025-02-07 PROCEDURE — 25000003 PHARM REV CODE 250: Performed by: OBSTETRICS & GYNECOLOGY

## 2025-02-07 PROCEDURE — 90471 IMMUNIZATION ADMIN: CPT | Performed by: OBSTETRICS & GYNECOLOGY

## 2025-02-07 PROCEDURE — 3E0234Z INTRODUCTION OF SERUM, TOXOID AND VACCINE INTO MUSCLE, PERCUTANEOUS APPROACH: ICD-10-PCS | Performed by: OBSTETRICS & GYNECOLOGY

## 2025-02-07 PROCEDURE — 99238 HOSP IP/OBS DSCHRG MGMT 30/<: CPT | Mod: ,,, | Performed by: OBSTETRICS & GYNECOLOGY

## 2025-02-07 PROCEDURE — 90715 TDAP VACCINE 7 YRS/> IM: CPT | Performed by: OBSTETRICS & GYNECOLOGY

## 2025-02-07 RX ORDER — OXYCODONE AND ACETAMINOPHEN 5; 325 MG/1; MG/1
1 TABLET ORAL EVERY 4 HOURS PRN
Status: DISCONTINUED | OUTPATIENT
Start: 2025-02-07 | End: 2025-02-07 | Stop reason: HOSPADM

## 2025-02-07 RX ORDER — IBUPROFEN 800 MG/1
800 TABLET ORAL EVERY 8 HOURS
Qty: 40 TABLET | Refills: 0 | Status: SHIPPED | OUTPATIENT
Start: 2025-02-07

## 2025-02-07 RX ORDER — OXYCODONE AND ACETAMINOPHEN 5; 325 MG/1; MG/1
1 TABLET ORAL EVERY 6 HOURS PRN
Qty: 20 TABLET | Refills: 0 | Status: SHIPPED | OUTPATIENT
Start: 2025-02-07

## 2025-02-07 RX ADMIN — DOCUSATE SODIUM 200 MG: 100 CAPSULE, LIQUID FILLED ORAL at 09:02

## 2025-02-07 RX ADMIN — CLOSTRIDIUM TETANI TOXOID ANTIGEN (FORMALDEHYDE INACTIVATED), CORYNEBACTERIUM DIPHTHERIAE TOXOID ANTIGEN (FORMALDEHYDE INACTIVATED), BORDETELLA PERTUSSIS TOXOID ANTIGEN (GLUTARALDEHYDE INACTIVATED), BORDETELLA PERTUSSIS FILAMENTOUS HEMAGGLUTININ ANTIGEN (FORMALDEHYDE INACTIVATED), BORDETELLA PERTUSSIS PERTACTIN ANTIGEN, AND BORDETELLA PERTUSSIS FIMBRIAE 2/3 ANTIGEN 0.5 ML: 5; 2; 2.5; 5; 3; 5 INJECTION, SUSPENSION INTRAMUSCULAR at 09:02

## 2025-02-07 RX ADMIN — MUPIROCIN: 20 OINTMENT TOPICAL at 09:02

## 2025-02-07 RX ADMIN — OXYCODONE AND ACETAMINOPHEN 1 TABLET: 10; 325 TABLET ORAL at 05:02

## 2025-02-07 RX ADMIN — IBUPROFEN 800 MG: 400 TABLET ORAL at 05:02

## 2025-02-07 RX ADMIN — OXYCODONE HYDROCHLORIDE AND ACETAMINOPHEN 1 TABLET: 5; 325 TABLET ORAL at 09:02

## 2025-02-07 NOTE — PLAN OF CARE
Problem: Adult Inpatient Plan of Care  Goal: Absence of Hospital-Acquired Illness or Injury  Outcome: Progressing  Goal: Optimal Comfort and Wellbeing  Outcome: Progressing     Problem: Infection  Goal: Absence of Infection Signs and Symptoms  Outcome: Progressing     Problem: Wound  Goal: Absence of Infection Signs and Symptoms  Outcome: Progressing  Goal: Improved Oral Intake  Outcome: Progressing  Goal: Optimal Pain Control and Function  Outcome: Progressing  Goal: Skin Health and Integrity  Outcome: Progressing  Goal: Optimal Wound Healing  Outcome: Progressing

## 2025-02-07 NOTE — PLAN OF CARE
Problem: Adult Inpatient Plan of Care  Goal: Plan of Care Review  Outcome: Progressing    Problem: Adult Inpatient Plan of Care  Goal: Optimal Comfort and Wellbeing  Outcome: Progressing     Problem: Adult Inpatient Plan of Care  Goal: Readiness for Transition of Care  Outcome: Progressing     Problem:  Fall Injury Risk  Goal: Absence of Fall, Infant Drop and Related Injury  Outcome: Progressing     Problem: Infection  Goal: Absence of Infection Signs and Symptoms  Outcome: Progressing     Problem: Wound  Goal: Optimal Wound Healing  Outcome: Progressing

## 2025-02-07 NOTE — DISCHARGE SUMMARY
"[facility] - [facility]  Obstetrics  Discharge Summary      Patient Name: Christopher Avila  MRN: [id number]  Admission Date: 2/3/2025  Hospital Length of Stay: 4 days  Discharge Date and Time:  2025 7:19 AM  Attending Physician: Hugo Giles MD   Discharging Provider: Hugo Giles MD   Primary Care Provider: Neeraj Guerra MD    HPI: 34 yro  @ 36 wk 1 d who was sent to L&D from the office due to stated ctx's and pelvic pain.  Pt checked in office and noted to be 3/80/-2.        Procedure(s) (LRB):   SECTION (N/A)     Hospital Course:   During course of observation pt's cervix changed from 3 cm to 4 cm.  Pt then consented for RLTCS and confirmed her desire to PPTL.  Surgery done without complication on 2/3/25.  Infant required NICU admission.    25:  pt ambulating, tolerating diet, +flatus, pain controlled with meds    2025 Walking to NICU to see her baby.  Still with some pain.      2025 Still in pain.  Not yet ready to leave.  Baby just got off oxygen yesterday.  Still with feeding difficulty    25:  Pt sad that infant cannot be d/c'd today.  Otherwise, all maternal milestones met for d/c. Pt is not breast feeding.         Final Active Diagnoses:    Diagnosis Date Noted POA    PRINCIPAL PROBLEM:  Previous  section complicating pregnancy, with delivery [O34.219] 2021 Yes    Encounter for female sterilization procedure [Z30.2] 2025 Not Applicable      Problems Resolved During this Admission:    Diagnosis Date Noted Date Resolved POA    Abnormal glucose affecting pregnancy [O99.810] 2025 Yes    Insufficient prenatal care in third trimester [O09.33] 2025 Not Applicable     labor third trimester with  delivery third trimester, not applicable or unspecified [O60.14X0] 2025 Yes        Significant Diagnostic Studies: Labs: CBC No results for input(s): "WBC", "HGB", "HCT", "PLT" in the last 48 " hours.      Feeding Method: bottle    Immunizations       Date Immunization Status Dose Route/Site Given by    25 0658 MMR Deferred (Other) 0.5 mL Subcutaneous/ Cristal Fan RN    25 1651 Tdap Incomplete 0.5 mL Intramuscular/             Delivery:    Episiotomy:     Lacerations:     Repair suture:     Repair # of packets:     Blood loss (ml):       Birth information:  YOB: [birth year]25   Time of birth: 3:27 PM   Sex: male   Delivery type: , Low Transverse   Gestational Age: 36w1d     Measurements    Weight: 3290 g  Weight (lbs): 7 lb 4.1 oz  Length:          Delivery Clinician: Delivery Providers    Delivering clinician: Hugo Giles MD   Provider Role    Sarah Beth Da Silva, RN Delivery Nurse    Bienvenido Stanley CRNA Nurse Anesthetist    Loli Florentino Surgical Tech    Kate Varenr RN Registered Nurse    Bri Cortez PA-C Physician Assistant             Additional  information:  Forceps:    Vacuum:    Breech:    Observed anomalies      Living?:     Apgars    Living status: Living  Apgar Component Scores:  1 min.:  5 min.:  10 min.:  15 min.:  20 min.:    Skin color:  0  1       Heart rate:  2  2       Reflex irritability:  2  2       Muscle tone:  0  2       Respiratory effort:  1  2       Total:  5  9       Apgars assigned by: KATE VARNER         Placenta: Delivered:       appearance  Pending Diagnostic Studies:       None            Discharged Condition: good    Disposition: Home or Self Care    Follow Up:   Follow-up Information       Hugo Giles MD Follow up on 2025.    Specialties: Obstetrics, Obstetrics and Gynecology  Why: For wound re-check and dressing removal  Contact information:  [address]  [address]  [facility] [address] [phone]                           Patient Instructions:      Diet Adult Regular     Pelvic Rest     Lifting restrictions     Ice to affected area     Notify your health care provider if you experience any of  the following:  temperature >100.4     Notify your health care provider if you experience any of the following:  persistent nausea and vomiting or diarrhea     Notify your health care provider if you experience any of the following:  severe uncontrolled pain     Notify your health care provider if you experience any of the following:  redness, tenderness, or signs of infection (pain, swelling, redness, odor or green/yellow discharge around incision site)     Notify your health care provider if you experience any of the following:  difficulty breathing or increased cough     Notify your health care provider if you experience any of the following:  severe persistent headache     Notify your health care provider if you experience any of the following:  persistent dizziness, light-headedness, or visual disturbances     Notify your health care provider if you experience any of the following:  increased confusion or weakness     Leave dressing on - Keep it clean, dry, and intact until clinic visit     Medications:  Current Discharge Medication List        START taking these medications    Details   ibuprofen (ADVIL,MOTRIN) 800 MG tablet Take 1 tablet (800 mg total) by mouth every 8 (eight) hours.  Qty: 40 tablet, Refills: 0    Associated Diagnoses: Previous  section complicating pregnancy, with delivery           CONTINUE these medications which have CHANGED    Details   oxyCODONE-acetaminophen (PERCOCET) 5-325 mg per tablet Take 1 tablet by mouth every 6 (six) hours as needed for Pain.  Qty: 20 tablet, Refills: 0    Comments: Quantity prescribed more than 7 day supply? no  Associated Diagnoses: Previous  section complicating pregnancy, with delivery           STOP taking these medications       acetaminophen-codeine 300-30mg (TYLENOL #3) 300-30 mg Tab Comments:   Reason for Stopping:         aspirin (ECOTRIN) 81 MG EC tablet Comments:   Reason for Stopping:         cyclobenzaprine (FLEXERIL) 5 MG tablet  Comments:   Reason for Stopping:         docusate sodium (COLACE) 100 MG capsule Comments:   Reason for Stopping:         ferrous sulfate 325 (65 FE) MG EC tablet Comments:   Reason for Stopping:         fluconazole (DIFLUCAN) 150 MG Tab Comments:   Reason for Stopping:         FLUoxetine 10 MG capsule Comments:   Reason for Stopping:         medroxyPROGESTERone (DEPO-PROVERA) 150 mg/mL Syrg Comments:   Reason for Stopping:         omeprazole (PRILOSEC OTC) 20 MG tablet Comments:   Reason for Stopping:         ondansetron (ZOFRAN-ODT) 4 MG TbDL Comments:   Reason for Stopping:         PNV,calcium 72-iron-folic acid (PRENATAL VITAMIN PLUS LOW IRON) 27 mg iron- 1 mg Tab Comments:   Reason for Stopping:         prenatal vit no.130-iron-folic 27 mg iron- 800 mcg Tab Comments:   Reason for Stopping:               Hugo Giles MD  Obstetrics  [facility] - [facility]

## 2025-02-07 NOTE — PROGRESS NOTES
[facility]  Obstetrics  Postpartum Progress Note    Patient Name: Christopher Avila  MRN: [id number]  Admission Date: 2/3/2025  Hospital Length of Stay: 4 days  Attending Physician: Hugo Giles MD  Primary Care Provider: Neeraj Guerra MD    Subjective:     Principal Problem:Previous  section complicating pregnancy, with delivery    Hospital Course:  During course of observation pt's cervix changed from 3 cm to 4 cm.  Pt then consented for RLTCS and confirmed her desire to PPTL.  Surgery done without complication on 2/3/25.  Infant required NICU admission.    25:  pt ambulating, tolerating diet, +flatus, pain controlled with meds    2025 Walking to NICU to see her baby.  Still with some pain.      2025 Still in pain.  Not yet ready to leave.  Baby just got off oxygen yesterday.  Still with feeding difficulty    25:  Pt sad that infant cannot be d/c'd today.  Otherwise, all maternal milestones met for d/c. Pt is not breast feeding.    Interval History: POD #4 s/p RLTCS/PPTL    She is doing well this morning. She is tolerating a regular diet without nausea or vomiting. She is voiding spontaneously. She is ambulating. She has passed flatus, and has not a BM. Vaginal bleeding is mild. She denies fever or chills. Abdominal pain is mild and controlled with oral medications. She Is not breastfeeding.     Objective:     Vital Signs (Most Recent):  Temp: 98.1 °F (36.7 °C) (25 043)  Pulse: 82 (25 043)  Resp: 18 (25 0536)  BP: 134/81 (25)  SpO2: 98 % (25) Vital Signs (24h Range):  Temp:  [98.1 °F (36.7 °C)-99 °F (37.2 °C)] 98.1 °F (36.7 °C)  Pulse:  [78-93] 82  Resp:  [18-20] 18  SpO2:  [95 %-99 %] 98 %  BP: (117-136)/(60-90) 134/81     Weight: 93 kg (205 lb)  Body mass index is 36.31 kg/m².      Intake/Output Summary (Last 24 hours) at 2025 0708  Last data filed at 2025 1700  Gross per 24 hour   Intake 600 ml   Output --   Net 600 ml  "        Significant Labs:  Lab Results   Component Value Date    GROUPTRH A POS 2025    HEPBSAG Non-reactive 2024    STREPBCULT No Group B Streptococcus isolated 2020     No results for input(s): "HGB", "HCT" in the last 48 hours.    I have personallly reviewed all pertinent lab results from the last 24 hours.    Physical Exam:   Constitutional: She is oriented to person, place, and time. She appears well-developed and well-nourished. No distress.    HENT:   Head: Normocephalic and atraumatic.     Neck: No thyromegaly present.     Pulmonary/Chest: Effort normal. No respiratory distress.        Abdominal: Soft. She exhibits abdominal incision (wound CDI). She exhibits no distension and no mass. There is no abdominal tenderness. There is no rebound and no guarding.             Musculoskeletal: Normal range of motion and moves all extremeties. No tenderness.       Neurological: She is alert and oriented to person, place, and time. No cranial nerve deficit. Coordination normal.    Skin: Skin is warm. She is not diaphoretic. No erythema.    Psychiatric: She has a normal mood and affect. Her behavior is normal. Judgment and thought content normal.         Assessment/Plan:     34 y.o. female  for:    * Previous  section complicating pregnancy, with delivery  Dismiss today      Encounter for female sterilization procedure  Done at time of Csec        Disposition: As patient meets milestones, will plan to discharge today.    Hugo Giles MD  Obstetrics  [facility]      "

## 2025-02-07 NOTE — SUBJECTIVE & OBJECTIVE
"Interval History: POD #4 s/p RLTCS/PPTL    She is doing well this morning. She is tolerating a regular diet without nausea or vomiting. She is voiding spontaneously. She is ambulating. She has passed flatus, and has not a BM. Vaginal bleeding is mild. She denies fever or chills. Abdominal pain is mild and controlled with oral medications. She Is not breastfeeding.     Objective:     Vital Signs (Most Recent):  Temp: 98.1 °F (36.7 °C) (02/07/25 0436)  Pulse: 82 (02/07/25 0436)  Resp: 18 (02/07/25 0536)  BP: 134/81 (02/07/25 0436)  SpO2: 98 % (02/07/25 0436) Vital Signs (24h Range):  Temp:  [98.1 °F (36.7 °C)-99 °F (37.2 °C)] 98.1 °F (36.7 °C)  Pulse:  [78-93] 82  Resp:  [18-20] 18  SpO2:  [95 %-99 %] 98 %  BP: (117-136)/(60-90) 134/81     Weight: 93 kg (205 lb)  Body mass index is 36.31 kg/m².      Intake/Output Summary (Last 24 hours) at 2/7/2025 0708  Last data filed at 2/6/2025 1700  Gross per 24 hour   Intake 600 ml   Output --   Net 600 ml         Significant Labs:  Lab Results   Component Value Date    GROUPTRH A POS 02/03/2025    HEPBSAG Non-reactive 11/11/2024    STREPBCULT No Group B Streptococcus isolated 12/30/2020     No results for input(s): "HGB", "HCT" in the last 48 hours.    I have personallly reviewed all pertinent lab results from the last 24 hours.    Physical Exam:   Constitutional: She is oriented to person, place, and time. She appears well-developed and well-nourished. No distress.    HENT:   Head: Normocephalic and atraumatic.     Neck: No thyromegaly present.     Pulmonary/Chest: Effort normal. No respiratory distress.        Abdominal: Soft. She exhibits abdominal incision (wound CDI). She exhibits no distension and no mass. There is no abdominal tenderness. There is no rebound and no guarding.             Musculoskeletal: Normal range of motion and moves all extremeties. No tenderness.       Neurological: She is alert and oriented to person, place, and time. No cranial nerve deficit. " Coordination normal.    Skin: Skin is warm. She is not diaphoretic. No erythema.    Psychiatric: She has a normal mood and affect. Her behavior is normal. Judgment and thought content normal.

## 2025-02-11 ENCOUNTER — OFFICE VISIT (OUTPATIENT)
Dept: OBSTETRICS AND GYNECOLOGY | Facility: CLINIC | Age: 35
End: 2025-02-11
Payer: MEDICAID

## 2025-02-11 VITALS
WEIGHT: 187.38 LBS | DIASTOLIC BLOOD PRESSURE: 80 MMHG | BODY MASS INDEX: 33.19 KG/M2 | SYSTOLIC BLOOD PRESSURE: 132 MMHG

## 2025-02-11 DIAGNOSIS — Z98.891 S/P CESAREAN SECTION: ICD-10-CM

## 2025-02-11 PROCEDURE — 1159F MED LIST DOCD IN RCRD: CPT | Mod: CPTII,,, | Performed by: OBSTETRICS & GYNECOLOGY

## 2025-02-11 PROCEDURE — 3008F BODY MASS INDEX DOCD: CPT | Mod: CPTII,,, | Performed by: OBSTETRICS & GYNECOLOGY

## 2025-02-11 PROCEDURE — 99212 OFFICE O/P EST SF 10 MIN: CPT | Mod: PBBFAC | Performed by: OBSTETRICS & GYNECOLOGY

## 2025-02-11 PROCEDURE — 99999 PR PBB SHADOW E&M-EST. PATIENT-LVL II: CPT | Mod: PBBFAC,,, | Performed by: OBSTETRICS & GYNECOLOGY

## 2025-02-11 PROCEDURE — 1111F DSCHRG MED/CURRENT MED MERGE: CPT | Mod: CPTII,,, | Performed by: OBSTETRICS & GYNECOLOGY

## 2025-02-11 PROCEDURE — 99213 OFFICE O/P EST LOW 20 MIN: CPT | Mod: S$PBB,,, | Performed by: OBSTETRICS & GYNECOLOGY

## 2025-02-11 PROCEDURE — 3075F SYST BP GE 130 - 139MM HG: CPT | Mod: CPTII,,, | Performed by: OBSTETRICS & GYNECOLOGY

## 2025-02-11 PROCEDURE — 3079F DIAST BP 80-89 MM HG: CPT | Mod: CPTII,,, | Performed by: OBSTETRICS & GYNECOLOGY

## 2025-02-11 RX ORDER — SERTRALINE HYDROCHLORIDE 50 MG/1
50 TABLET, FILM COATED ORAL DAILY
Qty: 30 TABLET | Refills: 2 | Status: SHIPPED | OUTPATIENT
Start: 2025-02-11 | End: 2026-02-11

## 2025-02-11 NOTE — PROGRESS NOTES
Cc:  postop check    HPI:  34 yr G7 now  who had RLTCS/PPTL done 1 week ago at 36 weeks due to PTL.  Infant still in NICU but doing well overall.  Pt states she has been crying a lot and feeling very sad.  Denies HI or SI.  Pt sleeping very poorly.  Pt willing to try medication to see if it improves depression sx's.  Pt states she would contact office if sx's worsen.    Vitals:    25 0944   BP: 132/80   Weight: 85 kg (187 lb 6.3 oz)     Abd:  soft NTND  Wound:  aquacel dressing removed and wound appears to be healing well    Assessment/Plan  1. Postpartum depression    2. S/P  section      Rx zoloft 50 mg qd, re-eval sx's in 1 month with pp exam.  Pt promises to call office if sx's worsen

## 2025-02-19 ENCOUNTER — TELEPHONE (OUTPATIENT)
Dept: OBSTETRICS AND GYNECOLOGY | Facility: CLINIC | Age: 35
End: 2025-02-19
Payer: MEDICAID

## 2025-02-19 NOTE — TELEPHONE ENCOUNTER
Pt call was returned. Pt will come into the office on 2025 at 115 pm to see Dr. Padilla.    ----- Message from Lori sent at 2025 10:33 AM CST -----  [phone]  Type: Patient Call BackWho called: self What is the request in detail:Symptom: Suture ConcernsOutcome: Talk to a nurse or provider within 15 minutes.Reason: Caller denied all higher acuity questions/ she stated on the left side had yellow/ greenish fluid, she asked for antibiotics, also stated she has back pain and does not want to take pain pill if there is a way she can get a muscle relaxer or something else. [facility] [place] [address] [id number] [address]Sznkg: [phone] Fax: [phone] Can the clinic reply by [facility]? noWould the patient rather a call back or a response via [facility]? Call back Best call back number: [phone]

## 2025-02-24 ENCOUNTER — PATIENT MESSAGE (OUTPATIENT)
Dept: OBSTETRICS AND GYNECOLOGY | Facility: CLINIC | Age: 35
End: 2025-02-24
Payer: MEDICAID

## 2025-03-03 ENCOUNTER — PATIENT MESSAGE (OUTPATIENT)
Dept: OBSTETRICS AND GYNECOLOGY | Facility: HOSPITAL | Age: 35
End: 2025-03-03
Payer: MEDICAID

## 2025-06-10 ENCOUNTER — HOSPITAL ENCOUNTER (EMERGENCY)
Facility: HOSPITAL | Age: 35
Discharge: HOME | End: 2025-06-10
Attending: EMERGENCY MEDICINE
Payer: MEDICAID

## 2025-06-10 VITALS
TEMPERATURE: 98 F | SYSTOLIC BLOOD PRESSURE: 140 MMHG | HEART RATE: 86 BPM | BODY MASS INDEX: 36.32 KG/M2 | WEIGHT: 205 LBS | RESPIRATION RATE: 20 BRPM | DIASTOLIC BLOOD PRESSURE: 88 MMHG | HEIGHT: 63 IN | OXYGEN SATURATION: 97 %

## 2025-06-10 DIAGNOSIS — R04.0 NOSEBLEED: Primary | ICD-10-CM

## 2025-06-10 DIAGNOSIS — S02.5XXB OPEN FRACTURE OF TOOTH, INITIAL ENCOUNTER: ICD-10-CM

## 2025-06-10 DIAGNOSIS — R09.81 NASAL CONGESTION: ICD-10-CM

## 2025-06-10 PROBLEM — N83.201 CYST OF RIGHT OVARY: Status: ACTIVE | Noted: 2018-12-27

## 2025-06-10 PROBLEM — D64.9 ANEMIA: Status: ACTIVE | Noted: 2021-02-15

## 2025-06-10 LAB
B-HCG UR QL: NEGATIVE
CTP QC/QA: YES
INFLUENZA A MOLECULAR (OHS): NEGATIVE
INFLUENZA B MOLECULAR (OHS): NEGATIVE
SARS-COV-2 RDRP RESP QL NAA+PROBE: NEGATIVE

## 2025-06-10 PROCEDURE — 99283 EMERGENCY DEPT VISIT LOW MDM: CPT

## 2025-06-10 PROCEDURE — 25000003 PHARM REV CODE 250: Performed by: PHYSICIAN ASSISTANT

## 2025-06-10 PROCEDURE — 87502 INFLUENZA DNA AMP PROBE: CPT | Performed by: EMERGENCY MEDICINE

## 2025-06-10 PROCEDURE — U0002 COVID-19 LAB TEST NON-CDC: HCPCS | Performed by: EMERGENCY MEDICINE

## 2025-06-10 PROCEDURE — 81025 URINE PREGNANCY TEST: CPT | Performed by: EMERGENCY MEDICINE

## 2025-06-10 RX ORDER — AMOXICILLIN 875 MG/1
875 TABLET, COATED ORAL 2 TIMES DAILY
Qty: 14 TABLET | Refills: 0 | Status: SHIPPED | OUTPATIENT
Start: 2025-06-10

## 2025-06-10 RX ORDER — FLUTICASONE PROPIONATE 50 MCG
1 SPRAY, SUSPENSION (ML) NASAL 2 TIMES DAILY PRN
Qty: 15 G | Refills: 0 | Status: SHIPPED | OUTPATIENT
Start: 2025-06-10

## 2025-06-10 RX ORDER — ACETAMINOPHEN 500 MG
1000 TABLET ORAL
Status: DISCONTINUED | OUTPATIENT
Start: 2025-06-10 | End: 2025-06-10

## 2025-06-10 RX ORDER — OXYMETAZOLINE HCL 0.05 %
1 SPRAY, NON-AEROSOL (ML) NASAL
Status: COMPLETED | OUTPATIENT
Start: 2025-06-10 | End: 2025-06-10

## 2025-06-10 RX ORDER — PSEUDOEPHEDRINE HCL 120 MG/1
120 TABLET, FILM COATED, EXTENDED RELEASE ORAL 2 TIMES DAILY PRN
Qty: 20 TABLET | Refills: 0 | Status: SHIPPED | OUTPATIENT
Start: 2025-06-10 | End: 2025-06-20

## 2025-06-10 RX ADMIN — Medication 1 SPRAY: at 03:06

## 2025-06-10 NOTE — ED PROVIDER NOTES
Encounter Date: 6/10/2025       History     Chief Complaint   Patient presents with    Multiple complaints     Ha, cough, nose draining with some blood      34-year-old female with history of migraines, ovarian cyst, anemia presents for multiple complaints.  Her initial presenting complaint is for nasal congestion with intermittent small volume epistaxis.  She reports associated headache, sinus discomfort and a broken tooth.  She denies fever, trouble breathing or facial swelling.  She is not anticoagulated.      Review of patient's allergies indicates:  No Known Allergies  Past Medical History:   Diagnosis Date    Abnormal menstrual periods     Anemia     Dental caries     Migraine headache     Ovarian cyst      Past Surgical History:   Procedure Laterality Date     SECTION N/A 2021    Procedure:  SECTION;  Surgeon: Jitendra Guerra MD;  Location: [facility] L&D OR;  Service: OB/GYN;  Laterality: N/A;     SECTION N/A 2/3/2025    Procedure:  SECTION;  Surgeon: Hugo Giles MD;  Location: [facility] L&D OR;  Service: OB/GYN;  Laterality: N/A;     Family History   Problem Relation Name Age of Onset    Diabetes Mother      Cancer Mother      Diabetes Maternal Grandmother       Social History[1]  Review of Systems    Physical Exam     Initial Vitals [06/10/25 1410]   BP Pulse Resp Temp SpO2   (!) 140/88 86 20 98 °F (36.7 °C) 97 %      MAP       --         Physical Exam    Nursing note and vitals reviewed.  Constitutional: She appears well-developed and well-nourished.   HENT:   Head: Normocephalic and atraumatic.   Right Ear: Hearing, tympanic membrane, external ear and ear canal normal.   Left Ear: Hearing, tympanic membrane, external ear and ear canal normal.   Nose: Mucosal edema present. Epistaxis is observed. Right sinus exhibits maxillary sinus tenderness. Left sinus exhibits maxillary sinus tenderness. Mouth/Throat: Abnormal dentition.   Bloody mucoid discharge from the nose.   Significant mucosal edema bilaterally    There is a broken tooth, no dental abscess   Eyes: EOM are normal. Pupils are equal, round, and reactive to light.   Cardiovascular:  Normal rate, regular rhythm, normal heart sounds and intact distal pulses.     Exam reveals no gallop and no friction rub.       No murmur heard.  Pulmonary/Chest: Breath sounds normal. No respiratory distress. She has no wheezes. She has no rhonchi. She has no rales. She exhibits no tenderness.   Musculoskeletal:         General: Normal range of motion.     Neurological: She is alert and oriented to person, place, and time.   Skin: Skin is warm and dry.   Psychiatric: She has a normal mood and affect.         ED Course   Procedures  Labs Reviewed   INFLUENZA A & B BY MOLECULAR - Normal       Result Value    INFLUENZA A MOLECULAR Negative      INFLUENZA B MOLECULAR  Negative     SARS-COV-2 RNA AMPLIFICATION, QUAL - Normal    SARS COV-2 Molecular Negative     POCT URINE PREGNANCY    POC Preg Test, Ur Negative       Acceptable Yes            Imaging Results    None          Medications   acetaminophen tablet 1,000 mg (has no administration in time range)   oxymetazoline 0.05 % nasal spray 1 spray (1 spray Each Nostril Given 6/10/25 5254)     Medical Decision Making  34-year-old female presenting for nasal congestion epistaxis.  Mildly hypertensive with otherwise normal vitals.  Nontoxic appearing.    Differential diagnosis:  Viral syndrome   Allergic rhinitis  There is only a small amount of blood mixed in with the mucus, I do not suspect loss anemia  Broken tooth    Patient given Afrin with improvement of congestion, sensation of epistaxis.  COVID and flu tests are negative.  Will discharge with instructions to follow up with dentist, medications for symptomatic relief, antibiotics for dental infection and instructions to return to the ED for worsening symptoms. Stressed the importance of follow-up, strict ED return precautions  given.  Patient voiced understanding and is comfortable with discharge.         Amount and/or Complexity of Data Reviewed  Labs: ordered. Decision-making details documented in ED Course.    Risk  OTC drugs.  Prescription drug management.               ED Course as of 06/10/25 1603   Tue Eyad 10, 2025   1516 Influenza A, Molecular: Negative [CC]   1516 Influenza B, Molecular: Negative [CC]   1516 SARS COV-2 MOLECULAR: Negative [CC]   1550 hCG Qualitative, Urine: Negative [CC]      ED Course User Index  [CC] Naida Steinberg PA-C                           Clinical Impression:  Final diagnoses:  [R04.0] Nosebleed (Primary)  [R09.81] Nasal congestion  [S02.5XXB] Open fracture of tooth, initial encounter          ED Disposition Condition    Discharge Stable          ED Prescriptions       Medication Sig Dispense Start Date End Date Auth. Provider    amoxicillin (AMOXIL) 875 MG tablet Take 1 tablet (875 mg total) by mouth 2 (two) times daily. 14 tablet 6/10/2025 -- aNida Steinberg PA-C    pseudoephedrine (SUDAFED 12 HOUR) 120 mg 12 hr tablet Take 1 tablet (120 mg total) by mouth 2 (two) times daily as needed for Congestion. 20 tablet 6/10/2025 6/20/2025 Naida Steinberg PA-C    fluticasone propionate (FLONASE) 50 mcg/actuation nasal spray 1 spray (50 mcg total) by Each Nostril route 2 (two) times daily as needed for Rhinitis. 15 g 6/10/2025 -- Naida Steinberg PA-C          Follow-up Information       Follow up With Specialties Details Why Contact Info    Neeraj Guerra MD Internal Medicine Schedule an appointment as soon as possible for a visit in 1 week  [address] [facility] [address]  [phone]      [facility] - Dental Dental General Practice, Oral and Maxillofacial Surgery, Oral Surgery Schedule an appointment as soon as possible for a visit in 1 week  [address] [facility] [address]  [phone]      [facility] - Emergency Dept Emergency Medicine Go to  If  symptoms worsen [address]  [facility] [address] [phone]                   [1]   Social History  Tobacco Use    Smoking status: Former     Current packs/day: 0.00     Types: Cigarettes     Quit date: 2020     Years since quittin.0    Smokeless tobacco: Never   Substance Use Topics    Alcohol use: Not Currently     Comment: occasional prior to pregnancy    Drug use: Not Currently     Types: Marijuana        Naida Steinberg PA-C  06/10/25 1607

## 2025-06-10 NOTE — FIRST PROVIDER EVALUATION
Medical screening examination initiated.  I have conducted a focused provider triage encounter, findings are as follows:    Brief history of present illness:  34 F here for lightheaded with cough, nose bleed started 3 days ago.  Sent from work    There were no vitals filed for this visit.    Pertinent physical exam:  tissue in both nostrils, speaking in clear sentences    Brief workup plan:  flu/covid    Preliminary workup initiated; this workup will be continued and followed by the physician or advanced practice provider that is assigned to the patient when roomed.

## 2025-06-10 NOTE — Clinical Note
"Christopher Todd (Jorann)y was seen and treated in our emergency department on 6/10/2025.  She may return to work on 06/12/2025.       If you have any questions or concerns, please don't hesitate to call.      Naida Steinberg PA-C"

## 2025-06-10 NOTE — ED TRIAGE NOTES
Patient identifiers verified and correct for Christopher Avila  C/C: cough, runny nose, headache   APPEARANCE: awake and alert in NAD.   SKIN: warm, dry and intact. No breakdown or bruising.  MUSCULOSKELETAL: Patient moving all extremities spontaneously, no obvious swelling or deformities noted. Ambulates independently.  RESPIRATORY: Denies shortness of breath.Respirations unlabored.   CARDIAC: Denies CP, 2+ distal pulses; no peripheral edema  ABDOMEN: S/ND/NT, reports nausea and vomiting and diarrhea  : voids spontaneously, denies difficulty  Neurologic: AAO x 4; follows commands equal strength in all extremities; denies numbness/tingling. Denies dizziness       Coughing fits to the point of vomiting. Headache, runny nose. X3days.

## 2025-06-10 NOTE — DISCHARGE INSTRUCTIONS
Diagnosis:  Nosebleed, nasal congestion, broken tooth    You have some swelling inside your nose which I think is likely due to a virus and causing your nosebleed.  You given Afrin in the emergency department, you can use this for 3 days to help with congestion and bleeding.  Do not use this for longer than 3 days as it can cause rebound congestion.  I am prescribing a steroid nasal spray as well as an antibiotic for your broken tooth.  You can go to a pharmacy and buy a dental repair kit. You should take Tylenol as needed for pain up to 3 grams daily which is 6 of the 500 mg extra strength tablets.  You can take ibuprofen in addition to this as needed up to 2400 mg daily.    Please call to schedule follow up appointment with a dentist in your primary care doctor.  If you start to have worsening symptoms please return to the emergency department.

## 2025-06-23 ENCOUNTER — HOSPITAL ENCOUNTER (EMERGENCY)
Facility: HOSPITAL | Age: 35
Discharge: HOME | End: 2025-06-23
Attending: STUDENT IN AN ORGANIZED HEALTH CARE EDUCATION/TRAINING PROGRAM
Payer: MEDICAID

## 2025-06-23 VITALS
SYSTOLIC BLOOD PRESSURE: 119 MMHG | RESPIRATION RATE: 17 BRPM | DIASTOLIC BLOOD PRESSURE: 66 MMHG | HEIGHT: 63 IN | OXYGEN SATURATION: 99 % | HEART RATE: 80 BPM | BODY MASS INDEX: 36.32 KG/M2 | TEMPERATURE: 98 F | WEIGHT: 205 LBS

## 2025-06-23 DIAGNOSIS — R07.9 CHEST PAIN, UNSPECIFIED TYPE: Primary | ICD-10-CM

## 2025-06-23 LAB
ABSOLUTE EOSINOPHIL (OHS): 0.7 K/UL
ABSOLUTE MONOCYTE (OHS): 0.41 K/UL (ref 0.3–1)
ABSOLUTE NEUTROPHIL COUNT (OHS): 2.95 K/UL (ref 1.8–7.7)
ALBUMIN SERPL BCP-MCNC: 4 G/DL (ref 3.5–5.2)
ALP SERPL-CCNC: 69 UNIT/L (ref 40–150)
ALT SERPL W/O P-5'-P-CCNC: 14 UNIT/L (ref 10–44)
ANION GAP (OHS): 8 MMOL/L (ref 8–16)
AST SERPL-CCNC: 16 UNIT/L (ref 11–45)
BASOPHILS # BLD AUTO: 0.08 K/UL
BASOPHILS NFR BLD AUTO: 1.2 %
BILIRUB SERPL-MCNC: 0.8 MG/DL (ref 0.1–1)
BNP SERPL-MCNC: <10 PG/ML (ref 0–99)
BUN SERPL-MCNC: 13 MG/DL (ref 6–20)
CALCIUM SERPL-MCNC: 8.9 MG/DL (ref 8.7–10.5)
CHLORIDE SERPL-SCNC: 106 MMOL/L (ref 95–110)
CO2 SERPL-SCNC: 25 MMOL/L (ref 23–29)
CREAT SERPL-MCNC: 0.7 MG/DL (ref 0.5–1.4)
D DIMER PPP IA.FEU-MCNC: 0.41 MG/L FEU
ERYTHROCYTE [DISTWIDTH] IN BLOOD BY AUTOMATED COUNT: 13.6 % (ref 11.5–14.5)
GFR SERPLBLD CREATININE-BSD FMLA CKD-EPI: >60 ML/MIN/1.73/M2
GLUCOSE SERPL-MCNC: 94 MG/DL (ref 70–110)
HCT VFR BLD AUTO: 38.9 % (ref 37–48.5)
HGB BLD-MCNC: 12.5 GM/DL (ref 12–16)
IMM GRANULOCYTES # BLD AUTO: 0.01 K/UL (ref 0–0.04)
IMM GRANULOCYTES NFR BLD AUTO: 0.2 % (ref 0–0.5)
LYMPHOCYTES # BLD AUTO: 2.34 K/UL (ref 1–4.8)
MCH RBC QN AUTO: 27.8 PG (ref 27–31)
MCHC RBC AUTO-ENTMCNC: 32.1 G/DL (ref 32–36)
MCV RBC AUTO: 86 FL (ref 82–98)
NUCLEATED RBC (/100WBC) (OHS): 0 /100 WBC
PLATELET # BLD AUTO: 319 K/UL (ref 150–450)
PMV BLD AUTO: 8.7 FL (ref 9.2–12.9)
POTASSIUM SERPL-SCNC: 3.6 MMOL/L (ref 3.5–5.1)
PROT SERPL-MCNC: 7.4 GM/DL (ref 6–8.4)
RBC # BLD AUTO: 4.5 M/UL (ref 4–5.4)
RELATIVE EOSINOPHIL (OHS): 10.8 %
RELATIVE LYMPHOCYTE (OHS): 36.1 % (ref 18–48)
RELATIVE MONOCYTE (OHS): 6.3 % (ref 4–15)
RELATIVE NEUTROPHIL (OHS): 45.4 % (ref 38–73)
SODIUM SERPL-SCNC: 139 MMOL/L (ref 136–145)
TROPONIN I SERPL HS-MCNC: <3 NG/L
TROPONIN I SERPL HS-MCNC: <3 NG/L
WBC # BLD AUTO: 6.49 K/UL (ref 3.9–12.7)

## 2025-06-23 PROCEDURE — 83880 ASSAY OF NATRIURETIC PEPTIDE: CPT | Performed by: EMERGENCY MEDICINE

## 2025-06-23 PROCEDURE — 80053 COMPREHEN METABOLIC PANEL: CPT | Performed by: EMERGENCY MEDICINE

## 2025-06-23 PROCEDURE — 93010 ELECTROCARDIOGRAM REPORT: CPT | Mod: ,,, | Performed by: INTERNAL MEDICINE

## 2025-06-23 PROCEDURE — 85379 FIBRIN DEGRADATION QUANT: CPT | Performed by: PHYSICIAN ASSISTANT

## 2025-06-23 PROCEDURE — 84484 ASSAY OF TROPONIN QUANT: CPT | Performed by: EMERGENCY MEDICINE

## 2025-06-23 PROCEDURE — 93005 ELECTROCARDIOGRAM TRACING: CPT

## 2025-06-23 PROCEDURE — 99285 EMERGENCY DEPT VISIT HI MDM: CPT | Mod: 25

## 2025-06-23 PROCEDURE — 85025 COMPLETE CBC W/AUTO DIFF WBC: CPT | Performed by: EMERGENCY MEDICINE

## 2025-06-23 PROCEDURE — 25000003 PHARM REV CODE 250: Performed by: PHYSICIAN ASSISTANT

## 2025-06-23 RX ORDER — HYDROCODONE BITARTRATE AND ACETAMINOPHEN 5; 325 MG/1; MG/1
1 TABLET ORAL
Refills: 0 | Status: COMPLETED | OUTPATIENT
Start: 2025-06-23 | End: 2025-06-23

## 2025-06-23 RX ORDER — HYDROXYZINE PAMOATE 25 MG/1
25 CAPSULE ORAL
Status: COMPLETED | OUTPATIENT
Start: 2025-06-23 | End: 2025-06-23

## 2025-06-23 RX ORDER — LIDOCAINE 50 MG/G
1 PATCH TOPICAL
Status: DISCONTINUED | OUTPATIENT
Start: 2025-06-23 | End: 2025-06-23 | Stop reason: HOSPADM

## 2025-06-23 RX ORDER — LIDOCAINE 50 MG/G
1 PATCH TOPICAL DAILY
Qty: 30 PATCH | Refills: 0 | Status: SHIPPED | OUTPATIENT
Start: 2025-06-23 | End: 2025-07-23

## 2025-06-23 RX ORDER — GUAIFENESIN 100 MG/5ML
100-200 LIQUID ORAL EVERY 4 HOURS PRN
Qty: 60 ML | Refills: 0 | Status: SHIPPED | OUTPATIENT
Start: 2025-06-23 | End: 2025-07-03

## 2025-06-23 RX ADMIN — HYDROCODONE BITARTRATE AND ACETAMINOPHEN 1 TABLET: 5; 325 TABLET ORAL at 06:06

## 2025-06-23 RX ADMIN — HYDROXYZINE PAMOATE 25 MG: 25 CAPSULE ORAL at 06:06

## 2025-06-23 RX ADMIN — LIDOCAINE 1 PATCH: 50 PATCH CUTANEOUS at 06:06

## 2025-06-23 NOTE — ED TRIAGE NOTES
Christopher Avila, a 34 y.o. female presents to the ED w/ complaint of chest pain. Pt endorses sharp pain underneath the left breast since yesterday morning. Pt states that the pain is 10/10 and is made worse by moving/breathing/talking.    Triage note:  Chief Complaint   Patient presents with    Chest Pain     Pain inc with breathing     Review of patient's allergies indicates:  No Known Allergies  Past Medical History:   Diagnosis Date    Abnormal menstrual periods     Anemia     Dental caries     Migraine headache     Ovarian cyst

## 2025-06-23 NOTE — Clinical Note
"Christopher Todd (Jorann)y was seen and treated in our emergency department on 6/23/2025.  She may return to work on 06/26/2025.       If you have any questions or concerns, please don't hesitate to call.      Lary Brown PA-C"

## 2025-06-23 NOTE — ED PROVIDER NOTES
Encounter Date: 2025       History     Chief Complaint   Patient presents with    Chest Pain     Pain inc with breathing     34-year-old female with no known reported chronic medical conditions is presenting to the ED with a 2 day history of pain to chest under her left breast with associated shortness of breath and cough productive of yellow sputum.  Pain is exacerbated by taking deep breath, coughing, laughing, or movement.  She denies palpitations, peripheral edema, leg pain, nausea, vomiting, abdominal pain, fatigue, lightheadedness, diaphoresis, or known fever.  No therapies attempted prior to arrival.  No known or reported sick contacts.  She denies OCP/HRT use, recent surgeries, recent extended periods of immobilization, personal history of VTE, or known familial history of VTE.  She did quit smoking 2-3 weeks ago.    The history is provided by the patient. No  was used.     Review of patient's allergies indicates:  No Known Allergies  Past Medical History:   Diagnosis Date    Abnormal menstrual periods     Anemia     Dental caries     Migraine headache     Ovarian cyst      Past Surgical History:   Procedure Laterality Date     SECTION N/A 2021    Procedure:  SECTION;  Surgeon: Jitendra Guerra MD;  Location: [facility] L&D OR;  Service: OB/GYN;  Laterality: N/A;     SECTION N/A 2025    Procedure:  SECTION;  Surgeon: Hguo Giles MD;  Location: [facility] L&[facility] OR;  Service: OB/GYN;  Laterality: N/A;    TUBAL LIGATION       Family History   Problem Relation Name Age of Onset    Diabetes Mother      Cancer Mother      Diabetes Maternal Grandmother       Social History[1]  Review of Systems    Physical Exam     Initial Vitals [25 1614]   BP Pulse Resp Temp SpO2   (!) 140/64 97 20 98.5 °F (36.9 °C) 98 %      MAP       --         Physical Exam    Vitals reviewed.  Constitutional: She appears well-developed. She is not diaphoretic. She is active.   Non-toxic appearance. She does not have a sickly appearance. She does not appear ill. No distress.   Tearful, appears mildly anxious.   HENT:   Head: Normocephalic and atraumatic.   Eyes: Conjunctivae, EOM and lids are normal.   Neck: Neck supple.   Normal range of motion.  Cardiovascular:  Normal rate, regular rhythm and normal heart sounds.           Pulmonary/Chest: Effort normal and breath sounds normal. No apnea. No respiratory distress. She has no decreased breath sounds. She has no wheezes. She has no rhonchi. She has no rales. She exhibits no tenderness, no crepitus, no edema, no deformity and no swelling.   Abdominal: Abdomen is soft and flat. There is no abdominal tenderness. There is no rebound and no guarding.   Musculoskeletal:      Cervical back: Normal range of motion and neck supple.      Right lower leg: Normal. No swelling or tenderness. No edema.      Left lower leg: Normal. No swelling or tenderness. No edema.     Neurological: She is alert. No cranial nerve deficit. She exhibits normal muscle tone. Gait normal.   Skin: Skin is warm and dry.         ED Course   Procedures  Labs Reviewed   CBC WITH DIFFERENTIAL - Abnormal       Result Value    WBC 6.49      RBC 4.50      HGB 12.5      HCT 38.9      MCV 86      MCH 27.8      MCHC 32.1      RDW 13.6      Platelet Count 319      MPV 8.7 (*)     Nucleated RBC 0      Neut % 45.4      Lymph % 36.1      Mono % 6.3      Eos % 10.8 (*)     Basophil % 1.2      Imm Grans % 0.2      Neut # 2.95      Lymph # 2.34      Mono # 0.41      Eos # 0.70 (*)     Baso # 0.08      Imm Grans # 0.01     COMPREHENSIVE METABOLIC PANEL - Normal    Sodium 139      Potassium 3.6      Chloride 106      CO2 25      Glucose 94      BUN 13      Creatinine 0.7      Calcium 8.9      Protein Total 7.4      Albumin 4.0      Bilirubin Total 0.8      ALP 69      AST 16      ALT 14      Anion Gap 8      eGFR >60     TROPONIN I HIGH SENSITIVITY - Normal    Troponin High Sensitive <3      TROPONIN I HIGH SENSITIVITY - Normal    Troponin High Sensitive <3     B-TYPE NATRIURETIC PEPTIDE - Normal    BNP <10     D DIMER, QUANTITATIVE - Normal    D-Dimer 0.41     CBC W/ AUTO DIFFERENTIAL    Narrative:     The following orders were created for panel order CBC auto differential.  Procedure                               Abnormality         Status                     ---------                               -----------         ------                     CBC with Differential[[id number]]       Abnormal            Final result                 Please view results for these tests on the individual orders.   POCT URINE PREGNANCY     EKG Readings: (Independently Interpreted)   Initial Reading: No STEMI. Previous EKG: Compared with most recent EKG Previous EKG Date: 12/21/2020. Rhythm: Normal Sinus Rhythm. Heart Rate: 88. Ectopy: No Ectopy. Conduction: Normal. ST Segments: Normal ST Segments. T Waves: Normal. Clinical Impression: Normal Sinus Rhythm       Imaging Results              X-Ray Chest AP Portable (Final result)  Result time 06/23/25 18:09:03      Final result by Chao Fortune MD (06/23/25 18:09:03)                   Impression:      1. Interstitial findings are accentuated by habitus, no large focal consolidation.      Electronically signed by: Chao Fortune MD  Date:    06/23/2025  Time:    18:09               Narrative:    EXAMINATION:  XR CHEST AP PORTABLE    CLINICAL HISTORY:  Chest Pain;    TECHNIQUE:  Single frontal view of the chest was performed.    COMPARISON:  06/10/2020    FINDINGS:  The cardiomediastinal silhouette is not enlarged.  There is no pleural effusion.  The trachea is midline.  The lungs are symmetrically expanded bilaterally with coarse interstitial attenuation, accentuated by habitus..  No large focal consolidation seen.  There is no pneumothorax.  The osseous structures are unremarkable.                                    X-Rays:   Independently Interpreted Readings:    Chest X-Ray: Normal heart size.  No infiltrates.  No acute abnormalities.     Medications   LIDOcaine 5 % patch 1 patch (1 patch Transdermal Patch Applied 6/23/25 1810)   HYDROcodone-acetaminophen 5-325 mg per tablet 1 tablet (1 tablet Oral Given 6/23/25 1808)   hydrOXYzine pamoate capsule 25 mg (25 mg Oral Given 6/23/25 1808)     Medical Decision Making  34 y.o. female with no pertinent past medical history presented to the ED with left-sided chest pain.  Differentials include, but are not limited to PE, ACS, chest wall pain, anxiety/acute stress reaction.  She has no PE risk factors and technically can be PERC'd out, however given symptoms, will add on D-dimer.  ECG with normal sinus rhythm, no ischemic changes compared to previous.  Chest x-ray without acute findings.  Troponin x2 within normal limits.  She received improvement in symptoms with hydroxyzine, Norco, and lidocaine patch.  She was discharged in stable condition.    Amount and/or Complexity of Data Reviewed  Labs: ordered. Decision-making details documented in ED Course.  Radiology: ordered and independent interpretation performed.  ECG/medicine tests: ordered and independent interpretation performed.    Risk  OTC drugs.  Prescription drug management.               ED Course as of 06/23/25 1946 Mon Jun 23, 2025   1746 EKG with sinus rhythm, rate 88, no STEMI [NN]   1751 Hemoglobin: 12.5 [SE]   1809 D-Dimer: 0.41 [SE]   1944 Reassessment: The patient is resting comfortably.  She reports improvement in symptoms and feels safe for discharge home.  I discussed results and plan to discharge with Cardiology referral, work excuse, and lidocaine patches.  She is additionally requesting antitussive so Robitussin was added.  Strict return precautions discussed.  All questions answered.  The patient verbalized understanding and agreed to plan [SE]      ED Course User Index  [NN] Laura Zimmerman MD  [SE] Lary Brown PA-C                            Clinical Impression:  Final diagnoses:  [R07.9] Chest pain, unspecified type (Primary)          ED Disposition Condition    Discharge Stable          ED Prescriptions       Medication Sig Dispense Start Date End Date Auth. Provider    guaiFENesin 100 mg/5 ml (ROBITUSSIN) 100 mg/5 mL syrup Take 5-10 mLs (100-200 mg total) by mouth every 4 (four) hours as needed for Cough. 60 mL 2025 7/3/2025 Lary Brown PA-C    LIDOcaine (LIDODERM) 5 % Place 1 patch onto the skin once daily. Remove & Discard patch within 12 hours or as directed by MD 30 patch 2025 Lary Brown PA-C          Follow-up Information    None                [1]   Social History  Tobacco Use    Smoking status: Former     Types: Cigarettes     Start date: 2025     Quit date: 2020     Years since quittin.0    Smokeless tobacco: Never   Substance Use Topics    Alcohol use: Not Currently     Comment: Occasionally    Drug use: Not Currently     Types: Marijuana        Lary Brown PA-C  25

## 2025-06-23 NOTE — FIRST PROVIDER EVALUATION
Medical screening examination initiated.  I have conducted a focused provider triage encounter, findings are as follows:    Brief history of present illness:  chest pain today    There were no vitals filed for this visit.    Pertinent physical exam:  no respiratory distress    Brief workup plan:  cardiac evaluaTION    Preliminary workup initiated; this workup will be continued and followed by the physician or advanced practice provider that is assigned to the patient when roomed.

## 2025-06-24 LAB
OHS QRS DURATION: 86 MS
OHS QTC CALCULATION: 447 MS

## 2025-06-24 NOTE — DISCHARGE INSTRUCTIONS
A referral was sent to cardiology. Someone from the scheduling service should call you to set up an appointment soon. Please answer all phone calls even if you do not know the number as this may be the scheduling service calling to set up an appointment. Take the prescribed Robitussin as instructed as needed for cough. Use the lidocaine patches exactly as instructed as needed for pain relief. Place a single lidocaine patch on the most painful muscle area and leave it on for up to 12 hours (do not leave the patch on for longer than 12 hours). Do not use more than 1 lidocaine patch per day. Return to the ER with any worsening or concerning symptoms including, but not limited to worsening or new/changing chest pain, chest pain radiating to your neck/jaw/arm, shortness of breath, palpitations (i.e., feeling like your heart is racing, fluttering, or skipping a beat), dizziness/lightheadedness, passing out/fainting or feeling like you are going to pass out/faint, or any additional symptom you believe warrants emergency evaluation.

## 2025-06-26 NOTE — PLAN OF CARE
DOUG faxed an Ambulatory Referral/Consult to Cardiology @[facility]. Fax#[phone].    MARYSOL Gutierrez, MSW-LMSW  Medical Social Worker/  ER Department

## 2025-08-30 ENCOUNTER — HOSPITAL ENCOUNTER (EMERGENCY)
Facility: HOSPITAL | Age: 35
Discharge: HOME | End: 2025-08-30
Attending: EMERGENCY MEDICINE
Payer: MEDICAID

## 2025-08-30 VITALS
OXYGEN SATURATION: 95 % | DIASTOLIC BLOOD PRESSURE: 61 MMHG | HEART RATE: 97 BPM | TEMPERATURE: 98 F | HEIGHT: 63 IN | SYSTOLIC BLOOD PRESSURE: 122 MMHG | BODY MASS INDEX: 40.75 KG/M2 | RESPIRATION RATE: 20 BRPM | WEIGHT: 230 LBS

## 2025-08-30 DIAGNOSIS — H10.32 ACUTE CONJUNCTIVITIS OF LEFT EYE, UNSPECIFIED ACUTE CONJUNCTIVITIS TYPE: Primary | ICD-10-CM

## 2025-08-30 PROCEDURE — 99283 EMERGENCY DEPT VISIT LOW MDM: CPT

## 2025-08-30 PROCEDURE — 25000003 PHARM REV CODE 250

## 2025-08-30 RX ORDER — ERYTHROMYCIN 5 MG/G
OINTMENT OPHTHALMIC
Status: COMPLETED | OUTPATIENT
Start: 2025-08-30 | End: 2025-08-30

## 2025-08-30 RX ORDER — ERYTHROMYCIN 5 MG/G
OINTMENT OPHTHALMIC
Qty: 1 G | Refills: 0 | Status: SHIPPED | OUTPATIENT
Start: 2025-08-30

## 2025-08-30 RX ADMIN — ERYTHROMYCIN: 5 OINTMENT OPHTHALMIC at 07:08

## (undated) DEVICE — DRESSING AQUACEL RIBBON 2X45CM